# Patient Record
Sex: FEMALE | Race: WHITE | NOT HISPANIC OR LATINO | Employment: FULL TIME | URBAN - METROPOLITAN AREA
[De-identification: names, ages, dates, MRNs, and addresses within clinical notes are randomized per-mention and may not be internally consistent; named-entity substitution may affect disease eponyms.]

---

## 2019-03-06 ENCOUNTER — OFFICE VISIT (OUTPATIENT)
Dept: FAMILY MEDICINE CLINIC | Facility: CLINIC | Age: 34
End: 2019-03-06

## 2019-03-06 VITALS
TEMPERATURE: 99.8 F | OXYGEN SATURATION: 97 % | SYSTOLIC BLOOD PRESSURE: 120 MMHG | HEIGHT: 67 IN | BODY MASS INDEX: 43.32 KG/M2 | DIASTOLIC BLOOD PRESSURE: 90 MMHG | RESPIRATION RATE: 18 BRPM | WEIGHT: 276 LBS | HEART RATE: 96 BPM

## 2019-03-06 DIAGNOSIS — B27.90 MONONUCLEOSIS SYNDROME: Primary | ICD-10-CM

## 2019-03-06 PROBLEM — R55 VASODEPRESSOR SYNCOPE: Status: ACTIVE | Noted: 2019-03-06

## 2019-03-06 PROBLEM — Q24.8 PERICARDIAL CYST: Status: ACTIVE | Noted: 2019-03-06

## 2019-03-06 LAB
EXPIRATION DATE: NORMAL
FLUAV AG NPH QL: NEGATIVE
FLUBV AG NPH QL: NEGATIVE
INTERNAL CONTROL: NORMAL
Lab: NORMAL

## 2019-03-06 PROCEDURE — 87804 INFLUENZA ASSAY W/OPTIC: CPT | Performed by: FAMILY MEDICINE

## 2019-03-06 PROCEDURE — 99213 OFFICE O/P EST LOW 20 MIN: CPT | Performed by: FAMILY MEDICINE

## 2019-03-06 RX ORDER — MONTELUKAST SODIUM 10 MG/1
TABLET ORAL
COMMUNITY
Start: 2019-02-22 | End: 2020-07-18 | Stop reason: ALTCHOICE

## 2019-03-06 RX ORDER — ALBUTEROL SULFATE 90 UG/1
2 AEROSOL, METERED RESPIRATORY (INHALATION) EVERY 4 HOURS PRN
COMMUNITY
Start: 2019-02-22

## 2019-03-06 RX ORDER — PREDNISONE 20 MG/1
20 TABLET ORAL 2 TIMES DAILY
Qty: 14 TABLET | Refills: 0 | Status: SHIPPED | OUTPATIENT
Start: 2019-03-06 | End: 2019-03-13

## 2019-03-06 RX ORDER — SPIRONOLACTONE 100 MG/1
100 TABLET, FILM COATED ORAL DAILY
COMMUNITY

## 2019-03-06 RX ORDER — FLUTICASONE PROPIONATE 50 MCG
2 SPRAY, SUSPENSION (ML) NASAL DAILY
COMMUNITY

## 2019-03-06 RX ORDER — CETIRIZINE HYDROCHLORIDE 10 MG/1
10 TABLET ORAL DAILY
COMMUNITY

## 2019-03-06 NOTE — PROGRESS NOTES
"Subjective   Bri Pfeiffer is a 33 y.o. female.     CC: Cough/Respiratory Sx    History of Present Illness     Pt comes in today after being seen at [facility] on 2/22/19 for a roughly 1 month h/o coughing. Had initially been seen there and took Augmentin but went back for the above. Was given Prednisone taper over 7 days and then Flonase. Got better with her neck/throat swelling but, as tapered it, the swelling and sx came back.  PMH: prior CMV but not mono.  Has \"felt terrible\" for the entire month. Sx wax and wane. Temps to 102 this Monday.  Elevates to sleep due to GERD but this is unchanged.    The following portions of the patient's history were reviewed and updated as appropriate: allergies, current medications, past family history, past medical history, past social history, past surgical history and problem list.    Review of Systems   Constitutional: Negative for activity change, chills, fatigue and fever.   HENT: Positive for congestion, ear pain, sore throat and trouble swallowing. Negative for drooling and ear discharge.    Respiratory: Positive for cough, shortness of breath and stridor. Negative for chest tightness.    Cardiovascular: Negative for chest pain and palpitations.   Gastrointestinal: Negative for abdominal pain, diarrhea, nausea and vomiting.   Endocrine: Negative for cold intolerance.   Musculoskeletal: Positive for neck pain.   Neurological: Positive for headaches.   Psychiatric/Behavioral: Negative for behavioral problems and dysphoric mood.       /90   Pulse 96   Temp 99.8 °F (37.7 °C) (Oral)   Resp 18   Ht 170.2 cm (67\")   Wt 125 kg (276 lb)   SpO2 97%   BMI 43.23 kg/m²     Objective   Physical Exam   Constitutional: She appears well-developed and well-nourished.   HENT:   Mouth/Throat: Uvula is midline. Posterior oropharyngeal erythema present.   Neck: Neck supple. No thyromegaly present.   Cardiovascular: Normal rate and regular rhythm.   No murmur " heard.  Pulmonary/Chest: Effort normal and breath sounds normal. She has no wheezes. She has no rales.   Abdominal: Bowel sounds are normal. There is no tenderness.   Lymphadenopathy:        Head (right side): Submandibular (tender) adenopathy present.        Head (left side): Submandibular (tender) adenopathy present.   Neurological: She is alert.   Psychiatric: She has a normal mood and affect. Her behavior is normal.   Nursing note and vitals reviewed.  Flu Screen: NEGATIVE    Assessment/Plan   Bri was seen today for sore throat and fever.    Diagnoses and all orders for this visit:    Mononucleosis syndrome  -     POC Influenza A / B  -     Comprehensive Metabolic Panel  -     CBC & Differential  -     Mononucleosis Screen  -     predniSONE (DELTASONE) 20 MG tablet; Take 1 tablet by mouth 2 (Two) Times a Day for 7 days.    Strongly feel this is Mono, or a Mono-type syndrome. Avoid Tylenol, hydrate, rest.

## 2019-03-07 LAB
ALBUMIN SERPL-MCNC: 4.3 G/DL (ref 3.5–5.2)
ALBUMIN/GLOB SERPL: 1.3 G/DL
ALP SERPL-CCNC: 90 U/L (ref 39–117)
ALT SERPL-CCNC: 12 U/L (ref 1–33)
AST SERPL-CCNC: 10 U/L (ref 1–32)
BASOPHILS # BLD AUTO: 0.08 10*3/MM3 (ref 0–0.2)
BASOPHILS NFR BLD AUTO: 0.3 % (ref 0–1.5)
BILIRUB SERPL-MCNC: 0.4 MG/DL (ref 0.1–1.2)
BUN SERPL-MCNC: 8 MG/DL (ref 6–20)
BUN/CREAT SERPL: 9.6 (ref 7–25)
CALCIUM SERPL-MCNC: 9.7 MG/DL (ref 8.6–10.5)
CHLORIDE SERPL-SCNC: 102 MMOL/L (ref 98–107)
CO2 SERPL-SCNC: 27.6 MMOL/L (ref 22–29)
CREAT SERPL-MCNC: 0.83 MG/DL (ref 0.57–1)
EOSINOPHIL # BLD AUTO: 0.18 10*3/MM3 (ref 0–0.4)
EOSINOPHIL NFR BLD AUTO: 0.8 % (ref 0.3–6.2)
ERYTHROCYTE [DISTWIDTH] IN BLOOD BY AUTOMATED COUNT: 14.2 % (ref 12.3–15.4)
GLOBULIN SER CALC-MCNC: 3.3 GM/DL
GLUCOSE SERPL-MCNC: 89 MG/DL (ref 65–99)
HCT VFR BLD AUTO: 42.5 % (ref 34–46.6)
HETEROPH AB SER QL LA: NEGATIVE
HGB BLD-MCNC: 12.2 G/DL (ref 12–15.9)
IMM GRANULOCYTES # BLD AUTO: 0.17 10*3/MM3 (ref 0–0.05)
IMM GRANULOCYTES NFR BLD AUTO: 0.7 % (ref 0–0.5)
LYMPHOCYTES # BLD AUTO: 1.98 10*3/MM3 (ref 0.7–3.1)
LYMPHOCYTES NFR BLD AUTO: 8.6 % (ref 19.6–45.3)
MCH RBC QN AUTO: 26.5 PG (ref 26.6–33)
MCHC RBC AUTO-ENTMCNC: 28.7 G/DL (ref 31.5–35.7)
MCV RBC AUTO: 92.2 FL (ref 79–97)
MONOCYTES # BLD AUTO: 1.98 10*3/MM3 (ref 0.1–0.9)
MONOCYTES NFR BLD AUTO: 8.6 % (ref 5–12)
NEUTROPHILS # BLD AUTO: 18.52 10*3/MM3 (ref 1.4–7)
NEUTROPHILS NFR BLD AUTO: 81 % (ref 42.7–76)
NRBC BLD AUTO-RTO: 0 /100 WBC (ref 0–0)
PLATELET # BLD AUTO: 406 10*3/MM3 (ref 140–450)
POTASSIUM SERPL-SCNC: 4.5 MMOL/L (ref 3.5–5.2)
PROT SERPL-MCNC: 7.6 G/DL (ref 6–8.5)
RBC # BLD AUTO: 4.61 10*6/MM3 (ref 3.77–5.28)
SODIUM SERPL-SCNC: 141 MMOL/L (ref 136–145)
WBC # BLD AUTO: 22.91 10*3/MM3 (ref 3.4–10.8)

## 2019-03-11 DIAGNOSIS — D72.829 LEUKOCYTOSIS, UNSPECIFIED TYPE: Primary | ICD-10-CM

## 2019-03-25 LAB
BASOPHILS # BLD AUTO: 0.04 10*3/MM3 (ref 0–0.2)
BASOPHILS NFR BLD AUTO: 0.4 % (ref 0–1.5)
EOSINOPHIL # BLD AUTO: 0.11 10*3/MM3 (ref 0–0.4)
EOSINOPHIL NFR BLD AUTO: 1.2 % (ref 0.3–6.2)
ERYTHROCYTE [DISTWIDTH] IN BLOOD BY AUTOMATED COUNT: 14.3 % (ref 12.3–15.4)
HCT VFR BLD AUTO: 39.9 % (ref 34–46.6)
HGB BLD-MCNC: 11.7 G/DL (ref 12–15.9)
IMM GRANULOCYTES # BLD AUTO: 0.02 10*3/MM3 (ref 0–0.05)
IMM GRANULOCYTES NFR BLD AUTO: 0.2 % (ref 0–0.5)
LYMPHOCYTES # BLD AUTO: 2.31 10*3/MM3 (ref 0.7–3.1)
LYMPHOCYTES NFR BLD AUTO: 25.4 % (ref 19.6–45.3)
MCH RBC QN AUTO: 26.4 PG (ref 26.6–33)
MCHC RBC AUTO-ENTMCNC: 29.3 G/DL (ref 31.5–35.7)
MCV RBC AUTO: 89.9 FL (ref 79–97)
MONOCYTES # BLD AUTO: 0.83 10*3/MM3 (ref 0.1–0.9)
MONOCYTES NFR BLD AUTO: 9.1 % (ref 5–12)
NEUTROPHILS # BLD AUTO: 5.78 10*3/MM3 (ref 1.4–7)
NEUTROPHILS NFR BLD AUTO: 63.7 % (ref 42.7–76)
NRBC BLD AUTO-RTO: 0 /100 WBC (ref 0–0)
PLATELET # BLD AUTO: 391 10*3/MM3 (ref 140–450)
RBC # BLD AUTO: 4.44 10*6/MM3 (ref 3.77–5.28)
WBC # BLD AUTO: 9.09 10*3/MM3 (ref 3.4–10.8)

## 2019-03-26 ENCOUNTER — TELEPHONE (OUTPATIENT)
Dept: FAMILY MEDICINE CLINIC | Facility: CLINIC | Age: 34
End: 2019-03-26

## 2019-03-26 DIAGNOSIS — D64.9 ANEMIA, UNSPECIFIED TYPE: Primary | ICD-10-CM

## 2019-03-26 NOTE — TELEPHONE ENCOUNTER
Regarding: RE: RE: Test Results Question  Contact: [phone]  ----- Message from Mychart, Generic sent at 3/26/2019  3:33 PM EDT -----    Thank you for getting back with me! I have a lot of GI issues I have not had addressed but need to look in to. I'd appreciate the referral.  Thanks!    ----- Message -----  From: Alan Benavidez MD  Sent: 3/26/19 3:27 PM  To: Bri Pfeiffer  Subject: RE: Test Results Question    Thanks for the quick email. Since your original CBC was abnormal, thus the repeat that is markedly better, although showing some anemia, we have several options. IF you are having any gastrointestinal issues (heartburn, etc), I would recommend a referral to GI. If feeling great, I would simply repeat the CBC in 1 month. Let me know which way you would like to go.      ----- Message -----     From: Bri Pfeiffer     Sent: 3/26/2019 12:54 PM EDT       To: Alan Benavidez MD  Subject: Test Results Question    I have a question about CBC WITH DIFFERENTIAL resulted on 3/25/19 at 8:07 PM.    I have an IUD, and do not have any bleeding with my periods. What do you advise I look in to?  Thanks so much!  Bri Pfeiffer

## 2019-04-22 ENCOUNTER — OFFICE VISIT (OUTPATIENT)
Dept: GASTROENTEROLOGY | Facility: CLINIC | Age: 34
End: 2019-04-22

## 2019-04-22 VITALS
WEIGHT: 281 LBS | DIASTOLIC BLOOD PRESSURE: 81 MMHG | BODY MASS INDEX: 42.59 KG/M2 | HEIGHT: 68 IN | SYSTOLIC BLOOD PRESSURE: 123 MMHG | HEART RATE: 73 BPM

## 2019-04-22 DIAGNOSIS — K62.5 RECTAL BLEEDING: ICD-10-CM

## 2019-04-22 DIAGNOSIS — R13.19 ESOPHAGEAL DYSPHAGIA: Primary | ICD-10-CM

## 2019-04-22 PROCEDURE — 99244 OFF/OP CNSLTJ NEW/EST MOD 40: CPT | Performed by: INTERNAL MEDICINE

## 2019-04-22 RX ORDER — SODIUM CHLORIDE, SODIUM LACTATE, POTASSIUM CHLORIDE, CALCIUM CHLORIDE 600; 310; 30; 20 MG/100ML; MG/100ML; MG/100ML; MG/100ML
30 INJECTION, SOLUTION INTRAVENOUS CONTINUOUS
Status: CANCELLED | OUTPATIENT
Start: 2019-04-22

## 2019-04-22 NOTE — PROGRESS NOTES
Subjective   Bri Pfeiffer is a 33 y.o.. female is being seen for consultation today at the request of Alan Benavidez MD    Chief Complaint   Patient presents with   • Anemia   • Heartburn   • Difficulty Swallowing   • Abdominal Pain   • Constipation   • Diarrhea   • Rectal Bleeding     History of Present Illness  In the first week of March patient had a serious febrile illness with white count of in excess of 22,000.  Serologic workup was actually negative, follow-up blood work demonstrated a mild anemia, and the symptoms eventually completely resolved.  However, during this workup patient made note of her GI symptoms which have been waxing and waning over a long period of time and that includes intermittent heartburn, occasional odynophagia, occasional solid food dysphasia, alternating diarrhea and constipation, and occasional rectal bleeding.  She was a soon as the symptoms would become severe enough to seek medical attention they would tend to go away on their own for a few months.  She has had no melena.  She said no unusual weight loss.  She is not aware of anything that provokes the symptoms.  She does have allergies and asthma.    The following portions of the patient's history were reviewed and updated as appropriate: allergies, current medications, past family history, past medical history, past social history, past surgical history and problem list.      Current Outpatient Medications:   •  albuterol sulfate HFA (PROAIR HFA) 108 (90 Base) MCG/ACT inhaler, , Disp: , Rfl:   •  cetirizine (ZYRTEC ALLERGY) 10 MG tablet, , Disp: , Rfl:   •  Cholecalciferol (VITAMIN D-3) 5000 units tablet, Take  by mouth., Disp: , Rfl:   •  Cyanocobalamin (VITAMIN B 12 PO), Take  by mouth., Disp: , Rfl:   •  fluticasone (FLONASE ALLERGY RELIEF) 50 MCG/ACT nasal spray, , Disp: , Rfl:   •  spironolactone (ALDACTONE) 100 MG tablet, , Disp: , Rfl:   •  montelukast (SINGULAIR) 10 MG tablet, , Disp: , Rfl:     Family History    Problem Relation Age of Onset   • Colon cancer Maternal Grandmother    • Rectal cancer Maternal Grandmother    • Prostate cancer Maternal Grandfather    • Prostate cancer Paternal Grandfather        Review of Systems   Constitutional: Negative for appetite change, diaphoresis, fatigue, fever and unexpected weight change.   HENT: Positive for trouble swallowing. Negative for hearing loss, mouth sores and sore throat.    Eyes: Negative for pain and redness.   Respiratory: Negative for choking and shortness of breath.    Cardiovascular: Negative for chest pain and leg swelling.   Gastrointestinal: Positive for anal bleeding, constipation and diarrhea. Negative for abdominal distention, abdominal pain, blood in stool, nausea, rectal pain and vomiting.   Genitourinary: Negative for flank pain and hematuria.   Musculoskeletal: Negative for arthralgias and joint swelling.   Skin: Negative for color change and rash.   Allergic/Immunologic: Negative for food allergies and immunocompromised state.   Neurological: Negative for dizziness, seizures and headaches.   Hematological: Does not bruise/bleed easily.   Psychiatric/Behavioral: Negative for confusion, sleep disturbance and suicidal ideas. The patient is not nervous/anxious.        Objective   Physical Exam   Constitutional: She is oriented to person, place, and time. She appears well-developed and well-nourished.   HENT:   Head: Normocephalic and atraumatic.   Right Ear: External ear normal.   Left Ear: External ear normal.   Nose: Nose normal.   Eyes: Conjunctivae are normal. Pupils are equal, round, and reactive to light.   Neck: Neck supple. No thyromegaly present.   Cardiovascular: Normal heart sounds. Exam reveals no gallop and no friction rub.   No murmur heard.  Pulmonary/Chest: Effort normal and breath sounds normal.   Abdominal: Soft. Bowel sounds are normal. She exhibits no distension and no mass. There is no tenderness.   Musculoskeletal: She exhibits no  edema.   Neurological: She is alert and oriented to person, place, and time.   Skin: No rash noted. No erythema.   Psychiatric: She has a normal mood and affect. Her behavior is normal.   Nursing note and vitals reviewed.      Pertinent laboratory results were reviewed.  and Pertinent old records were reviewed.     Assessment/Plan   Problems Addressed this Visit        Digestive    Esophageal dysphagia - Primary    Relevant Orders    Case Request (Completed)    Rectal bleeding    Relevant Orders    Case Request (Completed)        Patient's GI symptoms need to be addressed.  We scheduled EGD and colonoscopy accordingly.

## 2019-05-01 ENCOUNTER — TELEPHONE (OUTPATIENT)
Dept: GASTROENTEROLOGY | Facility: CLINIC | Age: 34
End: 2019-05-01

## 2019-05-10 ENCOUNTER — ANESTHESIA EVENT (OUTPATIENT)
Dept: GASTROENTEROLOGY | Facility: HOSPITAL | Age: 34
End: 2019-05-10

## 2019-05-10 ENCOUNTER — ANESTHESIA (OUTPATIENT)
Dept: GASTROENTEROLOGY | Facility: HOSPITAL | Age: 34
End: 2019-05-10

## 2019-05-10 ENCOUNTER — HOSPITAL ENCOUNTER (OUTPATIENT)
Facility: HOSPITAL | Age: 34
Setting detail: HOSPITAL OUTPATIENT SURGERY
Discharge: HOME | End: 2019-05-10
Attending: INTERNAL MEDICINE | Admitting: INTERNAL MEDICINE

## 2019-05-10 VITALS
DIASTOLIC BLOOD PRESSURE: 94 MMHG | HEIGHT: 68 IN | TEMPERATURE: 97.9 F | RESPIRATION RATE: 16 BRPM | SYSTOLIC BLOOD PRESSURE: 125 MMHG | BODY MASS INDEX: 41.82 KG/M2 | OXYGEN SATURATION: 99 % | WEIGHT: 275.9 LBS | HEART RATE: 82 BPM

## 2019-05-10 DIAGNOSIS — K56.699 COLON STRICTURE (HCC): Primary | ICD-10-CM

## 2019-05-10 DIAGNOSIS — R13.19 ESOPHAGEAL DYSPHAGIA: ICD-10-CM

## 2019-05-10 DIAGNOSIS — K62.5 RECTAL BLEEDING: ICD-10-CM

## 2019-05-10 LAB
B-HCG UR QL: NEGATIVE
INTERNAL NEGATIVE CONTROL: NEGATIVE
INTERNAL POSITIVE CONTROL: POSITIVE
Lab: NORMAL

## 2019-05-10 PROCEDURE — 88342 IMHCHEM/IMCYTCHM 1ST ANTB: CPT | Performed by: INTERNAL MEDICINE

## 2019-05-10 PROCEDURE — 25010000002 PROPOFOL 10 MG/ML EMULSION: Performed by: ANESTHESIOLOGY

## 2019-05-10 PROCEDURE — 88305 TISSUE EXAM BY PATHOLOGIST: CPT | Performed by: INTERNAL MEDICINE

## 2019-05-10 PROCEDURE — 43239 EGD BIOPSY SINGLE/MULTIPLE: CPT | Performed by: INTERNAL MEDICINE

## 2019-05-10 PROCEDURE — 45380 COLONOSCOPY AND BIOPSY: CPT | Performed by: INTERNAL MEDICINE

## 2019-05-10 PROCEDURE — 81025 URINE PREGNANCY TEST: CPT | Performed by: INTERNAL MEDICINE

## 2019-05-10 PROCEDURE — 88312 SPECIAL STAINS GROUP 1: CPT | Performed by: INTERNAL MEDICINE

## 2019-05-10 RX ORDER — PROPOFOL 10 MG/ML
VIAL (ML) INTRAVENOUS AS NEEDED
Status: DISCONTINUED | OUTPATIENT
Start: 2019-05-10 | End: 2019-05-10 | Stop reason: SURG

## 2019-05-10 RX ORDER — SODIUM CHLORIDE, SODIUM LACTATE, POTASSIUM CHLORIDE, CALCIUM CHLORIDE 600; 310; 30; 20 MG/100ML; MG/100ML; MG/100ML; MG/100ML
30 INJECTION, SOLUTION INTRAVENOUS CONTINUOUS
Status: DISCONTINUED | OUTPATIENT
Start: 2019-05-10 | End: 2019-05-10 | Stop reason: HOSPADM

## 2019-05-10 RX ORDER — PROPOFOL 10 MG/ML
VIAL (ML) INTRAVENOUS CONTINUOUS PRN
Status: DISCONTINUED | OUTPATIENT
Start: 2019-05-10 | End: 2019-05-10 | Stop reason: SURG

## 2019-05-10 RX ORDER — LIDOCAINE HYDROCHLORIDE 20 MG/ML
INJECTION, SOLUTION INFILTRATION; PERINEURAL AS NEEDED
Status: DISCONTINUED | OUTPATIENT
Start: 2019-05-10 | End: 2019-05-10 | Stop reason: SURG

## 2019-05-10 RX ORDER — ESOMEPRAZOLE MAGNESIUM 40 MG/1
40 CAPSULE, DELAYED RELEASE ORAL DAILY
Qty: 30 CAPSULE | Refills: 5 | Status: SHIPPED | OUTPATIENT
Start: 2019-05-10 | End: 2019-12-28

## 2019-05-10 RX ADMIN — PROPOFOL 100 MCG/KG/MIN: 10 INJECTION, EMULSION INTRAVENOUS at 08:15

## 2019-05-10 RX ADMIN — LIDOCAINE HYDROCHLORIDE 60 MG: 20 INJECTION, SOLUTION INFILTRATION; PERINEURAL at 08:15

## 2019-05-10 RX ADMIN — SODIUM CHLORIDE, POTASSIUM CHLORIDE, SODIUM LACTATE AND CALCIUM CHLORIDE 30 ML/HR: 600; 310; 30; 20 INJECTION, SOLUTION INTRAVENOUS at 07:40

## 2019-05-10 RX ADMIN — PROPOFOL 100 MG: 10 INJECTION, EMULSION INTRAVENOUS at 08:18

## 2019-05-10 NOTE — ANESTHESIA POSTPROCEDURE EVALUATION
"Patient: Bri Pfeiffer    Procedure Summary     Date:  05/10/19 Room / Location:  [facility] ENDOSCOPY 1 /  [facility]    Anesthesia Start:  0817 Anesthesia Stop:  0900    Procedures:       ESOPHAGOGASTRODUODENOSCOPY WITH BIOPSY (N/A Esophagus)      COLONOSCOPY TO CECUM TO TERMINAL ILEUM WITH BIOPSY (N/A ) Diagnosis:       Esophageal dysphagia      Rectal bleeding      (Esophageal dysphagia [R13.10])      (Rectal bleeding [K62.5])    Surgeon:  Tae Turcios MD Provider:  Carl Ko MD    Anesthesia Type:  MAC ASA Status:  3          Anesthesia Type: MAC  Last vitals  BP   125/94 (05/10/19 0923)   Temp   36.6 °C (97.9 °F) (05/10/19 0910)   Pulse   82 (05/10/19 0923)   Resp   16 (05/10/19 0923)     SpO2   99 % (05/10/19 0923)     Post Anesthesia Care and Evaluation    Patient location during evaluation: PACU  Patient participation: complete - patient participated  Level of consciousness: awake  Pain score: 0  Pain management: adequate  Airway patency: patent  Anesthetic complications: No anesthetic complications  PONV Status: none  Cardiovascular status: acceptable  Respiratory status: acceptable  Hydration status: acceptable    Comments: /94 (BP Location: Left arm, Patient Position: Sitting)   Pulse 82   Temp 36.6 °C (97.9 °F) (Oral)   Resp 16   Ht 172.7 cm (68\")   Wt 125 kg (275 lb 14.4 oz)   SpO2 99%   BMI 41.95 kg/m²       "

## 2019-05-10 NOTE — ANESTHESIA PREPROCEDURE EVALUATION
Anesthesia Evaluation     Patient summary reviewed and Nursing notes reviewed                Airway   Mallampati: I  TM distance: >3 FB  Neck ROM: full  No difficulty expected  Dental - normal exam     Pulmonary - normal exam   (+) asthma,   Cardiovascular - negative cardio ROS and normal exam        Neuro/Psych  (+) syncope,     GI/Hepatic/Renal/Endo    (+) obesity,  GERD, GI bleeding,     Musculoskeletal (-) negative ROS    Abdominal  - normal exam    Bowel sounds: normal.   Substance History - negative use     OB/GYN negative ob/gyn ROS         Other                        Anesthesia Plan    ASA 3     MAC     Anesthetic plan, all risks, benefits, and alternatives have been provided, discussed and informed consent has been obtained with: patient.

## 2019-05-14 ENCOUNTER — HOSPITAL ENCOUNTER (OUTPATIENT)
Dept: CT IMAGING | Facility: HOSPITAL | Age: 34
Discharge: HOME | End: 2019-05-14
Admitting: INTERNAL MEDICINE

## 2019-05-14 DIAGNOSIS — K56.699 COLON STRICTURE (HCC): ICD-10-CM

## 2019-05-14 PROCEDURE — 74177 CT ABD & PELVIS W/CONTRAST: CPT

## 2019-05-14 PROCEDURE — 25010000002 IOPAMIDOL 61 % SOLUTION: Performed by: INTERNAL MEDICINE

## 2019-05-14 RX ADMIN — IOPAMIDOL 85 ML: 612 INJECTION, SOLUTION INTRAVENOUS at 13:51

## 2019-05-15 LAB
CYTO UR: NORMAL
LAB AP CASE REPORT: NORMAL
LAB AP DIAGNOSIS COMMENT: NORMAL
PATH REPORT.ADDENDUM SPEC: NORMAL
PATH REPORT.FINAL DX SPEC: NORMAL
PATH REPORT.GROSS SPEC: NORMAL

## 2019-05-20 ENCOUNTER — TELEPHONE (OUTPATIENT)
Dept: GASTROENTEROLOGY | Facility: CLINIC | Age: 34
End: 2019-05-20

## 2019-05-20 NOTE — TELEPHONE ENCOUNTER
----- Message from Tae Turcios MD sent at 5/17/2019  3:37 PM EDT -----  Special stains are negative for Helicobacter.  However, the biopsies of the colon strongly suggest Crohn's disease and I would like to see her back in the office for further evaluation.  ----- Message -----  From: Lab, Background User  Sent: 5/13/2019   3:24 PM  To: Tae Turcios MD

## 2019-05-20 NOTE — TELEPHONE ENCOUNTER
Pt notified that Dr Turcios reviewed colonoscopy biopsy report which suggests Crohn's Disease.  Sched f/u appt with Dr Turcios tomorrow 5/21/19 to discuss further.

## 2019-05-21 ENCOUNTER — OFFICE VISIT (OUTPATIENT)
Dept: GASTROENTEROLOGY | Facility: CLINIC | Age: 34
End: 2019-05-21

## 2019-05-21 VITALS
DIASTOLIC BLOOD PRESSURE: 83 MMHG | HEART RATE: 68 BPM | BODY MASS INDEX: 41.98 KG/M2 | SYSTOLIC BLOOD PRESSURE: 121 MMHG | HEIGHT: 68 IN | WEIGHT: 277 LBS

## 2019-05-21 DIAGNOSIS — K52.9 COLITIS: Primary | ICD-10-CM

## 2019-05-21 PROCEDURE — 99213 OFFICE O/P EST LOW 20 MIN: CPT | Performed by: INTERNAL MEDICINE

## 2019-05-21 RX ORDER — MESALAMINE 0.38 G/1
375 CAPSULE, EXTENDED RELEASE ORAL DAILY
Qty: 120 CAPSULE | Refills: 3 | Status: SHIPPED | OUTPATIENT
Start: 2019-05-21 | End: 2019-10-02 | Stop reason: SDUPTHER

## 2019-05-21 NOTE — PROGRESS NOTES
Subjective   Bri Pfeiffer is a 33 y.o.. female is here today for follow-up.    Chief Complaint   Patient presents with   • Abdominal Pain     Discuss Bx Results   • Nausea   • Rectal Bleeding       History of Present Illness  Patient underwent endoscopic evaluation to address abdominal pain, rectal bleeding, and dysphasia.  Her upper tract evaluation demonstrated evidence of simple reflux but no other major pathology.  However, her colonoscopy was very suggestive of Crohn's disease, as manifested by perianal scarring and mild anal stenosis, along with a similar process involving the ileocecal valves which prevented their cannulation.  Scattered ulceration was also seen throughout the colon.  Biopsies were obtained and actually demonstrated some poorly organized granulomas which are almost diagnostic of Crohn's disease.  A CT scan was obtained which demonstrated fatty involvement of the terminal ileum, again highly suggestive of a chronic process such as Crohn's disease.  From a symptomatic standpoint the patient is experiencing the same symptoms that were present on her initial visit.    The following portions of the patient's history were reviewed and updated as appropriate: allergies, current medications, past family history, past medical history, past social history, past surgical history and problem list.      Current Outpatient Medications:   •  albuterol sulfate HFA (PROAIR HFA) 108 (90 Base) MCG/ACT inhaler, , Disp: , Rfl:   •  cetirizine (ZYRTEC ALLERGY) 10 MG tablet, , Disp: , Rfl:   •  Cholecalciferol (VITAMIN D-3) 5000 units tablet, Take  by mouth., Disp: , Rfl:   •  Cyanocobalamin (VITAMIN B 12 PO), Take  by mouth., Disp: , Rfl:   •  esomeprazole (nexIUM) 40 MG capsule, Take 1 capsule by mouth Daily., Disp: 30 capsule, Rfl: 5  •  fluticasone (FLONASE ALLERGY RELIEF) 50 MCG/ACT nasal spray, , Disp: , Rfl:   •  montelukast (SINGULAIR) 10 MG tablet, , Disp: , Rfl:   •  spironolactone (ALDACTONE) 100  MG tablet, , Disp: , Rfl:   •  mesalamine (APRISO) 0.375 g 24 hr capsule, Take 1 capsule by mouth Daily., Disp: 120 capsule, Rfl: 3    Family History   Problem Relation Age of Onset   • Colon cancer Maternal Grandmother    • Rectal cancer Maternal Grandmother    • Prostate cancer Maternal Grandfather    • Prostate cancer Paternal Grandfather        Review of Systems   Respiratory: Negative for shortness of breath.    Cardiovascular: Negative for chest pain.   All other systems reviewed and are negative.      Objective   Physical Exam   Constitutional: She appears well-developed and well-nourished.   Nursing note and vitals reviewed.      Pertinent laboratory results were reviewed. , Pertinent old records were reviewed. , Pertinent radiology results were reviewed.  and Pertinent medical tests were reviewed.     Assessment/Plan   Problems Addressed this Visit        Digestive    Colitis - Primary    Relevant Orders    IBD Sgi Diagnostic        Think it is likely that she has Crohn's disease.  I would like to get the above serologies and see if this would provide supporting evidence before the diagnosis is completely established.  Although use of mesalamine is controversial and Crohn's disease, it seems to be appropriate in the patient's presentation, and I am not sure I could justify use of Biologics, given their side effect profile and expense.

## 2019-05-22 ENCOUNTER — TELEPHONE (OUTPATIENT)
Dept: GASTROENTEROLOGY | Facility: CLINIC | Age: 34
End: 2019-05-22

## 2019-05-22 NOTE — TELEPHONE ENCOUNTER
"Pt was in office yesterday and Dr Turcios prescribed Apriso (Mesalamine) 0.375 g, quantity #120, but directions stated to take \"One cap daily\".    All d/w Dr Turcios, directions should read : 4 caps daily.  Called [facility] and spoke to Ilana. Directions changed to 4 caps daily.  LVM with pt that she needs to take 4 caps daily instead of just one.    Pt returned call, she verbalized understanding of need to take 4 caps once daily.  "

## 2019-05-26 ENCOUNTER — RESULTS ENCOUNTER (OUTPATIENT)
Dept: GASTROENTEROLOGY | Facility: CLINIC | Age: 34
End: 2019-05-26

## 2019-05-26 DIAGNOSIS — K52.9 COLITIS: ICD-10-CM

## 2019-06-04 ENCOUNTER — TELEPHONE (OUTPATIENT)
Dept: GASTROENTEROLOGY | Facility: CLINIC | Age: 34
End: 2019-06-04

## 2019-06-04 NOTE — TELEPHONE ENCOUNTER
"Dr Turcios ordered IBD diagnostic test at last visit.  Pt had done at at [facility] location. Received call from MOISE at [facility] ([phone]).  They gerald test but they cannot bill insurance directly.  They can only bill to provider (Dr Turcios).  Informed [facility] that they cannot bill directly to Dr Turcios.    Called [facility]. They state if pt had drawn at hospital, [facility] directly.  [facility] would then bill insurance. She will talk with Jeni Morocho to see if this can be worked out with [facility].    Spoke with Jeni Morocho and Opal at [facility].  There is nothing they can do about specimen collected at [facility]. ([facility] actually then sends to [facility]). They state lab test is very expensive and that even if pt came to [facility] to have done, they would not draw it.  They advised pt call her insurance company to see if this test is even covered.    Will contact [facility] lab tomorrow for further recommendations and to see if they can provide assistance with this matter.    Zoraida spoke with Pt, she will go to [facility]\" to have IBD test done.  Mailed lab order/instructions to pt. Called [facility] they will cancel test that was done there.  "

## 2019-06-06 LAB — SPECIMEN STATUS: NORMAL

## 2019-06-06 NOTE — TELEPHONE ENCOUNTER
Called Jerrod Boles from [facility] [employer] [facility] T [phone]. He advised best thing for patient to do. Is to go to [facility] and have blood drawn for test. They work with [facility] on this test. Most patient will pay out of pocket. Would be $250. Most people do not pay any more than $ 50. [facility] bills insurance and works to get lowest price for patient to pay out of pocket. Called patient left message for her to call me at [phone]. Will review this information with her.

## 2019-06-21 ENCOUNTER — TELEPHONE (OUTPATIENT)
Dept: GASTROENTEROLOGY | Facility: CLINIC | Age: 34
End: 2019-06-21

## 2019-06-21 NOTE — TELEPHONE ENCOUNTER
----- Message from Zoraida Leyva MA sent at 6/19/2019  4:08 PM EDT -----  [birth year] patient called wanting lab results. Told her we will call [facility] tomorrow. To see if they have results.

## 2019-06-21 NOTE — TELEPHONE ENCOUNTER
Dr Turcios reviewed [facility] IBD sgi Diagnostic results from 6/17/19:  Pattern is consistent with Crohn's Disease.    Pt notified of above.  She has f/u appt 7/25/19.

## 2019-07-25 ENCOUNTER — OFFICE VISIT (OUTPATIENT)
Dept: GASTROENTEROLOGY | Facility: CLINIC | Age: 34
End: 2019-07-25

## 2019-07-25 VITALS
BODY MASS INDEX: 42.21 KG/M2 | SYSTOLIC BLOOD PRESSURE: 124 MMHG | DIASTOLIC BLOOD PRESSURE: 83 MMHG | HEART RATE: 72 BPM | WEIGHT: 278.5 LBS | HEIGHT: 68 IN

## 2019-07-25 DIAGNOSIS — K21.9 GASTROESOPHAGEAL REFLUX DISEASE, ESOPHAGITIS PRESENCE NOT SPECIFIED: ICD-10-CM

## 2019-07-25 DIAGNOSIS — K50.111 CROHN'S DISEASE OF LARGE INTESTINE WITH RECTAL BLEEDING (HCC): ICD-10-CM

## 2019-07-25 DIAGNOSIS — K52.9 COLITIS: Primary | ICD-10-CM

## 2019-07-25 DIAGNOSIS — K62.5 RECTAL BLEEDING: ICD-10-CM

## 2019-07-25 PROCEDURE — 99213 OFFICE O/P EST LOW 20 MIN: CPT | Performed by: INTERNAL MEDICINE

## 2019-07-25 NOTE — PROGRESS NOTES
Subjective   Bri Pfeiffer is a 33 y.o.. female is here today for follow-up.    Chief Complaint   Patient presents with   • Crohn's Disease   • Abdominal Pain   • Rectal Bleeding     History of Present Illness  Patient is doing just a bit better than her last visit.  She is having less epigastric pain and reflux type symptoms.  She has noticed a slight diminution of her rectal bleeding.  Interestingly, her dominant symptom is constipation rather than diarrhea with her Crohn's disease.  The IBD diagnostic serology returned and it was positive for Crohn's.    The following portions of the patient's history were reviewed and updated as appropriate: allergies, current medications, past family history, past medical history, past social history, past surgical history and problem list.      Current Outpatient Medications:   •  albuterol sulfate HFA (PROAIR HFA) 108 (90 Base) MCG/ACT inhaler, , Disp: , Rfl:   •  cetirizine (ZYRTEC ALLERGY) 10 MG tablet, , Disp: , Rfl:   •  Cholecalciferol (VITAMIN D-3) 5000 units tablet, Take  by mouth., Disp: , Rfl:   •  Cyanocobalamin (VITAMIN B 12 PO), Take  by mouth., Disp: , Rfl:   •  esomeprazole (nexIUM) 40 MG capsule, Take 1 capsule by mouth Daily., Disp: 30 capsule, Rfl: 5  •  fluticasone (FLONASE ALLERGY RELIEF) 50 MCG/ACT nasal spray, , Disp: , Rfl:   •  mesalamine (APRISO) 0.375 g 24 hr capsule, Take 1 capsule by mouth Daily. (Patient taking differently: Take 375 mg by mouth Daily. 4 tabs daily), Disp: 120 capsule, Rfl: 3  •  montelukast (SINGULAIR) 10 MG tablet, , Disp: , Rfl:   •  spironolactone (ALDACTONE) 100 MG tablet, , Disp: , Rfl:     Family History   Problem Relation Age of Onset   • Colon cancer Maternal Grandmother    • Rectal cancer Maternal Grandmother    • Prostate cancer Maternal Grandfather    • Prostate cancer Paternal Grandfather        Review of Systems   Respiratory: Negative for shortness of breath.    Cardiovascular: Negative for chest pain.   All  other systems reviewed and are negative.      Objective   Physical Exam   Constitutional: She appears well-developed and well-nourished.   Nursing note and vitals reviewed.      Pertinent laboratory results were reviewed.  and Pertinent old records were reviewed.     Assessment/Plan   Problems Addressed this Visit        Digestive    Rectal bleeding    Colitis - Primary    Gastroesophageal reflux disease      Other Visit Diagnoses     Crohn's disease of large intestine with rectal bleeding (CMS/HCC)        Relevant Orders    Crohns Prognostic        She is making very slow progress with her colitis symptoms which is typical for mesalamine.  Her symptoms are at times difficult to assess.  For this reason I would like to go ahead and get a Crohn's prognostic panel.  If this returns a high value, I think one could make a good case to start biologic therapy earlier rather than later.  I like to see her back in about a month.  Otherwise she is to continue current medications.

## 2019-07-30 ENCOUNTER — RESULTS ENCOUNTER (OUTPATIENT)
Dept: GASTROENTEROLOGY | Facility: CLINIC | Age: 34
End: 2019-07-30

## 2019-07-30 DIAGNOSIS — K50.111 CROHN'S DISEASE OF LARGE INTESTINE WITH RECTAL BLEEDING (HCC): ICD-10-CM

## 2019-08-05 ENCOUNTER — TELEPHONE (OUTPATIENT)
Dept: GASTROENTEROLOGY | Facility: CLINIC | Age: 34
End: 2019-08-05

## 2019-08-05 NOTE — TELEPHONE ENCOUNTER
----- Message from Tae Turcios MD sent at 8/2/2019  7:00 PM EDT -----  These are results that indicate that she is at high risk for complications from her Crohn's disease.  I would like to see her back in the office to discuss biologic therapy unless she is committed to it in which case we can initiate the precertification process.  ----- Message -----  From: Deedee Ga RN  Sent: 8/2/2019   8:20 AM  To: Tae Turcios MD

## 2019-08-05 NOTE — TELEPHONE ENCOUNTER
LVM with pt to call office for [facility] results (Crohn's Prognostic).      Pt needs appt in office to discuss biologic therapy.    Pt notified that Dr Turcios reviewed results which indicate that she could be at risk for complications.  He would like to see her in office to discuss biologic therapy.  Sched appt tomorrow 8/6/19.

## 2019-08-06 ENCOUNTER — OFFICE VISIT (OUTPATIENT)
Dept: GASTROENTEROLOGY | Facility: CLINIC | Age: 34
End: 2019-08-06

## 2019-08-06 ENCOUNTER — LAB (OUTPATIENT)
Dept: LAB | Facility: HOSPITAL | Age: 34
End: 2019-08-06

## 2019-08-06 VITALS
BODY MASS INDEX: 41.66 KG/M2 | SYSTOLIC BLOOD PRESSURE: 118 MMHG | WEIGHT: 274 LBS | HEART RATE: 63 BPM | DIASTOLIC BLOOD PRESSURE: 79 MMHG

## 2019-08-06 DIAGNOSIS — K52.9 COLITIS: ICD-10-CM

## 2019-08-06 DIAGNOSIS — K52.9 COLITIS: Primary | ICD-10-CM

## 2019-08-06 LAB
HBV SURFACE AG SERPL QL IA: NORMAL
HCV AB SER DONR QL: NORMAL

## 2019-08-06 PROCEDURE — 86803 HEPATITIS C AB TEST: CPT | Performed by: INTERNAL MEDICINE

## 2019-08-06 PROCEDURE — 86481 TB AG RESPONSE T-CELL SUSP: CPT

## 2019-08-06 PROCEDURE — 99214 OFFICE O/P EST MOD 30 MIN: CPT | Performed by: INTERNAL MEDICINE

## 2019-08-06 PROCEDURE — 36415 COLL VENOUS BLD VENIPUNCTURE: CPT | Performed by: INTERNAL MEDICINE

## 2019-08-06 PROCEDURE — 87340 HEPATITIS B SURFACE AG IA: CPT | Performed by: INTERNAL MEDICINE

## 2019-08-06 NOTE — PROGRESS NOTES
Subjective   Bri Pfeiffer is a 33 y.o.. female is here today for follow-up.    Chief Complaint   Patient presents with   • Crohn's Disease   • Abdominal Pain   • Constipation   • Rectal Bleeding       History of Present Illness  Patient continues with her symptoms of general abdominal discomfort, intermittent rectal bleeding, and dyschezia.  At this point she has well-documented Crohn's colitis.  Serologies were obtained which confirmed the presence of Crohn's colitis.  We also got a Crohn's prognostic index.  It was positive, 83% chance of complication in 5 years, 87% chance of complication in 10 years.  She is open to the possibility of Biologics.  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    The following portions of the patient's history were reviewed and updated as appropriate: allergies, current medications, past family history, past medical history, past social history, past surgical history and problem list.      Current Outpatient Medications:   •  albuterol sulfate HFA (PROAIR HFA) 108 (90 Base) MCG/ACT inhaler, , Disp: , Rfl:   •  cetirizine (ZYRTEC ALLERGY) 10 MG tablet, , Disp: , Rfl:   •  Cholecalciferol (VITAMIN D-3) 5000 units tablet, Take  by mouth., Disp: , Rfl:   •  Cyanocobalamin (VITAMIN B 12 PO), Take  by mouth., Disp: , Rfl:   •  esomeprazole (nexIUM) 40 MG capsule, Take 1 capsule by mouth Daily., Disp: 30 capsule, Rfl: 5  •  fluticasone (FLONASE ALLERGY RELIEF) 50 MCG/ACT nasal spray, , Disp: , Rfl:   •  mesalamine (APRISO) 0.375 g 24 hr capsule, Take 1 capsule by mouth Daily. (Patient taking differently: Take 375 mg by mouth Daily. 4 tabs daily), Disp: 120 capsule, Rfl: 3  •  montelukast (SINGULAIR) 10 MG tablet, , Disp: , Rfl:   •  spironolactone (ALDACTONE) 100 MG tablet, , Disp: , Rfl:   •  Adalimumab (HUMIRA PEN) 40 MG/0.8ML Pen-injector Kit, Inject 40 mg under the skin  into the appropriate area as directed See Admin Instructions. 4 injections day 1, 2 injections day 15, then 1, Disp: 8 each, Rfl: 2    Family History   Problem Relation Age of Onset   • Colon cancer Maternal Grandmother    • Rectal cancer Maternal Grandmother    • Prostate cancer Maternal Grandfather    • Prostate cancer Paternal Grandfather        Review of Systems   Respiratory: Negative for shortness of breath.    Cardiovascular: Negative for chest pain.   All other systems reviewed and are negative.      Objective   Physical Exam   Constitutional: She is oriented to person, place, and time. She appears well-developed and well-nourished.   HENT:   Head: Normocephalic and atraumatic.   Right Ear: External ear normal.   Left Ear: External ear normal.   Eyes: Conjunctivae and EOM are normal. Pupils are equal, round, and reactive to light.   Pulmonary/Chest: Effort normal.   Neurological: She is alert and oriented to person, place, and time.   Psychiatric: She has a normal mood and affect. Her behavior is normal. Judgment and thought content normal.   Nursing note and vitals reviewed.      Pertinent laboratory results were reviewed.  and Pertinent old records were reviewed.     Assessment/Plan   Problems Addressed this Visit        Digestive    Colitis - Primary    Relevant Orders    Hepatitis B Surface Antigen    Hepatitis C Antibody    TSPOT        At this point I think biologic therapy is indicated on the basis of the high prognostic index.  I would recommend Humira because of her lifestyle (working daily).  I will get the above lab work and if negative then recommend we get started with the Humira induction therapy and then maintenance therapy 40 mg every other week.  I like to see her back in a couple months.

## 2019-08-08 LAB
TSPOT INTERPRETATION: NEGATIVE
TSPOT NIL CONTROL INTERPRETATION: NORMAL
TSPOT PANEL A: 0
TSPOT PANEL B: 0
TSPOT POS CONTROL INTERPRETATION: NORMAL

## 2019-08-12 ENCOUNTER — PRIOR AUTHORIZATION (OUTPATIENT)
Dept: GASTROENTEROLOGY | Facility: CLINIC | Age: 34
End: 2019-08-12

## 2019-08-12 NOTE — TELEPHONE ENCOUNTER
Dr Turcios has prescribed Humira.  Mesalamine not effective and [facility] Crohn's Diagnostic test indicates that she is at high risk for complications.    Humira citrate free:  2 pens day one (80 mg/08.ml), then one pen (80mg/08.ml) day 15 then one pen (40mg/0.4ml) day 29 then every other week thereafter.    Submitted intake info to [facility] to start prior auth process.  Also faxed Humira Nursing Ambassador form for injection training.    8/14/19, Received fax from [facility] approving Humira 40 mg/0.4 ml (maintenance dosing). Auth valid thru 8/13/2021.  Faxed this to [facility] with note to see if they received auth for induction dosing as well.    8/15/19, Debbi from [facility] was able to run both induction and maintenance doses of Humira thru [employer].  She will contact pt to schedule delivery.    8/19/19, Miguel has scheduled delivery with pt for this Wed 8/21/19.    8/23/19, spoke with pt, she received Humira and RN is coming to her house for injection training today.

## 2019-10-01 ENCOUNTER — OFFICE VISIT (OUTPATIENT)
Dept: GASTROENTEROLOGY | Facility: CLINIC | Age: 34
End: 2019-10-01

## 2019-10-01 ENCOUNTER — APPOINTMENT (OUTPATIENT)
Dept: LAB | Facility: HOSPITAL | Age: 34
End: 2019-10-01

## 2019-10-01 VITALS
WEIGHT: 272.5 LBS | DIASTOLIC BLOOD PRESSURE: 76 MMHG | SYSTOLIC BLOOD PRESSURE: 111 MMHG | BODY MASS INDEX: 41.3 KG/M2 | HEIGHT: 68 IN | HEART RATE: 68 BPM

## 2019-10-01 DIAGNOSIS — R19.7 DIARRHEA, UNSPECIFIED TYPE: ICD-10-CM

## 2019-10-01 DIAGNOSIS — K50.111 CROHN'S DISEASE OF LARGE INTESTINE WITH RECTAL BLEEDING (HCC): Primary | ICD-10-CM

## 2019-10-01 LAB
ALBUMIN SERPL-MCNC: 4.2 G/DL (ref 3.5–5.2)
ALBUMIN/GLOB SERPL: 1.4 G/DL
ALP SERPL-CCNC: 59 U/L (ref 39–117)
ALT SERPL W P-5'-P-CCNC: 7 U/L (ref 1–33)
ANION GAP SERPL CALCULATED.3IONS-SCNC: 13 MMOL/L (ref 5–15)
AST SERPL-CCNC: 11 U/L (ref 1–32)
BASOPHILS # BLD AUTO: 0.02 10*3/MM3 (ref 0–0.2)
BASOPHILS NFR BLD AUTO: 0.3 % (ref 0–1.5)
BILIRUB SERPL-MCNC: 0.4 MG/DL (ref 0.2–1.2)
BUN BLD-MCNC: 11 MG/DL (ref 6–20)
BUN/CREAT SERPL: 15.1 (ref 7–25)
CALCIUM SPEC-SCNC: 9 MG/DL (ref 8.6–10.5)
CHLORIDE SERPL-SCNC: 100 MMOL/L (ref 98–107)
CO2 SERPL-SCNC: 25 MMOL/L (ref 22–29)
CREAT BLD-MCNC: 0.73 MG/DL (ref 0.57–1)
DEPRECATED RDW RBC AUTO: 40.6 FL (ref 37–54)
EOSINOPHIL # BLD AUTO: 0.08 10*3/MM3 (ref 0–0.4)
EOSINOPHIL NFR BLD AUTO: 1 % (ref 0.3–6.2)
ERYTHROCYTE [DISTWIDTH] IN BLOOD BY AUTOMATED COUNT: 13.8 % (ref 12.3–15.4)
GFR SERPL CREATININE-BSD FRML MDRD: 92 ML/MIN/1.73
GLOBULIN UR ELPH-MCNC: 3.1 GM/DL
GLUCOSE BLD-MCNC: 81 MG/DL (ref 65–99)
HCT VFR BLD AUTO: 35.2 % (ref 34–46.6)
HGB BLD-MCNC: 11.2 G/DL (ref 12–15.9)
IMM GRANULOCYTES # BLD AUTO: 0.02 10*3/MM3 (ref 0–0.05)
IMM GRANULOCYTES NFR BLD AUTO: 0.3 % (ref 0–0.5)
LYMPHOCYTES # BLD AUTO: 2.99 10*3/MM3 (ref 0.7–3.1)
LYMPHOCYTES NFR BLD AUTO: 37.5 % (ref 19.6–45.3)
MCH RBC QN AUTO: 26.2 PG (ref 26.6–33)
MCHC RBC AUTO-ENTMCNC: 31.8 G/DL (ref 31.5–35.7)
MCV RBC AUTO: 82.4 FL (ref 79–97)
MONOCYTES # BLD AUTO: 0.7 10*3/MM3 (ref 0.1–0.9)
MONOCYTES NFR BLD AUTO: 8.8 % (ref 5–12)
NEUTROPHILS # BLD AUTO: 4.16 10*3/MM3 (ref 1.7–7)
NEUTROPHILS NFR BLD AUTO: 52.1 % (ref 42.7–76)
NRBC BLD AUTO-RTO: 0 /100 WBC (ref 0–0.2)
PLATELET # BLD AUTO: 331 10*3/MM3 (ref 140–450)
PMV BLD AUTO: 9.4 FL (ref 6–12)
POTASSIUM BLD-SCNC: 4.2 MMOL/L (ref 3.5–5.2)
PROT SERPL-MCNC: 7.3 G/DL (ref 6–8.5)
RBC # BLD AUTO: 4.27 10*6/MM3 (ref 3.77–5.28)
SODIUM BLD-SCNC: 138 MMOL/L (ref 136–145)
WBC NRBC COR # BLD: 7.97 10*3/MM3 (ref 3.4–10.8)

## 2019-10-01 PROCEDURE — 83516 IMMUNOASSAY NONANTIBODY: CPT | Performed by: INTERNAL MEDICINE

## 2019-10-01 PROCEDURE — 99213 OFFICE O/P EST LOW 20 MIN: CPT | Performed by: INTERNAL MEDICINE

## 2019-10-01 PROCEDURE — 86255 FLUORESCENT ANTIBODY SCREEN: CPT | Performed by: INTERNAL MEDICINE

## 2019-10-01 PROCEDURE — 36415 COLL VENOUS BLD VENIPUNCTURE: CPT | Performed by: INTERNAL MEDICINE

## 2019-10-01 PROCEDURE — 80053 COMPREHEN METABOLIC PANEL: CPT | Performed by: INTERNAL MEDICINE

## 2019-10-01 PROCEDURE — 82784 ASSAY IGA/IGD/IGG/IGM EACH: CPT | Performed by: INTERNAL MEDICINE

## 2019-10-01 PROCEDURE — 85025 COMPLETE CBC W/AUTO DIFF WBC: CPT | Performed by: INTERNAL MEDICINE

## 2019-10-01 RX ORDER — DULOXETIN HYDROCHLORIDE 20 MG/1
CAPSULE, DELAYED RELEASE ORAL
COMMUNITY
Start: 2019-09-30 | End: 2019-11-13 | Stop reason: ALTCHOICE

## 2019-10-01 RX ORDER — ADALIMUMAB 40MG/0.4ML
KIT SUBCUTANEOUS
Refills: 0 | Status: ON HOLD | COMMUNITY
Start: 2019-09-11 | End: 2020-11-06 | Stop reason: SDUPTHER

## 2019-10-01 NOTE — PROGRESS NOTES
Subjective   Bri Pfeiffer is a 33 y.o.. female is here today for follow-up.    Chief Complaint   Patient presents with   • Crohn's Disease   • Abdominal Pain     Intermittent   • Diarrhea   • Rectal Bleeding     History of Present Illness  Patient has received her 2 loading doses of Humira and one maintenance dose and she can tell significant improvement in her symptoms.  Her energy level has improved.  Aches and pains involving her hips and legs have gotten better.  The abdominal pain has improved although she experiences it on an intermittent basis.  Rectal bleeding is improved.  She still has occasional bouts of diarrhea.  She inquires about food allergies.    The following portions of the patient's history were reviewed and updated as appropriate: allergies, current medications, past family history, past medical history, past social history, past surgical history and problem list.      Current Outpatient Medications:   •  albuterol sulfate HFA (PROAIR HFA) 108 (90 Base) MCG/ACT inhaler, , Disp: , Rfl:   •  cetirizine (ZYRTEC ALLERGY) 10 MG tablet, , Disp: , Rfl:   •  Cholecalciferol (VITAMIN D-3) 5000 units tablet, Take  by mouth., Disp: , Rfl:   •  Cyanocobalamin (VITAMIN B 12 PO), Take  by mouth., Disp: , Rfl:   •  DULoxetine (CYMBALTA) 20 MG capsule, , Disp: , Rfl:   •  esomeprazole (nexIUM) 40 MG capsule, Take 1 capsule by mouth Daily., Disp: 30 capsule, Rfl: 5  •  fluticasone (FLONASE ALLERGY RELIEF) 50 MCG/ACT nasal spray, , Disp: , Rfl:   •  HUMIRA PEN 40 MG/0.4ML Pen-injector Kit, INJECT 40 MG Q OTHER WEEK, Disp: , Rfl: 0  •  mesalamine (APRISO) 0.375 g 24 hr capsule, Take 1 capsule by mouth Daily. (Patient taking differently: Take 375 mg by mouth Daily. 4 tabs daily), Disp: 120 capsule, Rfl: 3  •  montelukast (SINGULAIR) 10 MG tablet, , Disp: , Rfl:   •  spironolactone (ALDACTONE) 100 MG tablet, , Disp: , Rfl:     Family History   Problem Relation Age of Onset   • Colon cancer Maternal  Grandmother    • Rectal cancer Maternal Grandmother    • Prostate cancer Maternal Grandfather    • Prostate cancer Paternal Grandfather        Review of Systems   Respiratory: Negative for shortness of breath.    Cardiovascular: Negative for chest pain.   All other systems reviewed and are negative.      Objective   Physical Exam   Constitutional: She is oriented to person, place, and time. She appears well-developed and well-nourished.   HENT:   Head: Normocephalic and atraumatic.   Right Ear: External ear normal.   Left Ear: External ear normal.   Eyes: Conjunctivae and EOM are normal. Pupils are equal, round, and reactive to light.   Pulmonary/Chest: Effort normal.   Neurological: She is alert and oriented to person, place, and time.   Psychiatric: She has a normal mood and affect. Her behavior is normal. Judgment and thought content normal.   Nursing note and vitals reviewed.      Pertinent laboratory results were reviewed.  and Pertinent old records were reviewed.     Assessment/Plan   Problems Addressed this Visit        Digestive    Crohn's disease of large intestine with rectal bleeding (CMS/HCC) - Primary    Relevant Orders    CBC & Differential    Comprehensive Metabolic Panel    Celiac Comprehensive Panel      Other Visit Diagnoses     Diarrhea, unspecified type        Relevant Orders    CBC & Differential    Comprehensive Metabolic Panel    Celiac Comprehensive Panel        Patient is off to good start as far as Humira therapy is concerned.  I would like to continue it for now.  We will go ahead and update her blood work including a celiac panel.  I will see her back in 6 weeks.

## 2019-10-02 ENCOUNTER — TELEPHONE (OUTPATIENT)
Dept: GASTROENTEROLOGY | Facility: CLINIC | Age: 34
End: 2019-10-02

## 2019-10-02 RX ORDER — MESALAMINE 375 MG/1
CAPSULE, EXTENDED RELEASE ORAL
Qty: 120 CAPSULE | Refills: 2 | Status: SHIPPED | OUTPATIENT
Start: 2019-10-02 | End: 2019-12-28

## 2019-10-02 NOTE — TELEPHONE ENCOUNTER
Received message from pt stating she tried to have Apriso refilled but had no more refills. She says her pharmacy sent refill request to our office but we never received anything.    [facility]ill electronically or by fax.    Received electronic refill request from [employer], and routed to Dr Turcios to authorize.

## 2019-10-03 ENCOUNTER — TELEPHONE (OUTPATIENT)
Dept: GASTROENTEROLOGY | Facility: CLINIC | Age: 34
End: 2019-10-03

## 2019-10-03 LAB
ENDOMYSIUM IGA SER QL: NEGATIVE
GLIADIN PEPTIDE IGA SER-ACNC: 5 UNITS (ref 0–19)
GLIADIN PEPTIDE IGG SER-ACNC: 3 UNITS (ref 0–19)
IGA SERPL-MCNC: 369 MG/DL (ref 87–352)
TTG IGA SER-ACNC: <2 U/ML (ref 0–3)
TTG IGG SER-ACNC: <2 U/ML (ref 0–5)

## 2019-10-03 NOTE — TELEPHONE ENCOUNTER
LVM with pt regarding lab results (cbc,cmp) as reviewed by Dr Turcios:  All normal except for mild anemia, recommend OTC iron tablets on daily basis.

## 2019-10-03 NOTE — TELEPHONE ENCOUNTER
----- Message from Tae Turcios MD sent at 10/2/2019  3:57 PM EDT -----  Please call the patient regarding her abnormal result. MIld anemia, recommend any one of the OTC iron tablets on a chronic basis.

## 2019-10-07 ENCOUNTER — TELEPHONE (OUTPATIENT)
Dept: GASTROENTEROLOGY | Facility: CLINIC | Age: 34
End: 2019-10-07

## 2019-10-07 NOTE — TELEPHONE ENCOUNTER
----- Message from Tae Turcios MD sent at 10/4/2019  4:26 PM EDT -----  Advise patient the results were normal.  The elevated IgA is a nonspecific mild elevation.

## 2019-10-07 NOTE — TELEPHONE ENCOUNTER
"Notified pt of Celiac results via \"[employer]\" as reviewed by Dr Turcios: Celiac normal and the elevated IgA is a nonspecific mild elevation.  "

## 2019-10-21 ENCOUNTER — TELEPHONE (OUTPATIENT)
Dept: GASTROENTEROLOGY | Facility: CLINIC | Age: 34
End: 2019-10-21

## 2019-10-21 RX ORDER — CLOTRIMAZOLE 10 MG/1
10 LOZENGE ORAL; TOPICAL
Qty: 70 TABLET | Refills: 0 | Status: SHIPPED | OUTPATIENT
Start: 2019-10-21 | End: 2019-11-13

## 2019-10-21 NOTE — TELEPHONE ENCOUNTER
"Dr Turcios reviewed pt's message sent via \"[employer]\"    \"I've been having bad heartburn, difficulty and painful swallowing, and feel like i have something stuck in my esophagus for a few days. (There is no reason for me to believe there actually is something stuck). I've been cautious of my diet and taking my prescribed meds, but wanted to see if you had any other suggestions to alleviate the discomfort.\"    Dr Turcios sent in rx for Mycelex Fabiola for possible yeast infection.  If she is not better within few days, she may need repeat EGD.      LVM with pt regarding above.     "

## 2019-11-13 ENCOUNTER — OFFICE VISIT (OUTPATIENT)
Dept: GASTROENTEROLOGY | Facility: CLINIC | Age: 34
End: 2019-11-13

## 2019-11-13 VITALS
WEIGHT: 272 LBS | BODY MASS INDEX: 41.22 KG/M2 | HEIGHT: 68 IN | DIASTOLIC BLOOD PRESSURE: 83 MMHG | SYSTOLIC BLOOD PRESSURE: 113 MMHG | HEART RATE: 69 BPM

## 2019-11-13 DIAGNOSIS — K50.111 CROHN'S DISEASE OF LARGE INTESTINE WITH RECTAL BLEEDING (HCC): Primary | ICD-10-CM

## 2019-11-13 DIAGNOSIS — K52.9 COLITIS: ICD-10-CM

## 2019-11-13 DIAGNOSIS — K21.9 GASTROESOPHAGEAL REFLUX DISEASE, ESOPHAGITIS PRESENCE NOT SPECIFIED: ICD-10-CM

## 2019-11-13 PROCEDURE — 99213 OFFICE O/P EST LOW 20 MIN: CPT | Performed by: INTERNAL MEDICINE

## 2019-11-13 RX ORDER — HYDROXYZINE PAMOATE 25 MG/1
CAPSULE ORAL
COMMUNITY
Start: 2019-11-03 | End: 2020-01-14 | Stop reason: ALTCHOICE

## 2019-11-13 RX ORDER — CITALOPRAM 20 MG/1
TABLET ORAL
COMMUNITY
Start: 2019-11-12 | End: 2020-01-29

## 2019-11-13 RX ORDER — FLUCONAZOLE 100 MG/1
100 TABLET ORAL DAILY
Qty: 15 TABLET | Refills: 0 | Status: SHIPPED | OUTPATIENT
Start: 2019-11-13 | End: 2020-01-14

## 2019-11-13 RX ORDER — FERROUS SULFATE TAB EC 324 MG (65 MG FE EQUIVALENT) 324 (65 FE) MG
324 TABLET DELAYED RESPONSE ORAL
COMMUNITY

## 2019-11-13 RX ORDER — SODIUM CHLORIDE, SODIUM LACTATE, POTASSIUM CHLORIDE, CALCIUM CHLORIDE 600; 310; 30; 20 MG/100ML; MG/100ML; MG/100ML; MG/100ML
30 INJECTION, SOLUTION INTRAVENOUS CONTINUOUS
Status: CANCELLED | OUTPATIENT
Start: 2019-12-10

## 2019-11-13 NOTE — PROGRESS NOTES
Subjective   Bri Pfeiffer is a 34 y.o.. female is here today for follow-up.    Chief Complaint   Patient presents with   • Crohn's Disease   • Heartburn   • Difficulty Swallowing   • Abdominal Pain   • Fatigue   • Rectal Bleeding       History of Present Illness  At the time of last visit patient was doing quite well after induction of Humira and on maintenance Apriso.  However, over the past month she has had recurrence of some generalized abdominal pain, intermittent rectal bleeding, occasional diarrhea, dysphasia, and heartburn.  We gave her an empiric trial of Mycelex about 3 weeks ago and she seemed to improve a bit only to relapse.  Symptoms now persist as much as ever.  She feels like she has returned to her baseline illness for Humira induction therapy.    The following portions of the patient's history were reviewed and updated as appropriate: allergies, current medications, past family history, past medical history, past social history, past surgical history and problem list.      Current Outpatient Medications:   •  albuterol sulfate HFA (PROAIR HFA) 108 (90 Base) MCG/ACT inhaler, , Disp: , Rfl:   •  APRISO 0.375 g 24 hr capsule, TAKE FOUR CAPSULES BY MOUTH DAILY, Disp: 120 capsule, Rfl: 2  •  cetirizine (ZYRTEC ALLERGY) 10 MG tablet, , Disp: , Rfl:   •  Cholecalciferol (VITAMIN D-3) 5000 units tablet, Take  by mouth., Disp: , Rfl:   •  citalopram (CeleXA) 20 MG tablet, , Disp: , Rfl:   •  Cyanocobalamin (VITAMIN B 12 PO), Take  by mouth., Disp: , Rfl:   •  esomeprazole (nexIUM) 40 MG capsule, Take 1 capsule by mouth Daily., Disp: 30 capsule, Rfl: 5  •  ferrous sulfate 324 (65 Fe) MG tablet delayed-release EC tablet, Take 324 mg by mouth Daily With Breakfast., Disp: , Rfl:   •  fluticasone (FLONASE ALLERGY RELIEF) 50 MCG/ACT nasal spray, , Disp: , Rfl:   •  HUMIRA PEN 40 MG/0.4ML Pen-injector Kit, INJECT 40 MG Q OTHER WEEK, Disp: , Rfl: 0  •  hydrOXYzine pamoate (VISTARIL) 25 MG capsule, , Disp: ,  Rfl:   •  montelukast (SINGULAIR) 10 MG tablet, , Disp: , Rfl:   •  spironolactone (ALDACTONE) 100 MG tablet, , Disp: , Rfl:   •  fluconazole (DIFLUCAN) 100 MG tablet, Take 1 tablet by mouth Daily., Disp: 15 tablet, Rfl: 0    Family History   Problem Relation Age of Onset   • Colon cancer Maternal Grandmother    • Rectal cancer Maternal Grandmother    • Prostate cancer Maternal Grandfather    • Prostate cancer Paternal Grandfather        Review of Systems   Respiratory: Negative for shortness of breath.    Cardiovascular: Negative for chest pain.   All other systems reviewed and are negative.      Objective   Physical Exam   Constitutional: She is oriented to person, place, and time. She appears well-developed and well-nourished.   HENT:   Head: Normocephalic and atraumatic.   Right Ear: External ear normal.   Left Ear: External ear normal.   Eyes: Conjunctivae and EOM are normal. Pupils are equal, round, and reactive to light.   Pulmonary/Chest: Effort normal.   Neurological: She is alert and oriented to person, place, and time.   Psychiatric: She has a normal mood and affect. Her behavior is normal. Judgment and thought content normal.   Nursing note and vitals reviewed.      Pertinent laboratory results were reviewed.  and Pertinent old records were reviewed.     Assessment/Plan   Problems Addressed this Visit        Digestive    Colitis    Relevant Orders    Case Request (Completed)    Gastroesophageal reflux disease    Relevant Orders    Case Request (Completed)    Crohn's disease of large intestine with rectal bleeding (CMS/HCC) - Primary    Relevant Orders    Case Request (Completed)        I am still concerned that she may have Candida esophagitis.  I think it would be worth a course of Diflucan therapy and we will schedule her for EGD to confirm the diagnosis order refuted.  After initial good response to Humira she may no longer be responding to that particular medication.  We will check drug levels and  antibiotics and see if the dosing needs any adjustment.  I will see her one way or another in 3 to 4 weeks.

## 2019-11-14 ENCOUNTER — HOSPITAL ENCOUNTER (OUTPATIENT)
Facility: HOSPITAL | Age: 34
Setting detail: HOSPITAL OUTPATIENT SURGERY
End: 2019-11-14
Attending: INTERNAL MEDICINE | Admitting: INTERNAL MEDICINE

## 2019-11-27 ENCOUNTER — TELEPHONE (OUTPATIENT)
Dept: GASTROENTEROLOGY | Facility: CLINIC | Age: 34
End: 2019-11-27

## 2019-11-27 NOTE — TELEPHONE ENCOUNTER
----- Message from Tae Turcios MD sent at 11/26/2019  6:33 PM EST -----  The blood levels are adequate and there are no abnormal antibodies.

## 2019-11-27 NOTE — TELEPHONE ENCOUNTER
Pt notified of [facility] Humira test results as reviewed by Dr Turcios:  Humira levels adequate and no abnormal antibodies. Pt has EGD 12/10.  Sched f/u appt on 12/17/19.

## 2019-12-11 ENCOUNTER — PREP FOR SURGERY (OUTPATIENT)
Dept: OTHER | Facility: HOSPITAL | Age: 34
End: 2019-12-11

## 2019-12-11 DIAGNOSIS — R13.19 ESOPHAGEAL DYSPHAGIA: Primary | ICD-10-CM

## 2019-12-11 DIAGNOSIS — K50.111 CROHN'S DISEASE OF LARGE INTESTINE WITH RECTAL BLEEDING (HCC): ICD-10-CM

## 2019-12-11 RX ORDER — SODIUM CHLORIDE, SODIUM LACTATE, POTASSIUM CHLORIDE, CALCIUM CHLORIDE 600; 310; 30; 20 MG/100ML; MG/100ML; MG/100ML; MG/100ML
30 INJECTION, SOLUTION INTRAVENOUS CONTINUOUS
Status: CANCELLED | OUTPATIENT
Start: 2019-12-13

## 2019-12-13 ENCOUNTER — ANESTHESIA (OUTPATIENT)
Dept: GASTROENTEROLOGY | Facility: HOSPITAL | Age: 34
End: 2019-12-13

## 2019-12-13 ENCOUNTER — ANESTHESIA EVENT (OUTPATIENT)
Dept: GASTROENTEROLOGY | Facility: HOSPITAL | Age: 34
End: 2019-12-13

## 2019-12-13 ENCOUNTER — HOSPITAL ENCOUNTER (OUTPATIENT)
Facility: HOSPITAL | Age: 34
Setting detail: HOSPITAL OUTPATIENT SURGERY
Discharge: HOME | End: 2019-12-13
Attending: INTERNAL MEDICINE | Admitting: INTERNAL MEDICINE

## 2019-12-13 VITALS
RESPIRATION RATE: 16 BRPM | HEIGHT: 67 IN | SYSTOLIC BLOOD PRESSURE: 123 MMHG | OXYGEN SATURATION: 96 % | DIASTOLIC BLOOD PRESSURE: 54 MMHG | HEART RATE: 76 BPM | WEIGHT: 260.25 LBS | TEMPERATURE: 98 F | BODY MASS INDEX: 40.85 KG/M2

## 2019-12-13 DIAGNOSIS — K50.111 CROHN'S DISEASE OF LARGE INTESTINE WITH RECTAL BLEEDING (HCC): ICD-10-CM

## 2019-12-13 DIAGNOSIS — R13.19 ESOPHAGEAL DYSPHAGIA: ICD-10-CM

## 2019-12-13 PROCEDURE — 88305 TISSUE EXAM BY PATHOLOGIST: CPT | Performed by: INTERNAL MEDICINE

## 2019-12-13 PROCEDURE — 43239 EGD BIOPSY SINGLE/MULTIPLE: CPT | Performed by: INTERNAL MEDICINE

## 2019-12-13 PROCEDURE — 81025 URINE PREGNANCY TEST: CPT | Performed by: INTERNAL MEDICINE

## 2019-12-13 PROCEDURE — 25010000002 PROPOFOL 10 MG/ML EMULSION: Performed by: NURSE ANESTHETIST, CERTIFIED REGISTERED

## 2019-12-13 PROCEDURE — 45330 DIAGNOSTIC SIGMOIDOSCOPY: CPT | Performed by: INTERNAL MEDICINE

## 2019-12-13 RX ORDER — LIDOCAINE HYDROCHLORIDE 10 MG/ML
0.5 INJECTION, SOLUTION INFILTRATION; PERINEURAL ONCE AS NEEDED
Status: DISCONTINUED | OUTPATIENT
Start: 2019-12-13 | End: 2019-12-13 | Stop reason: HOSPADM

## 2019-12-13 RX ORDER — PROPOFOL 10 MG/ML
VIAL (ML) INTRAVENOUS AS NEEDED
Status: DISCONTINUED | OUTPATIENT
Start: 2019-12-13 | End: 2019-12-13 | Stop reason: SURG

## 2019-12-13 RX ORDER — SODIUM CHLORIDE 0.9 % (FLUSH) 0.9 %
10 SYRINGE (ML) INJECTION AS NEEDED
Status: DISCONTINUED | OUTPATIENT
Start: 2019-12-13 | End: 2019-12-13 | Stop reason: HOSPADM

## 2019-12-13 RX ORDER — LIDOCAINE HYDROCHLORIDE 20 MG/ML
INJECTION, SOLUTION INFILTRATION; PERINEURAL AS NEEDED
Status: DISCONTINUED | OUTPATIENT
Start: 2019-12-13 | End: 2019-12-13 | Stop reason: SURG

## 2019-12-13 RX ORDER — SODIUM CHLORIDE, SODIUM LACTATE, POTASSIUM CHLORIDE, CALCIUM CHLORIDE 600; 310; 30; 20 MG/100ML; MG/100ML; MG/100ML; MG/100ML
30 INJECTION, SOLUTION INTRAVENOUS CONTINUOUS
Status: DISCONTINUED | OUTPATIENT
Start: 2019-12-13 | End: 2019-12-13 | Stop reason: HOSPADM

## 2019-12-13 RX ORDER — PROPOFOL 10 MG/ML
VIAL (ML) INTRAVENOUS CONTINUOUS PRN
Status: DISCONTINUED | OUTPATIENT
Start: 2019-12-13 | End: 2019-12-13 | Stop reason: SURG

## 2019-12-13 RX ORDER — SODIUM CHLORIDE, SODIUM LACTATE, POTASSIUM CHLORIDE, CALCIUM CHLORIDE 600; 310; 30; 20 MG/100ML; MG/100ML; MG/100ML; MG/100ML
1000 INJECTION, SOLUTION INTRAVENOUS CONTINUOUS
Status: DISCONTINUED | OUTPATIENT
Start: 2019-12-13 | End: 2019-12-13 | Stop reason: HOSPADM

## 2019-12-13 RX ADMIN — PROPOFOL 300 MCG/KG/MIN: 10 INJECTION, EMULSION INTRAVENOUS at 13:00

## 2019-12-13 RX ADMIN — PROPOFOL 50 MG: 10 INJECTION, EMULSION INTRAVENOUS at 13:00

## 2019-12-13 RX ADMIN — LIDOCAINE HYDROCHLORIDE 60 MG: 20 INJECTION, SOLUTION INFILTRATION; PERINEURAL at 13:00

## 2019-12-13 RX ADMIN — SODIUM CHLORIDE, POTASSIUM CHLORIDE, SODIUM LACTATE AND CALCIUM CHLORIDE 1000 ML: 600; 310; 30; 20 INJECTION, SOLUTION INTRAVENOUS at 12:49

## 2019-12-13 NOTE — DISCHARGE INSTRUCTIONS
Flexible Sigmoiddr oscopy, Care After  DR SCHWARTZ [phone]    This sheet gives you information about how to care for yourself after your procedure. Your health care provider may also give you more specific instructions. If you have problems or questions, contact your health care provider.  What can I expect after the procedure?  After the procedure, it is common to have:  · Abdominal cramping or pain.  · Bloating.  · A small amount of rectal bleeding if you had a biopsy.  Follow these instructions at home:  · Take over-the-counter and prescription medicines only as told by your health care provider.  · Do not drive for 24 hours if you received a medicine to help you relax (sedative).  · Keep all follow-up visits as told by your health care provider. This is important.  Contact a health care provider if:  · You have abdominal pain or cramping that gets worse or is not helped with medicine.  · You continue to have small amounts of rectal bleeding after 24 hours.  · You have nausea or vomiting.  · You feel weak or dizzy.  · You have a fever.  Get help right away if:  · You pass large blood clots or see a large amount of blood in the toilet after having a bowel movement.  · You have nausea or vomiting for more than 24 hours after the procedure.  This information is not intended to replace advice given to you by your health care provider. Make sure you discuss any questions you have with your health care provider.  Document Released: 12/23/2014 Document Revised: 07/07/2017 Document Reviewed: 03/18/2017  [employer] Interactive Patient Education © 2019 [employer]  Upper Endoscopy, Adult, Care After  This sheet gives you information about how to care for yourself after your procedure. Your health care provider may also give you more specific instructions. If you have problems or questions, contact your health care provider.  What can I expect after the procedure?  After the procedure, it is common to have:  · A sore  throat.  · Mild stomach pain or discomfort.  · Bloating.  · Nausea.  Follow these instructions at home:    · Follow instructions from your health care provider about what to eat or drink after your procedure.  · Return to your normal activities as told by your health care provider. Ask your health care provider what activities are safe for you.  · Take over-the-counter and prescription medicines only as told by your health care provider.  · Do not drive for 24 hours if you were given a sedative during your procedure.  · Keep all follow-up visits as told by your health care provider. This is important.  Contact a health care provider if you have:  · A sore throat that lasts longer than one day.  · Trouble swallowing.  Get help right away if:  · You vomit blood or your vomit looks like coffee grounds.  · You have:  ? A fever.  ? Bloody, black, or tarry stools.  ? A severe sore throat or you cannot swallow.  ? Difficulty breathing.  ? Severe pain in your chest or abdomen.  Summary  · After the procedure, it is common to have a sore throat, mild stomach discomfort, bloating, and nausea.  · Do not drive for 24 hours if you were given a sedative during the procedure.  · Follow instructions from your health care provider about what to eat or drink after your procedure.  · Return to your normal activities as told by your health care provider.  This information is not intended to replace advice given to you by your health care provider. Make sure you discuss any questions you have with your health care provider.  Document Released: 06/18/2013 Document Revised: 05/20/2019 Document Reviewed: 05/20/2019  [employer] Interactive Patient Education © 2019 [employer]  Esophagitis    Esophagitis is inflammation of the esophagus. The esophagus is the tube that carries food and liquids from your mouth to your stomach. Esophagitis can cause soreness or pain in the esophagus. This condition can make it difficult and painful to  swallow.  What are the causes?  Most causes of esophagitis are not serious. Common causes of this condition include:  · Gastroesophageal reflux disease (GERD). This is when stomach contents move back up into the esophagus (reflux).  · Repeated vomiting.  · An allergic-type reaction, especially caused by food allergies (eosinophilic esophagitis).  · Injury to the esophagus by swallowing large pills with or without water, or swallowing certain types of medicines.  · Swallowing (ingesting) harmful chemicals, such as household cleaning products.  · Heavy alcohol use.  · An infection of the esophagus. This most often occurs in people who have a weakened immune system.  · Radiation or chemotherapy treatment for cancer.  · Certain diseases such as sarcoidosis, Crohn disease, and scleroderma.  What are the signs or symptoms?  Symptoms of this condition include:  · Difficult or painful swallowing.  · Pain with swallowing acidic liquids, such as citrus juices.  · Pain with burping.  · Chest pain.  · Difficulty breathing.  · Nausea.  · Vomiting.  · Pain in the abdomen.  · Weight loss.  · Ulcers in the mouth.  · Patches of white material in the mouth (candidiasis).  · Fever.  · Coughing up blood or vomiting blood.  · Stool that is black, tarry, or bright red.  How is this diagnosed?  Your health care provider will take a medical history and perform a physical exam. You may also have other tests, including:  · An endoscopy to examine your stomach and esophagus with a small camera.  · A test that measures the acidity level in your esophagus.  · A test that measures how much pressure is on your esophagus.  · A barium swallow or modified barium swallow to show the shape, size, and functioning of your esophagus.  · Allergy tests.  How is this treated?  Treatment for this condition depends on the cause of your esophagitis. In some cases, steroids or other medicines may be given to help relieve your symptoms or to treat the underlying  cause of your condition. You may have to make some lifestyle changes, such as:  · Avoiding alcohol.  · Quitting smoking.  · Changing your diet.  · Exercising.  · Changing your sleep habits and your sleep environment.  Follow these instructions at home:  Take these actions to decrease your discomfort and to help avoid complications.  Diet  · Follow a diet as recommended by your health care provider. This may involve avoiding foods and drinks such as:  ? Coffee and tea (with or without caffeine).  ? Drinks that contain alcohol.  ? Energy drinks and sports drinks.  ? Carbonated drinks or sodas.  ? Chocolate and cocoa.  ? Peppermint and mint flavorings.  ? Garlic and onions.  ? Horseradish.  ? Spicy and acidic foods, including peppers, chili powder, maldonado powder, vinegar, hot sauces, and barbecue sauce.  ? Citrus fruit juices and citrus fruits, such as oranges, hugh, and limes.  ? Tomato-based foods, such as red sauce, chili, salsa, and pizza with red sauce.  ? Fried and fatty foods, such as donuts, french fries, potato chips, and high-fat dressings.  ? High-fat meats, such as hot dogs and fatty cuts of red and white meats, such as rib eye steak, sausage, ham, and lopez.  ? High-fat dairy items, such as whole milk, butter, and cream cheese.  · Eat small, frequent meals instead of large meals.  · Avoid drinking large amounts of liquid with your meals.  · Avoid eating meals during the 2-3 hours before bedtime.  · Avoid lying down right after you eat.  · Do not exercise right after you eat.  · Avoid foods and drinks that seem to make your symptoms worse.  General instructions  · Pay attention to any changes in your symptoms.  · Take over-the-counter and prescription medicines only as told by your health care provider. Do not take aspirin, ibuprofen, or other NSAIDs unless your health care provider told you to do so.  · If you have trouble taking pills, use a pill splitter to decrease the size of the pill. This will  decrease the chance of the pill getting stuck or injuring your esophagus on the way down. Also, drink water after you take a pill.  · Do not use any tobacco products, including cigarettes, chewing tobacco, and e-cigarettes. If you need help quitting, ask your health care provider.  · Wear loose-fitting clothing. Do not wear anything tight around your waist that causes pressure on your abdomen.  · Raise (elevate) the head of your bed about 6 inches (15 cm).  · Try to reduce your stress, such as with yoga or meditation. If you need help reducing stress, ask your health care provider.  · If you are overweight, reduce your weight to an amount that is healthy for you. Ask your health care provider for guidance about a safe weight loss goal.  · Keep all follow-up visits as told by your health care provider. This is important.  Contact a health care provider if:  · You have new symptoms.  · You have unexplained weight loss.  · You have difficulty swallowing, or it hurts to swallow.  · You have wheezing or a persistent cough.  · Your symptoms do not improve with treatment.  · You have frequent heartburn for more than two weeks.  Get help right away if:  · You have severe pain in your arms, neck, jaw, teeth, or back.  · You feel sweaty, dizzy, or light-headed.  · You have chest pain or shortness of breath.  · You vomit and your vomit looks like blood or coffee grounds.  · Your stool is bloody or black.  · You have a fever.  · You cannot swallow, drink, or eat.  This information is not intended to replace advice given to you by your health care provider. Make sure you discuss any questions you have with your health care provider.  Document Released: 01/25/2006 Document Revised: 05/25/2017 Document Reviewed: 04/13/2016  [employer] Interactive Patient Education © 2019 [employer]

## 2019-12-13 NOTE — ANESTHESIA PREPROCEDURE EVALUATION
Anesthesia Evaluation     Patient summary reviewed and Nursing notes reviewed   no history of anesthetic complications:  NPO Solid Status: > 8 hours  NPO Liquid Status: > 2 hours           Airway   Mallampati: II  TM distance: >3 FB  Neck ROM: full  No difficulty expected  Dental - normal exam     Pulmonary     breath sounds clear to auscultation  (+) asthma,  Cardiovascular   Exercise tolerance: good (4-7 METS)    Rhythm: regular  Rate: normal        Neuro/Psych  GI/Hepatic/Renal/Endo    (+) morbid obesity, GERD,      ROS Comment: crohns disease    Musculoskeletal     Abdominal     Abdomen: soft.   Substance History      OB/GYN          Other                        Anesthesia Plan    ASA 3     MAC     intravenous induction     Anesthetic plan, all risks, benefits, and alternatives have been provided, discussed and informed consent has been obtained with: patient.    Plan discussed with CRNA.

## 2019-12-13 NOTE — ANESTHESIA POSTPROCEDURE EVALUATION
"Patient: Bri Pfeiffer    Procedure Summary     Date:  12/13/19 Room / Location:  [facility] ENDOSCOPY 1 /  [facility]    Anesthesia Start:  1253 Anesthesia Stop:  1324    Procedures:       ESOPHAGOGASTRODUODENOSCOPY WITH COLD BIOPSIES (N/A Esophagus)      SIGMOIDOSCOPY FLEXIBLE TO T.I. (N/A ) Diagnosis:       Esophageal dysphagia      Crohn's disease of large intestine with rectal bleeding (CMS/HCC)      (Esophageal dysphagia [R13.10])      (Crohn's disease of large intestine with rectal bleeding (CMS/HCC) [K50.111])    Surgeon:  Tae Turcios MD Provider:  Kristi Hernandez MD    Anesthesia Type:  MAC ASA Status:  3          Anesthesia Type: MAC    Vitals  Vitals Value Taken Time   /68 12/13/2019  1:37 PM   Temp 37.2 °C (99 °F) 12/13/2019  1:24 PM   Pulse 57 12/13/2019  1:37 PM   Resp 16 12/13/2019  1:24 PM   SpO2 100 % 12/13/2019  1:37 PM           Post Anesthesia Care and Evaluation    Patient location during evaluation: PACU  Patient participation: complete - patient participated  Level of consciousness: awake  Pain score: 0  Pain management: adequate  Airway patency: patent  Anesthetic complications: No anesthetic complications    Cardiovascular status: acceptable  Respiratory status: acceptable  Hydration status: acceptable    Comments: Blood pressure 115/68, pulse 57, temperature 37.2 °C (99 °F), resp. rate 16, height 170.2 cm (67\"), weight 118 kg (260 lb 4 oz), SpO2 100 %.    No anesthesia care post op    "

## 2019-12-17 ENCOUNTER — OFFICE VISIT (OUTPATIENT)
Dept: GASTROENTEROLOGY | Facility: CLINIC | Age: 34
End: 2019-12-17

## 2019-12-17 VITALS — SYSTOLIC BLOOD PRESSURE: 118 MMHG | HEART RATE: 64 BPM | DIASTOLIC BLOOD PRESSURE: 78 MMHG

## 2019-12-17 DIAGNOSIS — K50.111 CROHN'S DISEASE OF LARGE INTESTINE WITH RECTAL BLEEDING (HCC): Primary | ICD-10-CM

## 2019-12-17 DIAGNOSIS — K58.8 OTHER IRRITABLE BOWEL SYNDROME: ICD-10-CM

## 2019-12-17 PROCEDURE — 99213 OFFICE O/P EST LOW 20 MIN: CPT | Performed by: INTERNAL MEDICINE

## 2019-12-17 NOTE — PROGRESS NOTES
Subjective   Bri Pfeiffer is a 34 y.o.. female is here today for follow-up.    Chief Complaint   Patient presents with   • Crohn's Disease   • Difficulty Swallowing   • Heartburn   • Diarrhea   • Abdominal Pain   • Rectal Bleeding     History of Present Illness  Patient returns after recent endoscopic assessment.  Her colonoscopy demonstrated good response of her Crohn's disease to Humira.  She did have a anal fissure.  She was prescribed diltiazem cream 2% but has not had the opportunity to get it filled yet.  Her esophagus demonstrated mild diffuse superficial esophagitis, etiology of which is unclear.  This was biopsied, apices are still pending.  Her dominant symptom is actually constipation and rectal pain.    The following portions of the patient's history were reviewed and updated as appropriate: allergies, current medications, past family history, past medical history, past social history, past surgical history and problem list.      Current Outpatient Medications:   •  albuterol sulfate HFA (PROAIR HFA) 108 (90 Base) MCG/ACT inhaler, , Disp: , Rfl:   •  APRISO 0.375 g 24 hr capsule, TAKE FOUR CAPSULES BY MOUTH DAILY, Disp: 120 capsule, Rfl: 2  •  cetirizine (ZYRTEC ALLERGY) 10 MG tablet, , Disp: , Rfl:   •  Cholecalciferol (VITAMIN D-3) 5000 units tablet, Take  by mouth., Disp: , Rfl:   •  citalopram (CeleXA) 20 MG tablet, , Disp: , Rfl:   •  Cyanocobalamin (VITAMIN B 12 PO), Take  by mouth., Disp: , Rfl:   •  esomeprazole (nexIUM) 40 MG capsule, Take 1 capsule by mouth Daily., Disp: 30 capsule, Rfl: 5  •  ferrous sulfate 324 (65 Fe) MG tablet delayed-release EC tablet, Take 324 mg by mouth Daily With Breakfast., Disp: , Rfl:   •  fluconazole (DIFLUCAN) 100 MG tablet, Take 1 tablet by mouth Daily., Disp: 15 tablet, Rfl: 0  •  fluticasone (FLONASE ALLERGY RELIEF) 50 MCG/ACT nasal spray, , Disp: , Rfl:   •  HUMIRA PEN 40 MG/0.4ML Pen-injector Kit, INJECT 40 MG Q OTHER WEEK, Disp: , Rfl: 0  •   hydrOXYzine pamoate (VISTARIL) 25 MG capsule, , Disp: , Rfl:   •  montelukast (SINGULAIR) 10 MG tablet, , Disp: , Rfl:   •  spironolactone (ALDACTONE) 100 MG tablet, , Disp: , Rfl:     Family History   Problem Relation Age of Onset   • Colon cancer Maternal Grandmother    • Rectal cancer Maternal Grandmother    • Prostate cancer Maternal Grandfather    • Prostate cancer Paternal Grandfather        Review of Systems   Respiratory: Negative for shortness of breath.    Cardiovascular: Negative for chest pain.   All other systems reviewed and are negative.      Objective   Physical Exam   Constitutional: She is oriented to person, place, and time. She appears well-developed and well-nourished.   HENT:   Head: Normocephalic and atraumatic.   Right Ear: External ear normal.   Left Ear: External ear normal.   Eyes: Pupils are equal, round, and reactive to light. Conjunctivae and EOM are normal.   Pulmonary/Chest: Effort normal.   Neurological: She is alert and oriented to person, place, and time.   Psychiatric: She has a normal mood and affect. Her behavior is normal. Judgment and thought content normal.   Nursing note and vitals reviewed.      Pertinent laboratory results were reviewed. , Pertinent old records were reviewed.  and Pertinent medical tests were reviewed.     Assessment/Plan   Problems Addressed this Visit        Digestive    Crohn's disease of large intestine with rectal bleeding (CMS/HCC) - Primary    Other irritable bowel syndrome        Patient's Crohn's disease has responded to Humira.  She does have a anal fissure that is contributing to her current symptoms.  I am recommending that she get started on her diltiazem cream as soon as it is available.  Her esophageal biopsies are pending.  We will let her know once we get the results.  I will see her back in a couple of months.

## 2019-12-18 LAB
CYTO UR: NORMAL
LAB AP CASE REPORT: NORMAL
PATH REPORT.FINAL DX SPEC: NORMAL
PATH REPORT.GROSS SPEC: NORMAL

## 2019-12-19 ENCOUNTER — OFFICE VISIT (OUTPATIENT)
Dept: RETAIL CLINIC | Facility: CLINIC | Age: 34
End: 2019-12-19

## 2019-12-19 VITALS
SYSTOLIC BLOOD PRESSURE: 118 MMHG | HEART RATE: 68 BPM | DIASTOLIC BLOOD PRESSURE: 76 MMHG | TEMPERATURE: 98.6 F | OXYGEN SATURATION: 98 %

## 2019-12-19 DIAGNOSIS — R21 RASH: Primary | ICD-10-CM

## 2019-12-19 PROCEDURE — 99213 OFFICE O/P EST LOW 20 MIN: CPT | Performed by: NURSE PRACTITIONER

## 2019-12-19 PROCEDURE — 96372 THER/PROPH/DIAG INJ SC/IM: CPT | Performed by: NURSE PRACTITIONER

## 2019-12-19 RX ORDER — METHYLPREDNISOLONE ACETATE 80 MG/ML
80 INJECTION, SUSPENSION INTRA-ARTICULAR; INTRALESIONAL; INTRAMUSCULAR; SOFT TISSUE ONCE
Status: COMPLETED | OUTPATIENT
Start: 2019-12-19 | End: 2019-12-19

## 2019-12-19 RX ORDER — PREDNISONE 10 MG/1
TABLET ORAL
Qty: 21 TABLET | Refills: 0 | Status: SHIPPED | OUTPATIENT
Start: 2019-12-19 | End: 2020-01-14

## 2019-12-19 RX ADMIN — METHYLPREDNISOLONE ACETATE 80 MG: 80 INJECTION, SUSPENSION INTRA-ARTICULAR; INTRALESIONAL; INTRAMUSCULAR; SOFT TISSUE at 16:30

## 2019-12-19 NOTE — PATIENT INSTRUCTIONS
Start oral prednisone tomorrow since you received injection in clinic today.    Can also try a benadryl nightly. Also talk to MD about pre-treating with benadryl before humira injections.      If no improvement by 12/23 call the clinic, I will be here.

## 2019-12-19 NOTE — PROGRESS NOTES
Subjective   Bri Pfeiffer is a 34 y.o. female.     History of Present Illness Diagnosis of Crohn's 05/2019 and started Humira 08/2019. Had second scope colonoscopy and Administered self-injection of Humira 40mg on Friday 12/13/19, noticed rash to abdomen the next morning. Rash itches at times and has spread to armida. Upper thighs and chest. Denies trying anything OTC to treat. Pt. Called internist and they recommended being seen asap since they are on Humira. Denies any changes to body wash, detergent, dryer sheets, etc.    The following portions of the patient's history were reviewed and updated as appropriate: allergies, current medications, past family history, past medical history, past social history, past surgical history and problem list.    Review of Systems    Objective   Physical Exam   Constitutional: She is oriented to person, place, and time. She appears well-developed and well-nourished. No distress.   HENT:   Head: Normocephalic and atraumatic.   Eyes: Pupils are equal, round, and reactive to light. Conjunctivae and EOM are normal.   Neck: Normal range of motion.   Cardiovascular: Normal rate, regular rhythm and normal heart sounds. Exam reveals no gallop and no friction rub.   No murmur heard.  Pulmonary/Chest: Effort normal and breath sounds normal. No respiratory distress.   Neurological: She is alert and oriented to person, place, and time.   Skin: Skin is warm and dry. Rash noted. Rash is macular and papular. She is not diaphoretic.        Psychiatric: She has a normal mood and affect.         Assessment/Plan   Diagnoses and all orders for this visit:    Rash  -     methylPREDNISolone acetate (DEPO-medrol) injection 80 mg    Other orders  -     predniSONE (DELTASONE) 10 MG (21) tablet pack; Use as directed on package

## 2019-12-28 RX ORDER — ESOMEPRAZOLE MAGNESIUM 40 MG/1
CAPSULE, DELAYED RELEASE ORAL
Qty: 30 CAPSULE | Refills: 4 | Status: SHIPPED | OUTPATIENT
Start: 2019-12-28 | End: 2020-06-08

## 2019-12-28 RX ORDER — MESALAMINE 375 MG/1
CAPSULE, EXTENDED RELEASE ORAL
Qty: 120 CAPSULE | Refills: 1 | Status: SHIPPED | OUTPATIENT
Start: 2019-12-28 | End: 2020-02-06

## 2020-01-14 ENCOUNTER — LAB (OUTPATIENT)
Dept: LAB | Facility: HOSPITAL | Age: 35
End: 2020-01-14

## 2020-01-14 ENCOUNTER — OFFICE VISIT (OUTPATIENT)
Dept: GASTROENTEROLOGY | Facility: CLINIC | Age: 35
End: 2020-01-14

## 2020-01-14 VITALS
BODY MASS INDEX: 41.52 KG/M2 | DIASTOLIC BLOOD PRESSURE: 86 MMHG | SYSTOLIC BLOOD PRESSURE: 116 MMHG | HEART RATE: 79 BPM | HEIGHT: 68 IN | WEIGHT: 274 LBS

## 2020-01-14 DIAGNOSIS — K50.111 CROHN'S DISEASE OF LARGE INTESTINE WITH RECTAL BLEEDING (HCC): ICD-10-CM

## 2020-01-14 DIAGNOSIS — K21.9 GASTROESOPHAGEAL REFLUX DISEASE, ESOPHAGITIS PRESENCE NOT SPECIFIED: ICD-10-CM

## 2020-01-14 DIAGNOSIS — R21 SKIN RASH: ICD-10-CM

## 2020-01-14 DIAGNOSIS — K50.111 CROHN'S DISEASE OF LARGE INTESTINE WITH RECTAL BLEEDING (HCC): Primary | ICD-10-CM

## 2020-01-14 DIAGNOSIS — M54.40 LOW BACK PAIN WITH SCIATICA, SCIATICA LATERALITY UNSPECIFIED, UNSPECIFIED BACK PAIN LATERALITY, UNSPECIFIED CHRONICITY: ICD-10-CM

## 2020-01-14 DIAGNOSIS — K62.5 RECTAL BLEEDING: ICD-10-CM

## 2020-01-14 LAB
ALBUMIN SERPL-MCNC: 3.9 G/DL (ref 3.5–5.2)
ALBUMIN/GLOB SERPL: 1.2 G/DL
ALP SERPL-CCNC: 59 U/L (ref 39–117)
ALT SERPL W P-5'-P-CCNC: 11 U/L (ref 1–33)
ANION GAP SERPL CALCULATED.3IONS-SCNC: 7.9 MMOL/L (ref 5–15)
AST SERPL-CCNC: 16 U/L (ref 1–32)
BASOPHILS # BLD AUTO: 0.03 10*3/MM3 (ref 0–0.2)
BASOPHILS NFR BLD AUTO: 0.3 % (ref 0–1.5)
BILIRUB SERPL-MCNC: 0.2 MG/DL (ref 0.2–1.2)
BUN BLD-MCNC: 10 MG/DL (ref 6–20)
BUN/CREAT SERPL: 11.9 (ref 7–25)
CALCIUM SPEC-SCNC: 8.9 MG/DL (ref 8.6–10.5)
CHLORIDE SERPL-SCNC: 102 MMOL/L (ref 98–107)
CHROMATIN AB SERPL-ACNC: <10 IU/ML (ref 0–14)
CO2 SERPL-SCNC: 27.1 MMOL/L (ref 22–29)
CREAT BLD-MCNC: 0.84 MG/DL (ref 0.57–1)
DEPRECATED RDW RBC AUTO: 45 FL (ref 37–54)
EOSINOPHIL # BLD AUTO: 0.19 10*3/MM3 (ref 0–0.4)
EOSINOPHIL NFR BLD AUTO: 2.1 % (ref 0.3–6.2)
ERYTHROCYTE [DISTWIDTH] IN BLOOD BY AUTOMATED COUNT: 13.9 % (ref 12.3–15.4)
GFR SERPL CREATININE-BSD FRML MDRD: 78 ML/MIN/1.73
GLOBULIN UR ELPH-MCNC: 3.3 GM/DL
GLUCOSE BLD-MCNC: 94 MG/DL (ref 65–99)
HCT VFR BLD AUTO: 36.3 % (ref 34–46.6)
HGB BLD-MCNC: 11.7 G/DL (ref 12–15.9)
IMM GRANULOCYTES # BLD AUTO: 0.02 10*3/MM3 (ref 0–0.05)
IMM GRANULOCYTES NFR BLD AUTO: 0.2 % (ref 0–0.5)
LYMPHOCYTES # BLD AUTO: 2.39 10*3/MM3 (ref 0.7–3.1)
LYMPHOCYTES NFR BLD AUTO: 26.4 % (ref 19.6–45.3)
MCH RBC QN AUTO: 28.7 PG (ref 26.6–33)
MCHC RBC AUTO-ENTMCNC: 32.2 G/DL (ref 31.5–35.7)
MCV RBC AUTO: 89 FL (ref 79–97)
MONOCYTES # BLD AUTO: 0.94 10*3/MM3 (ref 0.1–0.9)
MONOCYTES NFR BLD AUTO: 10.4 % (ref 5–12)
NEUTROPHILS # BLD AUTO: 5.48 10*3/MM3 (ref 1.7–7)
NEUTROPHILS NFR BLD AUTO: 60.6 % (ref 42.7–76)
NRBC BLD AUTO-RTO: 0 /100 WBC (ref 0–0.2)
PLATELET # BLD AUTO: 294 10*3/MM3 (ref 140–450)
PMV BLD AUTO: 9.4 FL (ref 6–12)
POTASSIUM BLD-SCNC: 4.3 MMOL/L (ref 3.5–5.2)
PROT SERPL-MCNC: 7.2 G/DL (ref 6–8.5)
RBC # BLD AUTO: 4.08 10*6/MM3 (ref 3.77–5.28)
SODIUM BLD-SCNC: 137 MMOL/L (ref 136–145)
WBC NRBC COR # BLD: 9.05 10*3/MM3 (ref 3.4–10.8)

## 2020-01-14 PROCEDURE — 80053 COMPREHEN METABOLIC PANEL: CPT | Performed by: INTERNAL MEDICINE

## 2020-01-14 PROCEDURE — 85025 COMPLETE CBC W/AUTO DIFF WBC: CPT | Performed by: INTERNAL MEDICINE

## 2020-01-14 PROCEDURE — 99214 OFFICE O/P EST MOD 30 MIN: CPT | Performed by: INTERNAL MEDICINE

## 2020-01-14 PROCEDURE — 86038 ANTINUCLEAR ANTIBODIES: CPT | Performed by: INTERNAL MEDICINE

## 2020-01-14 PROCEDURE — 86431 RHEUMATOID FACTOR QUANT: CPT

## 2020-01-14 PROCEDURE — 81374 HLA I TYPING 1 ANTIGEN LR: CPT

## 2020-01-14 PROCEDURE — 36415 COLL VENOUS BLD VENIPUNCTURE: CPT | Performed by: INTERNAL MEDICINE

## 2020-01-14 NOTE — PROGRESS NOTES
"Subjective   Bri Pfeiffer is a 34 y.o.. female is here today for follow-up.    Chief Complaint   Patient presents with   • Crohn's Disease   • Rash   • Rectal Bleeding     Improving   • Abdominal Pain     with BMs   • Heartburn   • Hair/Scalp Problem     \"Falling out/breaking off\"       History of Present Illness  At this point patient has no diarrhea and very little rectal bleeding associated with her Crohn's disease.  She still has some generalized abdominal pain with bowel movements.  Diltiazem cream seems to help with this.  She has noticed a rash come and go.  It tends to go around her waist and into her inguinal area.  She is also noticed that her fingernails are becoming brittle and that she appears to be losing hair more frequently.  She also complains of persistent low back pain.  These are new complaints for the patient.    The following portions of the patient's history were reviewed and updated as appropriate: allergies, current medications, past family history, past medical history, past social history, past surgical history and problem list.      Current Outpatient Medications:   •  albuterol sulfate HFA (PROAIR HFA) 108 (90 Base) MCG/ACT inhaler, , Disp: , Rfl:   •  APRISO 0.375 g 24 hr capsule, TAKE FOUR CAPSULES BY MOUTH DAILY, Disp: 120 capsule, Rfl: 1  •  cetirizine (ZYRTEC ALLERGY) 10 MG tablet, , Disp: , Rfl:   •  Cholecalciferol (VITAMIN D-3) 5000 units tablet, Take  by mouth., Disp: , Rfl:   •  citalopram (CeleXA) 20 MG tablet, , Disp: , Rfl:   •  Cyanocobalamin (VITAMIN B 12 PO), Take  by mouth., Disp: , Rfl:   •  esomeprazole (nexIUM) 40 MG capsule, TAKE ONE CAPSULE BY MOUTH DAILY, Disp: 30 capsule, Rfl: 4  •  ferrous sulfate 324 (65 Fe) MG tablet delayed-release EC tablet, Take 324 mg by mouth Daily With Breakfast., Disp: , Rfl:   •  fluticasone (FLONASE ALLERGY RELIEF) 50 MCG/ACT nasal spray, , Disp: , Rfl:   •  HUMIRA PEN 40 MG/0.4ML Pen-injector Kit, INJECT 40 MG Q OTHER WEEK, " Disp: , Rfl: 0  •  montelukast (SINGULAIR) 10 MG tablet, , Disp: , Rfl:   •  spironolactone (ALDACTONE) 100 MG tablet, , Disp: , Rfl:     Family History   Problem Relation Age of Onset   • Colon cancer Maternal Grandmother    • Rectal cancer Maternal Grandmother    • Prostate cancer Maternal Grandfather    • Prostate cancer Paternal Grandfather        Review of Systems   Respiratory: Negative for shortness of breath.    Cardiovascular: Negative for chest pain.   All other systems reviewed and are negative.      Objective   Physical Exam   Constitutional: She is oriented to person, place, and time. She appears well-developed and well-nourished.   HENT:   Head: Normocephalic and atraumatic.   Right Ear: External ear normal.   Left Ear: External ear normal.   Eyes: Pupils are equal, round, and reactive to light. Conjunctivae and EOM are normal.   Pulmonary/Chest: Effort normal.   Neurological: She is alert and oriented to person, place, and time.   Skin: Skin is warm and dry.   At the injection sites on her thighs is a very early scaling rash.  She also has a more petechial type rash in her inguinal area.  Her fingernails today to me look fairly normal.  There is no pitting.   Psychiatric: She has a normal mood and affect. Her behavior is normal. Judgment and thought content normal.   Nursing note and vitals reviewed.      Pertinent laboratory results were reviewed.  and Pertinent old records were reviewed.     Assessment/Plan   Problems Addressed this Visit        Digestive    Rectal bleeding    Relevant Orders    Ambulatory Referral to Dermatology    Ambulatory Referral to Rheumatology    CBC & Differential    Comprehensive Metabolic Panel    Rheumatoid Factor    LAUREN    HLA-B27 Antigen    Gastroesophageal reflux disease    Relevant Orders    Ambulatory Referral to Dermatology    Ambulatory Referral to Rheumatology    CBC & Differential    Comprehensive Metabolic Panel    Rheumatoid Factor    LAUREN    HLA-B27 Antigen     Crohn's disease of large intestine with rectal bleeding (CMS/HCC) - Primary    Relevant Orders    Ambulatory Referral to Dermatology    Ambulatory Referral to Rheumatology    CBC & Differential    Comprehensive Metabolic Panel    Rheumatoid Factor    LAUREN    HLA-B27 Antigen       Musculoskeletal and Integument    Skin rash    Relevant Orders    Ambulatory Referral to Dermatology    Ambulatory Referral to Rheumatology    CBC & Differential    Comprehensive Metabolic Panel    Rheumatoid Factor    LAUREN    HLA-B27 Antigen      Other Visit Diagnoses     Low back pain with sciatica, sciatica laterality unspecified, unspecified back pain laterality, unspecified chronicity        Relevant Orders    Ambulatory Referral to Dermatology    Ambulatory Referral to Rheumatology    CBC & Differential    Comprehensive Metabolic Panel    Rheumatoid Factor    LAUREN    HLA-B27 Antigen        The patient's Crohn's disease seems to be doing well.  The skin rash is problematic.  Although it is not unusual for skin rashes to come and go with Humira therapy, occasionally one gets a psoriatic form illness that appears to be directly related to the Humira therapy.  I would like for her to see a dermatologist and have this commented upon.  It is possible we may need to stop Humira and change her over to something such as Entyvio.  The low back pain could be routine mechanical low back pain, but again because of the association of axial disease with Crohn's disease I would like to get a preliminary rheumatologic work-up and then have 1 of the rheumatologic specialists give an opinion as well.  I will see her back in 6 weeks.

## 2020-01-15 ENCOUNTER — TELEPHONE (OUTPATIENT)
Dept: GASTROENTEROLOGY | Facility: CLINIC | Age: 35
End: 2020-01-15

## 2020-01-15 LAB — ANA SER QL: NEGATIVE

## 2020-01-16 ENCOUNTER — TELEPHONE (OUTPATIENT)
Dept: GASTROENTEROLOGY | Facility: CLINIC | Age: 35
End: 2020-01-16

## 2020-01-16 NOTE — TELEPHONE ENCOUNTER
LVM with pt normal lab results (cbc,cmp,angella,rheumatoid) as reviewed by Dr Turcios.  HLA-B27 antigen still pending.

## 2020-01-16 NOTE — TELEPHONE ENCOUNTER
----- Message from Tae Turcios MD sent at 1/14/2020  4:20 PM EST -----  Advise patient the results were normal.

## 2020-01-20 LAB — HLA-B27 QL NAA+PROBE: NEGATIVE

## 2020-01-23 ENCOUNTER — TELEPHONE (OUTPATIENT)
Dept: GASTROENTEROLOGY | Facility: CLINIC | Age: 35
End: 2020-01-23

## 2020-01-23 NOTE — TELEPHONE ENCOUNTER
Left message on patients voice mail. About appointment  With Dermatologist Chetan Childs 2-. At 1 pm. Mailed patient appointment information. With address and phone number. Also called Rheumatolgist spoke to Yvonne in [employer]. Said she had not received fax. Re-faxed information to [phone]. Received fax confirmation.

## 2020-01-24 ENCOUNTER — TELEPHONE (OUTPATIENT)
Dept: GASTROENTEROLOGY | Facility: CLINIC | Age: 35
End: 2020-01-24

## 2020-01-24 NOTE — TELEPHONE ENCOUNTER
"Dr Turcios originally placed Rheumatology Referral to Dr Jeni Steele. However, this provider is actually a \"\" type.  Pt would have to pay $45 monthly fee in addition to her insurance.    Pt is not interested in this type of provider.  She would [facility] to [facility].    Faxed referral intake form/demographics, labs and office note to [phone]. [facility] will contact pt within 7-10 business days with appt.    Called Dr Steele's office ([facility]) [phone] to cancel previous referral that was made.  "

## 2020-01-29 ENCOUNTER — OFFICE VISIT (OUTPATIENT)
Dept: FAMILY MEDICINE CLINIC | Facility: CLINIC | Age: 35
End: 2020-01-29

## 2020-01-29 VITALS
WEIGHT: 270 LBS | SYSTOLIC BLOOD PRESSURE: 110 MMHG | TEMPERATURE: 97.9 F | HEART RATE: 68 BPM | DIASTOLIC BLOOD PRESSURE: 73 MMHG | RESPIRATION RATE: 16 BRPM | HEIGHT: 68 IN | BODY MASS INDEX: 40.92 KG/M2

## 2020-01-29 DIAGNOSIS — F33.1 MODERATE EPISODE OF RECURRENT MAJOR DEPRESSIVE DISORDER (HCC): Primary | ICD-10-CM

## 2020-01-29 DIAGNOSIS — F41.9 ANXIETY: ICD-10-CM

## 2020-01-29 PROCEDURE — 99213 OFFICE O/P EST LOW 20 MIN: CPT | Performed by: FAMILY MEDICINE

## 2020-01-29 RX ORDER — SERTRALINE HYDROCHLORIDE 100 MG/1
100 TABLET, FILM COATED ORAL DAILY
Qty: 30 TABLET | Refills: 5 | Status: SHIPPED | OUTPATIENT
Start: 2020-01-29 | End: 2020-09-15 | Stop reason: SDUPTHER

## 2020-01-29 NOTE — PROGRESS NOTES
"Subjective   Bri Pfeiffer is a 34 y.o. female.     CC: Management of Anxiety/Depression    History of Present Illness     Pt comes in today to discuss the above. Has been getting care for several months through [facility] (internet) where she originally was prescribed Cymbalta and then changed to Celexa, where she initially had some improvement yet now with regression. Denies SI/HI. Lots of Negative Sx.      The following portions of the patient's history were reviewed and updated as appropriate: allergies, current medications, past family history, past medical history, past social history, past surgical history and problem list.    Review of Systems   Constitutional: Positive for fatigue.   Psychiatric/Behavioral: Positive for dysphoric mood. Negative for agitation, behavioral problems and confusion. The patient is nervous/anxious.        /73   Pulse 68   Temp 97.9 °F (36.6 °C) (Oral)   Resp 16   Ht 172.7 cm (68\")   Wt 122 kg (270 lb)   BMI 41.05 kg/m²     Objective   Physical Exam   Constitutional: She appears well-developed and well-nourished.   Psychiatric: She has a normal mood and affect. Her behavior is normal. Judgment and thought content normal.       Assessment/Plan   Bri was seen today for depression and anxiety.    Diagnoses and all orders for this visit:    Moderate episode of recurrent major depressive disorder (CMS/HCC)  -     sertraline (ZOLOFT) 100 MG tablet; Take 1 tablet by mouth Daily.    Anxiety  -     sertraline (ZOLOFT) 100 MG tablet; Take 1 tablet by mouth Daily.    Pt to email in 1 month for possible dosage adjustment.           "

## 2020-02-05 ENCOUNTER — TELEPHONE (OUTPATIENT)
Dept: GASTROENTEROLOGY | Facility: CLINIC | Age: 35
End: 2020-02-05

## 2020-02-05 NOTE — TELEPHONE ENCOUNTER
Apriso in now non formulary, Preferred med is Mesalamine 1.2 gram.  (She is currently taking 4 Apriso caps daily).    Dr Turcios, can you change to Mesalamine 1.2 g?

## 2020-02-05 NOTE — TELEPHONE ENCOUNTER
Pt called office stating the cost of Apriso has gone up to $300 per month.  Offered her samples and copay card.    Pt stopped by office to  Apriso samples and copay card.  Also received fax from [facility] pharmacy stating Apriso is now non formulary with [employer].    Pt will try copay card but if still too expensive, will need to change to preferred medicine on formulary:  Generic Lialda (Mesalamine 1.2 g).  Pt will contact us if medicine needs to be changed.

## 2020-02-06 RX ORDER — MESALAMINE 1.2 G/1
2400 TABLET, DELAYED RELEASE ORAL
Qty: 60 TABLET | Refills: 11 | Status: SHIPPED | OUTPATIENT
Start: 2020-02-06 | End: 2020-02-25

## 2020-02-06 NOTE — TELEPHONE ENCOUNTER
Pt notified that Dr Turcios changed Apriso to Mesalamine 1.2 gram (2 tabs daily), since Apriso now non formulary with [employer].

## 2020-02-12 ENCOUNTER — PATIENT MESSAGE (OUTPATIENT)
Dept: GASTROENTEROLOGY | Facility: CLINIC | Age: 35
End: 2020-02-12

## 2020-02-25 ENCOUNTER — OFFICE VISIT (OUTPATIENT)
Dept: GASTROENTEROLOGY | Facility: CLINIC | Age: 35
End: 2020-02-25

## 2020-02-25 VITALS
BODY MASS INDEX: 40.92 KG/M2 | HEIGHT: 68 IN | HEART RATE: 75 BPM | SYSTOLIC BLOOD PRESSURE: 118 MMHG | WEIGHT: 270 LBS | DIASTOLIC BLOOD PRESSURE: 75 MMHG

## 2020-02-25 DIAGNOSIS — K21.9 GASTROESOPHAGEAL REFLUX DISEASE, ESOPHAGITIS PRESENCE NOT SPECIFIED: ICD-10-CM

## 2020-02-25 DIAGNOSIS — K50.111 CROHN'S DISEASE OF LARGE INTESTINE WITH RECTAL BLEEDING (HCC): Primary | ICD-10-CM

## 2020-02-25 PROCEDURE — 99213 OFFICE O/P EST LOW 20 MIN: CPT | Performed by: INTERNAL MEDICINE

## 2020-02-25 RX ORDER — ACETAMINOPHEN 500 MG
500 TABLET ORAL EVERY 6 HOURS PRN
COMMUNITY
End: 2021-07-08

## 2020-02-25 RX ORDER — KETOCONAZOLE 20 MG/G
CREAM TOPICAL
COMMUNITY
Start: 2020-02-21 | End: 2020-07-18 | Stop reason: ALTCHOICE

## 2020-02-25 RX ORDER — DIPHENOXYLATE HYDROCHLORIDE AND ATROPINE SULFATE 2.5; .025 MG/1; MG/1
1 TABLET ORAL 4 TIMES DAILY PRN
Qty: 120 TABLET | Refills: 3 | Status: SHIPPED | OUTPATIENT
Start: 2020-02-25 | End: 2022-03-09

## 2020-02-25 NOTE — PROGRESS NOTES
Subjective   Bri Pfeiffer is a 34 y.o.. female is here today for follow-up.    Chief Complaint   Patient presents with   • Crohn's Disease   • Diarrhea   • Gas   • Nausea   • Muscle Pain   • Heartburn     Regurg   • Rectal Bleeding     History of Present Illness  Patient had both dermatology and rheumatology appointments in the past several weeks.  Dermatologist found no evidence of any sort of psoriatic form issues that would require modification of her biologic therapy.  Rheumatologist did not see an active inflammatory component to her arthralgias.  Patient occasionally has breakthrough diarrhea.  She uses Lomotil occasionally.  Heartburn is under fairly good control with the esomeprazole.  Her rectal bleeding is scant.    The following portions of the patient's history were reviewed and updated as appropriate: allergies, current medications, past family history, past medical history, past social history, past surgical history and problem list.      Current Outpatient Medications:   •  acetaminophen (TYLENOL) 500 MG tablet, Take 500 mg by mouth Every 6 (Six) Hours As Needed for Mild Pain ., Disp: , Rfl:   •  albuterol sulfate HFA (PROAIR HFA) 108 (90 Base) MCG/ACT inhaler, , Disp: , Rfl:   •  cetirizine (ZYRTEC ALLERGY) 10 MG tablet, , Disp: , Rfl:   •  Cholecalciferol (VITAMIN D-3) 5000 units tablet, Take  by mouth., Disp: , Rfl:   •  Cyanocobalamin (VITAMIN B 12 PO), Take  by mouth., Disp: , Rfl:   •  esomeprazole (nexIUM) 40 MG capsule, TAKE ONE CAPSULE BY MOUTH DAILY, Disp: 30 capsule, Rfl: 4  •  ferrous sulfate 324 (65 Fe) MG tablet delayed-release EC tablet, Take 324 mg by mouth Daily With Breakfast., Disp: , Rfl:   •  fluticasone (FLONASE ALLERGY RELIEF) 50 MCG/ACT nasal spray, , Disp: , Rfl:   •  HUMIRA PEN 40 MG/0.4ML Pen-injector Kit, INJECT 40 MG Q OTHER WEEK, Disp: , Rfl: 0  •  ketoconazole (NIZORAL) 2 % cream, , Disp: , Rfl:   •  montelukast (SINGULAIR) 10 MG tablet, , Disp: , Rfl:   •   sertraline (ZOLOFT) 100 MG tablet, Take 1 tablet by mouth Daily., Disp: 30 tablet, Rfl: 5  •  spironolactone (ALDACTONE) 100 MG tablet, , Disp: , Rfl:   •  diphenoxylate-atropine (LOMOTIL) 2.5-0.025 MG per tablet, Take 1 tablet by mouth 4 (Four) Times a Day As Needed for Diarrhea., Disp: 120 tablet, Rfl: 3    Family History   Problem Relation Age of Onset   • Colon cancer Maternal Grandmother    • Rectal cancer Maternal Grandmother    • Prostate cancer Maternal Grandfather    • Prostate cancer Paternal Grandfather        Review of Systems   Respiratory: Negative for shortness of breath.    Cardiovascular: Negative for chest pain.   All other systems reviewed and are negative.      Objective   Physical Exam   Constitutional: She is oriented to person, place, and time. She appears well-developed and well-nourished.   HENT:   Head: Normocephalic and atraumatic.   Right Ear: External ear normal.   Left Ear: External ear normal.   Eyes: Pupils are equal, round, and reactive to light. Conjunctivae and EOM are normal.   Pulmonary/Chest: Effort normal.   Neurological: She is alert and oriented to person, place, and time.   Psychiatric: She has a normal mood and affect. Her behavior is normal. Judgment and thought content normal.   Nursing note and vitals reviewed.      Pertinent laboratory results were reviewed.  and Pertinent old records were reviewed.     Assessment/Plan   Problems Addressed this Visit        Digestive    Gastroesophageal reflux disease    Crohn's disease of large intestine with rectal bleeding (CMS/HCC) - Primary    Relevant Medications    diphenoxylate-atropine (LOMOTIL) 2.5-0.025 MG per tablet        I would have her continue current therapy.  I would like to switch her over to Lomotil which she can take up to 4 times a day for symptom control.  She will be following up with her rheumatologist shortly and I look forward to the feedback.  I will see her in 3 months.  Transition issues discussed.

## 2020-03-05 RX ORDER — MESALAMINE 375 MG/1
CAPSULE, EXTENDED RELEASE ORAL
Qty: 120 CAPSULE | Refills: 1 | Status: SHIPPED | OUTPATIENT
Start: 2020-03-05 | End: 2020-06-10

## 2020-03-06 ENCOUNTER — PRIOR AUTHORIZATION (OUTPATIENT)
Dept: GASTROENTEROLOGY | Facility: CLINIC | Age: 35
End: 2020-03-06

## 2020-03-06 NOTE — TELEPHONE ENCOUNTER
"Pt has been on Humira and previously had [employer] insurance. She now has [employer] insurance.  [facility] (Humira currently filled here) is not contracted with this specific [employer] plan. She will need to have filled thru [facility] (customer service # [phone]).  Called [facility] and spoke with Garcia, pharmacist. Gave him rx info:  Humira 40 mg/0.4ml citrate free, inject 40 mg (one pen) SQ every other week, dispense #2 pens, with 11 refills.    Also obtained Humira prior auth from [employer] via \"covermymeds\", valid 3/6/2020-3/6/2021.    Pt notified via \"mychart\" of above.  Advised her to contact [facility] in few days if she does not hear from them. She will need to call [facility] at [phone], option #3.  She will also need to provide pharmacy with her Humira copay ($5.00) info.    3/18, Received fax from [facility] that Humira was shipped to pt on 3/10/20.  "

## 2020-03-23 ENCOUNTER — TELEPHONE (OUTPATIENT)
Dept: GASTROENTEROLOGY | Facility: CLINIC | Age: 35
End: 2020-03-23

## 2020-03-23 NOTE — TELEPHONE ENCOUNTER
----- Message from Bri Pfeiffer sent at 3/23/2020  3:33 PM EDT -----  Regarding: Non-Urgent Medical Question  Contact: [phone]  Good afternoon!  I have another request from you. Can you either email or fax a letter to my employer stating that I am on medications that suppress my immune system?   I am going to take a leave as my employer is not closing and I am unable to work without being in close quarters to my coworkers or clients, and I need proof my my immunosuppression.     You can email my manager. Bony Mueller at [email or web address] or fax her at [phone].  If needed it can also be mailed to Bony at  [address] [place] [address]    Thank you so much!  Bri

## 2020-03-23 NOTE — TELEPHONE ENCOUNTER
"Dr Turcios recommends pt self quarantine for a minimum of 2 weeks due to immunosuppression medication (Humira).  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     Faxed note to pt's work (Attn: Bony Mueller/fax [phone]) per her request but with RTW date as \"undetermined\" for now.    Fax would not go through at above number.  Called pt and she gave another fax to use instead [phone] and received confirmation.      "

## 2020-04-02 ENCOUNTER — PRIOR AUTHORIZATION (OUTPATIENT)
Dept: GASTROENTEROLOGY | Facility: CLINIC | Age: 35
End: 2020-04-02

## 2020-04-02 NOTE — TELEPHONE ENCOUNTER
"Pt has been taking Esomeprazole (Nexium) 40 mg once daily since EGD showed Esophagitis (K20.9).  Pt has new [place] insurance and this now requires prior auth.    Submitted PA request online via \"covermymeds\" to [employer].  Received approval, valid 4/2/20-4/2/2021.  Faxed approval note to [facility].  "

## 2020-05-05 ENCOUNTER — TELEMEDICINE (OUTPATIENT)
Dept: GASTROENTEROLOGY | Facility: CLINIC | Age: 35
End: 2020-05-05

## 2020-05-05 DIAGNOSIS — K50.111 CROHN'S DISEASE OF LARGE INTESTINE WITH RECTAL BLEEDING (HCC): Primary | ICD-10-CM

## 2020-05-05 DIAGNOSIS — K62.5 RECTAL BLEEDING: ICD-10-CM

## 2020-05-05 PROCEDURE — 99213 OFFICE O/P EST LOW 20 MIN: CPT | Performed by: INTERNAL MEDICINE

## 2020-05-05 RX ORDER — DICYCLOMINE HCL 20 MG
20 TABLET ORAL 3 TIMES DAILY PRN
Qty: 90 TABLET | Refills: 2 | Status: SHIPPED | OUTPATIENT
Start: 2020-05-05 | End: 2022-03-09

## 2020-05-05 NOTE — PROGRESS NOTES
Subjective   Bri Pfeiffer is a 34 y.o.. female is here today for follow-up.    No chief complaint on file.  CC: Crohn's disease    This was a Telehealth visit using an approved video system and done according to our institutional protocol as necessitated by the current COVID-19 crisis. Consent for this visit was obtained.      History of Present Illness  The patient's Crohn's symptoms which consist of abdominal cramping, diarrhea, and rectal bleeding waxes and wanes.  She had a rough time about 2 weeks ago.  The symptoms have settled down at this point.  She is not sure what might have made a difference.  Currently she is in modified self quarantine because of her immunosuppressed situation.  With the rest, many of her orthopedic symptoms have improved.  Her last blood work is at least 3 months ago.    The following portions of the patient's history were reviewed and updated as appropriate: allergies, current medications, past family history, past medical history, past social history, past surgical history and problem list.      Current Outpatient Medications:   •  acetaminophen (TYLENOL) 500 MG tablet, Take 500 mg by mouth Every 6 (Six) Hours As Needed for Mild Pain ., Disp: , Rfl:   •  albuterol sulfate HFA (PROAIR HFA) 108 (90 Base) MCG/ACT inhaler, , Disp: , Rfl:   •  APRISO 0.375 g 24 hr capsule, TAKE FOUR CAPSULES BY MOUTH DAILY, Disp: 120 capsule, Rfl: 1  •  cetirizine (ZYRTEC ALLERGY) 10 MG tablet, , Disp: , Rfl:   •  Cholecalciferol (VITAMIN D-3) 5000 units tablet, Take  by mouth., Disp: , Rfl:   •  Cyanocobalamin (VITAMIN B 12 PO), Take  by mouth., Disp: , Rfl:   •  dicyclomine (Bentyl) 20 MG tablet, Take 1 tablet by mouth 3 (Three) Times a Day As Needed (Cramping)., Disp: 90 tablet, Rfl: 2  •  diphenoxylate-atropine (LOMOTIL) 2.5-0.025 MG per tablet, Take 1 tablet by mouth 4 (Four) Times a Day As Needed for Diarrhea., Disp: 120 tablet, Rfl: 3  •  esomeprazole (nexIUM) 40 MG capsule, TAKE ONE  CAPSULE BY MOUTH DAILY, Disp: 30 capsule, Rfl: 4  •  ferrous sulfate 324 (65 Fe) MG tablet delayed-release EC tablet, Take 324 mg by mouth Daily With Breakfast., Disp: , Rfl:   •  fluticasone (FLONASE ALLERGY RELIEF) 50 MCG/ACT nasal spray, , Disp: , Rfl:   •  HUMIRA PEN 40 MG/0.4ML Pen-injector Kit, INJECT 40 MG Q OTHER WEEK, Disp: , Rfl: 0  •  ketoconazole (NIZORAL) 2 % cream, , Disp: , Rfl:   •  montelukast (SINGULAIR) 10 MG tablet, , Disp: , Rfl:   •  sertraline (ZOLOFT) 100 MG tablet, Take 1 tablet by mouth Daily., Disp: 30 tablet, Rfl: 5  •  spironolactone (ALDACTONE) 100 MG tablet, , Disp: , Rfl:     Family History   Problem Relation Age of Onset   • Colon cancer Maternal Grandmother    • Rectal cancer Maternal Grandmother    • Prostate cancer Maternal Grandfather    • Prostate cancer Paternal Grandfather        Review of Systems   Respiratory: Negative for shortness of breath.    Cardiovascular: Negative for chest pain.   All other systems reviewed and are negative.      Objective   Physical Exam   Constitutional: She is oriented to person, place, and time. She appears well-developed and well-nourished.   HENT:   Head: Normocephalic and atraumatic.   Right Ear: External ear normal.   Left Ear: External ear normal.   Eyes: Pupils are equal, round, and reactive to light. Conjunctivae and EOM are normal.   Pulmonary/Chest: Effort normal.   Neurological: She is alert and oriented to person, place, and time.   Psychiatric: She has a normal mood and affect. Her behavior is normal. Judgment and thought content normal.       Pertinent laboratory results were reviewed.  and Pertinent old records were reviewed.     Assessment/Plan   Problems Addressed this Visit        Digestive    Rectal bleeding    Relevant Orders    CBC & Differential    Comprehensive Metabolic Panel    Crohn's disease of large intestine with rectal bleeding (CMS/HCC) - Primary    Relevant Orders    CBC & Differential    Comprehensive Metabolic  Panel        The patient is doing reasonably well.  She is not symptomatic but the symptoms are manageable.  I will try her on dicyclomine 20 mg 3 times daily as needed and see if that might help with some of the abdominal cramping.  We will update her CBC and CMP.  I would like to see her back in about 2 months.    Video face-to-face time was approximately 9 minutes.

## 2020-05-15 ENCOUNTER — TELEPHONE (OUTPATIENT)
Dept: GASTROENTEROLOGY | Facility: CLINIC | Age: 35
End: 2020-05-15

## 2020-05-15 LAB
ALBUMIN SERPL-MCNC: 4.2 G/DL (ref 3.5–5.2)
ALBUMIN/GLOB SERPL: 1.2 G/DL
ALP SERPL-CCNC: 71 U/L (ref 39–117)
ALT SERPL-CCNC: 18 U/L (ref 1–33)
AST SERPL-CCNC: 18 U/L (ref 1–32)
BASOPHILS # BLD AUTO: 0.15 10*3/MM3 (ref 0–0.2)
BASOPHILS NFR BLD AUTO: 2.1 % (ref 0–1.5)
BILIRUB SERPL-MCNC: 0.4 MG/DL (ref 0.2–1.2)
BUN SERPL-MCNC: 16 MG/DL (ref 6–20)
BUN/CREAT SERPL: 21.3 (ref 7–25)
CALCIUM SERPL-MCNC: 9.5 MG/DL (ref 8.6–10.5)
CHLORIDE SERPL-SCNC: 98 MMOL/L (ref 98–107)
CO2 SERPL-SCNC: 26.6 MMOL/L (ref 22–29)
CREAT SERPL-MCNC: 0.75 MG/DL (ref 0.57–1)
EOSINOPHIL # BLD AUTO: 0.23 10*3/MM3 (ref 0–0.4)
EOSINOPHIL NFR BLD AUTO: 3.3 % (ref 0.3–6.2)
ERYTHROCYTE [DISTWIDTH] IN BLOOD BY AUTOMATED COUNT: 12.3 % (ref 12.3–15.4)
GLOBULIN SER CALC-MCNC: 3.4 GM/DL
GLUCOSE SERPL-MCNC: 84 MG/DL (ref 65–99)
HCT VFR BLD AUTO: 41.5 % (ref 34–46.6)
HGB BLD-MCNC: 13.3 G/DL (ref 12–15.9)
IMM GRANULOCYTES # BLD AUTO: 0.01 10*3/MM3 (ref 0–0.05)
IMM GRANULOCYTES NFR BLD AUTO: 0.1 % (ref 0–0.5)
LYMPHOCYTES # BLD AUTO: 2.72 10*3/MM3 (ref 0.7–3.1)
LYMPHOCYTES NFR BLD AUTO: 38.6 % (ref 19.6–45.3)
MCH RBC QN AUTO: 29.5 PG (ref 26.6–33)
MCHC RBC AUTO-ENTMCNC: 32 G/DL (ref 31.5–35.7)
MCV RBC AUTO: 92 FL (ref 79–97)
MONOCYTES # BLD AUTO: 0.59 10*3/MM3 (ref 0.1–0.9)
MONOCYTES NFR BLD AUTO: 8.4 % (ref 5–12)
NEUTROPHILS # BLD AUTO: 3.35 10*3/MM3 (ref 1.7–7)
NEUTROPHILS NFR BLD AUTO: 47.5 % (ref 42.7–76)
NRBC BLD AUTO-RTO: 0 /100 WBC (ref 0–0.2)
PLATELET # BLD AUTO: 330 10*3/MM3 (ref 140–450)
POTASSIUM SERPL-SCNC: 4.2 MMOL/L (ref 3.5–5.2)
PROT SERPL-MCNC: 7.6 G/DL (ref 6–8.5)
RBC # BLD AUTO: 4.51 10*6/MM3 (ref 3.77–5.28)
SODIUM SERPL-SCNC: 136 MMOL/L (ref 136–145)
WBC # BLD AUTO: 7.05 10*3/MM3 (ref 3.4–10.8)

## 2020-05-15 NOTE — TELEPHONE ENCOUNTER
----- Message from Tae Turcios MD sent at 5/15/2020  9:42 AM EDT -----  Advise patient the results were normal.

## 2020-06-08 RX ORDER — ESOMEPRAZOLE MAGNESIUM 40 MG/1
CAPSULE, DELAYED RELEASE ORAL
Qty: 30 CAPSULE | Refills: 3 | Status: SHIPPED | OUTPATIENT
Start: 2020-06-08 | End: 2020-09-02 | Stop reason: SDUPTHER

## 2020-06-10 RX ORDER — MESALAMINE 0.38 G/1
CAPSULE, EXTENDED RELEASE ORAL
Qty: 120 CAPSULE | Refills: 0 | Status: SHIPPED | OUTPATIENT
Start: 2020-06-10 | End: 2020-07-09

## 2020-06-11 ENCOUNTER — TELEPHONE (OUTPATIENT)
Dept: GASTROENTEROLOGY | Facility: CLINIC | Age: 35
End: 2020-06-11

## 2020-06-11 NOTE — TELEPHONE ENCOUNTER
Pt called and left a message that she will have a procedure with her gynecologist next week and during the procedure she will have an injection of Toradol and they also told her to take Ibuprofen 800mg x 1 dose the day before. She tries to avoid NSAIDS because they cause issues with her Crohn's. She is not sure if the injection will be OK or not and was wondering what she could take instead of the Ibuprofen the day before. She asked her GYN today and they told her to ask her GI doctor. She state she will have a hydrocodone script for after the procedure for pain control. She would like Dr Turcios's recommendations.

## 2020-07-06 ENCOUNTER — OFFICE VISIT (OUTPATIENT)
Dept: GASTROENTEROLOGY | Facility: CLINIC | Age: 35
End: 2020-07-06

## 2020-07-06 DIAGNOSIS — M54.40 LOW BACK PAIN WITH SCIATICA, SCIATICA LATERALITY UNSPECIFIED, UNSPECIFIED BACK PAIN LATERALITY, UNSPECIFIED CHRONICITY: ICD-10-CM

## 2020-07-06 DIAGNOSIS — K50.111 CROHN'S DISEASE OF LARGE INTESTINE WITH RECTAL BLEEDING (HCC): Primary | ICD-10-CM

## 2020-07-06 PROCEDURE — 99441 PR PHYS/QHP TELEPHONE EVALUATION 5-10 MIN: CPT | Performed by: INTERNAL MEDICINE

## 2020-07-06 NOTE — PROGRESS NOTES
Subjective   Bri Pfeiffer is a 34 y.o.. female is here today for follow-up.    Chief Complaint   Patient presents with   • Crohn's Disease   CC: Crohn's disease.    This visit has been rescheduled as a phone visit to comply with patient safety concerns in accordance with CDC recommendations. Total time of discussion was 6 minutes.    History of Present Illness  By way of review, patient presented in May 2019 with generalized abdominal discomfort, intermittent rectal bleeding, and diarrhea.  Evaluation included EGD which revealed esophagitis superficialis.  Colonoscopy demonstrated a nonobstructive stricture of the terminal ileum, along with ulceration on the ileocecal valves and scattered aphthae in the rectosigmoid.  CT scan also showed evidence of chronic inflammation of the distal ileum.  After some trial and error patient ended up on a combination of Humira and Apriso and she is currently doing very well on that combination.  Serologic studies have included Crohn's prognostic index which was at 93% over the next 5 years.  She had Humira drug levels checked and they were good.  Her most recent endoscopic study was in December of last year and it demonstrated some persistent anal stenosis but resolution of the inflammatory change in the rectosigmoid.  She has had a variety of arthralgias and was referred to rheumatologist for evaluation and the did not find anything of an inflammatory nature.  She says that she is doing as well now as she is done for the last couple years.  She has some occasional regurgitation but very little if any in the way of abdominal pain, rectal bleeding, or diarrhea.    The following portions of the patient's history were reviewed and updated as appropriate: allergies, current medications, past family history, past medical history, past social history, past surgical history and problem list.      Current Outpatient Medications:   •  acetaminophen (TYLENOL) 500 MG tablet, Take 500  mg by mouth Every 6 (Six) Hours As Needed for Mild Pain ., Disp: , Rfl:   •  albuterol sulfate HFA (PROAIR HFA) 108 (90 Base) MCG/ACT inhaler, , Disp: , Rfl:   •  cetirizine (ZYRTEC ALLERGY) 10 MG tablet, , Disp: , Rfl:   •  Cholecalciferol (VITAMIN D-3) 5000 units tablet, Take  by mouth., Disp: , Rfl:   •  Cyanocobalamin (VITAMIN B 12 PO), Take  by mouth., Disp: , Rfl:   •  dicyclomine (Bentyl) 20 MG tablet, Take 1 tablet by mouth 3 (Three) Times a Day As Needed (Cramping)., Disp: 90 tablet, Rfl: 2  •  diphenoxylate-atropine (LOMOTIL) 2.5-0.025 MG per tablet, Take 1 tablet by mouth 4 (Four) Times a Day As Needed for Diarrhea., Disp: 120 tablet, Rfl: 3  •  esomeprazole (nexIUM) 40 MG capsule, TAKE ONE CAPSULE BY MOUTH DAILY, Disp: 30 capsule, Rfl: 3  •  ferrous sulfate 324 (65 Fe) MG tablet delayed-release EC tablet, Take 324 mg by mouth Daily With Breakfast., Disp: , Rfl:   •  fluticasone (FLONASE ALLERGY RELIEF) 50 MCG/ACT nasal spray, , Disp: , Rfl:   •  HUMIRA PEN 40 MG/0.4ML Pen-injector Kit, INJECT 40 MG Q OTHER WEEK, Disp: , Rfl: 0  •  ketoconazole (NIZORAL) 2 % cream, , Disp: , Rfl:   •  mesalamine (APRISO) 0.375 g 24 hr capsule, TAKE FOUR CAPSULES BY MOUTH DAILY, Disp: 120 capsule, Rfl: 0  •  montelukast (SINGULAIR) 10 MG tablet, , Disp: , Rfl:   •  sertraline (ZOLOFT) 100 MG tablet, Take 1 tablet by mouth Daily., Disp: 30 tablet, Rfl: 5  •  spironolactone (ALDACTONE) 100 MG tablet, , Disp: , Rfl:     Family History   Problem Relation Age of Onset   • Colon cancer Maternal Grandmother    • Rectal cancer Maternal Grandmother    • Prostate cancer Maternal Grandfather    • Prostate cancer Paternal Grandfather        Review of Systems   Respiratory: Negative for shortness of breath.    Cardiovascular: Negative for chest pain.   All other systems reviewed and are negative.      Objective   Physical Exam   Constitutional:   No data.       Pertinent laboratory results were reviewed. , Pertinent old records were  reviewed. , Pertinent radiology results were reviewed.  and Pertinent medical tests were reviewed.     Assessment/Plan   Problems Addressed this Visit        Digestive    Crohn's disease of large intestine with rectal bleeding (CMS/HCC) - Primary       Nervous and Auditory    Low back pain with sciatica        She is currently doing well.  I do not recommend any changes in her current regimen.  Transitional issues discussed.  Recommended follow-up interval 3 to 4 months.

## 2020-07-09 RX ORDER — MESALAMINE 0.38 G/1
CAPSULE, EXTENDED RELEASE ORAL
Qty: 120 CAPSULE | Refills: 11 | Status: SHIPPED | OUTPATIENT
Start: 2020-07-09 | End: 2020-09-02 | Stop reason: SDUPTHER

## 2020-07-18 ENCOUNTER — TELEMEDICINE (OUTPATIENT)
Dept: FAMILY MEDICINE CLINIC | Facility: TELEHEALTH | Age: 35
End: 2020-07-18

## 2020-07-18 VITALS — TEMPERATURE: 98.5 F

## 2020-07-18 DIAGNOSIS — L25.9 CONTACT DERMATITIS, UNSPECIFIED CONTACT DERMATITIS TYPE, UNSPECIFIED TRIGGER: Primary | ICD-10-CM

## 2020-07-18 DIAGNOSIS — R21 SKIN RASH: ICD-10-CM

## 2020-07-18 PROCEDURE — 99213 OFFICE O/P EST LOW 20 MIN: CPT | Performed by: NURSE PRACTITIONER

## 2020-07-18 RX ORDER — FLUTICASONE PROPIONATE 50 MCG
1-2 SPRAY, SUSPENSION (ML) NASAL
COMMUNITY
End: 2020-07-18

## 2020-07-18 RX ORDER — MONTELUKAST SODIUM 10 MG/1
10 TABLET ORAL NIGHTLY
Qty: 30 TABLET | Refills: 0 | Status: SHIPPED | OUTPATIENT
Start: 2020-07-18 | End: 2020-08-17

## 2020-07-18 RX ORDER — PROMETHAZINE HYDROCHLORIDE 25 MG/1
TABLET ORAL
COMMUNITY
Start: 2020-06-11 | End: 2020-09-02 | Stop reason: ALTCHOICE

## 2020-07-18 RX ORDER — MISOPROSTOL 200 UG/1
TABLET ORAL
COMMUNITY
Start: 2020-06-11 | End: 2020-09-02 | Stop reason: ALTCHOICE

## 2020-07-18 RX ORDER — DIAZEPAM 5 MG/1
TABLET ORAL
COMMUNITY
Start: 2020-06-11 | End: 2020-09-02 | Stop reason: ALTCHOICE

## 2020-07-18 RX ORDER — METHYLPREDNISOLONE 4 MG/1
TABLET ORAL
Qty: 1 EACH | Refills: 0 | Status: SHIPPED | OUTPATIENT
Start: 2020-07-18 | End: 2020-09-02

## 2020-07-18 RX ORDER — HYDROCODONE BITARTRATE AND ACETAMINOPHEN 5; 325 MG/1; MG/1
TABLET ORAL
COMMUNITY
Start: 2020-06-11 | End: 2020-09-02 | Stop reason: ALTCHOICE

## 2020-07-18 NOTE — PROGRESS NOTES
Chief Complaint   Patient presents with   • Rash     Subjective   Bri Pfeiffer is a 34 y.o. female who presents for video visit for complaints of skin irritation    History of Present Illness   Rash on neck that started about 5 days ago and has spread from one lesion to several raised, red, itchy patches. Since she has developed another area of rash on waist band area on right side that has spread around to back. She has tried steroid cream and cream for yeast infection. She is on Humara and has had issues with dermatitis in the past. She does not have a fever, chills or pain at rash site.    The following portions of the patient's history were reviewed and updated as appropriate: allergies, current medications, past social history and problem list.    Review of Systems   Constitutional: Negative for chills and fever.   HENT: Negative for congestion, ear discharge, ear pain, facial swelling, postnasal drip, rhinorrhea and sinus pressure.    Eyes: Negative for discharge, redness and itching.   Respiratory: Negative for cough, shortness of breath and wheezing.    Skin: Positive for rash (neck, abdomen, back).   Allergic/Immunologic: Positive for environmental allergies.   Neurological: Negative for dizziness, light-headedness and headache.   Hematological: Negative for adenopathy.       Objective   Temp 98.5 °F (36.9 °C) (Oral)   Breastfeeding No   Physical Exam   Constitutional: No distress.   HENT:   Nose: Nose normal.   Pulmonary/Chest: Effort normal.   Lymphadenopathy:        Head (right side): No submental, no submandibular and no tonsillar adenopathy present.        Head (left side): No submental, no submandibular and no tonsillar adenopathy present.        Right cervical: No superficial cervical adenopathy present.       Left cervical: No superficial cervical adenopathy present.   Neurological: She is alert.   Skin: Rash noted. Rash is maculopapular (right side of neck with several small lesion,  right side of abdomen and back with hive like lesions. No signs of infection, streaking or drainage, no ulceration).     Exam via Video visit, physical exam with guided patient exam.    Assessment/Plan   Problem List Items Addressed This Visit        Musculoskeletal and Integument    Skin rash    Relevant Medications    mupirocin (Bactroban) 2 % ointment    montelukast (Singulair) 10 MG tablet      Other Visit Diagnoses     Contact dermatitis, unspecified contact dermatitis type, unspecified trigger    -  Primary    Relevant Medications    methylPREDNISolone (MEDROL, JEREMIAH,) 4 MG tablet    mupirocin (Bactroban) 2 % ointment           New Medications Ordered This Visit   Medications   • methylPREDNISolone (MEDROL, JEREMIAH,) 4 MG tablet     Sig: Take as directed on package instructions.     Dispense:  1 each     Refill:  0   • mupirocin (Bactroban) 2 % ointment     Sig: Apply  topically to the appropriate area as directed 3 (Three) Times a Day for 10 days.     Dispense:  15 g     Refill:  0   • montelukast (Singulair) 10 MG tablet     Sig: Take 1 tablet by mouth Every Night for 30 days.     Dispense:  30 tablet     Refill:  0       I have reviewed the provider's instructions with the patient, answering all questions to her satisfaction. Follow up with primary care provider on Monday if symptoms are not improved or worsen.    Time Spent: 20 min  Counseled patient on diagnosis, treatment and follow up plan.

## 2020-08-02 DIAGNOSIS — F33.1 MODERATE EPISODE OF RECURRENT MAJOR DEPRESSIVE DISORDER (HCC): ICD-10-CM

## 2020-08-02 DIAGNOSIS — F41.9 ANXIETY: ICD-10-CM

## 2020-08-03 RX ORDER — SERTRALINE HYDROCHLORIDE 100 MG/1
TABLET, FILM COATED ORAL
Qty: 30 TABLET | Refills: 4 | OUTPATIENT
Start: 2020-08-03

## 2020-09-02 ENCOUNTER — PREP FOR SURGERY (OUTPATIENT)
Dept: OTHER | Facility: HOSPITAL | Age: 35
End: 2020-09-02

## 2020-09-02 ENCOUNTER — LAB (OUTPATIENT)
Dept: LAB | Facility: HOSPITAL | Age: 35
End: 2020-09-02

## 2020-09-02 ENCOUNTER — OFFICE VISIT (OUTPATIENT)
Dept: GASTROENTEROLOGY | Facility: CLINIC | Age: 35
End: 2020-09-02

## 2020-09-02 VITALS
HEIGHT: 61 IN | TEMPERATURE: 97.9 F | HEART RATE: 75 BPM | OXYGEN SATURATION: 99 % | RESPIRATION RATE: 16 BRPM | SYSTOLIC BLOOD PRESSURE: 120 MMHG | BODY MASS INDEX: 53.64 KG/M2 | WEIGHT: 284.1 LBS | DIASTOLIC BLOOD PRESSURE: 70 MMHG

## 2020-09-02 DIAGNOSIS — K50.919 CROHN'S DISEASE WITH COMPLICATION, UNSPECIFIED GASTROINTESTINAL TRACT LOCATION (HCC): Primary | ICD-10-CM

## 2020-09-02 DIAGNOSIS — R68.81 EARLY SATIETY: ICD-10-CM

## 2020-09-02 DIAGNOSIS — K21.9 GASTROESOPHAGEAL REFLUX DISEASE WITHOUT ESOPHAGITIS: ICD-10-CM

## 2020-09-02 DIAGNOSIS — R11.10 REGURGITATION OF FOOD: ICD-10-CM

## 2020-09-02 DIAGNOSIS — Z79.899 HIGH RISK MEDICATION USE: ICD-10-CM

## 2020-09-02 DIAGNOSIS — K63.89 ILEOCECAL VALVE STENOSIS: ICD-10-CM

## 2020-09-02 DIAGNOSIS — K50.111 CROHN'S DISEASE OF LARGE INTESTINE WITH RECTAL BLEEDING (HCC): Primary | ICD-10-CM

## 2020-09-02 LAB
25(OH)D3 SERPL-MCNC: 40.8 NG/ML (ref 30–100)
ALBUMIN SERPL-MCNC: 4.4 G/DL (ref 3.5–5.2)
ALP SERPL-CCNC: 70 U/L (ref 39–117)
ALT SERPL W P-5'-P-CCNC: 9 U/L (ref 1–33)
AST SERPL-CCNC: 14 U/L (ref 1–32)
BASOPHILS # BLD AUTO: 0.05 10*3/MM3 (ref 0–0.2)
BASOPHILS NFR BLD AUTO: 0.5 % (ref 0–1.5)
BILIRUB CONJ SERPL-MCNC: <0.2 MG/DL (ref 0–0.3)
BILIRUB INDIRECT SERPL-MCNC: NORMAL MG/DL
BILIRUB SERPL-MCNC: 0.3 MG/DL (ref 0–1.2)
DEPRECATED RDW RBC AUTO: 41.9 FL (ref 37–54)
EOSINOPHIL # BLD AUTO: 0.16 10*3/MM3 (ref 0–0.4)
EOSINOPHIL NFR BLD AUTO: 1.5 % (ref 0.3–6.2)
ERYTHROCYTE [DISTWIDTH] IN BLOOD BY AUTOMATED COUNT: 12.6 % (ref 12.3–15.4)
HCT VFR BLD AUTO: 43.4 % (ref 34–46.6)
HGB BLD-MCNC: 14.1 G/DL (ref 12–15.9)
IMM GRANULOCYTES # BLD AUTO: 0.04 10*3/MM3 (ref 0–0.05)
IMM GRANULOCYTES NFR BLD AUTO: 0.4 % (ref 0–0.5)
LYMPHOCYTES # BLD AUTO: 3.25 10*3/MM3 (ref 0.7–3.1)
LYMPHOCYTES NFR BLD AUTO: 30.9 % (ref 19.6–45.3)
MCH RBC QN AUTO: 29.5 PG (ref 26.6–33)
MCHC RBC AUTO-ENTMCNC: 32.5 G/DL (ref 31.5–35.7)
MCV RBC AUTO: 90.8 FL (ref 79–97)
MONOCYTES # BLD AUTO: 0.76 10*3/MM3 (ref 0.1–0.9)
MONOCYTES NFR BLD AUTO: 7.2 % (ref 5–12)
NEUTROPHILS NFR BLD AUTO: 59.5 % (ref 42.7–76)
NEUTROPHILS NFR BLD AUTO: 6.25 10*3/MM3 (ref 1.7–7)
NRBC BLD AUTO-RTO: 0 /100 WBC (ref 0–0.2)
PLATELET # BLD AUTO: 325 10*3/MM3 (ref 140–450)
PMV BLD AUTO: 9.1 FL (ref 6–12)
PROT SERPL-MCNC: 7.8 G/DL (ref 6–8.5)
RBC # BLD AUTO: 4.78 10*6/MM3 (ref 3.77–5.28)
VIT B12 BLD-MCNC: 1432 PG/ML (ref 211–946)
WBC # BLD AUTO: 10.51 10*3/MM3 (ref 3.4–10.8)

## 2020-09-02 PROCEDURE — 82607 VITAMIN B-12: CPT | Performed by: INTERNAL MEDICINE

## 2020-09-02 PROCEDURE — 36415 COLL VENOUS BLD VENIPUNCTURE: CPT | Performed by: INTERNAL MEDICINE

## 2020-09-02 PROCEDURE — 82306 VITAMIN D 25 HYDROXY: CPT | Performed by: INTERNAL MEDICINE

## 2020-09-02 PROCEDURE — 80076 HEPATIC FUNCTION PANEL: CPT | Performed by: INTERNAL MEDICINE

## 2020-09-02 PROCEDURE — 85025 COMPLETE CBC W/AUTO DIFF WBC: CPT | Performed by: INTERNAL MEDICINE

## 2020-09-02 PROCEDURE — 99214 OFFICE O/P EST MOD 30 MIN: CPT | Performed by: INTERNAL MEDICINE

## 2020-09-02 RX ORDER — ESOMEPRAZOLE MAGNESIUM 40 MG/1
40 CAPSULE, DELAYED RELEASE ORAL DAILY
Qty: 30 CAPSULE | Refills: 5 | Status: SHIPPED | OUTPATIENT
Start: 2020-09-02 | End: 2021-09-13

## 2020-09-02 RX ORDER — MESALAMINE 0.38 G/1
1.5 CAPSULE, EXTENDED RELEASE ORAL DAILY
Qty: 120 CAPSULE | Refills: 11 | Status: SHIPPED | OUTPATIENT
Start: 2020-09-02 | End: 2021-03-24 | Stop reason: SDUPTHER

## 2020-09-02 NOTE — PATIENT INSTRUCTIONS
Ok for weekly Humira if recommended by Rheumatology.     Recommend annual flu vaccine (non-live) yearly    Also recommend annual dermatologic evaluation and yearly gynecology exam while on medication like Humira.     Plans for repeat EGD and colonoscopy December 2020.    Avoid words that worsen symptoms such as dairy      Schedule small bowel follow through for symptoms of regurgitation, to evaluate for small bowel stricture and bloating symptoms

## 2020-09-02 NOTE — PROGRESS NOTES
Crohn's Disease      HPI  34-year-old female presents today.  She has an established diagnosis of Crohn's disease.  She is followed by Dr. Turcios who has retired.  She states from a GI standpoint she is doing well.     She was diagnosed May 2019 with EGD and colonoscopy. May 2019 colonoscopy with benign appearing intrinsic moderate stenosis less than 1 cm found at the ileocecal valve.  A few 6 mm ulcers found at the ileocecal valve.  Multiple localized nonbleeding abstain found in the rectosigmoid colon.  Benign appearing intrinsic moderate stenosis less than 1 cm in length found at the anus.  Otherwise normal colonoscopy.  Flexible sigmoidoscopy December 2019 with anal fissure.  Otherwise normal colonoscopy.    She has generalized arthralgia with previous work-up with rheumatologist unremarkable. There was question for increased Humira to weekly.      She has complaints of pain with swallowing.  She also reports regurgitation and feels full with distention.    She has complaints of joint pain, arthritis, rashes, broken tooth, memory loss and tremors.    She is currently on B12 replacement.  Also uses Nexium for acid reflux.  On Humira and Lomotil.  Also on iron supplement.  Also Apriso.     Bloodwork May 2019 with hgb 13.3, wbc 7.05, platelet 330. AST 18, ALT 18, ALK 71, tb 0.4.    She started Humira August 2019. Therapeutic Drug monitoring November 2019 with 7.3 with no antibodies.     Review of Systems   Constitutional: Positive for diaphoresis and fatigue.   HENT: Positive for dental problem, mouth sores, rhinorrhea and sore throat.    Eyes: Negative.    Respiratory: Positive for cough.    Cardiovascular: Positive for chest pain and palpitations.   Endocrine: Positive for heat intolerance, polydipsia and polyphagia.   Genitourinary: Negative.    Skin: Positive for rash.   Allergic/Immunologic: Positive for environmental allergies, food allergies and immunocompromised state.   Hematological: Negative.     Psychiatric/Behavioral: The patient is nervous/anxious.         I have reviewed and confirmed the accuracy of the HPI and ROS as documented by the APRN BANDAR Hopkins     Problem List:    Patient Active Problem List   Diagnosis   • Pericardial cyst   • Vasodepressor syncope   • Esophageal dysphagia   • Rectal bleeding   • Colitis   • Gastroesophageal reflux disease   • Crohn's disease of large intestine with rectal bleeding (CMS/HCC)   • Other irritable bowel syndrome   • Skin rash   • Moderate episode of recurrent major depressive disorder (CMS/HCC)   • Anxiety   • Low back pain with sciatica   • Ileocecal valve stenosis       Medical History:    Past Medical History:   Diagnosis Date   • Anemia    • Asthma    • Crohn's disease (CMS/HCC) 05/2019   • GERD (gastroesophageal reflux disease)    • Lactose intolerance    • Pericardial cyst    • Spondylosis         Social History:    Social History     Socioeconomic History   • Marital status: Single     Spouse name: Not on file   • Number of children: Not on file   • Years of education: Not on file   • Highest education level: Not on file   Tobacco Use   • Smoking status: Never Smoker   • Smokeless tobacco: Never Used   Substance and Sexual Activity   • Alcohol use: Yes     Alcohol/week: 3.0 standard drinks     Types: 1 Glasses of wine, 2 Shots of liquor per week   • Drug use: No   • Sexual activity: Defer       Family History:   Family History   Problem Relation Age of Onset   • Colon cancer Maternal Grandmother    • Rectal cancer Maternal Grandmother    • Prostate cancer Maternal Grandfather    • Prostate cancer Paternal Grandfather        Surgical History:   Past Surgical History:   Procedure Laterality Date   • COLONOSCOPY N/A 5/10/2019    Procedure: COLONOSCOPY TO CECUM TO TERMINAL ILEUM WITH BIOPSY;  Surgeon: Tae Turcios MD;  Location: [facility] ENDOSCOPY;  Service: Gastroenterology   • ENDOSCOPY N/A 5/10/2019    Procedure: ESOPHAGOGASTRODUODENOSCOPY  WITH BIOPSY;  Surgeon: Tae Turcios MD;  Location: [facility] ENDOSCOPY;  Service: Gastroenterology   • ENDOSCOPY N/A 12/13/2019    Procedure: ESOPHAGOGASTRODUODENOSCOPY WITH COLD BIOPSIES;  Surgeon: Tae Turcios MD;  Location: [facility] ENDOSCOPY;  Service: Gastroenterology   • LIPOMA EXCISION     • SIGMOIDOSCOPY N/A 12/13/2019    Procedure: SIGMOIDOSCOPY FLEXIBLE TO T.I.;  Surgeon: Tae Turcios MD;  Location: [facility] ENDOSCOPY;  Service: Gastroenterology   • TONSILLECTOMY     • TONSILLECTOMY AND ADENOIDECTOMY           Current Outpatient Medications:   •  acetaminophen (TYLENOL) 500 MG tablet, Take 500 mg by mouth Every 6 (Six) Hours As Needed for Mild Pain ., Disp: , Rfl:   •  albuterol sulfate HFA (PROAIR HFA) 108 (90 Base) MCG/ACT inhaler, , Disp: , Rfl:   •  cetirizine (ZYRTEC ALLERGY) 10 MG tablet, , Disp: , Rfl:   •  Cholecalciferol (VITAMIN D-3) 5000 units tablet, Take  by mouth., Disp: , Rfl:   •  Cyanocobalamin (VITAMIN B 12 PO), Take  by mouth., Disp: , Rfl:   •  diphenoxylate-atropine (LOMOTIL) 2.5-0.025 MG per tablet, Take 1 tablet by mouth 4 (Four) Times a Day As Needed for Diarrhea., Disp: 120 tablet, Rfl: 3  •  esomeprazole (nexIUM) 40 MG capsule, Take 1 capsule by mouth Daily., Disp: 30 capsule, Rfl: 5  •  ferrous sulfate 324 (65 Fe) MG tablet delayed-release EC tablet, Take 324 mg by mouth Daily With Breakfast., Disp: , Rfl:   •  fluticasone (FLONASE ALLERGY RELIEF) 50 MCG/ACT nasal spray, , Disp: , Rfl:   •  HUMIRA PEN 40 MG/0.4ML Pen-injector Kit, INJECT 40 MG Q OTHER WEEK, Disp: , Rfl: 0  •  mesalamine (APRISO) 0.375 g 24 hr capsule, Take 4 capsules by mouth Daily., Disp: 120 capsule, Rfl: 11  •  sertraline (ZOLOFT) 100 MG tablet, Take 1 tablet by mouth Daily., Disp: 30 tablet, Rfl: 5  •  spironolactone (ALDACTONE) 100 MG tablet, , Disp: , Rfl:   •  dicyclomine (Bentyl) 20 MG tablet, Take 1 tablet by mouth 3 (Three) Times a Day As Needed (Cramping)., Disp: 90 tablet, Rfl: 2    Allergies:    Allergies   Allergen Reactions   • Levaquin [Levofloxacin] Hallucinations        The following portions of the patient's history were reviewed and updated as appropriate: allergies, current medications, past family history, past medical history, past social history, past surgical history and problem list.    Vitals:    09/02/20 1404   BP: 120/70   Pulse: 75   Resp: 16   Temp: 97.9 °F (36.6 °C)   SpO2: 99%         09/02/20  1404   Weight: 129 kg (284 lb 1.6 oz)     Body mass index is 53.68 kg/m².      Physical Exam   HENT:   Nose: No nasal deformity.   Eyes: No scleral icterus.   Neck:   Trachea midline.   Cardiovascular: Normal rate and regular rhythm.   Pulmonary/Chest: Breath sounds normal. No respiratory distress.   Abdominal: Soft. Normal appearance and bowel sounds are normal. She exhibits no distension and no mass. There is no tenderness.   Lymphadenopathy:   No periumbilical lymphadenopathy.   Neurological: She is alert.   Skin: Skin is warm. No cyanosis.   Psychiatric: She has a normal mood and affect.   Vitals reviewed.       Assessment/ Plan  Bri was seen today for crohn's disease.    Diagnoses and all orders for this visit:    Crohn's disease of large intestine with rectal bleeding (CMS/HCC)  -     CBC & Differential  -     Hepatic Function Panel  -     Vitamin B12  -     Vitamin D 25 Hydroxy  -     esomeprazole (nexIUM) 40 MG capsule; Take 1 capsule by mouth Daily.  -     mesalamine (APRISO) 0.375 g 24 hr capsule; Take 4 capsules by mouth Daily.  -     FL Upper GI With Air Contrast & SBFT; Future    Gastroesophageal reflux disease without esophagitis  -     esomeprazole (nexIUM) 40 MG capsule; Take 1 capsule by mouth Daily.    Early satiety  -     FL Upper GI With Air Contrast & SBFT; Future    Regurgitation of food  -     FL Upper GI With Air Contrast & SBFT; Future    High risk medication use  -     CBC & Differential  -     Hepatic Function Panel  -     Vitamin B12  -     Vitamin D 25 Hydroxy  -      esomeprazole (nexIUM) 40 MG capsule; Take 1 capsule by mouth Daily.  -     mesalamine (APRISO) 0.375 g 24 hr capsule; Take 4 capsules by mouth Daily.    Ileocecal valve stenosis  -     FL Upper GI With Air Contrast & SBFT; Future         No follow-ups on file.    Patient Instructions   Ok for weekly Humira if recommended by Rheumatology.     Recommend annual flu vaccine (non-live) yearly    Also recommend annual dermatologic evaluation and yearly gynecology exam while on medication like Humira.     Plans for repeat EGD and colonoscopy December 2020.    Avoid words that worsen symptoms such as dairy      Schedule small bowel follow through for symptoms of regurgitation, to evaluate for small bowel stricture and bloating symptoms            Discussion:  Will arrange SBFT to assess for worsening IC Valve stricture and regurgitation symptoms. To consider gallbladder evaluation if suspected small bowel stricture is not causing symptoms    Documentation completed by Xochilt PORTILLO acting as a scribe and the following sections (HPI, assessment, plan) for the undersigned provider. Chrissy

## 2020-09-04 ENCOUNTER — TRANSCRIBE ORDERS (OUTPATIENT)
Dept: SLEEP MEDICINE | Facility: HOSPITAL | Age: 35
End: 2020-09-04

## 2020-09-04 DIAGNOSIS — Z01.818 OTHER SPECIFIED PRE-OPERATIVE EXAMINATION: Primary | ICD-10-CM

## 2020-09-08 ENCOUNTER — OFFICE VISIT (OUTPATIENT)
Dept: CARDIOLOGY | Facility: CLINIC | Age: 35
End: 2020-09-08

## 2020-09-08 ENCOUNTER — LAB (OUTPATIENT)
Dept: LAB | Facility: HOSPITAL | Age: 35
End: 2020-09-08

## 2020-09-08 VITALS
HEART RATE: 76 BPM | BODY MASS INDEX: 54.49 KG/M2 | SYSTOLIC BLOOD PRESSURE: 115 MMHG | WEIGHT: 288.6 LBS | DIASTOLIC BLOOD PRESSURE: 84 MMHG | HEIGHT: 61 IN

## 2020-09-08 DIAGNOSIS — Q24.8 PERICARDIAL CYST: Primary | ICD-10-CM

## 2020-09-08 DIAGNOSIS — Z01.818 OTHER SPECIFIED PRE-OPERATIVE EXAMINATION: ICD-10-CM

## 2020-09-08 PROCEDURE — C9803 HOPD COVID-19 SPEC COLLECT: HCPCS

## 2020-09-08 PROCEDURE — U0004 COV-19 TEST NON-CDC HGH THRU: HCPCS

## 2020-09-08 PROCEDURE — 99204 OFFICE O/P NEW MOD 45 MIN: CPT | Performed by: INTERNAL MEDICINE

## 2020-09-09 LAB — SARS-COV-2 RNA RESP QL NAA+PROBE: NOT DETECTED

## 2020-09-10 ENCOUNTER — HOSPITAL ENCOUNTER (OUTPATIENT)
Dept: GENERAL RADIOLOGY | Facility: HOSPITAL | Age: 35
Discharge: HOME | End: 2020-09-10
Admitting: NURSE PRACTITIONER

## 2020-09-10 DIAGNOSIS — R68.81 EARLY SATIETY: ICD-10-CM

## 2020-09-10 DIAGNOSIS — K63.89 ILEOCECAL VALVE STENOSIS: ICD-10-CM

## 2020-09-10 DIAGNOSIS — R11.10 REGURGITATION OF FOOD: ICD-10-CM

## 2020-09-10 DIAGNOSIS — K50.111 CROHN'S DISEASE OF LARGE INTESTINE WITH RECTAL BLEEDING (HCC): ICD-10-CM

## 2020-09-10 PROCEDURE — 74246 X-RAY XM UPR GI TRC 2CNTRST: CPT

## 2020-09-10 PROCEDURE — 74248 X-RAY SM INT F-THRU STD: CPT

## 2020-09-10 RX ADMIN — BARIUM SULFATE 135 ML: 980 POWDER, FOR SUSPENSION ORAL at 08:45

## 2020-09-10 RX ADMIN — BARIUM SULFATE 183 ML: 960 POWDER, FOR SUSPENSION ORAL at 08:35

## 2020-09-10 RX ADMIN — ANTACID/ANTIFLATULENT 1 TABLET: 380; 550; 10; 10 GRANULE, EFFERVESCENT ORAL at 08:35

## 2020-09-10 RX ADMIN — BARIUM SULFATE 700 MG: 700 TABLET ORAL at 10:00

## 2020-09-11 ENCOUNTER — TELEPHONE (OUTPATIENT)
Dept: GASTROENTEROLOGY | Facility: CLINIC | Age: 35
End: 2020-09-11

## 2020-09-11 DIAGNOSIS — K63.89 ILEOCECAL VALVE STENOSIS: Primary | ICD-10-CM

## 2020-09-11 PROCEDURE — 93000 ELECTROCARDIOGRAM COMPLETE: CPT | Performed by: INTERNAL MEDICINE

## 2020-09-11 NOTE — TELEPHONE ENCOUNTER
I asked she is not running a fever, she does have some nausea, but she has been really trying to push fluids to flush this out of her system.

## 2020-09-11 NOTE — TELEPHONE ENCOUNTER
Patient called and states that she had a contrast study preformed yesterday. She has Crohns. She has not been able to pass gas or stool. It is painful. This started about noon yesterday. Please advise.

## 2020-09-11 NOTE — TELEPHONE ENCOUNTER
Spoke with patient, reviewed with Dr Escalona.     Contrast passed on SBFT. Encouraged patient to continue increased fluids, add miralax and soft, low residue diet and to ER is symptoms worsens.     Chrissy

## 2020-09-11 NOTE — PROGRESS NOTES
Subjective:     Encounter Date:09/08/2020      Patient ID: Bri Pfeiffer is a 34 y.o. female.    Chief Complaint: Perioperative risk assessment.  History of Present Illness    This is a 34-year-old female who presents today for evaluation.  I had seen the patient in the very distant past.  She had a pericardial cyst that was diagnosed by CT scan.  Last CT showed minimal changes of this but that was several years ago.  She gets a little bit of shortness of breath both at rest and with exertion.  She is a little bit of lightheadedness little bit of fatigue and little bit of palpitations.    Review of Systems   Constitution: Positive for malaise/fatigue.   HENT: Positive for hearing loss and sore throat.    Respiratory: Positive for shortness of breath.    Skin: Positive for rash.   Musculoskeletal: Positive for joint pain, joint swelling and muscle cramps.   Gastrointestinal: Positive for abdominal pain, diarrhea and nausea.   Psychiatric/Behavioral: Positive for depression. The patient is nervous/anxious.          ECG 12 Lead  Date/Time: 9/11/2020 2:23 PM  Performed by: Tapan Gomez MD  Authorized by: Tapan Gomez MD   Comparison: compared with previous ECG from 11/3/2011  Rhythm: sinus rhythm  Other findings: non-specific ST-T wave changes    Clinical impression: non-specific ECG               Objective:     Physical Exam   Constitutional: She is oriented to person, place, and time. She appears well-developed.   HENT:   Head: Normocephalic.   Eyes: Conjunctivae are normal.   Neck: Normal range of motion.   Cardiovascular: Normal rate, regular rhythm and normal heart sounds.   Pulmonary/Chest: Breath sounds normal.   Abdominal: Soft. Bowel sounds are normal.   Musculoskeletal: Normal range of motion. She exhibits no edema.   Neurological: She is alert and oriented to person, place, and time.   Skin: Skin is warm and dry.   Psychiatric: She has a normal mood and affect. Her behavior is  normal.   Vitals reviewed.      Lab Review:       Assessment:          Diagnosis Plan   1. Pericardial cyst  CT Chest Without Contrast          Plan:       1.  Pericardial cyst was only seen by CT scan last time we imaged her.  In light of that I am going to repeat her CT scan consider doing an echo.  We did an echo but it really did not show much.  We will see if there is any compression of the cardiac structures.  If this is unremarkable I would pursue a GI work-up she should remain low risk for these procedures.

## 2020-09-15 ENCOUNTER — OFFICE VISIT (OUTPATIENT)
Dept: FAMILY MEDICINE CLINIC | Facility: CLINIC | Age: 35
End: 2020-09-15

## 2020-09-15 VITALS
HEIGHT: 61 IN | RESPIRATION RATE: 16 BRPM | WEIGHT: 291 LBS | BODY MASS INDEX: 54.94 KG/M2 | TEMPERATURE: 98.3 F | DIASTOLIC BLOOD PRESSURE: 78 MMHG | HEART RATE: 78 BPM | SYSTOLIC BLOOD PRESSURE: 112 MMHG

## 2020-09-15 DIAGNOSIS — J30.1 NON-SEASONAL ALLERGIC RHINITIS DUE TO POLLEN: ICD-10-CM

## 2020-09-15 DIAGNOSIS — F33.1 MODERATE EPISODE OF RECURRENT MAJOR DEPRESSIVE DISORDER (HCC): Primary | ICD-10-CM

## 2020-09-15 DIAGNOSIS — Z23 IMMUNIZATION DUE: ICD-10-CM

## 2020-09-15 DIAGNOSIS — F41.9 ANXIETY: ICD-10-CM

## 2020-09-15 PROCEDURE — 90472 IMMUNIZATION ADMIN EACH ADD: CPT | Performed by: FAMILY MEDICINE

## 2020-09-15 PROCEDURE — 99213 OFFICE O/P EST LOW 20 MIN: CPT | Performed by: FAMILY MEDICINE

## 2020-09-15 PROCEDURE — 90732 PPSV23 VACC 2 YRS+ SUBQ/IM: CPT | Performed by: FAMILY MEDICINE

## 2020-09-15 PROCEDURE — 90471 IMMUNIZATION ADMIN: CPT | Performed by: FAMILY MEDICINE

## 2020-09-15 PROCEDURE — 90686 IIV4 VACC NO PRSV 0.5 ML IM: CPT | Performed by: FAMILY MEDICINE

## 2020-09-15 RX ORDER — SERTRALINE HYDROCHLORIDE 100 MG/1
150 TABLET, FILM COATED ORAL DAILY
Qty: 135 TABLET | Refills: 3 | Status: SHIPPED | OUTPATIENT
Start: 2020-09-15 | End: 2021-07-07 | Stop reason: SDUPTHER

## 2020-09-15 RX ORDER — MONTELUKAST SODIUM 10 MG/1
10 TABLET ORAL DAILY
Qty: 90 TABLET | Refills: 3 | Status: SHIPPED | OUTPATIENT
Start: 2020-09-15 | End: 2021-07-07 | Stop reason: SDUPTHER

## 2020-09-15 NOTE — PROGRESS NOTES
Immunization  FLU VACCINE  performed in left deltoid  by Estephanie Stapleton MA. Patient tolerated the procedure well without complications.  09/15/20   Estephanie Stapleton MA  Answers for HPI/ROS submitted by the patient on 9/14/2020   What is the primary reason for your visit?: Other  Please describe your symptoms.: Prescription refills, Zoloft, singulair  Have you had these symptoms before?: Yes  How long have you been having these symptoms?: Greater than 2 weeks

## 2020-09-15 NOTE — PROGRESS NOTES
Immunization  PNEUMONIA 23  performed in RIGHT DELTOID  by Estephanie Stapleton MA. Patient tolerated the procedure well without complications.  09/15/20   Estephanie Stapleton MA,    Answers for HPI/ROS submitted by the patient on 9/14/2020   What is the primary reason for your visit?: Other  Please describe your symptoms.: Prescription refills, Zoloft, singulair  Have you had these symptoms before?: Yes  How long have you been having these symptoms?: Greater than 2 weeks

## 2020-09-15 NOTE — PROGRESS NOTES
Subjective   Bri Pfeiffer is a 34 y.o. female.     History of Present Illness     Chief Complaint:   Chief Complaint   Patient presents with   • Depression     med refill    • Allergies     requesting singulair    • flu shot     left deltoid today   jms   • immunizations     to discuss PNEU 23 VACCINE per pt        Bri Pfeiffer 34 y.o. female who presents today for Medical Management of the below listed issues and medication refills.  she has a problem list of   Patient Active Problem List   Diagnosis   • Pericardial cyst   • Vasodepressor syncope   • Esophageal dysphagia   • Rectal bleeding   • Colitis   • Gastroesophageal reflux disease   • Crohn's disease of large intestine with rectal bleeding (CMS/HCC)   • Other irritable bowel syndrome   • Skin rash   • Moderate episode of recurrent major depressive disorder (CMS/HCC)   • Anxiety   • Low back pain with sciatica   • Ileocecal valve stenosis   .  Since the last visit, she has overall felt well.  she has been compliant with   Current Outpatient Medications:   •  acetaminophen (TYLENOL) 500 MG tablet, Take 500 mg by mouth Every 6 (Six) Hours As Needed for Mild Pain ., Disp: , Rfl:   •  albuterol sulfate HFA (PROAIR HFA) 108 (90 Base) MCG/ACT inhaler, , Disp: , Rfl:   •  cetirizine (ZYRTEC ALLERGY) 10 MG tablet, , Disp: , Rfl:   •  Cholecalciferol (VITAMIN D-3) 5000 units tablet, Take  by mouth., Disp: , Rfl:   •  Cyanocobalamin (VITAMIN B 12 PO), Take  by mouth., Disp: , Rfl:   •  dicyclomine (Bentyl) 20 MG tablet, Take 1 tablet by mouth 3 (Three) Times a Day As Needed (Cramping)., Disp: 90 tablet, Rfl: 2  •  diphenoxylate-atropine (LOMOTIL) 2.5-0.025 MG per tablet, Take 1 tablet by mouth 4 (Four) Times a Day As Needed for Diarrhea., Disp: 120 tablet, Rfl: 3  •  esomeprazole (nexIUM) 40 MG capsule, Take 1 capsule by mouth Daily., Disp: 30 capsule, Rfl: 5  •  ferrous sulfate 324 (65 Fe) MG tablet delayed-release EC tablet, Take 324 mg by mouth  "Daily With Breakfast., Disp: , Rfl:   •  fluticasone (FLONASE ALLERGY RELIEF) 50 MCG/ACT nasal spray, , Disp: , Rfl:   •  HUMIRA PEN 40 MG/0.4ML Pen-injector Kit, INJECT 40 MG Q OTHER WEEK, Disp: , Rfl: 0  •  mesalamine (APRISO) 0.375 g 24 hr capsule, Take 4 capsules by mouth Daily., Disp: 120 capsule, Rfl: 11  •  montelukast (Singulair) 10 MG tablet, Take 1 tablet by mouth Daily., Disp: 90 tablet, Rfl: 3  •  sertraline (Zoloft) 100 MG tablet, Take 1.5 tablets by mouth Daily., Disp: 135 tablet, Rfl: 3  •  spironolactone (ALDACTONE) 100 MG tablet, , Disp: , Rfl: .  she denies medication side effects.    All of the other chronic condition(s) listed above are stable w/o issues.    /78   Pulse 78   Temp 98.3 °F (36.8 °C) (Oral)   Resp 16   Ht 154.9 cm (61\")   Wt 132 kg (291 lb)   BMI 54.98 kg/m²     Results for orders placed or performed in visit on 09/08/20   COVID-19,BIOTAP, NP/OP SWAB IN TRANSPORT MEDIA OR SALINE 24-36 HR TAT - Swab, Nasopharynx    Specimen: Nasopharynx; Swab   Result Value Ref Range    SARS-CoV-2 PCR Not Detected            The following portions of the patient's history were reviewed and updated as appropriate: allergies, current medications, past family history, past medical history, past social history, past surgical history and problem list.    Review of Systems   Constitutional: Negative for activity change, chills and fever.   Respiratory: Negative for cough.    Cardiovascular: Negative for chest pain.   Psychiatric/Behavioral: Negative for dysphoric mood.       Objective   Physical Exam  Constitutional:       General: She is not in acute distress.     Appearance: She is well-developed.   Pulmonary:      Effort: Pulmonary effort is normal.   Neurological:      Mental Status: She is alert and oriented to person, place, and time.   Psychiatric:         Behavior: Behavior normal.         Thought Content: Thought content normal.         Assessment/Plan   Bri was seen today for " depression, allergies, flu shot and immunizations.    Diagnoses and all orders for this visit:    Moderate episode of recurrent major depressive disorder (CMS/HCC)  -     sertraline (Zoloft) 100 MG tablet; Take 1.5 tablets by mouth Daily.    Anxiety  -     sertraline (Zoloft) 100 MG tablet; Take 1.5 tablets by mouth Daily.    Non-seasonal allergic rhinitis due to pollen  -     montelukast (Singulair) 10 MG tablet; Take 1 tablet by mouth Daily.    Immunization due  -     Pneumococcal Polysaccharide Vaccine 23-Valent Greater Than or Equal To 3yo Subcutaneous / IM  -     Fluarix/Fluzone/Afluria Quad>6 Months

## 2020-09-18 NOTE — PROGRESS NOTES
"SURGERY  Bri Pfeiffer   1985 09/23/2020    Chief Complaint:  Narrowing at ileocecal valve    HPI    Ms. Pfeiffer is a nice 34 y.o. female referred by Dr. Kurt Escalona/Xochilt Mcgovern who presents with Crohn's with recent small bowel follow-through 9/10/2020 showing a 6 mm stricture just proximal to the ileocecal valve in the ileum with 5.4 cm segment of ileum mildly distended proximal to that.  She has had a diagnosis of Crohn's disease for only a year but says she has had chronic GI \"stuff\" including diarrhea, abdominal pain, bloody stools, recurring anemia and nausea for maybe a decade.      She previously was cared for by Dr. Turcios and actually had a c scope 5/2019 showing a non obstructive stricture at the terminal ileum (1 cm x 1 cm), along with ulceration on the ileocecal valves and scattered apthtae in the rectosigmoid.  She was started on Humira and Apriso and C scope in 12/2019 showed no anal stenosis, no terminal ileum stenosis.  She has had an anal fissure but no fistula.      She is now being cared for by Dr. Camacho, and based on her symptoms he sent her for the above referenced small bowel follow-through.  She describes that she cannot have an actual real meal because of pain and regurgitation.  The regurgitation I find particularly concerning, as I do not believe she would have that from a terminal ileal stricture.  She is had a work-up for that as well including an EGD that essentially was normal and an upper GI which did not show any swallowing issues.  She also describes that she has pain on a daily basis on a 3-4 out of 10 scale, and if she tries to eat a salad or anything other than pretty much liquids, then she has a great deal of pain and distention.  She feels like she is \"ripping in half\" and has to push on the right lower quadrant.  She will have been ribbony stools and cannot evacuate from the rectum as well.    While she was previously treated with Humira every other " week, her rheumatologist now wants her to be on that weekly, with there being a hint that she might have rheumatoid but no confirmatory diagnosis.  She has had problems with her joints for ages, and says that she is unable to lose weight because of that, she having a BMI of 45.6.  We have touched on the difficult and somewhat hurtful topic of her weight, and she confides that there is not a real chance that she could lose weight in preparation for surgery.    Past Medical History:   Diagnosis Date   • Anemia    • Anxiety    • Asthma    • Crohn's disease (CMS/[facility]) 05/2019   • Depression    • GERD (gastroesophageal reflux disease)    • Lactose intolerance    • Pericardial cyst    • Rheumatoid arthritis (CMS/[facility])    • Spondylosis      Past Surgical History:   Procedure Laterality Date   • COLONOSCOPY N/A 5/10/2019    Procedure: COLONOSCOPY TO CECUM TO TERMINAL ILEUM WITH BIOPSY;  Surgeon: Tae Turcios MD;  Location: [facility] ENDOSCOPY;  Service: Gastroenterology   • ENDOSCOPY N/A 5/10/2019    Procedure: ESOPHAGOGASTRODUODENOSCOPY WITH BIOPSY;  Surgeon: Tae Turcios MD;  Location: [facility] ENDOSCOPY;  Service: Gastroenterology   • ENDOSCOPY N/A 12/13/2019    Procedure: ESOPHAGOGASTRODUODENOSCOPY WITH COLD BIOPSIES;  Surgeon: Tae Turcios MD;  Location: [facility] ENDOSCOPY;  Service: Gastroenterology   • LIPOMA EXCISION  2007   • SIGMOIDOSCOPY N/A 12/13/2019    Procedure: SIGMOIDOSCOPY FLEXIBLE TO T.I.;  Surgeon: Tae Turcios MD;  Location: [facility] ENDOSCOPY;  Service: Gastroenterology   • TONSILLECTOMY AND ADENOIDECTOMY     • WISDOM TOOTH EXTRACTION       Family History   Problem Relation Age of Onset   • Hypertension Father    • Skin cancer Father    • Cancer Maternal Grandmother         Colorectal   • Prostate cancer Maternal Grandfather    • Prostate cancer Paternal Grandfather    • Hypertension Paternal Grandfather    • Hypertension Paternal Grandmother      Social History     Socioeconomic History   •  Marital status: Single     Spouse name: Not on file   • Number of children: 0   • Years of education: Not on file   • Highest education level: Some college, no degree   Occupational History   • Occupation: ER /admin assist     Employer: [employer]   Tobacco Use   • Smoking status: Never Smoker   • Smokeless tobacco: Never Used   • Tobacco comment: caffeine use a few days weekly   Substance and Sexual Activity   • Alcohol use: Yes   • Drug use: No   • Sexual activity: Defer         Current Outpatient Medications:   •  acetaminophen (TYLENOL) 500 MG tablet, Take 500 mg by mouth Every 6 (Six) Hours As Needed for Mild Pain ., Disp: , Rfl:   •  albuterol sulfate HFA (PROAIR HFA) 108 (90 Base) MCG/ACT inhaler, Inhale 2 puffs Every 4 (Four) Hours As Needed., Disp: , Rfl:   •  cetirizine (ZYRTEC ALLERGY) 10 MG tablet, Take 10 mg by mouth Daily., Disp: , Rfl:   •  Cholecalciferol (VITAMIN D-3) 5000 units tablet, Take 5,000 Units by mouth Daily., Disp: , Rfl:   •  Cyanocobalamin (VITAMIN B 12 PO), Take 1 tablet by mouth Daily., Disp: , Rfl:   •  dicyclomine (Bentyl) 20 MG tablet, Take 1 tablet by mouth 3 (Three) Times a Day As Needed (Cramping)., Disp: 90 tablet, Rfl: 2  •  diphenoxylate-atropine (LOMOTIL) 2.5-0.025 MG per tablet, Take 1 tablet by mouth 4 (Four) Times a Day As Needed for Diarrhea., Disp: 120 tablet, Rfl: 3  •  esomeprazole (nexIUM) 40 MG capsule, Take 1 capsule by mouth Daily., Disp: 30 capsule, Rfl: 5  •  ferrous sulfate 324 (65 Fe) MG tablet delayed-release EC tablet, Take 324 mg by mouth Daily With Breakfast., Disp: , Rfl:   •  fluticasone (FLONASE ALLERGY RELIEF) 50 MCG/ACT nasal spray, 2 sprays into the nostril(s) as directed by provider Daily., Disp: , Rfl:   •  HUMIRA PEN 40 MG/0.4ML Pen-injector Kit, INJECT 40 MG Q OTHER WEEK, Disp: , Rfl: 0  •  mesalamine (APRISO) 0.375 g 24 hr capsule, Take 4 capsules by mouth Daily., Disp: 120 capsule, Rfl: 11  •  montelukast (Singulair) 10 MG  "tablet, Take 1 tablet by mouth Daily., Disp: 90 tablet, Rfl: 3  •  Multiple Vitamin (MULTIVITAMIN PO), Take 1 tablet by mouth Daily., Disp: , Rfl:   •  sertraline (Zoloft) 100 MG tablet, Take 1.5 tablets by mouth Daily., Disp: 135 tablet, Rfl: 3  •  spironolactone (ALDACTONE) 100 MG tablet, Take 100 mg by mouth Daily., Disp: , Rfl:     Allergies   Allergen Reactions   • Levaquin [Levofloxacin] Hallucinations     Review of Systems  Positive for all systems including excess sweating, fatigue, congestion, ear pain, mouth sores, nosebleeds, postnasal drip, runny nose, sinus pressure, sneezing, trouble swallowing, light sensitivity, cough, chest pain, abdominal distention and pain, blood in stool, constipation, diarrhea, nausea, rectal pain, vomiting, reflux, heat intolerance, excess thirst, flank pain, vaginal pain, joint and back pain, joint swelling, muscle and neck pain, neck stiffness, rash, environmental allergies, food allergies, immunocompromised straight, dizziness, headaches, lightheadedness, numbness, tremors, weakness, decreased concentration, nervousness, anxiety, depression.  All other systems reviewed and negative.    Vitals:    09/23/20 1332   Weight: 132 kg (291 lb)   Height: 170.2 cm (67\")       PHYSICAL EXAM:    Ht 170.2 cm (67\")   Wt 132 kg (291 lb)   BMI 45.58 kg/m²   Body mass index is 45.58 kg/m².    Constitutional: well developed, well nourished, appears  healthy, stated age  Eyes: sclera nonicteric, conjunctiva not injected   ENMT: Hearing intact, trachea midline, dentitia not seen with mask in place  CVS: RRR, no murmur, peripheral edema not present  Respiratory: CTA, normal respiratory effort   Gastrointestinal: no hepatosplenomegaly, abdomen soft, described tenderness in the right lower quadrant with no guarding or rebound, abdominal hernia detected  Genitourinary: inguinal hernia not present  Musculoskeletal: gait normal, muscle mass normal  Skin: warm and dry, no rashes " visible  Neurological: awake and alert, seems to have reasonable capacity for understanding for medical decision making  Psychiatric: appears to have reasonable judgement, pleasant  Lymphatics: no cervical adenopathy    Radiographic/Lab Findings: I reviewed the small bowel follow-through with her showing her the area where the stricture is, just proximal to the ileocecal valve    Pamphlet reviewed: Bowel resection, including the potential for leak, and in particular with Crohn's disease the increased risk for perianastomotic abscess or fistula, but most notably the recurrence of disease directly at the anastomosis with essentially the same situation that we have now there being a stricture.  Discussed the fact that surgery does not cure Crohn's disease.    IMPRESSION:  · Crohn's disease with distal terminal ileal stricture, 6 mm, with dramatic symptoms  · Humira and Apriso use  · Arthritis with some suspected connective tissue disorder and spondylosis  · BMI 46  · Anxiety/depression on Zoloft  · Regurgitation of unexplained etiology    PLAN:  · Ultrasound the gallbladder.  I would like to make sure that I can come up with some reason that she is having this regurgitation as I suspect it will persist and will be a problematic issue postoperatively  · I put a call into Dr. Escalona to get his input on the need for surgery.  She certainly a greater risk than the routine Crohn's patient because of her BMI.  We talked in particular about the increased risk that occurs with a BMI over 30 in whole colon studies.  · Would be required to hold Humira for 1 month  · Would be required to hold narcotics for 1 week prior in order to allow the ERAS protocol  · Would require prep day prior with both neomycin and erythromycin oral antibiotics, IV antibiotics and holding and Entereg as well as Tylenol.  She will need to be marked although the potential for an ileostomy is quite low.    Lisa Kerr MD  09/23/2020  3:14 PM    US GB  normal.  Discussed with Dr Escalona and in the context of her operative risk, we agreed to additional studies prior to surgery.  She will get MRI enterography and we will go from there.  Ordered but not completed.  Clinical staff will notify of US result.  Lisa Kerr MD  10/12/20     ADDEND  Discussed with Dr Escalona.  I think she would be benefited by the surgery.  Will have the staff call to arrange office visit or call to proceed, her choice.  She will need a laparoscopic ileocecectomy, possible open.   ERAS.    Lisa Kerr MD  10/13/20

## 2020-09-21 ENCOUNTER — HOSPITAL ENCOUNTER (OUTPATIENT)
Dept: CT IMAGING | Facility: HOSPITAL | Age: 35
Discharge: HOME | End: 2020-09-21
Admitting: INTERNAL MEDICINE

## 2020-09-21 DIAGNOSIS — Q24.8 PERICARDIAL CYST: ICD-10-CM

## 2020-09-21 PROCEDURE — 71250 CT THORAX DX C-: CPT

## 2020-09-23 ENCOUNTER — OFFICE VISIT (OUTPATIENT)
Dept: SURGERY | Facility: CLINIC | Age: 35
End: 2020-09-23

## 2020-09-23 VITALS — BODY MASS INDEX: 45.67 KG/M2 | HEIGHT: 67 IN | WEIGHT: 291 LBS

## 2020-09-23 DIAGNOSIS — K50.00 CROHN'S DISEASE INVOLVING TERMINAL ILEUM (HCC): Primary | ICD-10-CM

## 2020-09-23 DIAGNOSIS — R11.2 NAUSEA AND VOMITING, INTRACTABILITY OF VOMITING NOT SPECIFIED, UNSPECIFIED VOMITING TYPE: ICD-10-CM

## 2020-09-23 PROCEDURE — 99244 OFF/OP CNSLTJ NEW/EST MOD 40: CPT | Performed by: SURGERY

## 2020-09-29 ENCOUNTER — HOSPITAL ENCOUNTER (OUTPATIENT)
Dept: ULTRASOUND IMAGING | Facility: HOSPITAL | Age: 35
Discharge: HOME | End: 2020-09-29
Admitting: SURGERY

## 2020-09-29 DIAGNOSIS — R11.2 NAUSEA AND VOMITING, INTRACTABILITY OF VOMITING NOT SPECIFIED, UNSPECIFIED VOMITING TYPE: ICD-10-CM

## 2020-09-29 PROCEDURE — 76705 ECHO EXAM OF ABDOMEN: CPT

## 2020-09-30 ENCOUNTER — TELEPHONE (OUTPATIENT)
Dept: GASTROENTEROLOGY | Facility: CLINIC | Age: 35
End: 2020-09-30

## 2020-09-30 DIAGNOSIS — K50.111 CROHN'S DISEASE OF LARGE INTESTINE WITH RECTAL BLEEDING (HCC): Primary | ICD-10-CM

## 2020-09-30 NOTE — TELEPHONE ENCOUNTER
----- Message from Leeanna Alejandra MA sent at 9/30/2020  7:34 AM EDT -----  Regarding: Non-Urgent Medical Question  Contact: [phone]      ----- Message -----  From: Bri Pfeiffer  Sent: 9/29/2020   4:19 PM EDT  To: [facility]  Subject: Non-Urgent Medical Question                      Good afternoon!  I am due for my Humira injection this coming Friday. My rheumatologist has advised increasing my injections to weekly, however the surgeon said I need to be off humira for several weeks prior to surgery. Do you think I should skip this injection in preparation for the resection?    Thanks so much  Bri pfeiffer

## 2020-09-30 NOTE — TELEPHONE ENCOUNTER
Clarified with Dr. Escalona.  He recommends patient continue Humira at this time.  Given review of small bowel follow-through with radiologist and Dr. Kerr, Dr. Damon recommends further evaluation with MR enterography.  Orders placed.    Spoke with patient informing her of recommendation for MR enterography and to continue Humira every other week at this time.    Patient has been following with rheumatology and it was recommended by rheumatology to increase frequency of Humira to weekly.  We will work on insurance approval for weekly Humira however patient has been instructed to not start weekly Humira while surgery consideration is being worked up and pending.    Patient verbalized agreement and understanding with the above plan, all questions answered.      Ly, please work on insurance approval for weekly Humira.  This was prescribed by her previous gastroenterologist Dr. Turcios and she is currently on Humira every 14 days.  Need approval for weekly dosing given joint pain and active inflammatory bowel disease symptoms.    Chrissy

## 2020-10-12 ENCOUNTER — TELEPHONE (OUTPATIENT)
Dept: SURGERY | Facility: CLINIC | Age: 35
End: 2020-10-12

## 2020-10-12 DIAGNOSIS — K50.111 CROHN'S DISEASE OF LARGE INTESTINE WITH RECTAL BLEEDING (HCC): ICD-10-CM

## 2020-10-12 NOTE — TELEPHONE ENCOUNTER
----- Message from Lisa Kerr MD sent at 10/12/2020  7:31 AM EDT -----  Please note my addendum to most recent office visit and call to notify patient.

## 2020-10-12 NOTE — TELEPHONE ENCOUNTER
Patient aware of US results and has already received MRI today 10/12/20. Pt voiced understanding.

## 2020-10-13 ENCOUNTER — TELEPHONE (OUTPATIENT)
Dept: SURGERY | Facility: CLINIC | Age: 35
End: 2020-10-13

## 2020-10-15 ENCOUNTER — TELEPHONE (OUTPATIENT)
Dept: GASTROENTEROLOGY | Facility: CLINIC | Age: 35
End: 2020-10-15

## 2020-10-15 NOTE — TELEPHONE ENCOUNTER
Dr Escalona discussed MRE results and recommendations with patient.  Patient is aware and in touch with surgery at this time.  pk/jt

## 2020-10-19 ENCOUNTER — PREP FOR SURGERY (OUTPATIENT)
Dept: OTHER | Facility: HOSPITAL | Age: 35
End: 2020-10-19

## 2020-10-19 ENCOUNTER — OFFICE VISIT (OUTPATIENT)
Dept: SURGERY | Facility: CLINIC | Age: 35
End: 2020-10-19

## 2020-10-19 VITALS — WEIGHT: 288.8 LBS | BODY MASS INDEX: 54.53 KG/M2 | HEIGHT: 61 IN

## 2020-10-19 DIAGNOSIS — K50.111 CROHN'S DISEASE OF LARGE INTESTINE WITH RECTAL BLEEDING (HCC): Primary | ICD-10-CM

## 2020-10-19 DIAGNOSIS — K50.00 CROHN'S DISEASE INVOLVING TERMINAL ILEUM (HCC): Primary | ICD-10-CM

## 2020-10-19 PROBLEM — K50.90 CROHN'S DISEASE: Status: ACTIVE | Noted: 2020-10-19

## 2020-10-19 PROCEDURE — 99214 OFFICE O/P EST MOD 30 MIN: CPT | Performed by: SURGERY

## 2020-10-19 RX ORDER — ALVIMOPAN 12 MG/1
12 CAPSULE ORAL 2 TIMES DAILY
Status: CANCELLED | OUTPATIENT
Start: 2020-11-04 | End: 2020-11-09

## 2020-10-19 RX ORDER — SCOLOPAMINE TRANSDERMAL SYSTEM 1 MG/1
1 PATCH, EXTENDED RELEASE TRANSDERMAL CONTINUOUS
Status: CANCELLED | OUTPATIENT
Start: 2020-11-04 | End: 2020-11-07

## 2020-10-19 RX ORDER — CEFAZOLIN SODIUM IN 0.9 % NACL 3 G/100 ML
3 INTRAVENOUS SOLUTION, PIGGYBACK (ML) INTRAVENOUS ONCE
Status: CANCELLED | OUTPATIENT
Start: 2020-11-04 | End: 2020-10-19

## 2020-10-19 RX ORDER — GABAPENTIN 300 MG/1
300 CAPSULE ORAL 3 TIMES DAILY
Status: CANCELLED | OUTPATIENT
Start: 2020-11-04

## 2020-10-19 RX ORDER — CHLORHEXIDINE GLUCONATE 4 G/100ML
SOLUTION TOPICAL 2 TIMES DAILY
Qty: 236 ML | Refills: 0 | Status: ON HOLD | OUTPATIENT
Start: 2020-10-19 | End: 2020-11-04

## 2020-10-19 RX ORDER — ERYTHROMYCIN 500 MG/1
1000 TABLET, DELAYED RELEASE ORAL 3 TIMES DAILY
Qty: 6 TABLET | Refills: 0 | Status: SHIPPED | OUTPATIENT
Start: 2020-10-19 | End: 2020-10-20

## 2020-10-19 RX ORDER — CELECOXIB 200 MG/1
200 CAPSULE ORAL EVERY 12 HOURS SCHEDULED
Status: CANCELLED | OUTPATIENT
Start: 2020-11-04

## 2020-10-19 RX ORDER — NEOMYCIN SULFATE 500 MG/1
1000 TABLET ORAL 3 TIMES DAILY
Qty: 6 TABLET | Refills: 0 | Status: SHIPPED | OUTPATIENT
Start: 2020-10-19 | End: 2020-10-20

## 2020-10-19 RX ORDER — ACETAMINOPHEN 500 MG
1000 TABLET ORAL EVERY 6 HOURS
Status: CANCELLED | OUTPATIENT
Start: 2020-11-04

## 2020-10-19 NOTE — PROGRESS NOTES
"SURGERY  Bri Pfeiffer   1985  10/19/2020    Chief Complaint:  Narrowing at ileocecal valve    HPI    Ms. Pfeiffer is a nice 34 y.o. female referred by Dr. Kurt Escalona/Xochilt Mcgovern who returns for consideration of surgery for Crohn's disease with terminal ileal stricture after initial visit last month and additional work-up.  When originally seen, her studies had included a recent small bowel follow-through (9/10/2020) showing a 6 mm stricture just proximal to the ileocecal valve in the ileum with 5.4 cm segment of ileum mildly distended proximal to that.  Since that time, I reviewed the study with Dr. Moon, the radiologist, and he has amended that to say that there is involvement immediately at the ileocecal valve with inability to comment on the colon.  She has had a diagnosis of Crohn's disease for only a year but says she has had chronic GI \"stuff\" including diarrhea, abdominal pain, bloody stools, recurring anemia and nausea for maybe a decade.      She previously was cared for by Dr. Turcios and actually had a c scope 5/2019 showing a non obstructive stricture at the terminal ileum (1 cm x 1 cm), along with ulceration on the ileocecal valves and scattered apthtae in the rectosigmoid.  She was started on Humira and Apriso and C scope in 12/2019 showed no anal stenosis, no terminal ileum stenosis.  She has had an anal fissure but no fistula.      She describes that she cannot have an actual real meal because of pain and regurgitation.  She understands that Dr. Escalona nor I can explain why the regurgitation is occurring and it may not improve after the surgery.  She has had a work-up for that as well including an EGD that essentially was normal and an upper GI which did not show any swallowing issues.  She also describes that she has pain on a daily basis on a 3-4 out of 10 scale, and if she tries to eat a salad or anything other than pretty much liquids, then she has a great deal of pain " "and distention.  She feels like she is \"ripping in half\" and has to push on the right lower quadrant.  She will have been ribbony stools and cannot evacuate from the rectum as well.    While she was previously treated with Humira every other week, her rheumatologist now wants her to be on that weekly, with there being a hint that she might have rheumatoid but no confirmatory diagnosis.  She has had problems with her joints for ages, and says that she is unable to lose weight because of that, she having a BMI of 45.6.  We have touched on the difficult and somewhat hurtful topic of her weight, and she confides that there is not a real chance that she could lose weight in preparation for surgery.    MRI enterography showed a 1 cm lumen stricture \"at the level of the ileocecal valve\" with dilated bowel proximal to that.  Again, the UGI showed a stricture located immediately proximal to and likely involving the ileocecal valve.      Past Medical History:   Diagnosis Date   • Anemia    • Anxiety    • Asthma    • Crohn's disease (CMS/[facility]) 05/2019   • Depression    • GERD (gastroesophageal reflux disease)    • Lactose intolerance    • Pericardial cyst    • Rheumatoid arthritis (CMS/[facility])    • Spondylosis      Past Surgical History:   Procedure Laterality Date   • COLONOSCOPY N/A 5/10/2019    Procedure: COLONOSCOPY TO CECUM TO TERMINAL ILEUM WITH BIOPSY;  Surgeon: Tae Turcios MD;  Location: [facility] ENDOSCOPY;  Service: Gastroenterology   • ENDOSCOPY N/A 5/10/2019    Procedure: ESOPHAGOGASTRODUODENOSCOPY WITH BIOPSY;  Surgeon: Tae Turcios MD;  Location: [facility] ENDOSCOPY;  Service: Gastroenterology   • ENDOSCOPY N/A 12/13/2019    Procedure: ESOPHAGOGASTRODUODENOSCOPY WITH COLD BIOPSIES;  Surgeon: Tae Turcios MD;  Location: [facility] ENDOSCOPY;  Service: Gastroenterology   • LIPOMA EXCISION  2007   • SIGMOIDOSCOPY N/A 12/13/2019    Procedure: SIGMOIDOSCOPY FLEXIBLE TO T.I.;  Surgeon: Tae Turcios MD;  " Location: [facility] ENDOSCOPY;  Service: Gastroenterology   • TONSILLECTOMY AND ADENOIDECTOMY     • WISDOM TOOTH EXTRACTION       Family History   Problem Relation Age of Onset   • Hypertension Father    • Skin cancer Father    • Cancer Maternal Grandmother         Colorectal   • Prostate cancer Maternal Grandfather    • Prostate cancer Paternal Grandfather    • Hypertension Paternal Grandfather    • Hypertension Paternal Grandmother      Social History     Socioeconomic History   • Marital status: Single     Spouse name: Not on file   • Number of children: 0   • Years of education: Not on file   • Highest education level: Some college, no degree   Occupational History   • Occupation: ER /admin assist     Employer: [employer]   Tobacco Use   • Smoking status: Never Smoker   • Smokeless tobacco: Never Used   • Tobacco comment: caffeine use a few days weekly   Substance and Sexual Activity   • Alcohol use: Yes   • Drug use: No   • Sexual activity: Defer         Current Outpatient Medications:   •  acetaminophen (TYLENOL) 500 MG tablet, Take 500 mg by mouth Every 6 (Six) Hours As Needed for Mild Pain ., Disp: , Rfl:   •  albuterol sulfate HFA (PROAIR HFA) 108 (90 Base) MCG/ACT inhaler, Inhale 2 puffs Every 4 (Four) Hours As Needed., Disp: , Rfl:   •  cetirizine (ZYRTEC ALLERGY) 10 MG tablet, Take 10 mg by mouth Daily., Disp: , Rfl:   •  Cholecalciferol (VITAMIN D-3) 5000 units tablet, Take 5,000 Units by mouth Daily., Disp: , Rfl:   •  Cyanocobalamin (VITAMIN B 12 PO), Take 1 tablet by mouth Daily., Disp: , Rfl:   •  dicyclomine (Bentyl) 20 MG tablet, Take 1 tablet by mouth 3 (Three) Times a Day As Needed (Cramping)., Disp: 90 tablet, Rfl: 2  •  diphenoxylate-atropine (LOMOTIL) 2.5-0.025 MG per tablet, Take 1 tablet by mouth 4 (Four) Times a Day As Needed for Diarrhea., Disp: 120 tablet, Rfl: 3  •  esomeprazole (nexIUM) 40 MG capsule, Take 1 capsule by mouth Daily., Disp: 30 capsule, Rfl: 5  •   "ferrous sulfate 324 (65 Fe) MG tablet delayed-release EC tablet, Take 324 mg by mouth Daily With Breakfast., Disp: , Rfl:   •  fluticasone (FLONASE ALLERGY RELIEF) 50 MCG/ACT nasal spray, 2 sprays into the nostril(s) as directed by provider Daily., Disp: , Rfl:   •  mesalamine (APRISO) 0.375 g 24 hr capsule, Take 4 capsules by mouth Daily., Disp: 120 capsule, Rfl: 11  •  montelukast (Singulair) 10 MG tablet, Take 1 tablet by mouth Daily., Disp: 90 tablet, Rfl: 3  •  Multiple Vitamin (MULTIVITAMIN PO), Take 1 tablet by mouth Daily., Disp: , Rfl:   •  sertraline (Zoloft) 100 MG tablet, Take 1.5 tablets by mouth Daily., Disp: 135 tablet, Rfl: 3  •  spironolactone (ALDACTONE) 100 MG tablet, Take 100 mg by mouth Daily., Disp: , Rfl:   •  HUMIRA PEN 40 MG/0.4ML Pen-injector Kit, INJECT 40 MG Q OTHER WEEK, Disp: , Rfl: 0    Allergies   Allergen Reactions   • Levaquin [Levofloxacin] Hallucinations     Review of Systems  Positive for all systems including excess sweating, fatigue, congestion, ear pain, mouth sores, nosebleeds, postnasal drip, runny nose, sinus pressure, sneezing, trouble swallowing, light sensitivity, cough, chest pain, abdominal distention and pain, blood in stool, constipation, diarrhea, nausea, rectal pain, vomiting, reflux, heat intolerance, excess thirst, flank pain, vaginal pain, joint and back pain, joint swelling, muscle and neck pain, neck stiffness, rash, environmental allergies, food allergies, immunocompromised straight, dizziness, headaches, lightheadedness, numbness, tremors, weakness, decreased concentration, nervousness, anxiety, depression.  All other systems reviewed and negative.    Vitals:    10/19/20 1523   Weight: 131 kg (288 lb 12.8 oz)   Height: 154.9 cm (60.98\")       PHYSICAL EXAM:    Ht 154.9 cm (60.98\")   Wt 131 kg (288 lb 12.8 oz)   BMI 54.60 kg/m²   Body mass index is 54.6 kg/m².    Constitutional: well developed, well nourished, appears  healthy, stated age  Eyes: sclera " nonicteric, conjunctiva not injected   ENMT: Hearing intact, trachea midline, dentitia not seen with mask in place  CVS: RRR, no murmur, peripheral edema not present  Respiratory: CTA, normal respiratory effort   Gastrointestinal: no hepatosplenomegaly, abdomen soft, described tenderness in the right lower quadrant with no guarding or rebound, abdominal hernia detected  Genitourinary: inguinal hernia not present  Musculoskeletal: gait normal, muscle mass normal  Skin: warm and dry, no rashes visible  Neurological: awake and alert, seems to have reasonable capacity for understanding for medical decision making  Psychiatric: appears to have reasonable judgement, pleasant  Lymphatics: no cervical adenopathy    Radiographic/Lab Findings: I reviewed the small bowel follow-through with her showing her the area where the stricture is, just proximal to the ileocecal valve.  US GB normal.      Pamphlet reviewed: Bowel resection, including the potential for leak, and in particular with Crohn's disease the increased risk for perianastomotic abscess or fistula, but most notably the recurrence of disease directly at the anastomosis with essentially the same situation that we have now there being a stricture.  Discussed the fact that surgery does not cure Crohn's disease.    IMPRESSION:  · Crohn's disease with distal terminal ileal stricture, 6 mm, with dramatic symptoms  · Likely anorectal disease, managed with meds, manifest as anal stenosis and rectal apthea.  · Humira (last dose 10/2) and Apriso use  · Arthritis with some suspected connective tissue disorder and spondylosis  · BMI 46  · Anxiety/depression on Zoloft  · Regurgitation of unexplained etiology    PLAN:  · Laparoscopic ileocecectomy, possible open.  Discussed with her why she is a better candidate for an excision then a stricturoplasty at this point, she never having had surgery previously.  · Would be required to hold Humira for 1 month.  Continue mesalamine  perioperatively  · Would be required to hold narcotics for 1 week prior in order to allow the ERAS protocol  · Would require prep day prior with both neomycin and erythromycin oral antibiotics, IV antibiotics in holding and Entereg as well as Tylenol.  She will need to be marked although the potential for an ileostomy is quite low.  · Eras protocol    Lisa Kerr MD  10/19/2020  3:14 PM

## 2020-10-28 ENCOUNTER — APPOINTMENT (OUTPATIENT)
Dept: PREADMISSION TESTING | Facility: HOSPITAL | Age: 35
End: 2020-10-28

## 2020-11-02 ENCOUNTER — PRE-ADMISSION TESTING (OUTPATIENT)
Dept: PREADMISSION TESTING | Facility: HOSPITAL | Age: 35
End: 2020-11-02

## 2020-11-02 VITALS
WEIGHT: 291 LBS | HEART RATE: 83 BPM | RESPIRATION RATE: 18 BRPM | BODY MASS INDEX: 45.67 KG/M2 | DIASTOLIC BLOOD PRESSURE: 83 MMHG | OXYGEN SATURATION: 100 % | SYSTOLIC BLOOD PRESSURE: 121 MMHG | HEIGHT: 67 IN | TEMPERATURE: 98.7 F

## 2020-11-02 DIAGNOSIS — K50.111 CROHN'S DISEASE OF LARGE INTESTINE WITH RECTAL BLEEDING (HCC): ICD-10-CM

## 2020-11-02 LAB
ABO GROUP BLD: NORMAL
ANION GAP SERPL CALCULATED.3IONS-SCNC: 10.3 MMOL/L (ref 5–15)
BLD GP AB SCN SERPL QL: NEGATIVE
BUN SERPL-MCNC: 13 MG/DL (ref 6–20)
BUN/CREAT SERPL: 17.6 (ref 7–25)
CALCIUM SPEC-SCNC: 9 MG/DL (ref 8.6–10.5)
CHLORIDE SERPL-SCNC: 103 MMOL/L (ref 98–107)
CO2 SERPL-SCNC: 26.7 MMOL/L (ref 22–29)
CREAT SERPL-MCNC: 0.74 MG/DL (ref 0.57–1)
DEPRECATED RDW RBC AUTO: 41.1 FL (ref 37–54)
ERYTHROCYTE [DISTWIDTH] IN BLOOD BY AUTOMATED COUNT: 12.3 % (ref 12.3–15.4)
GFR SERPL CREATININE-BSD FRML MDRD: 90 ML/MIN/1.73
GLUCOSE SERPL-MCNC: 100 MG/DL (ref 65–99)
HCG SERPL QL: NEGATIVE
HCT VFR BLD AUTO: 40 % (ref 34–46.6)
HGB BLD-MCNC: 13.1 G/DL (ref 12–15.9)
MCH RBC QN AUTO: 29.9 PG (ref 26.6–33)
MCHC RBC AUTO-ENTMCNC: 32.8 G/DL (ref 31.5–35.7)
MCV RBC AUTO: 91.3 FL (ref 79–97)
PLATELET # BLD AUTO: 313 10*3/MM3 (ref 140–450)
PMV BLD AUTO: 9.1 FL (ref 6–12)
POTASSIUM SERPL-SCNC: 4.1 MMOL/L (ref 3.5–5.2)
RBC # BLD AUTO: 4.38 10*6/MM3 (ref 3.77–5.28)
RH BLD: POSITIVE
SODIUM SERPL-SCNC: 140 MMOL/L (ref 136–145)
T&S EXPIRATION DATE: NORMAL
WBC # BLD AUTO: 9.01 10*3/MM3 (ref 3.4–10.8)

## 2020-11-02 PROCEDURE — 86900 BLOOD TYPING SEROLOGIC ABO: CPT | Performed by: SURGERY

## 2020-11-02 PROCEDURE — 80048 BASIC METABOLIC PNL TOTAL CA: CPT | Performed by: SURGERY

## 2020-11-02 PROCEDURE — 86850 RBC ANTIBODY SCREEN: CPT | Performed by: SURGERY

## 2020-11-02 PROCEDURE — 86901 BLOOD TYPING SEROLOGIC RH(D): CPT | Performed by: SURGERY

## 2020-11-02 PROCEDURE — C9803 HOPD COVID-19 SPEC COLLECT: HCPCS

## 2020-11-02 PROCEDURE — 84703 CHORIONIC GONADOTROPIN ASSAY: CPT | Performed by: SURGERY

## 2020-11-02 PROCEDURE — U0004 COV-19 TEST NON-CDC HGH THRU: HCPCS | Performed by: NURSE PRACTITIONER

## 2020-11-02 PROCEDURE — 85027 COMPLETE CBC AUTOMATED: CPT | Performed by: SURGERY

## 2020-11-02 PROCEDURE — 36415 COLL VENOUS BLD VENIPUNCTURE: CPT

## 2020-11-02 RX ORDER — CHLORHEXIDINE GLUCONATE 500 MG/1
CLOTH TOPICAL TAKE AS DIRECTED
Status: ON HOLD | COMMUNITY
End: 2020-11-04

## 2020-11-02 NOTE — DISCHARGE INSTRUCTIONS
Take the following medications the morning of surgery:ALBUTEROL, NEXIUM AND ZOLOFT    ARRIVE AT 8AM      If you are on prescription narcotic pain medication to control your pain you may also take that medication the morning of surgery.    General Instructions:  • Do not eat solid food after midnight the night before surgery.  • You may drink clear liquids day of surgery but must stop at least one hour before your hospital arrival time.  • It is beneficial for you to have a clear drink that contains carbohydrates the day of surgery.  We suggest a 12 to 20 ounce bottle of Gatorade or Powerade for non-diabetic patients or a 12 to 20 ounce bottle of G2 or Powerade Zero for diabetic patients. (Pediatric patients, are not advised to drink a 12 to 20 ounce carbohydrate drink)    Clear liquids are liquids you can see through.  Nothing red in color.     Plain water                               Sports drinks  Sodas                                   Gelatin (Jell-O)  Fruit juices without pulp such as white grape juice and apple juice  Popsicles that contain no fruit or yogurt  Tea or coffee (no cream or milk added)  Gatorade / Powerade  G2 / Powerade Zero    • Infants may have breast milk up to four hours before surgery.  • Infants drinking formula may drink formula up to six hours before surgery.   • Patients who avoid smoking, chewing tobacco and alcohol for 4 weeks prior to surgery have a reduced risk of post-operative complications.  Quit smoking as many days before surgery as you can.  • Do not smoke, use chewing tobacco or drink alcohol the day of surgery.   • If applicable bring your C-PAP/ BI-PAP machine.  • Bring any papers given to you in the doctor’s office.  • Wear clean comfortable clothes.  • Do not wear contact lenses, false eyelashes or make-up.  Bring a case for your glasses.   • Bring crutches or walker if applicable.  • Remove all piercings.  Leave jewelry and any other valuables at home.  • Hair extensions  with metal clips must be removed prior to surgery.  • The Pre-Admission Testing nurse will instruct you to bring medications if unable to obtain an accurate list in Pre-Admission Testing.        If you were given a blood bank ID arm band remember to bring it with you the day of surgery.    Preventing a Surgical Site Infection:  • For 2 to 3 days before surgery, avoid shaving with a razor because the razor can irritate skin and make it easier to develop an infection.    • Any areas of open skin can increase the risk of a post-operative wound infection by allowing bacteria to enter and travel throughout the body.  Notify your surgeon if you have any skin wounds / rashes even if it is not near the expected surgical site.  The area will need assessed to determine if surgery should be delayed until it is healed.  • The night prior to surgery shower using a fresh bar of anti-bacterial soap (such as Dial) and clean washcloth.  Sleep in a clean bed with clean clothing.  Do not allow pets to sleep with you.  • Shower on the morning of surgery using a fresh bar of anti-bacterial soap (such as Dial) and clean washcloth.  Dry with a clean towel and dress in clean clothing.  • Ask your surgeon if you will be receiving antibiotics prior to surgery.  • Make sure you, your family, and all healthcare providers clean their hands with soap and water or an alcohol based hand  before caring for you or your wound.    Day of surgery:  Your arrival time is approximately two hours before your scheduled surgery time.  Upon arrival, a Pre-op nurse and Anesthesiologist will review your health history, obtain vital signs, and answer questions you may have.  The only belongings needed at this time will be a list of your home medications and if applicable your C-PAP/BI-PAP machine.  If you are staying overnight your family can leave the rest of your belongings in the car and bring them to your room later.  A Pre-op nurse will start an IV  and you may receive medication in preparation for surgery, including something to help you relax.  While you are in surgery your family should notify the waiting room  if they leave the waiting room area and provide a contact phone number.    Please be aware that surgery does come with discomfort.  We want to make every effort to control your discomfort so please discuss any uncontrolled symptoms with your nurse.   Your doctor will most likely have prescribed pain medications.      If you are going home after surgery you will receive individualized written care instructions before being discharged.  A responsible adult must drive you to and from the hospital on the day of your surgery and stay with you for 24 hours.    If you are staying overnight following surgery, you will be transported to your hospital room following the recovery period.  [facility] has all private rooms.    If you have any questions please call Pre-Admission Testing at [phone].  Deductibles and co-payments are collected on the day of service. Please be prepared to pay the required co-pay, deductible or deposit on the day of service as defined by your plan.    Patient Education for Self-Quarantine Process    Following your COVID testing, we strongly recommend that you do not leave your home after you have been tested for COVID except to get medical care. This includes not going to work, school or to public areas.  If this is not possible for you to do please limit your activities to only required outings.  Be sure to wear a mask when you are with other people, practice social distancing and wash your hands frequently.      The following items provide additional details to keep you safe.  • Wash your hands with soap and water frequently for at least 20 seconds.   • Avoid touching your eyes, nose and mouth with unwashed hands.  • Do not share anything - utensils, towels, food from the same bowl.   • Have your own  utensils, drinking glass, dishes, towels and bedding.   • Do not have visitors.   • Do use FaceTime to stay in touch with family and friends.  • You should stay in a specific room away from others if possible.   • Stay at least 6 feet away from others in the home if you cannot have a dedicated room to yourself.   • Do not snuggle with your pet. While the CDC says there is no evidence that pets can spread COVID-19 or be infected from humans, it is probably best to avoid “petting, snuggling, being kissed or licked and sharing food (during self-quarantine)”, according to the CDC.   • Sanitize household surfaces daily. Include all high touch areas (door handles, light switches, phones, countertops, etc.)  • Do not share a bathroom with others, if possible.   • Wear a mask around others in your home if you are unable to stay in a separate room or 6 feet apart. If  you are unable to wear a mask, have your family member wear a mask if they must be within 6 feet of you.   Call your surgeon immediately if you experience any of the following symptoms:  • Sore Throat  • Shortness of Breath or difficulty breathing  • Cough  • Chills  • Body soreness or muscle pain  • Headache  • Fever  • New loss of taste or smell  • Do not arrive for your surgery ill.  Your procedure will need to be rescheduled to another time.  You will need to call your physician before the day of surgery to avoid any unnecessary exposure to hospital staff as well as other patients.    CHLORHEXIDINE CLOTH INSTRUCTIONS  The morning of surgery follow these instructions using the Chlorhexidine cloths you've been given.  These steps reduce bacteria on the body.  Do not use the cloths near your eyes, ears mouth, genitalia or on open wounds.  Throw the cloths away after use but do not try to flush them down a toilet.      • Open and remove one cloth at a time from the package.    • Leave the cloth unfolded and begin the bathing.  • Massage the skin with the cloths  using gentle pressure to remove bacteria.  Do not scrub harshly.   • Follow the steps below with one 2% CHG cloth per area (6 total cloths).  • One cloth for neck, shoulders and chest.  • One cloth for both arms, hands, fingers and underarms (do underarms last).  • One cloth for the abdomen followed by groin.  • One cloth for right leg and foot including between the toes.  • One cloth for left leg and foot including between the toes.  • The last cloth is to be used for the back of the neck, back and buttocks.    Allow the CHG to air dry 3 minutes on the skin which will give it time to work and decrease the chance of irritation.  The skin may feel sticky until it is dry.  Do not rinse with water or any other liquid or you will lose the beneficial effects of the CHG.  If mild skin irritation occurs, do rinse the skin to remove the CHG.  Report this to the nurse at time of admission.  Do not apply lotions, creams, ointments, deodorants or perfumes after using the clothes. Dress in clean clothes before coming to the hospital.

## 2020-11-02 NOTE — NURSING NOTE
WOCN: Possible ostomy . OR Wednesday. Stoma marking. Discussed how to empty pouch, daily activities. Patient has no questions currently   11/02/20 1701   Stoma Site Marking   Procedure Marking For ileostomy   Site Marked RUQ: right upper quadrant   Patient Assessment Screen rectus muscle identified;marked within patient's visual field;bony prominences avoided;waistband avoided;creases/scars avoided   How Was Patient Marked? surgical marker;transparent dressing   Patient Position During Marking sitting;standing

## 2020-11-03 LAB — SARS-COV-2 RNA RESP QL NAA+PROBE: NOT DETECTED

## 2020-11-04 ENCOUNTER — HOSPITAL ENCOUNTER (INPATIENT)
Facility: HOSPITAL | Age: 35
LOS: 4 days | Discharge: HOME | End: 2020-11-08
Attending: SURGERY | Admitting: SURGERY

## 2020-11-04 ENCOUNTER — ANESTHESIA (OUTPATIENT)
Dept: PERIOP | Facility: HOSPITAL | Age: 35
End: 2020-11-04

## 2020-11-04 ENCOUNTER — ANESTHESIA EVENT (OUTPATIENT)
Dept: PERIOP | Facility: HOSPITAL | Age: 35
End: 2020-11-04

## 2020-11-04 DIAGNOSIS — K50.111 CROHN'S DISEASE OF LARGE INTESTINE WITH RECTAL BLEEDING (HCC): Primary | ICD-10-CM

## 2020-11-04 LAB
ANION GAP SERPL CALCULATED.3IONS-SCNC: 9.3 MMOL/L (ref 5–15)
BASOPHILS # BLD AUTO: 0.02 10*3/MM3 (ref 0–0.2)
BASOPHILS NFR BLD AUTO: 0.1 % (ref 0–1.5)
BUN SERPL-MCNC: 9 MG/DL (ref 6–20)
BUN/CREAT SERPL: 11.5 (ref 7–25)
CALCIUM SPEC-SCNC: 8.8 MG/DL (ref 8.6–10.5)
CHLORIDE SERPL-SCNC: 105 MMOL/L (ref 98–107)
CO2 SERPL-SCNC: 22.7 MMOL/L (ref 22–29)
CREAT SERPL-MCNC: 0.78 MG/DL (ref 0.57–1)
DEPRECATED RDW RBC AUTO: 43.2 FL (ref 37–54)
EOSINOPHIL # BLD AUTO: 0.01 10*3/MM3 (ref 0–0.4)
EOSINOPHIL NFR BLD AUTO: 0.1 % (ref 0.3–6.2)
ERYTHROCYTE [DISTWIDTH] IN BLOOD BY AUTOMATED COUNT: 12.6 % (ref 12.3–15.4)
GFR SERPL CREATININE-BSD FRML MDRD: 85 ML/MIN/1.73
GLUCOSE SERPL-MCNC: 142 MG/DL (ref 65–99)
HCT VFR BLD AUTO: 42.9 % (ref 34–46.6)
HGB BLD-MCNC: 13.8 G/DL (ref 12–15.9)
IMM GRANULOCYTES # BLD AUTO: 0.06 10*3/MM3 (ref 0–0.05)
IMM GRANULOCYTES NFR BLD AUTO: 0.4 % (ref 0–0.5)
LYMPHOCYTES # BLD AUTO: 0.52 10*3/MM3 (ref 0.7–3.1)
LYMPHOCYTES NFR BLD AUTO: 3.7 % (ref 19.6–45.3)
MCH RBC QN AUTO: 29.8 PG (ref 26.6–33)
MCHC RBC AUTO-ENTMCNC: 32.2 G/DL (ref 31.5–35.7)
MCV RBC AUTO: 92.7 FL (ref 79–97)
MONOCYTES # BLD AUTO: 0.13 10*3/MM3 (ref 0.1–0.9)
MONOCYTES NFR BLD AUTO: 0.9 % (ref 5–12)
NEUTROPHILS NFR BLD AUTO: 13.2 10*3/MM3 (ref 1.7–7)
NEUTROPHILS NFR BLD AUTO: 94.8 % (ref 42.7–76)
NRBC BLD AUTO-RTO: 0 /100 WBC (ref 0–0.2)
PLATELET # BLD AUTO: 337 10*3/MM3 (ref 140–450)
PMV BLD AUTO: 9.1 FL (ref 6–12)
POTASSIUM SERPL-SCNC: 4.9 MMOL/L (ref 3.5–5.2)
RBC # BLD AUTO: 4.63 10*6/MM3 (ref 3.77–5.28)
SODIUM SERPL-SCNC: 137 MMOL/L (ref 136–145)
WBC # BLD AUTO: 13.94 10*3/MM3 (ref 3.4–10.8)

## 2020-11-04 PROCEDURE — 25010000002 CEFAZOLIN PER 500 MG: Performed by: SURGERY

## 2020-11-04 PROCEDURE — 88307 TISSUE EXAM BY PATHOLOGIST: CPT | Performed by: SURGERY

## 2020-11-04 PROCEDURE — 25010000003 BUPIVACAINE LIPOSOME 1.3 % SUSPENSION: Performed by: ANESTHESIOLOGY

## 2020-11-04 PROCEDURE — 25010000002 DEXAMETHASONE PER 1 MG: Performed by: NURSE ANESTHETIST, CERTIFIED REGISTERED

## 2020-11-04 PROCEDURE — 25010000002 PHENYLEPHRINE PER 1 ML: Performed by: NURSE ANESTHETIST, CERTIFIED REGISTERED

## 2020-11-04 PROCEDURE — 85025 COMPLETE CBC W/AUTO DIFF WBC: CPT | Performed by: SURGERY

## 2020-11-04 PROCEDURE — 25010000002 HYDROMORPHONE PER 4 MG: Performed by: NURSE ANESTHETIST, CERTIFIED REGISTERED

## 2020-11-04 PROCEDURE — 44205 LAP COLECTOMY PART W/ILEUM: CPT | Performed by: SURGERY

## 2020-11-04 PROCEDURE — 25010000002 LIDOCAINE PER 10 MG: Performed by: NURSE ANESTHETIST, CERTIFIED REGISTERED

## 2020-11-04 PROCEDURE — 25010000002 ONDANSETRON PER 1 MG: Performed by: NURSE ANESTHETIST, CERTIFIED REGISTERED

## 2020-11-04 PROCEDURE — 25010000002 MAGNESIUM SULFATE PER 500 MG OF MAGNESIUM: Performed by: NURSE ANESTHETIST, CERTIFIED REGISTERED

## 2020-11-04 PROCEDURE — 0DTH4ZZ RESECTION OF CECUM, PERCUTANEOUS ENDOSCOPIC APPROACH: ICD-10-PCS | Performed by: SURGERY

## 2020-11-04 PROCEDURE — C9290 INJ, BUPIVACAINE LIPOSOME: HCPCS | Performed by: ANESTHESIOLOGY

## 2020-11-04 PROCEDURE — 25010000002 HYDROMORPHONE PER 4 MG: Performed by: SURGERY

## 2020-11-04 PROCEDURE — 94799 UNLISTED PULMONARY SVC/PX: CPT

## 2020-11-04 PROCEDURE — 44205 LAP COLECTOMY PART W/ILEUM: CPT | Performed by: PHYSICIAN ASSISTANT

## 2020-11-04 PROCEDURE — 25010000002 MIDAZOLAM PER 1 MG: Performed by: ANESTHESIOLOGY

## 2020-11-04 PROCEDURE — 25010000002 FENTANYL CITRATE (PF) 100 MCG/2ML SOLUTION: Performed by: NURSE ANESTHETIST, CERTIFIED REGISTERED

## 2020-11-04 PROCEDURE — 25010000002 NEOSTIGMINE PER 0.5 MG: Performed by: NURSE ANESTHETIST, CERTIFIED REGISTERED

## 2020-11-04 PROCEDURE — 25010000002 PROPOFOL 10 MG/ML EMULSION: Performed by: NURSE ANESTHETIST, CERTIFIED REGISTERED

## 2020-11-04 PROCEDURE — 80048 BASIC METABOLIC PNL TOTAL CA: CPT | Performed by: SURGERY

## 2020-11-04 DEVICE — PROXIMATE RELOADABLE LINEAR CUTTER WITH SAFETY LOCK-OUT, 75MM
Type: IMPLANTABLE DEVICE | Site: ABDOMEN | Status: FUNCTIONAL
Brand: PROXIMATE

## 2020-11-04 DEVICE — PROXIMATE LINEAR CUTTER RELOAD, BLUE, 75MM
Type: IMPLANTABLE DEVICE | Site: ABDOMEN | Status: FUNCTIONAL
Brand: PROXIMATE

## 2020-11-04 DEVICE — CLIP LIGAT VASC HORIZON TI MD/LG GRN 6CT: Type: IMPLANTABLE DEVICE | Site: ABDOMEN | Status: FUNCTIONAL

## 2020-11-04 RX ORDER — CETIRIZINE HYDROCHLORIDE 10 MG/1
10 TABLET ORAL DAILY
Status: DISCONTINUED | OUTPATIENT
Start: 2020-11-04 | End: 2020-11-08 | Stop reason: HOSPADM

## 2020-11-04 RX ORDER — PROPOFOL 10 MG/ML
VIAL (ML) INTRAVENOUS AS NEEDED
Status: DISCONTINUED | OUTPATIENT
Start: 2020-11-04 | End: 2020-11-04 | Stop reason: SURG

## 2020-11-04 RX ORDER — MAGNESIUM SULFATE HEPTAHYDRATE 500 MG/ML
INJECTION, SOLUTION INTRAMUSCULAR; INTRAVENOUS AS NEEDED
Status: DISCONTINUED | OUTPATIENT
Start: 2020-11-04 | End: 2020-11-04 | Stop reason: SURG

## 2020-11-04 RX ORDER — FLUTICASONE PROPIONATE 50 MCG
2 SPRAY, SUSPENSION (ML) NASAL DAILY
Status: DISCONTINUED | OUTPATIENT
Start: 2020-11-04 | End: 2020-11-08 | Stop reason: HOSPADM

## 2020-11-04 RX ORDER — MONTELUKAST SODIUM 10 MG/1
10 TABLET ORAL DAILY
Status: DISCONTINUED | OUTPATIENT
Start: 2020-11-04 | End: 2020-11-08 | Stop reason: HOSPADM

## 2020-11-04 RX ORDER — FAMOTIDINE 20 MG/1
20 TABLET, FILM COATED ORAL 2 TIMES DAILY
Status: DISCONTINUED | OUTPATIENT
Start: 2020-11-04 | End: 2020-11-06

## 2020-11-04 RX ORDER — SPIRONOLACTONE 100 MG/1
100 TABLET, FILM COATED ORAL DAILY
Status: DISCONTINUED | OUTPATIENT
Start: 2020-11-04 | End: 2020-11-08 | Stop reason: HOSPADM

## 2020-11-04 RX ORDER — SERTRALINE HYDROCHLORIDE 100 MG/1
100 TABLET, FILM COATED ORAL DAILY
Status: DISCONTINUED | OUTPATIENT
Start: 2020-11-04 | End: 2020-11-08 | Stop reason: HOSPADM

## 2020-11-04 RX ORDER — SODIUM CHLORIDE 9 MG/ML
INJECTION, SOLUTION INTRAVENOUS AS NEEDED
Status: DISCONTINUED | OUTPATIENT
Start: 2020-11-04 | End: 2020-11-04 | Stop reason: HOSPADM

## 2020-11-04 RX ORDER — BUPIVACAINE HYDROCHLORIDE AND EPINEPHRINE 5; 5 MG/ML; UG/ML
INJECTION, SOLUTION PERINEURAL AS NEEDED
Status: DISCONTINUED | OUTPATIENT
Start: 2020-11-04 | End: 2020-11-04 | Stop reason: HOSPADM

## 2020-11-04 RX ORDER — ALVIMOPAN 12 MG/1
12 CAPSULE ORAL 2 TIMES DAILY
Status: DISCONTINUED | OUTPATIENT
Start: 2020-11-04 | End: 2020-11-04 | Stop reason: HOSPADM

## 2020-11-04 RX ORDER — ONDANSETRON 2 MG/ML
INJECTION INTRAMUSCULAR; INTRAVENOUS AS NEEDED
Status: DISCONTINUED | OUTPATIENT
Start: 2020-11-04 | End: 2020-11-04 | Stop reason: SURG

## 2020-11-04 RX ORDER — HYDROMORPHONE HYDROCHLORIDE 1 MG/ML
0.25 INJECTION, SOLUTION INTRAMUSCULAR; INTRAVENOUS; SUBCUTANEOUS
Status: DISCONTINUED | OUTPATIENT
Start: 2020-11-04 | End: 2020-11-08 | Stop reason: HOSPADM

## 2020-11-04 RX ORDER — SODIUM CHLORIDE, SODIUM LACTATE, POTASSIUM CHLORIDE, CALCIUM CHLORIDE 600; 310; 30; 20 MG/100ML; MG/100ML; MG/100ML; MG/100ML
9 INJECTION, SOLUTION INTRAVENOUS CONTINUOUS
Status: DISCONTINUED | OUTPATIENT
Start: 2020-11-04 | End: 2020-11-04

## 2020-11-04 RX ORDER — KETOROLAC TROMETHAMINE 15 MG/ML
15 INJECTION, SOLUTION INTRAMUSCULAR; INTRAVENOUS EVERY 6 HOURS
Status: DISCONTINUED | OUTPATIENT
Start: 2020-11-04 | End: 2020-11-06

## 2020-11-04 RX ORDER — SODIUM CHLORIDE, SODIUM LACTATE, POTASSIUM CHLORIDE, CALCIUM CHLORIDE 600; 310; 30; 20 MG/100ML; MG/100ML; MG/100ML; MG/100ML
INJECTION, SOLUTION INTRAVENOUS CONTINUOUS PRN
Status: DISCONTINUED | OUTPATIENT
Start: 2020-11-04 | End: 2020-11-04 | Stop reason: SURG

## 2020-11-04 RX ORDER — GABAPENTIN 300 MG/1
300 CAPSULE ORAL EVERY 8 HOURS SCHEDULED
Status: DISCONTINUED | OUTPATIENT
Start: 2020-11-04 | End: 2020-11-08 | Stop reason: HOSPADM

## 2020-11-04 RX ORDER — ONDANSETRON 2 MG/ML
4 INJECTION INTRAMUSCULAR; INTRAVENOUS ONCE AS NEEDED
Status: DISCONTINUED | OUTPATIENT
Start: 2020-11-04 | End: 2020-11-04 | Stop reason: HOSPADM

## 2020-11-04 RX ORDER — LIDOCAINE HYDROCHLORIDE 20 MG/ML
INJECTION, SOLUTION INFILTRATION; PERINEURAL AS NEEDED
Status: DISCONTINUED | OUTPATIENT
Start: 2020-11-04 | End: 2020-11-04 | Stop reason: SURG

## 2020-11-04 RX ORDER — FAMOTIDINE 10 MG/ML
20 INJECTION, SOLUTION INTRAVENOUS ONCE
Status: COMPLETED | OUTPATIENT
Start: 2020-11-04 | End: 2020-11-04

## 2020-11-04 RX ORDER — OXYCODONE HYDROCHLORIDE 5 MG/1
5 TABLET ORAL EVERY 4 HOURS PRN
Status: DISCONTINUED | OUTPATIENT
Start: 2020-11-04 | End: 2020-11-08 | Stop reason: HOSPADM

## 2020-11-04 RX ORDER — SODIUM CHLORIDE 0.9 % (FLUSH) 0.9 %
3 SYRINGE (ML) INJECTION EVERY 12 HOURS SCHEDULED
Status: DISCONTINUED | OUTPATIENT
Start: 2020-11-04 | End: 2020-11-04 | Stop reason: HOSPADM

## 2020-11-04 RX ORDER — CEFAZOLIN SODIUM IN 0.9 % NACL 3 G/100 ML
3 INTRAVENOUS SOLUTION, PIGGYBACK (ML) INTRAVENOUS EVERY 8 HOURS
Status: COMPLETED | OUTPATIENT
Start: 2020-11-04 | End: 2020-11-05

## 2020-11-04 RX ORDER — MIDAZOLAM HYDROCHLORIDE 1 MG/ML
1 INJECTION INTRAMUSCULAR; INTRAVENOUS
Status: DISCONTINUED | OUTPATIENT
Start: 2020-11-04 | End: 2020-11-04 | Stop reason: HOSPADM

## 2020-11-04 RX ORDER — KETAMINE HYDROCHLORIDE 50 MG/ML
INJECTION, SOLUTION, CONCENTRATE INTRAMUSCULAR; INTRAVENOUS AS NEEDED
Status: DISCONTINUED | OUTPATIENT
Start: 2020-11-04 | End: 2020-11-04 | Stop reason: SURG

## 2020-11-04 RX ORDER — DEXAMETHASONE SODIUM PHOSPHATE 10 MG/ML
INJECTION INTRAMUSCULAR; INTRAVENOUS AS NEEDED
Status: DISCONTINUED | OUTPATIENT
Start: 2020-11-04 | End: 2020-11-04 | Stop reason: SURG

## 2020-11-04 RX ORDER — FENTANYL CITRATE 50 UG/ML
INJECTION, SOLUTION INTRAMUSCULAR; INTRAVENOUS AS NEEDED
Status: DISCONTINUED | OUTPATIENT
Start: 2020-11-04 | End: 2020-11-04 | Stop reason: SURG

## 2020-11-04 RX ORDER — LIDOCAINE HYDROCHLORIDE ANHYDROUS AND DEXTROSE MONOHYDRATE 5; 400 G/100ML; MG/100ML
INJECTION, SOLUTION INTRAVENOUS CONTINUOUS PRN
Status: DISCONTINUED | OUTPATIENT
Start: 2020-11-04 | End: 2020-11-04 | Stop reason: SURG

## 2020-11-04 RX ORDER — MESALAMINE 0.38 G/1
1.5 CAPSULE, EXTENDED RELEASE ORAL DAILY
Status: DISCONTINUED | OUTPATIENT
Start: 2020-11-04 | End: 2020-11-08 | Stop reason: HOSPADM

## 2020-11-04 RX ORDER — LIDOCAINE HYDROCHLORIDE 10 MG/ML
0.5 INJECTION, SOLUTION EPIDURAL; INFILTRATION; INTRACAUDAL; PERINEURAL ONCE AS NEEDED
Status: DISCONTINUED | OUTPATIENT
Start: 2020-11-04 | End: 2020-11-04 | Stop reason: HOSPADM

## 2020-11-04 RX ORDER — HYDROMORPHONE HYDROCHLORIDE 1 MG/ML
0.25 INJECTION, SOLUTION INTRAMUSCULAR; INTRAVENOUS; SUBCUTANEOUS
Status: DISCONTINUED | OUTPATIENT
Start: 2020-11-04 | End: 2020-11-04 | Stop reason: HOSPADM

## 2020-11-04 RX ORDER — GLYCOPYRROLATE 0.2 MG/ML
INJECTION INTRAMUSCULAR; INTRAVENOUS AS NEEDED
Status: DISCONTINUED | OUTPATIENT
Start: 2020-11-04 | End: 2020-11-04 | Stop reason: SURG

## 2020-11-04 RX ORDER — ACETAMINOPHEN 500 MG
1000 TABLET ORAL EVERY 6 HOURS
Status: DISCONTINUED | OUTPATIENT
Start: 2020-11-04 | End: 2020-11-04 | Stop reason: HOSPADM

## 2020-11-04 RX ORDER — DEXTROSE MONOHYDRATE, SODIUM CHLORIDE, SODIUM LACTATE, POTASSIUM CHLORIDE, CALCIUM CHLORIDE 5; 600; 310; 179; 20 G/100ML; MG/100ML; MG/100ML; MG/100ML; MG/100ML
50 INJECTION, SOLUTION INTRAVENOUS CONTINUOUS
Status: DISCONTINUED | OUTPATIENT
Start: 2020-11-04 | End: 2020-11-06

## 2020-11-04 RX ORDER — SODIUM CHLORIDE 0.9 % (FLUSH) 0.9 %
3-10 SYRINGE (ML) INJECTION AS NEEDED
Status: DISCONTINUED | OUTPATIENT
Start: 2020-11-04 | End: 2020-11-04 | Stop reason: HOSPADM

## 2020-11-04 RX ORDER — NALOXONE HCL 0.4 MG/ML
0.4 VIAL (ML) INJECTION
Status: DISCONTINUED | OUTPATIENT
Start: 2020-11-04 | End: 2020-11-04 | Stop reason: HOSPADM

## 2020-11-04 RX ORDER — SCOLOPAMINE TRANSDERMAL SYSTEM 1 MG/1
1 PATCH, EXTENDED RELEASE TRANSDERMAL CONTINUOUS
Status: DISPENSED | OUTPATIENT
Start: 2020-11-04 | End: 2020-11-07

## 2020-11-04 RX ORDER — ALBUTEROL SULFATE 90 UG/1
2 AEROSOL, METERED RESPIRATORY (INHALATION) EVERY 4 HOURS PRN
Status: DISCONTINUED | OUTPATIENT
Start: 2020-11-04 | End: 2020-11-08 | Stop reason: HOSPADM

## 2020-11-04 RX ORDER — ACETAMINOPHEN 500 MG
1000 TABLET ORAL EVERY 6 HOURS
Status: DISPENSED | OUTPATIENT
Start: 2020-11-04 | End: 2020-11-06

## 2020-11-04 RX ORDER — ROCURONIUM BROMIDE 10 MG/ML
INJECTION, SOLUTION INTRAVENOUS AS NEEDED
Status: DISCONTINUED | OUTPATIENT
Start: 2020-11-04 | End: 2020-11-04 | Stop reason: SURG

## 2020-11-04 RX ORDER — ALVIMOPAN 12 MG/1
12 CAPSULE ORAL 2 TIMES DAILY
Status: DISCONTINUED | OUTPATIENT
Start: 2020-11-04 | End: 2020-11-06

## 2020-11-04 RX ORDER — MAGNESIUM HYDROXIDE 1200 MG/15ML
LIQUID ORAL AS NEEDED
Status: DISCONTINUED | OUTPATIENT
Start: 2020-11-04 | End: 2020-11-04 | Stop reason: HOSPADM

## 2020-11-04 RX ORDER — NALOXONE HCL 0.4 MG/ML
0.4 VIAL (ML) INJECTION
Status: DISCONTINUED | OUTPATIENT
Start: 2020-11-04 | End: 2020-11-08 | Stop reason: HOSPADM

## 2020-11-04 RX ORDER — PANTOPRAZOLE SODIUM 40 MG/1
40 TABLET, DELAYED RELEASE ORAL
Status: DISCONTINUED | OUTPATIENT
Start: 2020-11-05 | End: 2020-11-08 | Stop reason: HOSPADM

## 2020-11-04 RX ORDER — CEFAZOLIN SODIUM IN 0.9 % NACL 3 G/100 ML
3 INTRAVENOUS SOLUTION, PIGGYBACK (ML) INTRAVENOUS ONCE
Status: COMPLETED | OUTPATIENT
Start: 2020-11-04 | End: 2020-11-04

## 2020-11-04 RX ORDER — BUPIVACAINE HYDROCHLORIDE 5 MG/ML
INJECTION, SOLUTION EPIDURAL; INTRACAUDAL
Status: COMPLETED | OUTPATIENT
Start: 2020-11-04 | End: 2020-11-04

## 2020-11-04 RX ORDER — GABAPENTIN 300 MG/1
300 CAPSULE ORAL 3 TIMES DAILY
Status: DISCONTINUED | OUTPATIENT
Start: 2020-11-04 | End: 2020-11-04 | Stop reason: HOSPADM

## 2020-11-04 RX ORDER — CELECOXIB 200 MG/1
200 CAPSULE ORAL EVERY 12 HOURS SCHEDULED
Status: DISCONTINUED | OUTPATIENT
Start: 2020-11-04 | End: 2020-11-04 | Stop reason: HOSPADM

## 2020-11-04 RX ADMIN — MAGNESIUM SULFATE HEPTAHYDRATE 2 G: 500 INJECTION, SOLUTION INTRAMUSCULAR; INTRAVENOUS at 09:46

## 2020-11-04 RX ADMIN — SODIUM CHLORIDE, POTASSIUM CHLORIDE, SODIUM LACTATE AND CALCIUM CHLORIDE: 600; 310; 30; 20 INJECTION, SOLUTION INTRAVENOUS at 09:53

## 2020-11-04 RX ADMIN — CEFAZOLIN 3 G: 10 INJECTION, POWDER, FOR SOLUTION INTRAVENOUS at 09:24

## 2020-11-04 RX ADMIN — HYDROMORPHONE HYDROCHLORIDE 0.25 MG: 1 INJECTION, SOLUTION INTRAMUSCULAR; INTRAVENOUS; SUBCUTANEOUS at 15:19

## 2020-11-04 RX ADMIN — METRONIDAZOLE 500 MG: 500 INJECTION, SOLUTION INTRAVENOUS at 20:21

## 2020-11-04 RX ADMIN — DEXTROSE MONOHYDRATE, SODIUM CHLORIDE, SODIUM LACTATE, POTASSIUM CHLORIDE, CALCIUM CHLORIDE 50 ML/HR: 5; 600; 310; 179; 20 INJECTION, SOLUTION INTRAVENOUS at 20:03

## 2020-11-04 RX ADMIN — ROCURONIUM BROMIDE 20 MG: 10 INJECTION INTRAVENOUS at 10:47

## 2020-11-04 RX ADMIN — METRONIDAZOLE 500 MG: 500 INJECTION, SOLUTION INTRAVENOUS at 09:24

## 2020-11-04 RX ADMIN — KETAMINE HYDROCHLORIDE 15 MG: 50 INJECTION, SOLUTION INTRAMUSCULAR; INTRAVENOUS at 11:35

## 2020-11-04 RX ADMIN — GABAPENTIN 300 MG: 300 CAPSULE ORAL at 23:59

## 2020-11-04 RX ADMIN — SERTRALINE 100 MG: 100 TABLET, FILM COATED ORAL at 20:21

## 2020-11-04 RX ADMIN — MONTELUKAST SODIUM 10 MG: 10 TABLET, FILM COATED ORAL at 20:22

## 2020-11-04 RX ADMIN — PROPOFOL 200 MG: 10 INJECTION, EMULSION INTRAVENOUS at 09:38

## 2020-11-04 RX ADMIN — BUPIVACAINE 20 ML: 13.3 INJECTION, SUSPENSION, LIPOSOMAL INFILTRATION at 09:56

## 2020-11-04 RX ADMIN — MIDAZOLAM 1 MG: 1 INJECTION INTRAMUSCULAR; INTRAVENOUS at 08:49

## 2020-11-04 RX ADMIN — HYDROMORPHONE HYDROCHLORIDE 0.25 MG: 1 INJECTION, SOLUTION INTRAMUSCULAR; INTRAVENOUS; SUBCUTANEOUS at 13:35

## 2020-11-04 RX ADMIN — BUPIVACAINE HYDROCHLORIDE 20 ML: 5 INJECTION, SOLUTION EPIDURAL; INTRACAUDAL; PERINEURAL at 09:56

## 2020-11-04 RX ADMIN — SCOPALAMINE 1 PATCH: 1 PATCH, EXTENDED RELEASE TRANSDERMAL at 08:42

## 2020-11-04 RX ADMIN — HYDROMORPHONE HYDROCHLORIDE 0.25 MG: 1 INJECTION, SOLUTION INTRAMUSCULAR; INTRAVENOUS; SUBCUTANEOUS at 17:03

## 2020-11-04 RX ADMIN — NEOSTIGMINE METHYLSULFATE 3 MG: 1 INJECTION INTRAMUSCULAR; INTRAVENOUS; SUBCUTANEOUS at 12:10

## 2020-11-04 RX ADMIN — HYDROMORPHONE HYDROCHLORIDE 0.25 MG: 1 INJECTION, SOLUTION INTRAMUSCULAR; INTRAVENOUS; SUBCUTANEOUS at 14:43

## 2020-11-04 RX ADMIN — CEFAZOLIN 3 G: 10 INJECTION, POWDER, FOR SOLUTION INTRAVENOUS at 23:59

## 2020-11-04 RX ADMIN — LIDOCAINE HYDROCHLORIDE 80 MG: 20 INJECTION, SOLUTION INFILTRATION; PERINEURAL at 09:38

## 2020-11-04 RX ADMIN — SODIUM CHLORIDE, POTASSIUM CHLORIDE, SODIUM LACTATE AND CALCIUM CHLORIDE 9 ML/HR: 600; 310; 30; 20 INJECTION, SOLUTION INTRAVENOUS at 08:49

## 2020-11-04 RX ADMIN — ACETAMINOPHEN 1000 MG: 500 TABLET, FILM COATED ORAL at 08:43

## 2020-11-04 RX ADMIN — ROCURONIUM BROMIDE 50 MG: 10 INJECTION INTRAVENOUS at 09:38

## 2020-11-04 RX ADMIN — GABAPENTIN 300 MG: 300 CAPSULE ORAL at 08:43

## 2020-11-04 RX ADMIN — FAMOTIDINE 20 MG: 10 INJECTION INTRAVENOUS at 08:49

## 2020-11-04 RX ADMIN — FENTANYL CITRATE 50 MCG: 50 INJECTION INTRAMUSCULAR; INTRAVENOUS at 10:09

## 2020-11-04 RX ADMIN — OXYCODONE 5 MG: 5 TABLET ORAL at 19:08

## 2020-11-04 RX ADMIN — KETAMINE HYDROCHLORIDE 25 MG: 50 INJECTION, SOLUTION INTRAMUSCULAR; INTRAVENOUS at 09:44

## 2020-11-04 RX ADMIN — GLYCOPYRROLATE 0.4 MG: 0.2 INJECTION INTRAMUSCULAR; INTRAVENOUS at 12:10

## 2020-11-04 RX ADMIN — FAMOTIDINE 20 MG: 20 TABLET, FILM COATED ORAL at 20:22

## 2020-11-04 RX ADMIN — HYDROMORPHONE HYDROCHLORIDE 0.25 MG: 1 INJECTION, SOLUTION INTRAMUSCULAR; INTRAVENOUS; SUBCUTANEOUS at 14:05

## 2020-11-04 RX ADMIN — CETIRIZINE HYDROCHLORIDE 10 MG: 10 TABLET ORAL at 20:22

## 2020-11-04 RX ADMIN — PHENYLEPHRINE HYDROCHLORIDE 100 MCG: 10 INJECTION INTRAVENOUS at 10:33

## 2020-11-04 RX ADMIN — CELECOXIB 200 MG: 200 CAPSULE ORAL at 08:42

## 2020-11-04 RX ADMIN — ACETAMINOPHEN 1000 MG: 500 TABLET, FILM COATED ORAL at 20:23

## 2020-11-04 RX ADMIN — DEXAMETHASONE SODIUM PHOSPHATE 8 MG: 10 INJECTION INTRAMUSCULAR; INTRAVENOUS at 09:53

## 2020-11-04 RX ADMIN — MESALAMINE 1.5 G: 0.38 CAPSULE, EXTENDED RELEASE ORAL at 20:21

## 2020-11-04 RX ADMIN — SPIRONOLACTONE 100 MG: 100 TABLET, FILM COATED ORAL at 20:22

## 2020-11-04 RX ADMIN — HYDROMORPHONE HYDROCHLORIDE 0.25 MG: 1 INJECTION, SOLUTION INTRAMUSCULAR; INTRAVENOUS; SUBCUTANEOUS at 20:23

## 2020-11-04 RX ADMIN — LIDOCAINE HYDROCHLORIDE ANHYDROUS AND DEXTROSE MONOHYDRATE 2 MG/MIN: .4; 5 INJECTION, SOLUTION INTRAVENOUS at 09:49

## 2020-11-04 RX ADMIN — ALVIMOPAN 12 MG: 12 CAPSULE ORAL at 08:42

## 2020-11-04 RX ADMIN — ONDANSETRON HYDROCHLORIDE 4 MG: 2 SOLUTION INTRAMUSCULAR; INTRAVENOUS at 12:10

## 2020-11-04 RX ADMIN — ALVIMOPAN 12 MG: 12 CAPSULE ORAL at 20:23

## 2020-11-04 RX ADMIN — FENTANYL CITRATE 50 MCG: 50 INJECTION INTRAMUSCULAR; INTRAVENOUS at 09:38

## 2020-11-04 RX ADMIN — KETAMINE HYDROCHLORIDE 10 MG: 50 INJECTION, SOLUTION INTRAMUSCULAR; INTRAVENOUS at 10:51

## 2020-11-04 NOTE — ADDENDUM NOTE
Addendum  created 11/04/20 1451 by Chase Sweet MD    Attestation recorded in Intraprocedure, Intraprocedure Attestations filed

## 2020-11-04 NOTE — ANESTHESIA PREPROCEDURE EVALUATION
Anesthesia Evaluation     Patient summary reviewed and Nursing notes reviewed   no history of anesthetic complications:  NPO Solid Status: > 8 hours  NPO Liquid Status: > 2 hours           Airway   Mallampati: II  TM distance: >3 FB  Neck ROM: full  No difficulty expected  Dental - normal exam     Pulmonary     breath sounds clear to auscultation  (+) asthma,  Cardiovascular   Exercise tolerance: good (4-7 METS)    Rhythm: regular  Rate: normal        Neuro/Psych  (+) syncope, numbness, psychiatric history Anxiety and Depression,     GI/Hepatic/Renal/Endo    (+) morbid obesity, GERD,      ROS Comment: crohns disease    Musculoskeletal     Abdominal     Abdomen: soft.   Substance History      OB/GYN          Other   arthritis,                        Anesthesia Plan    ASA 3     general   (Tap blocks)  intravenous induction     Anesthetic plan, all risks, benefits, and alternatives have been provided, discussed and informed consent has been obtained with: patient.    Plan discussed with CRNA.

## 2020-11-04 NOTE — SIGNIFICANT NOTE
Called and spoke with mother to notify her pt now has a room P695.  Mother stated she would call later and I gave her the nurse's station number

## 2020-11-04 NOTE — ANESTHESIA PROCEDURE NOTES
Peripheral Block      Patient location during procedure: pre-op  Stop time: 11/4/2020 9:55 AM  Reason for block: at surgeon's request and post-op pain management  Performed by  Anesthesiologist: Chase Sweet MD  Preanesthetic Checklist  Completed: patient identified, site marked, surgical consent, pre-op evaluation, timeout performed, IV checked, risks and benefits discussed and monitors and equipment checked  Prep:  Pt Position: supine  Sterile barriers:cap, gloves and mask  Prep: ChloraPrep  Patient monitoring: blood pressure monitoring, continuous pulse oximetry and EKG  Procedure  Sedation:yes  Performed under: local infiltration  Guidance:ultrasound guided  ULTRASOUND INTERPRETATION. Using ultrasound guidance a 22 G gauge needle was placed in close proximity to the nerve, at which point, under ultrasound guidance anesthetic was injected in the area of the nerve and spread of the anesthesia was seen on ultrasound in close proximity thereto.  There were no abnormalities seen on ultrasound; a digital image was taken; and the patient tolerated the procedure with no complications. Images:still images obtained    Laterality:Bilateral  Block Type:TAP  Injection Technique:single-shot  Needle Type:echogenic  Needle Gauge:22 G      Medications Used: bupivacaine liposome (EXPAREL) 1.3 % injection, 20 mL  bupivacaine (PF) (MARCAINE) 0.5 % injection, 20 mL  Med admintered at 11/4/2020 9:56 AM      Post Assessment  Injection Assessment: negative aspiration for heme, no paresthesia on injection and incremental injection  Patient Tolerance:comfortable throughout block  Complications:no

## 2020-11-04 NOTE — ANESTHESIA PROCEDURE NOTES
Airway  Urgency: elective    Date/Time: 11/4/2020 9:43 AM  Airway not difficult    General Information and Staff    Patient location during procedure: OR  Anesthesiologist: Chase Sweet MD  CRNA: Oliva Yu CRNA    Indications and Patient Condition  Indications for airway management: airway protection    Preoxygenated: yes  MILS not maintained throughout  Mask difficulty assessment: 1 - vent by mask    Final Airway Details  Final airway type: endotracheal airway      Successful airway: ETT  Cuffed: yes   Successful intubation technique: direct laryngoscopy  Endotracheal tube insertion site: oral  Blade: Maureen  Blade size: 3  ETT size (mm): 7.0  Cormack-Lehane Classification: grade I - full view of glottis  Placement verified by: chest auscultation and capnometry   Cuff volume (mL): 8  Measured from: lips  ETT/EBT  to lips (cm): 22  Number of attempts at approach: 1  Assessment: lips, teeth, and gum same as pre-op and atraumatic intubation    Additional Comments  Intubated by LINDA Loyd. PreO2 100%, FEO2 >85, SIVI, easy BMV, sniff position, ETT placed/ confirmed, attraumatic, teeth and lips as preop.

## 2020-11-04 NOTE — ANESTHESIA POSTPROCEDURE EVALUATION
"Patient: Bri Pfeiffer    Procedure Summary     Date: 11/04/20 Room / Location: [facility] OR 06 / [facility] MAIN OR    Anesthesia Start: 0929 Anesthesia Stop: 1248    Procedure: laparoscopic ileocecectomy (N/A Abdomen) Diagnosis:       Crohn's disease of large intestine with rectal bleeding (CMS/HCC)      (Crohn's disease of large intestine with rectal bleeding (CMS/HCC) [K50.111])    Surgeon: Lisa Kerr MD Provider: Chase Sweet MD    Anesthesia Type: general ASA Status: 3          Anesthesia Type: general    Vitals  Vitals Value Taken Time   /79 11/04/20 1345   Temp 37 °C (98.6 °F) 11/04/20 1246   Pulse 99 11/04/20 1357   Resp 24 11/04/20 1345   SpO2 97 % 11/04/20 1357   Vitals shown include unvalidated device data.        Post Anesthesia Care and Evaluation    Patient location during evaluation: bedside  Pain management: adequate  Airway patency: patent  Anesthetic complications: No anesthetic complications    Cardiovascular status: acceptable  Respiratory status: acceptable  Hydration status: acceptable    Comments: /79   Pulse 85   Temp 37 °C (98.6 °F) (Oral)   Resp 24   Ht 170.2 cm (67\")   Wt 129 kg (283 lb 4.7 oz)   SpO2 97%   BMI 44.37 kg/m²         "

## 2020-11-04 NOTE — OP NOTE
SURGERY  Operative Note :  CIRILO Lugoranulfo  1985    Procedure Date: 11/04/20    Pre-op Diagnosis:  · Crohn's disease with distal terminal ileal stricture, 6 mm, with dramatic symptoms    Post-op Diagnosis:  · same    Procedure:   · Laparoscopic ileocecectomy    Surgeon: Cirilo    Assistant: mary amezcua    Indications:  · CT findings : Fairly extensive submucosal fat deposition or fatty infiltration within a 3.5 cm segment of cecum at the ileocecal valve resulting in luminal narrowing. The terminal ileum appears mildly thickened and there is slight submucosal fat deposition at the terminal ileum as well.  · MRI findings : enterography confirm a short segment stricture at the level of the ileocecal valve with luminal narrowing.  · SBFT findings : Terminal ileum stricture immediately proximal to and involving the ileocecal valve    Associated Issues:  · Likely anorectal disease, managed with meds, manifest as anal stenosis and rectal apthea.  · Humira (last dose 10/2) and Apriso use  · Arthritis with some suspected connective tissue disorder and spondylosis  · BMI 46  · Anxiety/depression on Zoloft  · Regurgitation of unexplained etiology    Findings:   · Minimal findings of Crohn's disease, with no dramatic suggestion of fat wrapping, and minimal thickening if any in the ileum as close as 5 cm to ileocecal valve    Recommendations:   · Routine recovery    Specimens:   · Ileum and cecum    EBL: 50 cc    Technique:     General anesthetic was induced, ERAS protocol with Entereg preop, tap blocks, IV antibiotics, abdomen prepped with ChloraPrep and draped sterilely.  Split leg table Jackson catheter.  Incision was made just above the umbilicus, CO2 insufflated via the Veress needle followed by a 12 mm trocar and a 10 mm scope straight initially then exchanged for an angled scope.  2 additional 5 mm trochars were placed one in the suprapubic, 1 in the right lower quadrant    I began by scoring the  peritoneum overlying the mesentery right in the midline of the small bowel having lifted that up and placing the patient in Trendelenburg.  I then took this division over to the white line of Toldt on the right, and gently began to lift up the small bowel mesentery and a Kattel Brasch maneuver, dividing the mesentery at the white line of Toldt all the way up around the hepatic flexure and rotating the mesentery all the way over to the midline.  Identified the duodenum at the upper aspect.  The transverse colon really was not tethered in the least based on the omentum and thus this degree of mobilization which essentially gave me everything to the midline was sufficient.  Oddly the appendix was tethered all the way up to the most superior aspect of the duodenum and that had to be released quite extensively.    We then made an incision that went below the umbilicus just a small amount and then about 5 cm upwards.  Her subcutaneous adipose tissue was about 7-1/2 cm deep, both by palpation as well as measuring on the CT scan.  A medium wound protector was placed, and the bowel lifted up into the wound and then outside the wound.  Blue towels were placed.    I palpated the ileum and finding no stricture within 5 cm of the ileocecal valve and no suggestion of Crohn's disease or dilatation, I selected a site about 5 cm from the terminal ileum, and came across that with a 75 JUANA.  I carefully took down the mesentery and stayed very close to the bowel.  I came across the ascending colon about 7 to 8 cm up from the most proximal aspect of the cecum, doing so with the 75 JUANA transversely.  The mesentery was then taken down with Kellys and divided.    I then oversewed the entire stump end of the a sending colon with interrupted 3-0 Vicryl's.  I cleaned up the small bowel at its stapled edge, and laid it overlying the a sending colon with the mesenteric aspect pointing posterior and anterior.  The small bowel was laid with the  antimesenteric aspect inferiorly, mesenteric aspect superiorly.    I then did a handsewn anastomosis and ileum to side a sending colon, with interrupted 3-0 Vicryl's to the serosa, opened the colon and the staple end of the ileum and then closed the mucosa with a running 3-0 chromic, finishing laterally with interrupted 3-0 Vicryl serosal sutures for completion of the anastomosis.  The mesentery required only 2 sutures for closure.    We placed the bowel back into the abdomen and irrigated out.  We then closed with running 0 PDS starting inferiorly and superiorly and tying together in the middle.  We then irrigated the subcu and closed the dermis with interrupted 3-0 Vicryl, skin with running 4-0 Vicryl.  Dermabond.    Lisa Kerr MD  11/04/20  12:49 EST

## 2020-11-04 NOTE — SIGNIFICANT NOTE
Called and spoke with mother Omayra and let her know pt was doing well.  They have not assigned her a room yet.  They expect the bed delay to be long.  Mother is at home and I will call her when we get a room assignment

## 2020-11-05 LAB
ANION GAP SERPL CALCULATED.3IONS-SCNC: 9.8 MMOL/L (ref 5–15)
BASOPHILS # BLD AUTO: 0.03 10*3/MM3 (ref 0–0.2)
BASOPHILS NFR BLD AUTO: 0.2 % (ref 0–1.5)
BUN SERPL-MCNC: 7 MG/DL (ref 6–20)
BUN/CREAT SERPL: 8.3 (ref 7–25)
CALCIUM SPEC-SCNC: 9 MG/DL (ref 8.6–10.5)
CHLORIDE SERPL-SCNC: 105 MMOL/L (ref 98–107)
CO2 SERPL-SCNC: 25.2 MMOL/L (ref 22–29)
CREAT SERPL-MCNC: 0.84 MG/DL (ref 0.57–1)
DEPRECATED RDW RBC AUTO: 41.4 FL (ref 37–54)
EOSINOPHIL # BLD AUTO: 0 10*3/MM3 (ref 0–0.4)
EOSINOPHIL NFR BLD AUTO: 0 % (ref 0.3–6.2)
ERYTHROCYTE [DISTWIDTH] IN BLOOD BY AUTOMATED COUNT: 12.2 % (ref 12.3–15.4)
GFR SERPL CREATININE-BSD FRML MDRD: 78 ML/MIN/1.73
GLUCOSE SERPL-MCNC: 107 MG/DL (ref 65–99)
HCT VFR BLD AUTO: 40.8 % (ref 34–46.6)
HGB BLD-MCNC: 13.3 G/DL (ref 12–15.9)
IMM GRANULOCYTES # BLD AUTO: 0.03 10*3/MM3 (ref 0–0.05)
IMM GRANULOCYTES NFR BLD AUTO: 0.2 % (ref 0–0.5)
LYMPHOCYTES # BLD AUTO: 1.65 10*3/MM3 (ref 0.7–3.1)
LYMPHOCYTES NFR BLD AUTO: 13 % (ref 19.6–45.3)
MCH RBC QN AUTO: 30 PG (ref 26.6–33)
MCHC RBC AUTO-ENTMCNC: 32.6 G/DL (ref 31.5–35.7)
MCV RBC AUTO: 91.9 FL (ref 79–97)
MONOCYTES # BLD AUTO: 1.27 10*3/MM3 (ref 0.1–0.9)
MONOCYTES NFR BLD AUTO: 10 % (ref 5–12)
NEUTROPHILS NFR BLD AUTO: 76.6 % (ref 42.7–76)
NEUTROPHILS NFR BLD AUTO: 9.71 10*3/MM3 (ref 1.7–7)
NRBC BLD AUTO-RTO: 0 /100 WBC (ref 0–0.2)
PLATELET # BLD AUTO: 334 10*3/MM3 (ref 140–450)
PMV BLD AUTO: 9.1 FL (ref 6–12)
POTASSIUM SERPL-SCNC: 4.7 MMOL/L (ref 3.5–5.2)
RBC # BLD AUTO: 4.44 10*6/MM3 (ref 3.77–5.28)
SODIUM SERPL-SCNC: 140 MMOL/L (ref 136–145)
WBC # BLD AUTO: 12.69 10*3/MM3 (ref 3.4–10.8)

## 2020-11-05 PROCEDURE — 25010000002 ENOXAPARIN PER 10 MG: Performed by: SURGERY

## 2020-11-05 PROCEDURE — 25010000002 KETOROLAC TROMETHAMINE PER 15 MG: Performed by: SURGERY

## 2020-11-05 PROCEDURE — 25010000002 CEFAZOLIN PER 500 MG: Performed by: SURGERY

## 2020-11-05 PROCEDURE — 80048 BASIC METABOLIC PNL TOTAL CA: CPT | Performed by: SURGERY

## 2020-11-05 PROCEDURE — 99024 POSTOP FOLLOW-UP VISIT: CPT | Performed by: SURGERY

## 2020-11-05 PROCEDURE — 85025 COMPLETE CBC W/AUTO DIFF WBC: CPT | Performed by: SURGERY

## 2020-11-05 RX ORDER — KETOROLAC TROMETHAMINE 30 MG/ML
15 INJECTION, SOLUTION INTRAMUSCULAR; INTRAVENOUS ONCE
Status: DISCONTINUED | OUTPATIENT
Start: 2020-11-05 | End: 2020-11-06

## 2020-11-05 RX ADMIN — ACETAMINOPHEN 1000 MG: 500 TABLET, FILM COATED ORAL at 17:38

## 2020-11-05 RX ADMIN — FAMOTIDINE 20 MG: 20 TABLET, FILM COATED ORAL at 08:58

## 2020-11-05 RX ADMIN — GABAPENTIN 300 MG: 300 CAPSULE ORAL at 05:59

## 2020-11-05 RX ADMIN — DEXTROSE MONOHYDRATE, SODIUM CHLORIDE, SODIUM LACTATE, POTASSIUM CHLORIDE, CALCIUM CHLORIDE 50 ML/HR: 5; 600; 310; 179; 20 INJECTION, SOLUTION INTRAVENOUS at 13:18

## 2020-11-05 RX ADMIN — KETOROLAC TROMETHAMINE 15 MG: 15 INJECTION, SOLUTION INTRAMUSCULAR; INTRAVENOUS at 10:49

## 2020-11-05 RX ADMIN — METRONIDAZOLE 500 MG: 500 INJECTION, SOLUTION INTRAVENOUS at 02:29

## 2020-11-05 RX ADMIN — SPIRONOLACTONE 100 MG: 100 TABLET, FILM COATED ORAL at 08:51

## 2020-11-05 RX ADMIN — ALVIMOPAN 12 MG: 12 CAPSULE ORAL at 21:43

## 2020-11-05 RX ADMIN — SERTRALINE 100 MG: 100 TABLET, FILM COATED ORAL at 08:51

## 2020-11-05 RX ADMIN — KETOROLAC TROMETHAMINE 15 MG: 15 INJECTION, SOLUTION INTRAMUSCULAR; INTRAVENOUS at 23:30

## 2020-11-05 RX ADMIN — MESALAMINE 1.5 G: 0.38 CAPSULE, EXTENDED RELEASE ORAL at 08:51

## 2020-11-05 RX ADMIN — ACETAMINOPHEN 1000 MG: 500 TABLET, FILM COATED ORAL at 00:00

## 2020-11-05 RX ADMIN — GABAPENTIN 300 MG: 300 CAPSULE ORAL at 17:38

## 2020-11-05 RX ADMIN — CETIRIZINE HYDROCHLORIDE 10 MG: 10 TABLET ORAL at 08:50

## 2020-11-05 RX ADMIN — PANTOPRAZOLE SODIUM 40 MG: 40 TABLET, DELAYED RELEASE ORAL at 05:59

## 2020-11-05 RX ADMIN — ENOXAPARIN SODIUM 40 MG: 40 INJECTION SUBCUTANEOUS at 08:58

## 2020-11-05 RX ADMIN — GABAPENTIN 300 MG: 300 CAPSULE ORAL at 21:48

## 2020-11-05 RX ADMIN — ALVIMOPAN 12 MG: 12 CAPSULE ORAL at 08:52

## 2020-11-05 RX ADMIN — ACETAMINOPHEN 1000 MG: 500 TABLET, FILM COATED ORAL at 10:49

## 2020-11-05 RX ADMIN — OXYCODONE 5 MG: 5 TABLET ORAL at 00:00

## 2020-11-05 RX ADMIN — FAMOTIDINE 20 MG: 20 TABLET, FILM COATED ORAL at 21:48

## 2020-11-05 RX ADMIN — FLUTICASONE PROPIONATE 2 SPRAY: 50 SPRAY, METERED NASAL at 08:52

## 2020-11-05 RX ADMIN — CEFAZOLIN 3 G: 10 INJECTION, POWDER, FOR SOLUTION INTRAVENOUS at 06:01

## 2020-11-05 RX ADMIN — ACETAMINOPHEN 1000 MG: 500 TABLET, FILM COATED ORAL at 05:59

## 2020-11-05 RX ADMIN — MONTELUKAST SODIUM 10 MG: 10 TABLET, FILM COATED ORAL at 08:51

## 2020-11-05 NOTE — PROGRESS NOTES
Discharge Planning Assessment  [facility]     Patient Name: Bri Pfeiffer  MRN: [id number]  Today's Date: 11/5/2020    Admit Date: 11/4/2020    Discharge Needs Assessment     Row Name 11/05/20 1237       Living Environment    Lives With  alone    Current Living Arrangements  home/apartment/condo    Primary Care Provided by  self    Provides Primary Care For  no one    Family Caregiver if Needed  parent(s)       Resource/Environmental Concerns    Resource/Environmental Concerns  none    Transportation Concerns  car, none       Transition Planning    Patient/Family Anticipates Transition to  home;home with family    Transportation Anticipated  family or friend will provide       Discharge Needs Assessment    Equipment Currently Used at Home  none    Concerns to be Addressed  no discharge needs identified;denies needs/concerns at this time    Equipment Needed After Discharge  none    Provided Post Acute Provider List?  N/A        Discharge Plan     Row Name 11/05/20 1237       Plan    Plan  Home alone- mother to assist if needed.    Plan Comments  Met in room with patient.  Introduced self and explained role.  Facesheet verified.  S/p lap ileocecectomy.  Pt denies any d/c needs.        Continued Care and Services - Admitted Since 11/4/2020    Coordination has not been started for this encounter.       Expected Discharge Date and Time     Expected Discharge Date Expected Discharge Time    Nov 7, 2020         Demographic Summary     Row Name 11/05/20 1236       General Information    Admission Type  inpatient    Arrived From  home    Referral Source  admission list    Reason for Consult  discharge planning    Preferred Language  English     Used During This Interaction  no       Contact Information    Permission Granted to Share Info With  ;family/designee        Functional Status     Row Name 11/05/20 1237       Functional Status    Usual Activity Tolerance  excellent    Current  Activity Tolerance  good       Functional Status, IADL    Medications  independent    Meal Preparation  independent    Housekeeping  independent    Laundry  independent    Shopping  independent       Mental Status    General Appearance WDL  WDL       Mental Status Summary    Recent Changes in Mental Status/Cognitive Functioning  no changes       Employment/    Employment Status  employed full-time                 Barb Osborn RN

## 2020-11-05 NOTE — PROGRESS NOTES
GENERAL SURGERY  Bri Pfeiffer  1985  1 Day Post-Op    HPI:  States bad night due to pain.  Declined the toradol.  Has walked.  No flatus.    Diet:  Advance as tolerated.     Vitals:    11/04/20 2220 11/05/20 0219 11/05/20 0640 11/05/20 0916   BP: 127/79 122/79 137/85 109/72   BP Location: Left arm Left arm Left arm Left arm   Patient Position: Lying Lying Lying Lying   Pulse: 103 103 89 84   Resp: 16 16 16 16   Temp: 100 °F (37.8 °C) 98.7 °F (37.1 °C) 100.2 °F (37.9 °C) 97.9 °F (36.6 °C)   TempSrc: Oral Oral Oral Oral   SpO2: 97% 99% 98% 96%   Weight:       Height:         Intake & Output (last day)       11/04 0701 - 11/05 0700 11/05 0701 - 11/06 0700    I.V. (mL/kg) 1586 (12.4)     Total Intake(mL/kg) 1586 (12.4)     Urine (mL/kg/hr) 4055     Total Output 4055     Net -2469                 Physical Exam  Abdominal:      Comments: Some small amount of blood eminated from the very deep umbilicus with staining in the mid abdominal fold.  Cleaned and asked nurse to place another dressing as i expect that it will happen again.         Pertinent labs/imaging:  Results from last 7 days   Lab Units 11/05/20  0735   WBC 10*3/mm3 12.69*   HEMOGLOBIN g/dL 13.3   HEMATOCRIT % 40.8   PLATELETS 10*3/mm3 334     Results from last 7 days   Lab Units 11/05/20  0735 11/04/20  1741 11/02/20  1635   SODIUM mmol/L 140 137 140   POTASSIUM mmol/L 4.7 4.9 4.1   CHLORIDE mmol/L 105 105 103   CO2 mmol/L 25.2 22.7 26.7   BUN mg/dL 7 9 13   CREATININE mg/dL 0.84 0.78 0.74   CALCIUM mg/dL 9.0 8.8 9.0   GLUCOSE mg/dL 107* 142* 100*       Assessment:   • 1 Day Post-Op after lap ileocecectomy  · Crohn's disease with distal terminal ileal stricture, 6 mm, with dramatic symptoms  · Likely anorectal disease, managed with meds, manifest as anal stenosis and rectal apthea.  · Humira (last dose 10/2) and Apriso use  · Arthritis with some suspected connective tissue disorder and spondylosis  · BMI 46  · Anxiety/depression on  Zoloft  · Regurgitation of unexplained etiology    Plan  · Await GI function  · One dose toradol.  · Ambulate.    Lisa Kerr MD  11/05/20  11:03 EST

## 2020-11-05 NOTE — PLAN OF CARE
Goal Outcome Evaluation:  HR tachy, medicated for pain with better relief with IV med, lap site x 2 dry & intact with dermabond, small midline incision intact with dermabond, scant amount of draianage, f/c to BSD draining cl yellow urine, tolerating clear liquids, IS up to 1200  Plan of Care Reviewed With: patient

## 2020-11-05 NOTE — PLAN OF CARE
Goal Outcome Evaluation:  Plan of Care Reviewed With: patient  Progress: improving  Outcome Summary: Incisions x3 with dermabond are clean, dry and intact, given Dilaudid x1 and Roxicodone x1 for pain, IVF infusing, received IV antibiotics, tachycardic, afebrile, walked in stoner, charles with good urine output to be removed this morning.

## 2020-11-06 LAB
ANION GAP SERPL CALCULATED.3IONS-SCNC: 4.8 MMOL/L (ref 5–15)
BUN SERPL-MCNC: 9 MG/DL (ref 6–20)
BUN/CREAT SERPL: 13.6 (ref 7–25)
CALCIUM SPEC-SCNC: 8.7 MG/DL (ref 8.6–10.5)
CHLORIDE SERPL-SCNC: 106 MMOL/L (ref 98–107)
CO2 SERPL-SCNC: 28.2 MMOL/L (ref 22–29)
CREAT SERPL-MCNC: 0.66 MG/DL (ref 0.57–1)
GFR SERPL CREATININE-BSD FRML MDRD: 103 ML/MIN/1.73
GLUCOSE SERPL-MCNC: 81 MG/DL (ref 65–99)
LAB AP CASE REPORT: NORMAL
PATH REPORT.FINAL DX SPEC: NORMAL
PATH REPORT.GROSS SPEC: NORMAL
POTASSIUM SERPL-SCNC: 4 MMOL/L (ref 3.5–5.2)
SODIUM SERPL-SCNC: 139 MMOL/L (ref 136–145)

## 2020-11-06 PROCEDURE — 25010000002 KETOROLAC TROMETHAMINE PER 15 MG: Performed by: SURGERY

## 2020-11-06 PROCEDURE — 99024 POSTOP FOLLOW-UP VISIT: CPT | Performed by: SURGERY

## 2020-11-06 PROCEDURE — 80048 BASIC METABOLIC PNL TOTAL CA: CPT | Performed by: SURGERY

## 2020-11-06 RX ORDER — OXYCODONE HYDROCHLORIDE 5 MG/1
5 TABLET ORAL EVERY 4 HOURS PRN
Qty: 16 TABLET | Refills: 0 | Status: SHIPPED | OUTPATIENT
Start: 2020-11-06 | End: 2020-11-14

## 2020-11-06 RX ORDER — ADALIMUMAB 40MG/0.4ML
40 KIT SUBCUTANEOUS WEEKLY
Qty: 2 EACH | Refills: 0
Start: 2020-11-06 | End: 2021-03-18 | Stop reason: DRUGHIGH

## 2020-11-06 RX ADMIN — OXYCODONE 5 MG: 5 TABLET ORAL at 11:56

## 2020-11-06 RX ADMIN — ACETAMINOPHEN 1000 MG: 500 TABLET, FILM COATED ORAL at 11:56

## 2020-11-06 RX ADMIN — GABAPENTIN 300 MG: 300 CAPSULE ORAL at 14:41

## 2020-11-06 RX ADMIN — KETOROLAC TROMETHAMINE 15 MG: 15 INJECTION, SOLUTION INTRAMUSCULAR; INTRAVENOUS at 05:34

## 2020-11-06 RX ADMIN — CETIRIZINE HYDROCHLORIDE 10 MG: 10 TABLET ORAL at 09:14

## 2020-11-06 RX ADMIN — FLUTICASONE PROPIONATE 2 SPRAY: 50 SPRAY, METERED NASAL at 09:14

## 2020-11-06 RX ADMIN — GABAPENTIN 300 MG: 300 CAPSULE ORAL at 05:34

## 2020-11-06 RX ADMIN — GABAPENTIN 300 MG: 300 CAPSULE ORAL at 21:33

## 2020-11-06 RX ADMIN — OXYCODONE 5 MG: 5 TABLET ORAL at 18:38

## 2020-11-06 RX ADMIN — PANTOPRAZOLE SODIUM 40 MG: 40 TABLET, DELAYED RELEASE ORAL at 05:34

## 2020-11-06 RX ADMIN — MONTELUKAST SODIUM 10 MG: 10 TABLET, FILM COATED ORAL at 09:14

## 2020-11-06 RX ADMIN — ACETAMINOPHEN 1000 MG: 500 TABLET, FILM COATED ORAL at 05:34

## 2020-11-06 RX ADMIN — SERTRALINE 100 MG: 100 TABLET, FILM COATED ORAL at 09:14

## 2020-11-06 RX ADMIN — MESALAMINE 1.5 G: 0.38 CAPSULE, EXTENDED RELEASE ORAL at 09:14

## 2020-11-06 RX ADMIN — SPIRONOLACTONE 100 MG: 100 TABLET, FILM COATED ORAL at 09:15

## 2020-11-06 NOTE — NURSING NOTE
Temp max 100.2, enc ambulation & IS, uses IS independently up to 2000, ambulated in stoner several times per ERAS orders, chewing gum , f/c d/c'd this am, voided small amount 1st void but now voiding w/o difficulty, gauze dressing applied to small midline incision for small amount drainage, lap sites x 2 dry & intact, no BM, no flatus, advancing diet slowly, tried regular vegan for dinner with no problems, does not want to take any narcotic pain meds, was reluctant to take toradol but finally took a dose after talking with Dr Kerr, took additional dose later claribel

## 2020-11-06 NOTE — PROGRESS NOTES
GENERAL SURGERY  Bri Pfeiffer  1985  2 Day Post-Op    HPI:  States good night with toradol.  Had a bowel movement with some blood in it.  On lovenox.  Walking a lot.  Tolerating diet.  Wants to stay another day, especially with blood in stool.    Diet:  Advance as tolerated.     Vitals:    11/05/20 1824 11/05/20 2250 11/06/20 0215 11/06/20 0549   BP: 124/75 103/70 108/71 99/61   BP Location: Left arm Left arm Left arm Left arm   Patient Position: Lying Lying Lying Lying   Pulse: 78 67 63 53   Resp: 16 16 16 16   Temp: 98.4 °F (36.9 °C) 97.4 °F (36.3 °C) 97.1 °F (36.2 °C) 97.3 °F (36.3 °C)   TempSrc: Oral Oral Oral Oral   SpO2: 100% 97% 98% 100%   Weight:       Height:         Intake & Output (last day)       11/05 0701 - 11/06 0700 11/06 0701 - 11/07 0700    P.O. 1040 240    I.V. (mL/kg) 1576.7 (12.3)     Total Intake(mL/kg) 2616.7 (20.4) 240 (1.9)    Urine (mL/kg/hr) 1350 (0.4) 200 (1)    Stool  0    Total Output 1350 200    Net +1266.7 +40          Stool Unmeasured Occurrence  1 x          Physical Exam  Abdominal:      Comments: Some small amount of blood eminated from the midline above the umbilicus at the skin edge, very minimal.           Pertinent labs/imaging:  Results from last 7 days   Lab Units 11/05/20  0735   WBC 10*3/mm3 12.69*   HEMOGLOBIN g/dL 13.3   HEMATOCRIT % 40.8   PLATELETS 10*3/mm3 334     Results from last 7 days   Lab Units 11/06/20  0648 11/05/20  0735 11/04/20  1741   SODIUM mmol/L 139 140 137   POTASSIUM mmol/L 4.0 4.7 4.9   CHLORIDE mmol/L 106 105 105   CO2 mmol/L 28.2 25.2 22.7   BUN mg/dL 9 7 9   CREATININE mg/dL 0.66 0.84 0.78   CALCIUM mg/dL 8.7 9.0 8.8   GLUCOSE mg/dL 81 107* 142*       Assessment:   • 2 Day Post-Op after lap ileocecectomy  · Crohn's disease with distal terminal ileal stricture, 6 mm, with dramatic symptoms. Pathology still pending.  · Likely anorectal disease, managed with meds, manifest as anal stenosis and rectal apthea.  · Humira (last dose  10/2) and Apriso use  · Arthritis with some suspected connective tissue disorder and spondylosis  · BMI 46  · Anxiety/depression on Zoloft  · Regurgitation of unexplained etiology    Plan  · Check CBC in AM with some blood in stool.  · DC toradol.  · DC lovenox and ambulate 5 times today.      · Ambulate.    Lisa Kerr MD  11/06/20  11:03 EST       Duration Of Freeze Thaw-Cycle (Seconds): 5 Detail Level: Detailed Number Of Freeze-Thaw Cycles: 1 freeze-thaw cycle Render Note In Bullet Format When Appropriate: No Post-Care Instructions: I reviewed with the patient in detail post-care instructions. Patient is to wear sunprotection, and avoid picking at any of the treated lesions. Pt may apply Vaseline to crusted or scabbing areas. If the treated lesions do not resolve pt is asked to return for further treatment and/or biopsy. Render Post-Care Instructions In Note?: yes Consent: The patient's verbal consent was obtained including but not limited to risks of crusting, scabbing, blistering, scarring, darker or lighter pigmentary change, recurrence, incomplete removal and infection.

## 2020-11-06 NOTE — PLAN OF CARE
Goal Outcome Evaluation:  Plan of Care Reviewed With: patient  Progress: improving  Outcome Summary: Midline inc and 2 lap sites. Up ad grant. IV SL. Voiding. Walking in stoner. Loose BM this a.m. Roxicodone for pain. Home tomorrow.

## 2020-11-06 NOTE — PLAN OF CARE
Goal Outcome Evaluation:  Plan of Care Reviewed With: patient  Progress: improving  Outcome Summary: lap sites x2 and 1 small midline incision with dermabond CDI.  scheduled toradol given.  voidng freely.  passing flatus.  labs ordered this morning. IVF infusing

## 2020-11-06 NOTE — PROGRESS NOTES
Continued Stay Note  [facility]     Patient Name: Bri Pfeiffer  MRN: [id number]  Today's Date: 11/6/2020    Admit Date: 11/4/2020    Discharge Plan     Row Name 11/06/20 1330       Plan    Plan  Home No needs    Plan Comments  Met with patient who confirmed DC plan is home. Stated mother and boyfriend will assist as needed and mother will provide transportation at DC. Denies any needs/equipment.        Discharge Codes    No documentation.       Expected Discharge Date and Time     Expected Discharge Date Expected Discharge Time    Nov 7, 2020             Luz Allred RN

## 2020-11-06 NOTE — DISCHARGE SUMMARY
Discharge Summary    Patient name: Bri Pfeiffer    Medical record number: [id number]    Admission date: 11/4/2020  Discharge date:  11/8/2020    Attending physician: Dr. Lisa Kerr    Primary care physician: Alan Benavidez MD    Consulting physician(s):  none    Primary Diagnoses:    · Crohn's stricture terminal ileum/ileocecal valve    Secondary Diagnoses:     · Body mass index is 44.37 kg/m².   · Likely anorectal disease, managed with meds, manifest as anal stenosis and rectal apthea.  · Immunocompromised with use of Humira (last dose 10/2) and Apriso   · Arthritis with some suspected connective tissue disorder and spondylosis  · Anxiety/depression on Zoloft  · Regurgitation of unexplained etiology    Procedure/Proc Date:      · Laparoscopic ileocectomy: 11/4/2020    Hospital Course:   The patient is a very pleasant 34 y.o. female that was admitted to the hospital on 11/4/2020 for the aforementioned surgery.  This was done for Crohn's disease.  She did well after surgery but did have some blood in her stool on POD 2 which prompted DC of toradol and lovenox.  She had 1 more day of bloody stools, that was monitored.  On the day of discharge she had had a normal bowel movement and had normal vital signs.    Pathology:   · Pending    Discharge medications:      Your medication list      START taking these medications      Instructions Last Dose Given Next Dose Due   oxyCODONE 5 MG immediate release tablet  Commonly known as: ROXICODONE      Take 1 tablet by mouth Every 4 (Four) Hours As Needed for Moderate Pain  for up to 8 days.          CHANGE how you take these medications      Instructions Last Dose Given Next Dose Due   Humira Pen 40 MG/0.4ML Pen-injector Kit  Generic drug: Adalimumab  What changed: See the new instructions.      Inject 40 mg under the skin into the appropriate area as directed 1 (One) Time Per Week. Hold for one month post op       sertraline 100 MG tablet  Commonly known as:  Zoloft  What changed: how much to take      Take 1.5 tablets by mouth Daily.          CONTINUE taking these medications      Instructions Last Dose Given Next Dose Due   acetaminophen 500 MG tablet  Commonly known as: TYLENOL      Take 500 mg by mouth Every 6 (Six) Hours As Needed for Mild Pain .       Aldactone 100 MG tablet  Generic drug: spironolactone      Take 100 mg by mouth Daily.       dicyclomine 20 MG tablet  Commonly known as: Bentyl      Take 1 tablet by mouth 3 (Three) Times a Day As Needed (Cramping).       diphenoxylate-atropine 2.5-0.025 MG per tablet  Commonly known as: Lomotil      Take 1 tablet by mouth 4 (Four) Times a Day As Needed for Diarrhea.       esomeprazole 40 MG capsule  Commonly known as: nexIUM      Take 1 capsule by mouth Daily.       ferrous sulfate 324 (65 Fe) MG tablet delayed-release EC tablet      Take 324 mg by mouth Daily With Breakfast.       Flonase Allergy Relief 50 MCG/ACT nasal spray  Generic drug: fluticasone      2 sprays into the nostril(s) as directed by provider Daily.       mesalamine 0.375 g 24 hr capsule  Commonly known as: APRISO      Take 4 capsules by mouth Daily.       montelukast 10 MG tablet  Commonly known as: Singulair      Take 1 tablet by mouth Daily.       multivitamin tablet tablet  Commonly known as: THERAGRAN      Take 1 tablet by mouth Daily.       ProAir  (90 Base) MCG/ACT inhaler  Generic drug: albuterol sulfate HFA      Inhale 2 puffs Every 4 (Four) Hours As Needed.       VITAMIN B 12 PO      Take 1 tablet by mouth Daily.       Vitamin D-3 125 MCG (5000 UT) tablet      Take 5,000 Units by mouth Daily.       ZyrTEC Allergy 10 MG tablet  Generic drug: cetirizine      Take 10 mg by mouth Daily.             Where to Get Your Medications      These medications were sent to [facility] - [address] [address]    Hours: 7:00AM-6PM Mon-Fri Phone: [phone]   · oxyCODONE 5 MG immediate release tablet      Information about where to get these medications is not yet available    Ask your nurse or doctor about these medications  · Humira Pen 40 MG/0.4ML Pen-injector Kit         Discharge diet:  · Vegetarian but with low residue.    Recommendations:    · Follow up in the office  · About 3 weeks.  · DC recommendations and follow up in AVS.  · RX for DC from Surgical standpoint completed in Med Rec          Lisa Kerr MD  Office Number: [phone].

## 2020-11-07 LAB
BASOPHILS # BLD AUTO: 0.04 10*3/MM3 (ref 0–0.2)
BASOPHILS NFR BLD AUTO: 0.4 % (ref 0–1.5)
DEPRECATED RDW RBC AUTO: 39.5 FL (ref 37–54)
EOSINOPHIL # BLD AUTO: 0.2 10*3/MM3 (ref 0–0.4)
EOSINOPHIL NFR BLD AUTO: 1.8 % (ref 0.3–6.2)
ERYTHROCYTE [DISTWIDTH] IN BLOOD BY AUTOMATED COUNT: 12 % (ref 12.3–15.4)
HCT VFR BLD AUTO: 37.5 % (ref 34–46.6)
HGB BLD-MCNC: 12.3 G/DL (ref 12–15.9)
IMM GRANULOCYTES # BLD AUTO: 0.04 10*3/MM3 (ref 0–0.05)
IMM GRANULOCYTES NFR BLD AUTO: 0.4 % (ref 0–0.5)
LYMPHOCYTES # BLD AUTO: 3.42 10*3/MM3 (ref 0.7–3.1)
LYMPHOCYTES NFR BLD AUTO: 30.2 % (ref 19.6–45.3)
MCH RBC QN AUTO: 29.9 PG (ref 26.6–33)
MCHC RBC AUTO-ENTMCNC: 32.8 G/DL (ref 31.5–35.7)
MCV RBC AUTO: 91.2 FL (ref 79–97)
MONOCYTES # BLD AUTO: 1.14 10*3/MM3 (ref 0.1–0.9)
MONOCYTES NFR BLD AUTO: 10.1 % (ref 5–12)
NEUTROPHILS NFR BLD AUTO: 57.1 % (ref 42.7–76)
NEUTROPHILS NFR BLD AUTO: 6.47 10*3/MM3 (ref 1.7–7)
NRBC BLD AUTO-RTO: 0 /100 WBC (ref 0–0.2)
PLATELET # BLD AUTO: 269 10*3/MM3 (ref 140–450)
PMV BLD AUTO: 9.6 FL (ref 6–12)
RBC # BLD AUTO: 4.11 10*6/MM3 (ref 3.77–5.28)
WBC # BLD AUTO: 11.31 10*3/MM3 (ref 3.4–10.8)

## 2020-11-07 PROCEDURE — 85025 COMPLETE CBC W/AUTO DIFF WBC: CPT | Performed by: SURGERY

## 2020-11-07 PROCEDURE — 99024 POSTOP FOLLOW-UP VISIT: CPT | Performed by: STUDENT IN AN ORGANIZED HEALTH CARE EDUCATION/TRAINING PROGRAM

## 2020-11-07 RX ORDER — ACETAMINOPHEN 500 MG
500 TABLET ORAL EVERY 6 HOURS
Status: DISCONTINUED | OUTPATIENT
Start: 2020-11-07 | End: 2020-11-08 | Stop reason: HOSPADM

## 2020-11-07 RX ADMIN — ACETAMINOPHEN 500 MG: 500 TABLET, FILM COATED ORAL at 10:59

## 2020-11-07 RX ADMIN — SERTRALINE 100 MG: 100 TABLET, FILM COATED ORAL at 09:56

## 2020-11-07 RX ADMIN — MESALAMINE 1.5 G: 0.38 CAPSULE, EXTENDED RELEASE ORAL at 09:56

## 2020-11-07 RX ADMIN — GABAPENTIN 300 MG: 300 CAPSULE ORAL at 21:42

## 2020-11-07 RX ADMIN — MONTELUKAST SODIUM 10 MG: 10 TABLET, FILM COATED ORAL at 09:56

## 2020-11-07 RX ADMIN — ACETAMINOPHEN 500 MG: 500 TABLET, FILM COATED ORAL at 18:15

## 2020-11-07 RX ADMIN — GABAPENTIN 300 MG: 300 CAPSULE ORAL at 04:42

## 2020-11-07 RX ADMIN — CETIRIZINE HYDROCHLORIDE 10 MG: 10 TABLET ORAL at 09:57

## 2020-11-07 RX ADMIN — GABAPENTIN 300 MG: 300 CAPSULE ORAL at 14:07

## 2020-11-07 RX ADMIN — FLUTICASONE PROPIONATE 2 SPRAY: 50 SPRAY, METERED NASAL at 09:58

## 2020-11-07 RX ADMIN — SPIRONOLACTONE 100 MG: 100 TABLET, FILM COATED ORAL at 09:56

## 2020-11-07 RX ADMIN — PANTOPRAZOLE SODIUM 40 MG: 40 TABLET, DELAYED RELEASE ORAL at 04:41

## 2020-11-07 NOTE — PLAN OF CARE
Goal Outcome Evaluation:  Plan of Care Reviewed With: patient  Progress: improving  Outcome Summary: Plan is for discharge today. Abdominal incision intact with dermabond . small amt of redness and bruising poresent. Lap sites x2 also dry and intact with dermabond. Taking roxicodone for pain which appears to be controlling pain but she did want to take some tylenol in between but the order had . She denied need for call to Dr to request more. She had some loose stool and is passing flatus. Ambulating in halls . Using ice bag 6o abdomen. Afebrile and VS stable

## 2020-11-07 NOTE — SIGNIFICANT NOTE
States she doesn't need MD called for additional tylenol order, will see if other meds help enough

## 2020-11-07 NOTE — PROGRESS NOTES
"GENERAL SURGERY PROGRESS NOTE    SUMMARY:  Mrs. Bri Pfeiffer is a 34 y.o. year old lady POD3 s/p ileocolic resection.  WBC down to 11 from 12. Having bowel function but with more blood this morning, Hgb 12.3 from 13.3 two days ago. Will observe for one more day with CBC in am.    Interval Events: She does say she had some dark black stools last night and some blood in them this morning.  Tolerating a diet.  Abdominal pain is stable.    O:/75 (BP Location: Right arm, Patient Position: Sitting)   Pulse 68   Temp 97.7 °F (36.5 °C) (Oral)   Resp 16   Ht 170.2 cm (67\")   Wt 129 kg (283 lb 4.7 oz)   SpO2 100%   BMI 44.37 kg/m²     Intake & Output:  UOP: 600cc/24 hrs    GENERAL: alert, well appearing, and in no distress  HEENT: normocephalic, atraumatic, moist mucous membranes, clear sclerae   CHEST: on room air, no increased work of breathing, symmetric air entry  CARDIAC: regular rate and rhythm    ABDOMEN: Incision clean and dry, no erythema or drainage.  EXTREMITIES: no cyanosis, clubbing, or edema   SKIN: Warm and moist, no rashes    LABS  Results from last 7 days   Lab Units 11/07/20  0758 11/05/20  0735 11/04/20  1741   WBC 10*3/mm3 11.31* 12.69* 13.94*   HEMOGLOBIN g/dL 12.3 13.3 13.8   HEMATOCRIT % 37.5 40.8 42.9   PLATELETS 10*3/mm3 269 334 337     Results from last 7 days   Lab Units 11/06/20  0648 11/05/20  0735 11/04/20  1741   SODIUM mmol/L 139 140 137   POTASSIUM mmol/L 4.0 4.7 4.9   CHLORIDE mmol/L 106 105 105   CO2 mmol/L 28.2 25.2 22.7   BUN mg/dL 9 7 9   CREATININE mg/dL 0.66 0.84 0.78   CALCIUM mg/dL 8.7 9.0 8.8   GLUCOSE mg/dL 81 107* 142*           ELISSA GUO MD  General and Endoscopic Surgery  [facility]    [address], [address]  [place], [place], [address]  P: [phone]  F: [phone]     "

## 2020-11-07 NOTE — PLAN OF CARE
Problem: Adult Inpatient Plan of Care  Goal: Plan of Care Review  2020 by Kat Grover, RN  Outcome: Ongoing, Progressing  Flowsheets (Taken 2020)  Outcome Summary: Pt had loose dark brown/blackish liquid stool this am  2020 by Kat Grover, RN  Outcome: Ongoing, Progressing  Flowsheets (Taken 2020)  Progress: improving  Plan of Care Reviewed With: patient  Outcome Summary: Plan is for discharge today. Abdominal incision intact with dermabond . small amt of redness and bruising poresent. Lap sites x2 also dry and intact with dermabond. Taking roxicodone for pain which appears to be controlling pain but she did want to take some tylenol in between but the order had . She denied need for call to Dr to request more. She had some loose stool and is passing flatus. Ambulating in halls . Using ice bag 6o abdomen. Afebrile and VS stable   Goal Outcome Evaluation:  Plan of Care Reviewed With: patient  Progress: improving  Outcome Summary: Pt had loose dark brown/blackish liquid stool this am

## 2020-11-08 ENCOUNTER — READMISSION MANAGEMENT (OUTPATIENT)
Dept: CALL CENTER | Facility: HOSPITAL | Age: 35
End: 2020-11-08

## 2020-11-08 VITALS
SYSTOLIC BLOOD PRESSURE: 116 MMHG | BODY MASS INDEX: 44.46 KG/M2 | TEMPERATURE: 97.7 F | RESPIRATION RATE: 16 BRPM | HEART RATE: 85 BPM | DIASTOLIC BLOOD PRESSURE: 76 MMHG | OXYGEN SATURATION: 100 % | WEIGHT: 283.29 LBS | HEIGHT: 67 IN

## 2020-11-08 LAB
DEPRECATED RDW RBC AUTO: 41.5 FL (ref 37–54)
ERYTHROCYTE [DISTWIDTH] IN BLOOD BY AUTOMATED COUNT: 12.3 % (ref 12.3–15.4)
HCT VFR BLD AUTO: 34.2 % (ref 34–46.6)
HGB BLD-MCNC: 11.5 G/DL (ref 12–15.9)
MCH RBC QN AUTO: 30.9 PG (ref 26.6–33)
MCHC RBC AUTO-ENTMCNC: 33.6 G/DL (ref 31.5–35.7)
MCV RBC AUTO: 91.9 FL (ref 79–97)
PLATELET # BLD AUTO: 295 10*3/MM3 (ref 140–450)
PMV BLD AUTO: 9.3 FL (ref 6–12)
RBC # BLD AUTO: 3.72 10*6/MM3 (ref 3.77–5.28)
WBC # BLD AUTO: 8.22 10*3/MM3 (ref 3.4–10.8)

## 2020-11-08 PROCEDURE — 85027 COMPLETE CBC AUTOMATED: CPT | Performed by: STUDENT IN AN ORGANIZED HEALTH CARE EDUCATION/TRAINING PROGRAM

## 2020-11-08 RX ORDER — GABAPENTIN 300 MG/1
300 CAPSULE ORAL EVERY 8 HOURS SCHEDULED
Qty: 20 CAPSULE | Refills: 0 | Status: SHIPPED | OUTPATIENT
Start: 2020-11-08 | End: 2021-07-08

## 2020-11-08 RX ADMIN — CETIRIZINE HYDROCHLORIDE 10 MG: 10 TABLET ORAL at 08:54

## 2020-11-08 RX ADMIN — MESALAMINE 1.5 G: 0.38 CAPSULE, EXTENDED RELEASE ORAL at 08:53

## 2020-11-08 RX ADMIN — ACETAMINOPHEN 500 MG: 500 TABLET, FILM COATED ORAL at 06:08

## 2020-11-08 RX ADMIN — GABAPENTIN 300 MG: 300 CAPSULE ORAL at 06:08

## 2020-11-08 RX ADMIN — PANTOPRAZOLE SODIUM 40 MG: 40 TABLET, DELAYED RELEASE ORAL at 06:08

## 2020-11-08 RX ADMIN — MONTELUKAST SODIUM 10 MG: 10 TABLET, FILM COATED ORAL at 08:54

## 2020-11-08 RX ADMIN — SERTRALINE 100 MG: 100 TABLET, FILM COATED ORAL at 08:54

## 2020-11-08 RX ADMIN — ACETAMINOPHEN 500 MG: 500 TABLET, FILM COATED ORAL at 12:27

## 2020-11-08 RX ADMIN — FLUTICASONE PROPIONATE 2 SPRAY: 50 SPRAY, METERED NASAL at 08:55

## 2020-11-08 RX ADMIN — SPIRONOLACTONE 100 MG: 100 TABLET, FILM COATED ORAL at 08:54

## 2020-11-08 NOTE — OUTREACH NOTE
Prep Survey      Responses   [facility] patient discharged from?  [place]   Is LACE score < 7 ?  No   Eligibility  [facility]   Date of Admission  11/04/20   Date of Discharge  11/08/20   Discharge Disposition  Home or Self Care   Discharge diagnosis  Laparoscopic ileocectomy for Crohn's stricture   Does the patient have one of the following disease processes/diagnoses(primary or secondary)?  General Surgery   Does the patient have Home health ordered?  No   Is there a DME ordered?  No   Prep survey completed?  Yes          Tia Thao RN

## 2020-11-08 NOTE — PLAN OF CARE
Goal Outcome Evaluation:  Plan of Care Reviewed With: patient  Progress: improving  Outcome Summary: Afebrile and VS stable. No visible signs of bleeding and no reported stools. Ambulating in halls. Small amts of reness and little bruising at incision line. Denies need for pain meds. Scheduled meds controlling pain. Rash present left side below breast and on left arm. Pt denies itching. She states she does have sensitive skin and breaks out easily. Will continue to monitor.

## 2020-11-09 ENCOUNTER — TRANSITIONAL CARE MANAGEMENT TELEPHONE ENCOUNTER (OUTPATIENT)
Dept: CALL CENTER | Facility: HOSPITAL | Age: 35
End: 2020-11-09

## 2020-11-09 NOTE — OUTREACH NOTE
Call Center TCM Note      Responses   [facility] patient discharged from?  [place]   Does the patient have one of the following disease processes/diagnoses(primary or secondary)?  General Surgery   TCM attempt successful?  Yes   Call start time  1430   Call end time  1431   Discharge diagnosis  Laparoscopic ileocectomy for Crohn's stricture   Meds reviewed with patient/caregiver?  Yes   Is the patient having any side effects they believe may be caused by any medication additions or changes?  No   Does the patient have all medications related to this admission filled (includes all antibiotics, pain medications, etc.)  Yes   Is the patient taking all medications as directed (includes completed medication regime)?  Yes   Does the patient have a follow up appointment scheduled with their surgeon?  Yes   Has the patient kept scheduled appointments due by today?  N/A   Psychosocial issues?  No   Did the patient receive a copy of their discharge instructions?  Yes   What is the patient's perception of their health status since discharge?  Improving   Nursing interventions  Nurse provided patient education   Is the patient /caregiver able to teach back basic post-op care?  Drive as instructed by MD in discharge instructions, Continue use of incentive spirometry at least 1 week post discharge, Practice 'cough and deep breath', Lifting as instructed by MD in discharge instructions, Do not remove steri-strips, Keep incision areas clean,dry and protected, No tub bath, swimming, or hot tub until instructed by MD, Take showers only when approved by MD-sponge bathe until then   Is the patient/caregiver able to teach back signs and symptoms of incisional infection?  Fever   Is the patient/caregiver able to teach back the hierarchy of who to call/visit for symptoms/problems? PCP, Specialist, Home health nurse, Urgent Care, ED, 911  Yes   TCM call completed?  Yes   Wrap up additional comments  Patient says she is doing  great, no questions or concerns at this time, she will be following up with her surgeon.          Aye Jones RN    11/9/2020, 14:31 EST

## 2020-11-09 NOTE — OUTREACH NOTE
Call Center TCM Note      Responses   [facility] patient discharged from?  [place]   Does the patient have one of the following disease processes/diagnoses(primary or secondary)?  General Surgery   TCM attempt successful?  No   Unsuccessful attempts  Attempt 1          Aye Jones RN    11/9/2020, 13:35 EST

## 2020-11-09 NOTE — PROGRESS NOTES
Case Management Discharge Note      Final Note: Discharged home. Luz Allred RN    Provided Post Acute Provider List?: N/A    Transportation Services  Private: Car    Final Discharge Disposition Code: 01 - home or self-care

## 2020-11-10 DIAGNOSIS — K50.111 CROHN'S DISEASE OF LARGE INTESTINE WITH RECTAL BLEEDING (HCC): ICD-10-CM

## 2020-11-10 RX ORDER — GABAPENTIN 300 MG/1
CAPSULE ORAL
Qty: 20 CAPSULE | Refills: 0 | OUTPATIENT
Start: 2020-11-10

## 2020-11-11 ENCOUNTER — OFFICE VISIT (OUTPATIENT)
Dept: SURGERY | Facility: CLINIC | Age: 35
End: 2020-11-11

## 2020-11-11 DIAGNOSIS — K50.812 CROHN'S DISEASE OF BOTH SMALL AND LARGE INTESTINE WITH INTESTINAL OBSTRUCTION (HCC): Primary | ICD-10-CM

## 2020-11-11 PROCEDURE — 99024 POSTOP FOLLOW-UP VISIT: CPT | Performed by: SURGERY

## 2020-11-11 NOTE — PROGRESS NOTES
SURGERY: [facility]  Post op Visit  Bri Pfeiffer  11/11/2020    Ms. Pfeiffer  presents today after having undergone Surgery 11/4/2020, of a laparoscopic ileocectomy.  This was for a suspected Crohn's stricture.  Her pathology showed vascular congestion and reactive hyperplasia.  There were no granulomas.    She did moderately well post op, tho pain was an issue.  She had some rectal bleeding that spontaneously resolved.  Happily, she didn't have any regurgitation after surgery.    Today with talked about her pathology.  I remarked that one of her imaging studies suggested a large fat pad at the ileocecal valve which may have been some of the issue.  Happily, she did not have gross evidence of crohn's elsewhere.     She can resume her crohn's tx at 4 weeks postop.    11/11/20  16:17 EST

## 2020-11-17 ENCOUNTER — READMISSION MANAGEMENT (OUTPATIENT)
Dept: CALL CENTER | Facility: HOSPITAL | Age: 35
End: 2020-11-17

## 2020-11-17 NOTE — OUTREACH NOTE
General Surgery Week 2 Survey      Responses   [facility] patient discharged from?  [place]   Does the patient have one of the following disease processes/diagnoses(primary or secondary)?  General Surgery   Week 2 attempt successful?  Yes   Call start time  1337   Discharge diagnosis  Laparoscopic ileocectomy for Crohn's stricture   Is the patient taking all medications as directed (includes completed medication regime)?  Yes   Has the patient kept scheduled appointments due by today?  Yes   Psychosocial issues?  No   What is the patient's perception of their health status since discharge?  Improving   Nursing interventions  Nurse provided patient education   Is the patient /caregiver able to teach back basic post-op care?  Continue use of incentive spirometry at least 1 week post discharge, Practice 'cough and deep breath', Drive as instructed by MD in discharge instructions, Take showers only when approved by MD-sponge bathe until then, No tub bath, swimming, or hot tub until instructed by MD, Keep incision areas clean,dry and protected, Do not remove steri-strips, Lifting as instructed by MD in discharge instructions   Is the patient/caregiver able to teach back signs and symptoms of incisional infection?  Fever   Is the patient/caregiver able to teach back steps to recovery at home?  Set small, achievable goals for return to baseline health, Rest and rebuild strength, gradually increase activity   Is the patient/caregiver able to teach back the hierarchy of who to call/visit for symptoms/problems? PCP, Specialist, Home health nurse, Urgent Care, ED, 911  Yes   Week 2 call completed?  Yes   Wrap up additional comments  Patient says she is doing great, no questions or concerns at this time.          Aye Jones RN

## 2020-11-24 ENCOUNTER — READMISSION MANAGEMENT (OUTPATIENT)
Dept: CALL CENTER | Facility: HOSPITAL | Age: 35
End: 2020-11-24

## 2020-11-24 NOTE — OUTREACH NOTE
General Surgery Week 3 Survey      Responses   [facility] patient discharged from?  [place]   Does the patient have one of the following disease processes/diagnoses(primary or secondary)?  General Surgery   Week 3 attempt successful?  Yes   Call start time  1355   Call end time  1357   Medication alerts for this patient  no medication changes   Meds reviewed with patient/caregiver?  Yes   Is the patient taking all medications as directed (includes completed medication regime)?  Yes   Has the patient kept scheduled appointments due by today?  Yes   Comments  has seen the surgeon one week after surgery and will  speaking with  the surgeon   What is the patient's perception of their health status since discharge?  Improving   Week 3 call completed?  Yes   Wrap up additional comments  having trouble with wound closing and some serous drainage, speakig with surgeon. wound has no odor          Chrissy Alaniz RN

## 2020-12-05 RX ORDER — AMOXICILLIN AND CLAVULANATE POTASSIUM 875; 125 MG/1; MG/1
1 TABLET, FILM COATED ORAL 2 TIMES DAILY
Qty: 10 TABLET | Refills: 0 | Status: SHIPPED | OUTPATIENT
Start: 2020-12-05 | End: 2020-12-14 | Stop reason: ALTCHOICE

## 2020-12-05 NOTE — PROGRESS NOTES
She called today with exquisite tenderness at the upper aspect of the incision from laparoscopic ileocecectomy for Crohn's disease.  The end of the incision is ulcerated and erythematous.  There is no drainage.  She has been applying Silvadene cream.  She is applied photos from her mobile phone to my chart which I was able to view.  Bowels have been moving.  There are no urinary complaints.  She is not running a fever.    I will start Augmentin for 5 days.  I instructed her to discontinue the Silvadene.  I think it is still okay for her to shower.

## 2020-12-14 ENCOUNTER — OFFICE VISIT (OUTPATIENT)
Dept: GASTROENTEROLOGY | Facility: CLINIC | Age: 35
End: 2020-12-14

## 2020-12-14 ENCOUNTER — OFFICE VISIT (OUTPATIENT)
Dept: SURGERY | Facility: CLINIC | Age: 35
End: 2020-12-14

## 2020-12-14 DIAGNOSIS — K58.8 OTHER IRRITABLE BOWEL SYNDROME: ICD-10-CM

## 2020-12-14 DIAGNOSIS — M25.50 ARTHRALGIA, UNSPECIFIED JOINT: ICD-10-CM

## 2020-12-14 DIAGNOSIS — K50.00 CROHN'S DISEASE INVOLVING TERMINAL ILEUM (HCC): Primary | ICD-10-CM

## 2020-12-14 DIAGNOSIS — K50.111 CROHN'S DISEASE OF LARGE INTESTINE WITH RECTAL BLEEDING (HCC): Primary | ICD-10-CM

## 2020-12-14 DIAGNOSIS — R11.0 NAUSEA: ICD-10-CM

## 2020-12-14 PROCEDURE — 99024 POSTOP FOLLOW-UP VISIT: CPT | Performed by: SURGERY

## 2020-12-14 PROCEDURE — 99443 PR PHYS/QHP TELEPHONE EVALUATION 21-30 MIN: CPT | Performed by: NURSE PRACTITIONER

## 2020-12-14 RX ORDER — ONDANSETRON 4 MG/1
4 TABLET, FILM COATED ORAL EVERY 8 HOURS PRN
Qty: 30 TABLET | Refills: 1 | Status: SHIPPED | OUTPATIENT
Start: 2020-12-14 | End: 2021-01-03

## 2020-12-14 NOTE — PROGRESS NOTES
Chief Complaint   Patient presents with   • Follow-up   • Crohn's Disease       HPI  Patient presents today via telemedicine for follow-up.  She has diagnosis of Crohn's disease status post laparoscopic ileocecectomy November 4, 2020 for suspected Crohn's stricture.  Pathology revealed vascular congestion and reactive hyperplasia, no granulomas.  She has been off of Humira since her resection.  She is scheduled for a follow-up today with her surgeon for some difficulty at her incision.    She was previous patient of Dr. Turcios's who has now retired.    Patient can experience vomiting that comes and goes.  She will use nausea medication as needed.  No specific triggers.    She still has a rectal stricture and can experience rectal bleeding at times.  She can have pain and irritation at her rectum at times.  Bowel movements are small and described as ribbonlike.  She is not currently taking MiraLAX or stool softener. She can have mucus in her stool at times.    She has abdominal bloating that occurs later in the day.  No specific food triggers.    She has sores in her mouth.  They are more pronounced.  She has had them when she was first diagnosed with Crohn's and feels that they have worsened over the past several months.  They are painful and described as small circular ulcers.    She contacted the office with concern over her menstrual cycle.  Prior to her surgery she had not had a menstrual cycle and since her surgery she has had 2 menstrual cycles.  She was evaluated by gynecology and her IUD is in the correct place and they are going to monitor.    Patient continues to experience joint pain and has been evaluated by rheumatology.  While she was on Humira, rheumatology had recommended weekly Humira to see if this improved arthralgias.  Rheumatology work-up was unremarkable for generalized arthralgia.    She is currently using Lomotil rarely for diarrhea and urgency.  She continues on Nexium for acid reflux.  Also  Apriso.    She continues on B12 replacement.    Adalimumab was initiated August 2019.  Therapeutic drug monitoring November 2019 was 7.3 with no antibodies.    You have chosen to receive care through a telephone visit today. Do you consent to use a telephone visit for your medical care today? Yes      Review of Systems   Constitutional: Positive for diaphoresis and fatigue.   HENT:        Mouth ulcers   Eyes: Negative.    Respiratory: Negative.    Cardiovascular: Negative.    Gastrointestinal: Positive for abdominal pain, anal bleeding and nausea.   Endocrine: Positive for heat intolerance and polyphagia.   Genitourinary: Positive for menstrual problem.   Musculoskeletal: Positive for arthralgias, back pain, joint swelling and myalgias.   Skin: Negative.    Allergic/Immunologic: Negative.    Neurological: Negative.    Hematological: Negative.    Psychiatric/Behavioral: Negative.        Problem List:    Patient Active Problem List   Diagnosis   • Pericardial cyst   • Vasodepressor syncope   • Esophageal dysphagia   • Rectal bleeding   • Colitis   • Gastroesophageal reflux disease   • Crohn's disease of large intestine with rectal bleeding (CMS/HCC)   • Other irritable bowel syndrome   • Skin rash   • Moderate episode of recurrent major depressive disorder (CMS/HCC)   • Anxiety   • Low back pain with sciatica   • Ileocecal valve stenosis   • Crohn's disease (CMS/HCC)   • BMI 40.0-44.9, adult (CMS/HCC)   • Status post small bowel resection       Medical History:    Past Medical History:   Diagnosis Date   • Allergies    • Anemia    • Anxiety    • Asthma    • Crohn's disease (CMS/HCC) 05/2019   • Depression    • Eczema    • GERD (gastroesophageal reflux disease)    • IBS (irritable bowel syndrome)    • Irregular heartbeat     OCCAS   • Lactose intolerance    • Pericardial cyst    • Rheumatoid arthritis (CMS/HCC)    • Sensitive skin    • Spondylosis    • Syncope         Social History:    Social History     Socioeconomic  History   • Marital status: Single     Spouse name: Not on file   • Number of children: 0   • Years of education: Not on file   • Highest education level: Some college, no degree   Occupational History   • Occupation: ER /admin assist     Employer: [employer]   Tobacco Use   • Smoking status: Never Smoker   • Smokeless tobacco: Never Used   • Tobacco comment: caffeine use a few days weekly   Substance and Sexual Activity   • Alcohol use: Not Currently   • Drug use: No   • Sexual activity: Defer       Family History:   Family History   Problem Relation Age of Onset   • Hypertension Father    • Skin cancer Father    • Cancer Maternal Grandmother         Colorectal   • Prostate cancer Maternal Grandfather    • Prostate cancer Paternal Grandfather    • Hypertension Paternal Grandfather    • Hypertension Paternal Grandmother    • Malig Hyperthermia Neg Hx        Surgical History:   Past Surgical History:   Procedure Laterality Date   • ADENOIDECTOMY     • COLON RESECTION N/A 11/4/2020    Procedure: laparoscopic ileocecectomy;  Surgeon: Lisa Kerr MD;  Location: [facility] MAIN OR;  Service: General;  Laterality: N/A;   • COLONOSCOPY N/A 5/10/2019    Procedure: COLONOSCOPY TO CECUM TO TERMINAL ILEUM WITH BIOPSY;  Surgeon: Tae Turcios MD;  Location: [facility] ENDOSCOPY;  Service: Gastroenterology   • ENDOSCOPY N/A 5/10/2019    Procedure: ESOPHAGOGASTRODUODENOSCOPY WITH BIOPSY;  Surgeon: Tae Turcios MD;  Location: [facility] ENDOSCOPY;  Service: Gastroenterology   • ENDOSCOPY N/A 12/13/2019    Procedure: ESOPHAGOGASTRODUODENOSCOPY WITH COLD BIOPSIES;  Surgeon: Tae Turcios MD;  Location: [facility] ENDOSCOPY;  Service: Gastroenterology   • LIPOMA EXCISION  2007   • SIGMOIDOSCOPY N/A 12/13/2019    Procedure: SIGMOIDOSCOPY FLEXIBLE TO T.I.;  Surgeon: Tae Turcios MD;  Location: [facility] ENDOSCOPY;  Service: Gastroenterology   • TONSILLECTOMY AND ADENOIDECTOMY     • WISDOM TOOTH EXTRACTION            Current Outpatient Medications:   •  acetaminophen (TYLENOL) 500 MG tablet, Take 500 mg by mouth Every 6 (Six) Hours As Needed for Mild Pain ., Disp: , Rfl:   •  albuterol sulfate HFA (PROAIR HFA) 108 (90 Base) MCG/ACT inhaler, Inhale 2 puffs Every 4 (Four) Hours As Needed., Disp: , Rfl:   •  cetirizine (ZYRTEC ALLERGY) 10 MG tablet, Take 10 mg by mouth Daily., Disp: , Rfl:   •  Cholecalciferol (VITAMIN D-3) 5000 units tablet, Take 5,000 Units by mouth Daily., Disp: , Rfl:   •  Cyanocobalamin (VITAMIN B 12 PO), Take 1 tablet by mouth Daily., Disp: , Rfl:   •  dicyclomine (Bentyl) 20 MG tablet, Take 1 tablet by mouth 3 (Three) Times a Day As Needed (Cramping)., Disp: 90 tablet, Rfl: 2  •  diphenoxylate-atropine (LOMOTIL) 2.5-0.025 MG per tablet, Take 1 tablet by mouth 4 (Four) Times a Day As Needed for Diarrhea., Disp: 120 tablet, Rfl: 3  •  esomeprazole (nexIUM) 40 MG capsule, Take 1 capsule by mouth Daily., Disp: 30 capsule, Rfl: 5  •  ferrous sulfate 324 (65 Fe) MG tablet delayed-release EC tablet, Take 324 mg by mouth Daily With Breakfast., Disp: , Rfl:   •  fluticasone (FLONASE ALLERGY RELIEF) 50 MCG/ACT nasal spray, 2 sprays into the nostril(s) as directed by provider Daily., Disp: , Rfl:   •  gabapentin (NEURONTIN) 300 MG capsule, Take 1 capsule by mouth Every 8 (Eight) Hours., Disp: 20 capsule, Rfl: 0  •  Humira Pen 40 MG/0.4ML Pen-injector Kit, Inject 40 mg under the skin into the appropriate area as directed 1 (One) Time Per Week. Hold for one month post op, Disp: 2 each, Rfl: 0  •  mesalamine (APRISO) 0.375 g 24 hr capsule, Take 4 capsules by mouth Daily. (Patient taking differently: Take 1,500 mg by mouth Daily.), Disp: 120 capsule, Rfl: 11  •  montelukast (Singulair) 10 MG tablet, Take 1 tablet by mouth Daily., Disp: 90 tablet, Rfl: 3  •  Multiple Vitamin (MULTIVITAMIN PO), Take 1 tablet by mouth Daily., Disp: , Rfl:   •  sertraline (Zoloft) 100 MG tablet, Take 1.5 tablets by mouth Daily.  (Patient taking differently: Take 100 mg by mouth Daily.), Disp: 135 tablet, Rfl: 3  •  silver sulfadiazine (SILVADENE, SSD) 1 % cream, Apply to affected area daily, Disp: 50 g, Rfl: 1  •  spironolactone (ALDACTONE) 100 MG tablet, Take 100 mg by mouth Daily., Disp: , Rfl:   •  ondansetron (ZOFRAN) 4 MG tablet, Take 1 tablet by mouth Every 8 (Eight) Hours As Needed for Nausea or Vomiting for up to 20 days., Disp: 30 tablet, Rfl: 1    Allergies:  Lactose intolerance (gi) and Levaquin [levofloxacin]    The following portions of the patient's history were reviewed and updated as appropriate: allergies, current medications, past family history, past medical history, past social history, past surgical history and problem list.    Assessment/ Plan  Diagnoses and all orders for this visit:    1. Crohn's disease of large intestine with rectal bleeding (CMS/HCC) (Primary)    2. Nausea  -     ondansetron (ZOFRAN) 4 MG tablet; Take 1 tablet by mouth Every 8 (Eight) Hours As Needed for Nausea or Vomiting for up to 20 days.  Dispense: 30 tablet; Refill: 1    3. Arthralgia, unspecified joint    4. Other irritable bowel syndrome         No follow-ups on file.    Patient Instructions   Use Zofran as needed for nausea, prescription sent electronically to pharmacy.  This medication can cause constipation so use lowest dose possible to control symptoms.    For rectal stricture, recommend MiraLAX or stool softener daily to help with the passage of stool.    Play food  to see if there are certain foods that are worsening dyspeptic symptoms.    Please contact the office after you have had follow-up with your surgeon regarding biologic therapy.  I have reviewed with Dr. Escalona and he is okay with restarting Humira to see if this helps with inflammatory bowel disease as well as joint pain as previously recommended by your rheumatologist.    For acid reflux, continue acid medication.  Avoid foods that worsen acid reflux  symptoms.    Use Ehlen's Magic mouthwash for oral ulcers, new prescription to be sent to pharmacy.    Referral placed for rheumatology evaluation given ongoing joint pain.            Discussion:  To consider Xifaxan as discussed.    Encourage patient to follow-up with surgeon and let us know after her appointment if she has been cleared to resume biologic therapy.  Discussed options including Stelara and Humira.  After discussing with Dr cornell, he recommends Humira given joint pain.  Would need to consider therapeutic drug monitoring or weekly dosing based on symptoms.    COVID-19 precautions and immunosuppressive medications reviewed with patient in detail.  We will also arrange for a rheumatology consult as patient has requested a second opinion.  Referral placed.    For oral ulcers, will start Helen's Magic mouthwash, refill sent electronically to pharmacy.         This visit was completed as a telephone visit due to COVID-19 pandemic. This visit has been rescheduled as a phone visit to comply with patient safety concerns in accordance with CDC recommendations. Total time of discussion was 28 minutes.

## 2020-12-14 NOTE — PROGRESS NOTES
SURGERY: [facility]  Post op Visit  Bri Pfeiffer  12/14/2020    Ms. Pfeiffer  presents today after having undergone Surgery 11/4/2020, of a laparoscopic ileocectomy.  This was for a suspected Crohn's stricture.  Her pathology showed vascular congestion and reactive hyperplasia.  There were no granulomas.    She did moderately well post op, tho pain was an issue.  She had some rectal bleeding that spontaneously resolved.  Happily, she didn't have any regurgitation after surgery.    Today she returns with multiple calls in between her last visit with complaints of difficulty with her incision.  At first it appeared she has had a small opening at the inferior aspect, but with repeat call she was placed on Augmentin.  Last week she had intense pain there so bad that she could not even put a sheet on it.  Now this was concerned about her neuroma and asked that she come in today.    She has a small area with thin skin overlying it which could be an infected stitch.  I anesthetized that open did, remove some necrotic material in place Nu Gauze.  She should replace this after 2 days on a daily basis and I will see her next week.  I suspect there was a stitch there.      12/14/20  16:17 EST

## 2020-12-15 PROBLEM — Z90.49 STATUS POST SMALL BOWEL RESECTION: Status: ACTIVE | Noted: 2020-12-15

## 2020-12-15 NOTE — PATIENT INSTRUCTIONS
Use Zofran as needed for nausea, prescription sent electronically to pharmacy.  This medication can cause constipation so use lowest dose possible to control symptoms.    For rectal stricture, recommend MiraLAX or stool softener daily to help with the passage of stool.    Play food  to see if there are certain foods that are worsening dyspeptic symptoms.    Please contact the office after you have had follow-up with your surgeon regarding biologic therapy.  I have reviewed with Dr. Escalona and he is okay with restarting Humira to see if this helps with inflammatory bowel disease as well as joint pain as previously recommended by your rheumatologist.    For acid reflux, continue acid medication.  Avoid foods that worsen acid reflux symptoms.    Use Helen's Magic mouthwash for oral ulcers, new prescription to be sent to pharmacy.    Referral placed for rheumatology evaluation given ongoing joint pain.

## 2020-12-16 ENCOUNTER — TELEPHONE (OUTPATIENT)
Dept: GASTROENTEROLOGY | Facility: CLINIC | Age: 35
End: 2020-12-16

## 2020-12-16 NOTE — ADDENDUM NOTE
Addended by: GEORGE MATHEW on: 12/16/2020 07:01 AM     Modules accepted: Orders, Level of Service

## 2020-12-16 NOTE — TELEPHONE ENCOUNTER
Phoned in new rx for Helen's Magic Mouth Wash  Benadryl Liquid 4 oz  Nystatin liquid 4 oz  Viscous Lidocaine 4 oz  Swish & Spit 5ml QID 2 refills  [facility] [phone]  pk/sw

## 2020-12-21 ENCOUNTER — OFFICE VISIT (OUTPATIENT)
Dept: SURGERY | Facility: CLINIC | Age: 35
End: 2020-12-21

## 2020-12-21 DIAGNOSIS — K50.00 CROHN'S DISEASE INVOLVING TERMINAL ILEUM (HCC): Primary | ICD-10-CM

## 2020-12-21 PROCEDURE — 99024 POSTOP FOLLOW-UP VISIT: CPT | Performed by: SURGERY

## 2020-12-22 NOTE — PROGRESS NOTES
SURGERY: [facility]  Post op Visit  Bri Pfeiffer  12/21/2020    Ms. Pfeiffer  presents today after having undergone Surgery 11/4/2020, of a laparoscopic ileocectomy.  This was for a suspected Crohn's stricture.  Her pathology showed vascular congestion and reactive hyperplasia.  There were no granulomas.    She did moderately well post op, tho pain was an issue.  She had some rectal bleeding that spontaneously resolved.  Happily, she didn't have any regurgitation after surgery.  Today she says she is having issues with recurrent emesis that is almost uncontrollable.  She is actually here this for a wound check    Wound looks great.  I pulled the few 1 mm tissue specks out and then encouraged her to use the new gauze for another 5 days, then switch to nothing for a couple of days to let any fluid come out and then switch to antibiotic ointment.  Given the choice of scheduling an appointment in 2 weeks or coming back if she has continued difficulties, she has chosen the latter.  Opening is about 3 mm    12/21/2020  16:17 EST

## 2021-03-02 ENCOUNTER — OFFICE VISIT (OUTPATIENT)
Dept: GASTROENTEROLOGY | Facility: CLINIC | Age: 36
End: 2021-03-02

## 2021-03-02 VITALS
OXYGEN SATURATION: 99 % | DIASTOLIC BLOOD PRESSURE: 74 MMHG | RESPIRATION RATE: 16 BRPM | SYSTOLIC BLOOD PRESSURE: 112 MMHG | WEIGHT: 293 LBS | TEMPERATURE: 98 F | BODY MASS INDEX: 45.99 KG/M2 | HEIGHT: 67 IN | HEART RATE: 70 BPM

## 2021-03-02 DIAGNOSIS — R68.81 EARLY SATIETY: ICD-10-CM

## 2021-03-02 DIAGNOSIS — Z90.49 STATUS POST SMALL BOWEL RESECTION: ICD-10-CM

## 2021-03-02 DIAGNOSIS — M25.50 ARTHRALGIA, UNSPECIFIED JOINT: ICD-10-CM

## 2021-03-02 DIAGNOSIS — R11.10 REGURGITATION OF FOOD: ICD-10-CM

## 2021-03-02 DIAGNOSIS — L73.2 HIDRADENITIS SUPPURATIVA: ICD-10-CM

## 2021-03-02 DIAGNOSIS — R11.0 NAUSEA: ICD-10-CM

## 2021-03-02 DIAGNOSIS — K50.111 CROHN'S DISEASE OF LARGE INTESTINE WITH RECTAL BLEEDING (HCC): Primary | ICD-10-CM

## 2021-03-02 DIAGNOSIS — Z79.899 HIGH RISK MEDICATION USE: ICD-10-CM

## 2021-03-02 PROCEDURE — 99215 OFFICE O/P EST HI 40 MIN: CPT | Performed by: NURSE PRACTITIONER

## 2021-03-02 RX ORDER — PREDNISONE 1 MG/1
TABLET ORAL
Qty: 65 TABLET | Refills: 0 | Status: SHIPPED | OUTPATIENT
Start: 2021-03-02 | End: 2021-07-08

## 2021-03-02 NOTE — PROGRESS NOTES
Chief Complaint   Patient presents with   • Follow-up     Crohn's disease, recently restarted Humira.           History of Present Illness  35-year-old female presents today for follow-up.  Last visit December 14, 2020.  She has a diagnosis of Crohn's disease and is status post laparoscopic ileocecectomy November 4, 2020 for suspected Crohn's stricture.  Pathology revealed vascular congestion and reactive hyperplasia, no granulomas.  She is a previous patient of Dr. Iqbal who has retired.  She restarted Humira January 2021 after she was cleared from colorectal surgery.    She still has rectal stricture and can experience rectal bleeding at times.  She can have pain and irritation at her rectum.  Bowel movements are small and ribbonlike.  She will use MiraLAX at times to help with the passage of stool but does not use MiraLAX on a regular basis.    She can experience abdominal bloating that occurs later in the day.  No specific food triggers.  Also early satiety.    She has acid reflux with regurgitation and vomiting of undigested food.  No previous evaluation for gastroparesis.    She will experience nausea.  This is a new symptom and has worsened over the past several months.  Nausea is present first thing in the morning and will last all day.  She also has new complaints of headache.  She does notice some nausea worse with movement.  No previous diagnosis of vertigo or evaluation for headaches.    She has ongoing joint pain that initially improved after she started Humira but worsened when she discontinued Humira recently.  She has been evaluated by rheumatology and work-up for rheumatoid arthritis was negative.  She was diagnosed with inflammatory arthritis secondary to Crohn's disease.  When she discontinued Humira her arthritis has been unbearable and did not improve when she recently restarted Humira.    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  She has not had recent B12 level checked.    She is scheduled for follow-up with rheumatology in 2 weeks.    She has boils in her axilla, below her breast and groin.  These were worse when she was off of Humira and since she has restarted Humira they have improved.  She does not have an official diagnosis of hidradenitis suppurativa but she is wondering if this is what she has.    She also reports syncope, autonomic dysfunction, new symptoms of headaches and orthostatic hypotension at times.  She is wondering if she needs a referral to a neurologist or another healthcare provider to see if all of the symptoms are connected in some way.    She has a prescription for Helen's Magic mouthwash for oral ulcers.    Review of Systems   Cardiovascular: Positive for palpitations.   Musculoskeletal: Positive for arthralgias, back pain, myalgias, neck pain and neck stiffness.   Skin:        boils   Neurological: Positive for dizziness, light-headedness and headaches.         Result Review :       Tissue Pathology Exam (11/04/2020 11:10)      Vital Signs:   /74   Pulse 70   Temp 98 °F (36.7 °C)   Resp 16   Ht 170.2 cm (67\")   Wt 135 kg (296 lb 11.2 oz)   SpO2 99%   BMI 46.47 kg/m²     Body mass index is 46.47 kg/m².     Physical Exam  Vitals signs reviewed.   Constitutional:       General: She is not in acute distress.     Appearance: Normal appearance. She is obese. She is not ill-appearing or diaphoretic.   Pulmonary:      Effort: Pulmonary effort is normal. No respiratory distress.   Skin:     Comments: Bilateral axilla and bilateral groin scars consistent with at bedtime.  No active nodules or tender areas.   Neurological:      Mental Status: She is alert.           Assessment and Plan    Diagnoses and all orders for this visit:    1. Crohn's disease of large intestine with rectal bleeding (CMS/HCC) (Primary)  -     " CBC & Differential  -     Comprehensive Metabolic Panel  -     Adalimumab+Ab (Serial Monitor)  -     QuantiFERON TB Gold  -     Vitamin B12  -     Vitamin D 25 Hydroxy  -     TSH Rfx On Abnormal To Free T4  -     Hepatitis B Surface Antigen  -     Hepatitis B Core Ab W/Reflex  -     Hepatitis B Surface Antibody  -     predniSONE (DELTASONE) 5 MG tablet; Taper: 30 mg x 3 days, 20 x 3 days, 15 x 3 days, 10 x 3 days, 5 x 14 days.  Dispense: 65 tablet; Refill: 0    2. Status post small bowel resection  -     CBC & Differential  -     Vitamin B12  -     Vitamin D 25 Hydroxy    3. High risk medication use  -     Adalimumab+Ab (Serial Monitor)  -     QuantiFERON TB Gold  -     Hepatitis B Surface Antigen  -     Hepatitis B Core Ab W/Reflex  -     Hepatitis B Surface Antibody    4. Regurgitation of food  -     NM Gastric Emptying; Future  -     NM HIDA Scan With Pharmacological Intervention; Future    5. Early satiety  -     NM Gastric Emptying; Future  -     NM HIDA Scan With Pharmacological Intervention; Future    6. Nausea  -     hCG, Serum, Qualitative; Future    7. Arthralgia, unspecified joint  -     predniSONE (DELTASONE) 5 MG tablet; Taper: 30 mg x 3 days, 20 x 3 days, 15 x 3 days, 10 x 3 days, 5 x 14 days.  Dispense: 65 tablet; Refill: 0    8. Hidradenitis suppurativa       Will start prednisone taper for worsening joint pain, side effects and precautions discussed in detail.    For nausea, regurgitation and reflux, will check gastric emptying study and HIDA scan.  Previous right upper quadrant ultrasound was unremarkable.    For nausea, will check pregnancy test.  She has an IUD and has unpredictable menstrual cycles.    We will check Humira drug level.  To consider weekly Humira however recent surgical findings as well as histologic findings did not support active inflammatory bowel disease however given diagnosis of hidradenitis suppurativa and worsening joint pain, we could consider weekly Humira as  discussed.    For numbness and tingling in her arms and feet, will check B12 level, orders placed.  We will also update thyroid and vitamin D for monitoring, orders placed.    We will discuss today's office visit with Dr. Escalona and make additional recommendations based on this discussion.      Follow Up   No follow-ups on file.    Patient Instructions   Obtain blood work, orders placed.    Schedule gastric emptying study and HIDA scan for further evaluation of symptoms.    Start MiraLAX, daily, adjust dose as needed to help promote more complete bowel movements.    For joint pain, start prednisone taper, take as directed.    Follow-up with rheumatology as scheduled.    We will discuss today's office visit with Dr. Escalona and contact you with additional recommendations and possible referral.

## 2021-03-12 LAB
25(OH)D3+25(OH)D2 SERPL-MCNC: 29.1 NG/ML (ref 30–100)
ADALIMUMAB AB SERPL-MCNC: <25 NG/ML
ADALIMUMAB SERPL-MCNC: 4.1 UG/ML
ALBUMIN SERPL-MCNC: 4.5 G/DL (ref 3.8–4.8)
ALBUMIN/GLOB SERPL: 1.4 {RATIO} (ref 1.2–2.2)
ALP SERPL-CCNC: 83 IU/L (ref 39–117)
ALT SERPL-CCNC: 54 IU/L (ref 0–32)
AST SERPL-CCNC: 47 IU/L (ref 0–40)
BASOPHILS # BLD AUTO: 0 X10E3/UL (ref 0–0.2)
BASOPHILS NFR BLD AUTO: 1 %
BILIRUB SERPL-MCNC: 0.4 MG/DL (ref 0–1.2)
BUN SERPL-MCNC: 14 MG/DL (ref 6–20)
BUN/CREAT SERPL: 19 (ref 9–23)
CALCIUM SERPL-MCNC: 9.8 MG/DL (ref 8.7–10.2)
CHLORIDE SERPL-SCNC: 101 MMOL/L (ref 96–106)
CO2 SERPL-SCNC: 25 MMOL/L (ref 20–29)
CREAT SERPL-MCNC: 0.75 MG/DL (ref 0.57–1)
EOSINOPHIL # BLD AUTO: 0.2 X10E3/UL (ref 0–0.4)
EOSINOPHIL NFR BLD AUTO: 2 %
ERYTHROCYTE [DISTWIDTH] IN BLOOD BY AUTOMATED COUNT: 12.2 % (ref 11.7–15.4)
GAMMA INTERFERON BACKGROUND BLD IA-ACNC: 0.07 IU/ML
GLOBULIN SER CALC-MCNC: 3.2 G/DL (ref 1.5–4.5)
GLUCOSE SERPL-MCNC: 80 MG/DL (ref 65–99)
HBV CORE AB SERPL QL IA: NEGATIVE
HBV SURFACE AB SER QL: REACTIVE
HBV SURFACE AG SERPL QL IA: NEGATIVE
HCT VFR BLD AUTO: 41.8 % (ref 34–46.6)
HGB BLD-MCNC: 13.8 G/DL (ref 11.1–15.9)
IMM GRANULOCYTES # BLD AUTO: 0 X10E3/UL (ref 0–0.1)
IMM GRANULOCYTES NFR BLD AUTO: 0 %
LYMPHOCYTES # BLD AUTO: 2.8 X10E3/UL (ref 0.7–3.1)
LYMPHOCYTES NFR BLD AUTO: 37 %
M TB IFN-G BLD-IMP: NEGATIVE
M TB IFN-G CD4+ BCKGRND COR BLD-ACNC: 0.17 IU/ML
M TB IFN-G CD4+CD8+ BCKGRND COR BLD-ACNC: 0.16 IU/ML
MCH RBC QN AUTO: 29.4 PG (ref 26.6–33)
MCHC RBC AUTO-ENTMCNC: 33 G/DL (ref 31.5–35.7)
MCV RBC AUTO: 89 FL (ref 79–97)
MITOGEN IGNF BLD-ACNC: >10 IU/ML
MONOCYTES # BLD AUTO: 0.7 X10E3/UL (ref 0.1–0.9)
MONOCYTES NFR BLD AUTO: 9 %
NEUTROPHILS # BLD AUTO: 4 X10E3/UL (ref 1.4–7)
NEUTROPHILS NFR BLD AUTO: 51 %
PLATELET # BLD AUTO: 393 X10E3/UL (ref 150–450)
POTASSIUM SERPL-SCNC: 4.8 MMOL/L (ref 3.5–5.2)
PROT SERPL-MCNC: 7.7 G/DL (ref 6–8.5)
QUANTIFERON INCUBATION: NORMAL
RBC # BLD AUTO: 4.7 X10E6/UL (ref 3.77–5.28)
SERVICE CMNT-IMP: NORMAL
SODIUM SERPL-SCNC: 141 MMOL/L (ref 134–144)
TSH SERPL DL<=0.005 MIU/L-ACNC: 1.91 UIU/ML (ref 0.45–4.5)
VIT B12 SERPL-MCNC: 1017 PG/ML (ref 232–1245)
WBC # BLD AUTO: 7.7 X10E3/UL (ref 3.4–10.8)

## 2021-03-15 ENCOUNTER — HOSPITAL ENCOUNTER (OUTPATIENT)
Dept: NUCLEAR MEDICINE | Facility: HOSPITAL | Age: 36
Discharge: HOME | End: 2021-03-15

## 2021-03-15 DIAGNOSIS — R11.10 REGURGITATION OF FOOD: ICD-10-CM

## 2021-03-15 DIAGNOSIS — R68.81 EARLY SATIETY: ICD-10-CM

## 2021-03-15 PROCEDURE — 0 TECHNETIUM TC 99M MEBROFENIN KIT: Performed by: NURSE PRACTITIONER

## 2021-03-15 PROCEDURE — 25010000002 SINCALIDE PER 5 MCG: Performed by: NURSE PRACTITIONER

## 2021-03-15 PROCEDURE — 78227 HEPATOBIL SYST IMAGE W/DRUG: CPT

## 2021-03-15 PROCEDURE — A9537 TC99M MEBROFENIN: HCPCS | Performed by: NURSE PRACTITIONER

## 2021-03-15 RX ORDER — KIT FOR THE PREPARATION OF TECHNETIUM TC 99M MEBROFENIN 45 MG/10ML
1 INJECTION, POWDER, LYOPHILIZED, FOR SOLUTION INTRAVENOUS
Status: COMPLETED | OUTPATIENT
Start: 2021-03-15 | End: 2021-03-15

## 2021-03-15 RX ADMIN — SINCALIDE 2.7 MCG: 5 INJECTION, POWDER, LYOPHILIZED, FOR SOLUTION INTRAVENOUS at 09:15

## 2021-03-15 RX ADMIN — MEBROFENIN 1 DOSE: 45 INJECTION, POWDER, LYOPHILIZED, FOR SOLUTION INTRAVENOUS at 08:10

## 2021-03-16 ENCOUNTER — TELEPHONE (OUTPATIENT)
Dept: GASTROENTEROLOGY | Facility: CLINIC | Age: 36
End: 2021-03-16

## 2021-03-16 DIAGNOSIS — K50.111 CROHN'S DISEASE OF LARGE INTESTINE WITH RECTAL BLEEDING (HCC): Primary | ICD-10-CM

## 2021-03-16 DIAGNOSIS — R79.89 ELEVATED LFTS: ICD-10-CM

## 2021-03-16 NOTE — TELEPHONE ENCOUNTER
AlSo I spoke with patient earlier today on the phone.    She needs repeat liver function test in 1 week.    I have placed the order in the computer and patient is aware.    Will you please mail the blood test order form from the chart to her home address.  Thank you.    Chrissy

## 2021-03-16 NOTE — TELEPHONE ENCOUNTER
[facility] is calling for a Humira refill.  Is she continuing on Humira Q two weeks?   Please advise.

## 2021-03-18 DIAGNOSIS — K50.111 CROHN'S DISEASE OF LARGE INTESTINE WITH RECTAL BLEEDING (HCC): Primary | ICD-10-CM

## 2021-03-18 DIAGNOSIS — L73.2 HIDRADENITIS SUPPURATIVA: ICD-10-CM

## 2021-03-18 RX ORDER — ADALIMUMAB 40MG/0.4ML
40 KIT SUBCUTANEOUS
Qty: 4 EACH | Refills: 6 | Status: SHIPPED | OUTPATIENT
Start: 2021-03-18 | End: 2021-09-16

## 2021-03-22 ENCOUNTER — TELEPHONE (OUTPATIENT)
Dept: GASTROENTEROLOGY | Facility: CLINIC | Age: 36
End: 2021-03-22

## 2021-03-22 NOTE — TELEPHONE ENCOUNTER
Patient was seen last week for a f/u for her Crohn's , patient was advised to see her rheumatologist and get his or her option regarding patient starting sulfasalazine. Rheumatologist approved pt starting the med but would want patient to d/c the mesalamine . Ok to d/c current regiment? Please advise     Ms.[email or web address]  [phone]

## 2021-03-24 DIAGNOSIS — K50.111 CROHN'S DISEASE OF LARGE INTESTINE WITH RECTAL BLEEDING (HCC): Primary | ICD-10-CM

## 2021-03-24 DIAGNOSIS — Z79.899 HIGH RISK MEDICATION USE: ICD-10-CM

## 2021-03-24 DIAGNOSIS — K50.111 CROHN'S DISEASE OF LARGE INTESTINE WITH RECTAL BLEEDING (HCC): ICD-10-CM

## 2021-03-24 RX ORDER — FOLIC ACID 1 MG/1
1 TABLET ORAL DAILY
Qty: 90 TABLET | Refills: 0 | Status: SHIPPED | OUTPATIENT
Start: 2021-03-24 | End: 2021-06-25

## 2021-03-24 RX ORDER — SULFASALAZINE 500 MG/1
TABLET ORAL
Qty: 84 TABLET | Refills: 0 | Status: SHIPPED | OUTPATIENT
Start: 2021-03-24 | End: 2021-04-01

## 2021-03-24 NOTE — TELEPHONE ENCOUNTER
This morning I Sent [employer] message to patient to discuss sulfasalazine and joint pain and I will complete this task. Chrissy

## 2021-03-25 DIAGNOSIS — K50.111 CROHN'S DISEASE OF LARGE INTESTINE WITH RECTAL BLEEDING (HCC): ICD-10-CM

## 2021-03-25 DIAGNOSIS — Z79.899 HIGH RISK MEDICATION USE: ICD-10-CM

## 2021-04-01 RX ORDER — MESALAMINE 0.38 G/1
725 CAPSULE, EXTENDED RELEASE ORAL DAILY
Qty: 60 CAPSULE | Refills: 5 | Status: SHIPPED | OUTPATIENT
Start: 2021-04-01 | End: 2021-05-03 | Stop reason: SDUPTHER

## 2021-04-01 NOTE — TELEPHONE ENCOUNTER
Please contact patient and let her know that she can restart the Apriso.    She does not need to restart prednisone.  Lets see what happens when she restarts Apriso.    She does not need blood work since we are not starting a new medication.  After she starts Apriso, would give the medication 14 days to see if symptoms improve.    If she is having constipation, she can use MiraLAX or stool softener to help with the passage.  Thank you.  Chrissy

## 2021-04-01 NOTE — TELEPHONE ENCOUNTER
Patient would like to restart Apriso. [facility] confirmed. Medication pended for approval.

## 2021-04-01 NOTE — TELEPHONE ENCOUNTER
----- Message from Bri Pfeiffer sent at 3/31/2021  6:11 PM EDT -----  Regarding: Prescription Question  Contact: [phone]  Annette Lemon!    So, apparently sulfasalazine has been on backorder for about a year and [facility] pharmacy has no clue when they will have it again. Looks like we will have to scratch that plan. I have been SUPER inflammed and having a lot of pain and inability to pass stool since stopping the Apriso. I am also off of prednisone now. Should I start one or both back up?     Do I still need to do bloodwork next week since I can't start the Sulfasalazine?     Thanks for your help!  Bri

## 2021-04-05 ENCOUNTER — TELEPHONE (OUTPATIENT)
Dept: GASTROENTEROLOGY | Facility: CLINIC | Age: 36
End: 2021-04-05

## 2021-04-05 NOTE — TELEPHONE ENCOUNTER
Patient called stating that the Sulfasalazine was on backorder so she filled the Mesalamine. Patient states that she has been taking the Mesalamine and now the Sulfasalazine is available and not on back order. She wants to know if she should continue the mesalamine or switch to the Sulfasalazine. Please advise.

## 2021-04-05 NOTE — TELEPHONE ENCOUNTER
We restarted the mesalamine because she was having symptoms since she discontinued it.  She should restart the mesalamine for 1 month and then let me know and we can switch to sulfasalazine at that time.  Thank you.  Chrissy

## 2021-04-15 DIAGNOSIS — K50.111 CROHN'S DISEASE OF LARGE INTESTINE WITH RECTAL BLEEDING (HCC): ICD-10-CM

## 2021-04-16 NOTE — TELEPHONE ENCOUNTER
Ly, please contact patient.  Last month, this medication was on backorder and she was having symptoms so we restarted her other mesalamine.  I do not think she needs a refill of this medication.  Please contact patient to clarify.  Thank you.

## 2021-04-21 RX ORDER — SULFASALAZINE 500 MG/1
TABLET ORAL
Qty: 84 TABLET | Refills: 0 | OUTPATIENT
Start: 2021-04-21

## 2021-04-30 ENCOUNTER — PATIENT MESSAGE (OUTPATIENT)
Dept: GASTROENTEROLOGY | Facility: CLINIC | Age: 36
End: 2021-04-30

## 2021-05-03 ENCOUNTER — TELEPHONE (OUTPATIENT)
Dept: GASTROENTEROLOGY | Facility: CLINIC | Age: 36
End: 2021-05-03

## 2021-05-03 DIAGNOSIS — R20.0 NUMBNESS AND TINGLING: Primary | ICD-10-CM

## 2021-05-03 DIAGNOSIS — R20.2 NUMBNESS AND TINGLING: Primary | ICD-10-CM

## 2021-05-03 DIAGNOSIS — K50.111 CROHN'S DISEASE OF LARGE INTESTINE WITH RECTAL BLEEDING (HCC): Primary | ICD-10-CM

## 2021-05-03 RX ORDER — MESALAMINE 0.38 G/1
1500 CAPSULE, EXTENDED RELEASE ORAL DAILY
Qty: 120 CAPSULE | Refills: 5 | Status: SHIPPED | OUTPATIENT
Start: 2021-05-03 | End: 2021-12-06

## 2021-05-03 NOTE — TELEPHONE ENCOUNTER
Patient sent a message in that when Apriso was last filled it was only filled for two pills daily but she thought she had been taking four pills daily. When I went in here to re-fill it for her it pre loads as two pills daily. Please advise correct dosage. Thanks

## 2021-05-07 ENCOUNTER — HOSPITAL ENCOUNTER (OUTPATIENT)
Dept: NUCLEAR MEDICINE | Facility: HOSPITAL | Age: 36
Discharge: HOME | End: 2021-05-07

## 2021-05-07 DIAGNOSIS — R68.81 EARLY SATIETY: ICD-10-CM

## 2021-05-07 DIAGNOSIS — R11.10 REGURGITATION OF FOOD: ICD-10-CM

## 2021-05-07 PROCEDURE — 0 TECHNETIUM SULFUR COLLOID: Performed by: NURSE PRACTITIONER

## 2021-05-07 PROCEDURE — 78264 GASTRIC EMPTYING IMG STUDY: CPT

## 2021-05-07 PROCEDURE — A9541 TC99M SULFUR COLLOID: HCPCS | Performed by: NURSE PRACTITIONER

## 2021-05-07 RX ADMIN — TECHNETIUM TC 99M SULFUR COLLOID 1 DOSE: KIT at 07:55

## 2021-06-25 DIAGNOSIS — K50.111 CROHN'S DISEASE OF LARGE INTESTINE WITH RECTAL BLEEDING (HCC): ICD-10-CM

## 2021-06-25 DIAGNOSIS — Z79.899 HIGH RISK MEDICATION USE: ICD-10-CM

## 2021-06-25 RX ORDER — FOLIC ACID 1 MG/1
TABLET ORAL
Qty: 90 TABLET | Refills: 0 | Status: SHIPPED | OUTPATIENT
Start: 2021-06-25 | End: 2022-11-17

## 2021-07-05 ENCOUNTER — APPOINTMENT (OUTPATIENT)
Dept: GENERAL RADIOLOGY | Facility: HOSPITAL | Age: 36
End: 2021-07-05

## 2021-07-05 PROCEDURE — 73610 X-RAY EXAM OF ANKLE: CPT | Performed by: EMERGENCY MEDICINE

## 2021-07-07 NOTE — PROGRESS NOTES
Subjective   Bri Pfeiffer is a 35 y.o. female.     History of Present Illness     Chief Complaint:   Chief Complaint   Patient presents with   • Depression   • Anxiety   • Allergic Rhinitis       Bri Pfeiffer 35 y.o. female who presents today for Medical Management of the below listed issues and medication refills.  she has a problem list of   Patient Active Problem List   Diagnosis   • Pericardial cyst   • Vasodepressor syncope   • Esophageal dysphagia   • Rectal bleeding   • Colitis   • Gastroesophageal reflux disease   • Crohn's disease of large intestine with rectal bleeding (CMS/HCC)   • Other irritable bowel syndrome   • Skin rash   • Moderate episode of recurrent major depressive disorder (CMS/HCC)   • Anxiety   • Low back pain with sciatica   • Ileocecal valve stenosis   • Crohn's disease (CMS/HCC)   • BMI 40.0-44.9, adult (CMS/HCC)   • Status post small bowel resection   • Fibromyalgia   • Non-seasonal allergic rhinitis due to pollen   .  Since the last visit, she has overall felt well, although her rheumatologist dx her with Fibromyalgia and pt needs help with sx control.   she has been compliant with   Current Outpatient Medications:   •  acetaminophen (Tylenol 8 Hour) 650 MG 8 hr tablet, Take 2 tablets by mouth Every 8 (Eight) Hours., Disp: , Rfl:   •  Adalimumab (Humira Pen) 40 MG/0.4ML Pen-injector Kit, Inject 40 mg under the skin into the appropriate area as directed Every 7 (Seven) Days. Dispense one kit = four pens for monthly dose, Disp: 4 each, Rfl: 6  •  albuterol sulfate HFA (PROAIR HFA) 108 (90 Base) MCG/ACT inhaler, Inhale 2 puffs Every 4 (Four) Hours As Needed., Disp: , Rfl:   •  cetirizine (ZYRTEC ALLERGY) 10 MG tablet, Take 10 mg by mouth Daily., Disp: , Rfl:   •  Cholecalciferol (VITAMIN D-3) 5000 units tablet, Take 5,000 Units by mouth Daily., Disp: , Rfl:   •  Cyanocobalamin (VITAMIN B 12 PO), Take 1 tablet by mouth Daily., Disp: , Rfl:   •  dicyclomine (Bentyl) 20  "MG tablet, Take 1 tablet by mouth 3 (Three) Times a Day As Needed (Cramping)., Disp: 90 tablet, Rfl: 2  •  diphenoxylate-atropine (LOMOTIL) 2.5-0.025 MG per tablet, Take 1 tablet by mouth 4 (Four) Times a Day As Needed for Diarrhea., Disp: 120 tablet, Rfl: 3  •  esomeprazole (nexIUM) 40 MG capsule, Take 1 capsule by mouth Daily., Disp: 30 capsule, Rfl: 5  •  ferrous sulfate 324 (65 Fe) MG tablet delayed-release EC tablet, Take 324 mg by mouth Daily With Breakfast., Disp: , Rfl:   •  fluticasone (FLONASE ALLERGY RELIEF) 50 MCG/ACT nasal spray, 2 sprays into the nostril(s) as directed by provider Daily., Disp: , Rfl:   •  folic acid (FOLVITE) 1 MG tablet, TAKE ONE TABLET BY MOUTH DAILY, Disp: 90 tablet, Rfl: 0  •  mesalamine (Apriso) 0.375 g 24 hr capsule, Take 4 capsules by mouth Daily., Disp: 120 capsule, Rfl: 5  •  Multiple Vitamin (MULTIVITAMIN PO), Take 1 tablet by mouth Daily., Disp: , Rfl:   •  polyethylene glycol (MiraLax) 17 GM/SCOOP powder, Take  by mouth., Disp: , Rfl:   •  spironolactone (ALDACTONE) 100 MG tablet, Take 100 mg by mouth Daily., Disp: , Rfl:   •  montelukast (Singulair) 10 MG tablet, Take 1 tablet by mouth Daily., Disp: 90 tablet, Rfl: 3  •  pregabalin (Lyrica) 50 MG capsule, Take 1 capsule by mouth 3 (Three) Times a Day., Disp: 90 capsule, Rfl: 1  •  sertraline (Zoloft) 100 MG tablet, Take 1.5 tablets by mouth Daily., Disp: 135 tablet, Rfl: 3.  she denies medication side effects.    All of the other chronic condition(s) listed above are stable w/o issues.    /80   Pulse 92   Temp 97.9 °F (36.6 °C)   Resp 16   Ht 170.2 cm (67\")   Wt (!) 137 kg (303 lb)   SpO2 98%   BMI 47.46 kg/m²     Results for orders placed or performed in visit on 03/02/21   Comprehensive Metabolic Panel    Specimen: Blood   Result Value Ref Range    Glucose 80 65 - 99 mg/dL    BUN 14 6 - 20 mg/dL    Creatinine 0.75 0.57 - 1.00 mg/dL    eGFR Non African Am 104 >59 mL/min/1.73    eGFR African Am 120 >59 " mL/min/1.73    BUN/Creatinine Ratio 19 9 - 23    Sodium 141 134 - 144 mmol/L    Potassium 4.8 3.5 - 5.2 mmol/L    Chloride 101 96 - 106 mmol/L    Total CO2 25 20 - 29 mmol/L    Calcium 9.8 8.7 - 10.2 mg/dL    Total Protein 7.7 6.0 - 8.5 g/dL    Albumin 4.5 3.8 - 4.8 g/dL    Globulin 3.2 1.5 - 4.5 g/dL    A/G Ratio 1.4 1.2 - 2.2    Total Bilirubin 0.4 0.0 - 1.2 mg/dL    Alkaline Phosphatase 83 39 - 117 IU/L    AST (SGOT) 47 (H) 0 - 40 IU/L    ALT (SGPT) 54 (H) 0 - 32 IU/L   Adalimumab+Ab (Serial Monitor)    Specimen: Blood   Result Value Ref Range    Adalimumab Drug Level 4.1 ug/mL    Adalimumab Antibody <25 ng/mL   QuantiFERON TB Gold    Specimen: Blood   Result Value Ref Range    QuantiFERON Incubation Incubation performed.     QuantiFERON Criteria Comment     QUANTIFERON TB1 AG VALUE 0.17 IU/mL    QUANTIFERON TB2 AG VALUE 0.16 IU/mL    QuantiFERON Nil Value 0.07 IU/mL    QuantiFERON Mitogen Value >10.00 IU/mL    QUANTIFERON-TB GOLD PLUS Negative Negative   Vitamin B12    Specimen: Blood   Result Value Ref Range    Vitamin B-12 1,017 232 - 1,245 pg/mL   Vitamin D 25 Hydroxy    Specimen: Blood   Result Value Ref Range    25 Hydroxy, Vitamin D 29.1 (L) 30.0 - 100.0 ng/mL   TSH Rfx On Abnormal To Free T4    Specimen: Blood   Result Value Ref Range    TSH 1.910 0.450 - 4.500 uIU/mL   Hepatitis B Surface Antigen    Specimen: Blood   Result Value Ref Range    Hepatitis B Surface Ag Negative Negative   Hepatitis B Core Ab W/Reflex    Specimen: Blood   Result Value Ref Range    Hep B Core Total Ab Negative Negative   Hepatitis B Surface Antibody    Specimen: Blood   Result Value Ref Range    Hep B S Ab Reactive    CBC & Differential    Specimen: Blood   Result Value Ref Range    WBC 7.7 3.4 - 10.8 x10E3/uL    RBC 4.70 3.77 - 5.28 x10E6/uL    Hemoglobin 13.8 11.1 - 15.9 g/dL    Hematocrit 41.8 34.0 - 46.6 %    MCV 89 79 - 97 fL    MCH 29.4 26.6 - 33.0 pg    MCHC 33.0 31.5 - 35.7 g/dL    RDW 12.2 11.7 - 15.4 %    Platelets  393 150 - 450 x10E3/uL    Neutrophil Rel % 51 Not Estab. %    Lymphocyte Rel % 37 Not Estab. %    Monocyte Rel % 9 Not Estab. %    Eosinophil Rel % 2 Not Estab. %    Basophil Rel % 1 Not Estab. %    Neutrophils Absolute 4.0 1.4 - 7.0 x10E3/uL    Lymphocytes Absolute 2.8 0.7 - 3.1 x10E3/uL    Monocytes Absolute 0.7 0.1 - 0.9 x10E3/uL    Eosinophils Absolute 0.2 0.0 - 0.4 x10E3/uL    Basophils Absolute 0.0 0.0 - 0.2 x10E3/uL    Immature Granulocyte Rel % 0 Not Estab. %    Immature Grans Absolute 0.0 0.0 - 0.1 x10E3/uL           The following portions of the patient's history were reviewed and updated as appropriate: allergies, current medications, past family history, past medical history, past social history, past surgical history and problem list.    Review of Systems   Constitutional: Negative for activity change, chills and fever.   Respiratory: Negative for cough.    Cardiovascular: Negative for chest pain.   Musculoskeletal: Positive for myalgias.   Psychiatric/Behavioral: Negative for dysphoric mood.       Objective   Physical Exam  Constitutional:       General: She is not in acute distress.     Appearance: She is well-developed.   Cardiovascular:      Rate and Rhythm: Normal rate and regular rhythm.   Pulmonary:      Effort: Pulmonary effort is normal.      Breath sounds: Normal breath sounds.   Neurological:      Mental Status: She is alert and oriented to person, place, and time.   Psychiatric:         Behavior: Behavior normal.         Thought Content: Thought content normal.         Assessment/Plan   Diagnoses and all orders for this visit:    1. Moderate episode of recurrent major depressive disorder (CMS/[facility]) (Primary)  -     sertraline (Zoloft) 100 MG tablet; Take 1.5 tablets by mouth Daily.  Dispense: 135 tablet; Refill: 3    2. Anxiety  -     sertraline (Zoloft) 100 MG tablet; Take 1.5 tablets by mouth Daily.  Dispense: 135 tablet; Refill: 3    3. Non-seasonal allergic rhinitis due to pollen  -      montelukast (Singulair) 10 MG tablet; Take 1 tablet by mouth Daily.  Dispense: 90 tablet; Refill: 3    4. Fibromyalgia  -     pregabalin (Lyrica) 50 MG capsule; Take 1 capsule by mouth 3 (Three) Times a Day.  Dispense: 90 capsule; Refill: 1

## 2021-07-08 ENCOUNTER — OFFICE VISIT (OUTPATIENT)
Dept: FAMILY MEDICINE CLINIC | Facility: CLINIC | Age: 36
End: 2021-07-08

## 2021-07-08 ENCOUNTER — OFFICE VISIT (OUTPATIENT)
Dept: NEUROLOGY | Facility: CLINIC | Age: 36
End: 2021-07-08

## 2021-07-08 VITALS
HEIGHT: 67 IN | OXYGEN SATURATION: 98 % | DIASTOLIC BLOOD PRESSURE: 90 MMHG | HEART RATE: 73 BPM | SYSTOLIC BLOOD PRESSURE: 116 MMHG | WEIGHT: 293 LBS | BODY MASS INDEX: 45.99 KG/M2

## 2021-07-08 VITALS
TEMPERATURE: 97.9 F | RESPIRATION RATE: 16 BRPM | DIASTOLIC BLOOD PRESSURE: 80 MMHG | HEART RATE: 92 BPM | HEIGHT: 67 IN | BODY MASS INDEX: 45.99 KG/M2 | WEIGHT: 293 LBS | SYSTOLIC BLOOD PRESSURE: 121 MMHG | OXYGEN SATURATION: 98 %

## 2021-07-08 DIAGNOSIS — M79.7 FIBROMYALGIA: Chronic | ICD-10-CM

## 2021-07-08 DIAGNOSIS — F41.9 ANXIETY: Chronic | ICD-10-CM

## 2021-07-08 DIAGNOSIS — F33.1 MODERATE EPISODE OF RECURRENT MAJOR DEPRESSIVE DISORDER (HCC): Primary | Chronic | ICD-10-CM

## 2021-07-08 DIAGNOSIS — R27.0 ATAXIA: Primary | ICD-10-CM

## 2021-07-08 DIAGNOSIS — J30.1 NON-SEASONAL ALLERGIC RHINITIS DUE TO POLLEN: Chronic | ICD-10-CM

## 2021-07-08 PROCEDURE — 99244 OFF/OP CNSLTJ NEW/EST MOD 40: CPT | Performed by: PSYCHIATRY & NEUROLOGY

## 2021-07-08 PROCEDURE — 99214 OFFICE O/P EST MOD 30 MIN: CPT | Performed by: FAMILY MEDICINE

## 2021-07-08 RX ORDER — POLYETHYLENE GLYCOL 3350 17 G/17G
POWDER, FOR SOLUTION ORAL
COMMUNITY

## 2021-07-08 RX ORDER — SENNOSIDES 8.6 MG
2 CAPSULE ORAL EVERY 8 HOURS
COMMUNITY
End: 2023-02-15

## 2021-07-08 RX ORDER — SERTRALINE HYDROCHLORIDE 100 MG/1
150 TABLET, FILM COATED ORAL DAILY
Qty: 135 TABLET | Refills: 3 | Status: SHIPPED | OUTPATIENT
Start: 2021-07-08 | End: 2021-10-21

## 2021-07-08 RX ORDER — MONTELUKAST SODIUM 10 MG/1
10 TABLET ORAL DAILY
Qty: 90 TABLET | Refills: 3 | Status: SHIPPED | OUTPATIENT
Start: 2021-07-08 | End: 2022-09-19

## 2021-07-08 RX ORDER — PREGABALIN 50 MG/1
50 CAPSULE ORAL 3 TIMES DAILY
Qty: 90 CAPSULE | Refills: 1 | Status: SHIPPED | OUTPATIENT
Start: 2021-07-08 | End: 2021-08-11 | Stop reason: SDUPTHER

## 2021-07-08 NOTE — PROGRESS NOTES
CC: Numbness    HPI:  Bri Pfeiffer is a  35 y.o.  right-handed white female who was sent for neurological consultation by Xochilt Mcgovern NP regarding numbness.  The patient describes numbness in both upper extremities more so in the left arm than the right and has been diagnosed with a bilateral carpal tunnel syndrome and left cubital tunnel syndrome based on an EMG done by Dr. Newell.  Both median motor distal latencies were mildly prolonged at 4.5 ms with normal conduction velocities in the forearms.  The amplitude was normal in the right but a little low on the left at 4 mV.  Both ulnar motor studies were obtained and there were normal conduction velocities above and below the elbow on the right but on the left the standard study showed some difference at 58.9 m/s below the elbow with 52.9 m/s across the elbow and so the study was repeated recording over the first dorsal interosseous showing a slow velocity across the elbow at 46.6 m/s and a normal velocity below the elbow at 55.9 m/s.    In addition she describes numbness in the left foot.  She describes numbness if she is sitting for prolonged period of time or sitting on the toilet.  The left leg gets numb.  She has daily notation of some left foot numbness mostly on the top and including all of the toes.  She actually struck the top of the left foot a few months ago causing shooting pains on the top of the foot into the toes but this pain has gotten better but she still has some numbness.  She says that on the stairs the left quad wants to give out on her at times but it is hard for her to tell when this is just from pain from her fibromyalgia or other causes.  She has had an MRI of the lumbar spine at [facility] as well as a pelvis MRI.  She states that there were not significant root compressions described.  The study was done end of 2019 or early 2020.  She has rheumatoid arthritis and involvement of the hip as well as SI joint.  She is  followed by Dr. Estephanie Thao for her rheumatoid arthritis.    The patient has complaints of balance issues which seem to be not really related to any dizziness although she does describe dizziness at times he does seem to be related to falls.  Within the past week or so she fell and sprained her right ankle and was seen at immediate care.  X-rays did not show a fracture.  She has a follow-up with her primary physician today.  She indicates that when she falls she typically falls of the left.    She also has history of syncopal episodes for many years.  She was seen by cardiology for this and it was felt that she had vasodepressor syncope.  She avoids loss of consciousness pretty well by quickly sitting down when she starts feeling dizzy.  She gets sweaty with the episodes in addition.    The patient has Crohn's disease rheumatoid arthritis chronic fatigue and fibromyalgia.  She required a recent small bowel resection for Crohn's disease.  Most recent vitamin B12 level was in March and was over 1000.  She is on oral B12 supplementation        Past Medical History:   Diagnosis Date   • Allergies    • Anemia    • Anxiety    • Asthma    • Crohn's disease (CMS/HCC) 05/2019   • Depression    • Eczema    • GERD (gastroesophageal reflux disease)    • IBS (irritable bowel syndrome)    • Irregular heartbeat     OCCAS   • Lactose intolerance    • Pericardial cyst    • Rheumatoid arthritis (CMS/HCC)    • Sensitive skin    • Spondylosis    • Syncope          Past Surgical History:   Procedure Laterality Date   • ADENOIDECTOMY     • COLON RESECTION N/A 11/4/2020    Procedure: laparoscopic ileocecectomy;  Surgeon: Lisa Kerr MD;  Location: [facility] OR;  Service: General;  Laterality: N/A;   • COLONOSCOPY N/A 5/10/2019    Procedure: COLONOSCOPY TO CECUM TO TERMINAL ILEUM WITH BIOPSY;  Surgeon: Tae Turcios MD;  Location: [facility] ENDOSCOPY;  Service: Gastroenterology   • ENDOSCOPY N/A 5/10/2019    Procedure:  ESOPHAGOGASTRODUODENOSCOPY WITH BIOPSY;  Surgeon: Tae Turcios MD;  Location: [facility] ENDOSCOPY;  Service: Gastroenterology   • ENDOSCOPY N/A 12/13/2019    Procedure: ESOPHAGOGASTRODUODENOSCOPY WITH COLD BIOPSIES;  Surgeon: Tae Turcios MD;  Location: [facility] ENDOSCOPY;  Service: Gastroenterology   • LIPOMA EXCISION  2007   • SIGMOIDOSCOPY N/A 12/13/2019    Procedure: SIGMOIDOSCOPY FLEXIBLE TO T.I.;  Surgeon: Tae Turcios MD;  Location: [facility] ENDOSCOPY;  Service: Gastroenterology   • TONSILLECTOMY AND ADENOIDECTOMY     • WISDOM TOOTH EXTRACTION             Current Outpatient Medications:   •  Adalimumab (Humira Pen) 40 MG/0.4ML Pen-injector Kit, Inject 40 mg under the skin into the appropriate area as directed Every 7 (Seven) Days. Dispense one kit = four pens for monthly dose, Disp: 4 each, Rfl: 6  •  albuterol sulfate HFA (PROAIR HFA) 108 (90 Base) MCG/ACT inhaler, Inhale 2 puffs Every 4 (Four) Hours As Needed., Disp: , Rfl:   •  cetirizine (ZYRTEC ALLERGY) 10 MG tablet, Take 10 mg by mouth Daily., Disp: , Rfl:   •  Cholecalciferol (VITAMIN D-3) 5000 units tablet, Take 5,000 Units by mouth Daily., Disp: , Rfl:   •  Cyanocobalamin (VITAMIN B 12 PO), Take 1 tablet by mouth Daily., Disp: , Rfl:   •  dicyclomine (Bentyl) 20 MG tablet, Take 1 tablet by mouth 3 (Three) Times a Day As Needed (Cramping)., Disp: 90 tablet, Rfl: 2  •  diphenoxylate-atropine (LOMOTIL) 2.5-0.025 MG per tablet, Take 1 tablet by mouth 4 (Four) Times a Day As Needed for Diarrhea., Disp: 120 tablet, Rfl: 3  •  esomeprazole (nexIUM) 40 MG capsule, Take 1 capsule by mouth Daily., Disp: 30 capsule, Rfl: 5  •  ferrous sulfate 324 (65 Fe) MG tablet delayed-release EC tablet, Take 324 mg by mouth Daily With Breakfast., Disp: , Rfl:   •  fluticasone (FLONASE ALLERGY RELIEF) 50 MCG/ACT nasal spray, 2 sprays into the nostril(s) as directed by provider Daily., Disp: , Rfl:   •  folic acid (FOLVITE) 1 MG tablet, TAKE ONE TABLET BY MOUTH DAILY,  Disp: 90 tablet, Rfl: 0  •  mesalamine (Apriso) 0.375 g 24 hr capsule, Take 4 capsules by mouth Daily., Disp: 120 capsule, Rfl: 5  •  montelukast (Singulair) 10 MG tablet, Take 1 tablet by mouth Daily., Disp: 90 tablet, Rfl: 3  •  Multiple Vitamin (MULTIVITAMIN PO), Take 1 tablet by mouth Daily., Disp: , Rfl:   •  sertraline (Zoloft) 100 MG tablet, Take 1.5 tablets by mouth Daily. (Patient taking differently: Take 100 mg by mouth Daily.), Disp: 135 tablet, Rfl: 3  •  spironolactone (ALDACTONE) 100 MG tablet, Take 100 mg by mouth Daily., Disp: , Rfl:   •  acetaminophen (Tylenol 8 Hour) 650 MG 8 hr tablet, Take 2 tablets by mouth Every 8 (Eight) Hours., Disp: , Rfl:   •  polyethylene glycol (MiraLax) 17 GM/SCOOP powder, Take  by mouth., Disp: , Rfl:       Family History   Problem Relation Age of Onset   • Hypertension Father    • Skin cancer Father    • Cancer Maternal Grandmother         Colorectal   • Colon cancer Maternal Grandmother    • Prostate cancer Maternal Grandfather    • Prostate cancer Paternal Grandfather    • Hypertension Paternal Grandfather    • Hypertension Paternal Grandmother    • Malig Hyperthermia Neg Hx    • Colon polyps Neg Hx          Social History     Socioeconomic History   • Marital status: Single     Spouse name: Not on file   • Number of children: 0   • Years of education: Not on file   • Highest education level: Some college, no degree   Tobacco Use   • Smoking status: Never Smoker   • Smokeless tobacco: Never Used   • Tobacco comment: caffeine use a few days weekly   Substance and Sexual Activity   • Alcohol use: Not Currently   • Drug use: No   • Sexual activity: Defer         Allergies   Allergen Reactions   • Lactose Intolerance (Gi) GI Intolerance   • Levaquin [Levofloxacin] Hallucinations         Pain Scale: Variable.  Currently the right ankle hurts because she sprained it        ROS:  Review of Systems   Constitutional: Positive for fatigue. Negative for activity change and  "appetite change.   HENT: Negative for ear pain, facial swelling and mouth sores.    Eyes: Negative for pain, redness and itching.   Respiratory: Negative for cough, choking and shortness of breath.    Cardiovascular: Negative for chest pain and leg swelling.   Gastrointestinal: Positive for abdominal pain, nausea and vomiting.   Endocrine: Negative for cold intolerance and heat intolerance.   Musculoskeletal: Negative for arthralgias, back pain and joint swelling.   Skin: Negative for color change, pallor and rash.   Allergic/Immunologic: Negative for environmental allergies and food allergies.   Neurological: Positive for dizziness, weakness and headaches. Negative for tremors, seizures, syncope, facial asymmetry, speech difficulty, light-headedness and numbness.   Psychiatric/Behavioral: Positive for confusion, decreased concentration and sleep disturbance. Negative for agitation, behavioral problems, dysphoric mood, hallucinations, self-injury and suicidal ideas. The patient is nervous/anxious. The patient is not hyperactive.          I have reviewed and agree with the above ROS completed by the medical assistant.      Physical Exam:  Vitals:    07/08/21 0858   BP: 116/90   Pulse: 73   SpO2: 98%   Weight: (!) 137 kg (301 lb)   Height: 170.2 cm (67\")     Orthostatic BP: Standing blood pressure was 120/80    Body mass index is 47.14 kg/m².    Physical Exam  General: M obese white female no acute distress  HEENT: Normocephalic no evidence of trauma.  Discs flat.  No AV nicking.  Throat negative  Neck: Supple.  Some pain in the back of the neck with flexion but no Lhermitte sign.  No cervical bruits.  No thyromegaly.    Heart: Regular rate and rhythm without murmur.  No pedal edema  Extremities: Radial pulses were strong and simultaneous.  Some swelling at the right ankle was noted mostly laterally      Neurological Exam:   Mental Status: Awake, alert, oriented to person, place and time.  Conversant without evidence " of an affective disorder, thought disorder, delusions or hallucinations.  Attention span and concentration are normal.  HCF: No aphasia, apraxia or dysarthria.  Recent and remote memory intact.  Knowledge of recent events intact.  CN: I:   II: Visual fields full without left inattention   III, IV, VI: Eye movements intact without nystagmus or ptosis.  Pupils equal  round and reactive to light.   V,VII: Light touch and pinprick intact all 3 divisions of V.  Facial muscles symmetrical.   VIII: Hearing intact to finger rub   IX,X: Soft palate elevates symmetrically   XI: Sternomastoid and trapezius are strong.   XII: Tongue midline without atrophy or fasciculations  Motor: Normal tone and bulk in the upper and lower extremities   Power testing: Mild weakness of both abductor pollicis brevis muscles and in the interossei of the left hand.  Otherwise all muscles tested in both arms were normal.  In the lower extremities all muscles tested in the left leg were 5/5.  In the right leg all muscles tested were also 5/5 but ankle flexion and extension were not tested because of her recent sprain.  Reflexes: Upper extremities: +2 diffusely        Lower extremities: +2 diffusely        Toe signs: Downgoing bilaterally  Sensory: Light touch: Reduced in the first 3 digits of the right hand and small digit of the left hand.  Reduced in the left great toe otherwise normal in both lower extremities        Pinprick: Reduced in the first 3 digits of the right hand as well as the left thumb and small digit.  Reduced in the left great toe otherwise normal in both lower extremities        Vibration: Intact at the ankles        Position: Intact at the great toes    Cerebellar: Finger-to-nose: Intact           Rapid movement: Intact           Heel-to-shin: Intact  Gait and Station: Antalgic.  Mildly broad-based.  No Romberg and no drift    Results:      Lab Results   Component Value Date    GLUCOSE 81 11/06/2020    BUN 14 03/02/2021     CREATININE 0.75 03/02/2021    EGFRIFNONA 104 03/02/2021    EGFRIFAFRI 120 03/02/2021    BCR 19 03/02/2021    CO2 25 03/02/2021    CALCIUM 9.8 03/02/2021    PROTENTOTREF 7.7 03/02/2021    ALBUMIN 4.5 03/02/2021    LABIL2 1.4 03/02/2021    AST 47 (H) 03/02/2021    ALT 54 (H) 03/02/2021       Lab Results   Component Value Date    WBC 7.7 03/02/2021    HGB 13.8 03/02/2021    HCT 41.8 03/02/2021    MCV 89 03/02/2021     03/02/2021         .No results found for: RPR      Lab Results   Component Value Date    TSH 1.910 03/02/2021         Lab Results   Component Value Date    DNCJAZKG84 1,017 03/02/2021         No results found for: FOLATE      No results found for: HGBA1C      Lab Results   Component Value Date    GLUCOSE 81 11/06/2020    BUN 14 03/02/2021    CREATININE 0.75 03/02/2021    EGFRIFNONA 104 03/02/2021    EGFRIFAFRI 120 03/02/2021    BCR 19 03/02/2021    K 4.8 03/02/2021    CO2 25 03/02/2021    CALCIUM 9.8 03/02/2021    PROTENTOTREF 7.7 03/02/2021    ALBUMIN 4.5 03/02/2021    LABIL2 1.4 03/02/2021    AST 47 (H) 03/02/2021    ALT 54 (H) 03/02/2021         Lab Results   Component Value Date    WBC 7.7 03/02/2021    HGB 13.8 03/02/2021    HCT 41.8 03/02/2021    MCV 89 03/02/2021     03/02/2021             Assessment:   1.  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  In the lower extremities she may have contused the left superficial peroneal sensory nerve.  I am not convinced she has a more diffuse polyneuropathy.  No additional findings in the left leg to suggest a lumbosacral radiculopathy.  2.  Balance disturbance-nothing obvious on exam to point to be the cause.  3.  History of syncope most likely vasodepressor syncope      Plan:  1.  MRI of the brain without and with contrast looking for hydrocephalus, pontine lesion or a midline cerebellar abnormality  2.  I don't think further investigation on the  left leg will help that much.  She may have an abnormal superficial peroneal sensory but this will not change what we do.  We will try to obtain the actual report for MRI of the lumbar spine done about a year and a half ago.  Currently she does not have evidence for a left lumbosacral radiculopathy  3.  She will follow up with MERLE Sam in about a month          Time: 60 minutes              Dictated utilizing Dragon dictation.

## 2021-08-07 ENCOUNTER — HOSPITAL ENCOUNTER (OUTPATIENT)
Dept: MRI IMAGING | Facility: HOSPITAL | Age: 36
Discharge: HOME | End: 2021-08-07
Admitting: PSYCHIATRY & NEUROLOGY

## 2021-08-07 DIAGNOSIS — R27.0 ATAXIA: ICD-10-CM

## 2021-08-07 PROCEDURE — 70553 MRI BRAIN STEM W/O & W/DYE: CPT

## 2021-08-07 PROCEDURE — 0 GADOBENATE DIMEGLUMINE 529 MG/ML SOLUTION: Performed by: PSYCHIATRY & NEUROLOGY

## 2021-08-07 PROCEDURE — A9577 INJ MULTIHANCE: HCPCS | Performed by: PSYCHIATRY & NEUROLOGY

## 2021-08-07 RX ADMIN — GADOBENATE DIMEGLUMINE 20 ML: 529 INJECTION, SOLUTION INTRAVENOUS at 13:58

## 2021-08-09 ENCOUNTER — TRANSCRIBE ORDERS (OUTPATIENT)
Dept: ADMINISTRATIVE | Facility: HOSPITAL | Age: 36
End: 2021-08-09

## 2021-08-09 DIAGNOSIS — R27.0 ATAXIA: Primary | ICD-10-CM

## 2021-08-10 ENCOUNTER — TELEPHONE (OUTPATIENT)
Dept: NEUROLOGY | Facility: CLINIC | Age: 36
End: 2021-08-10

## 2021-08-10 ENCOUNTER — HOSPITAL ENCOUNTER (OUTPATIENT)
Dept: MRI IMAGING | Facility: HOSPITAL | Age: 36
Discharge: HOME | End: 2021-08-10

## 2021-08-10 DIAGNOSIS — R27.0 ATAXIA: ICD-10-CM

## 2021-08-10 NOTE — TELEPHONE ENCOUNTER
PATIENT CALLED AND STATED ANDREA CALLED HER TO RESCHED AN APPT- I TRANSFERRED TO OFFICE, PATIENT HUNG UP DURING TRANSFER- LADY AT OFFICE STATES SHE HAD PATIENT ON OTHER LINE.

## 2021-08-11 ENCOUNTER — HOSPITAL ENCOUNTER (OUTPATIENT)
Dept: CT IMAGING | Facility: HOSPITAL | Age: 36
Discharge: HOME | End: 2021-08-11
Admitting: FAMILY MEDICINE

## 2021-08-11 ENCOUNTER — OFFICE VISIT (OUTPATIENT)
Dept: FAMILY MEDICINE CLINIC | Facility: CLINIC | Age: 36
End: 2021-08-11

## 2021-08-11 VITALS
WEIGHT: 293 LBS | TEMPERATURE: 97.6 F | RESPIRATION RATE: 16 BRPM | HEIGHT: 67 IN | DIASTOLIC BLOOD PRESSURE: 71 MMHG | HEART RATE: 66 BPM | OXYGEN SATURATION: 98 % | SYSTOLIC BLOOD PRESSURE: 108 MMHG | BODY MASS INDEX: 45.99 KG/M2

## 2021-08-11 DIAGNOSIS — M79.7 FIBROMYALGIA: Chronic | ICD-10-CM

## 2021-08-11 DIAGNOSIS — J45.40 MODERATE PERSISTENT ASTHMA, UNSPECIFIED WHETHER COMPLICATED: Primary | ICD-10-CM

## 2021-08-11 DIAGNOSIS — R06.02 SHORTNESS OF BREATH: ICD-10-CM

## 2021-08-11 PROCEDURE — 99214 OFFICE O/P EST MOD 30 MIN: CPT | Performed by: FAMILY MEDICINE

## 2021-08-11 PROCEDURE — 0 IOPAMIDOL PER 1 ML: Performed by: FAMILY MEDICINE

## 2021-08-11 PROCEDURE — 71275 CT ANGIOGRAPHY CHEST: CPT

## 2021-08-11 PROCEDURE — 82565 ASSAY OF CREATININE: CPT

## 2021-08-11 RX ORDER — PREGABALIN 50 MG/1
50 CAPSULE ORAL 3 TIMES DAILY
Qty: 90 CAPSULE | Refills: 5 | Status: SHIPPED | OUTPATIENT
Start: 2021-08-11 | End: 2021-09-30 | Stop reason: SDUPTHER

## 2021-08-11 RX ADMIN — IOPAMIDOL 95 ML: 755 INJECTION, SOLUTION INTRAVENOUS at 14:49

## 2021-08-11 NOTE — PROGRESS NOTES
"Subjective   Bri Pfeiffer is a 35 y.o. female.     CC: Dyspnea    History of Present Illness     Pt comes in today reporting some increased issues with COLEMAN and wheezing MORE RECENTLY. hAS A H/O ASTHMA AND does have a Proair when needed. Also using Singulair. Has had negative COVID testing x 3. No change with sleeping. NO wheezing noted. Also using Flonase and Zyrtec.   Ancillary, several family members with h/o blood clots.  Pt has taken the COVID vaccines.    Ancillary, doing well with the Lyrica and needs a refill.    The following portions of the patient's history were reviewed and updated as appropriate: allergies, current medications, past family history, past medical history, past social history, past surgical history and problem list.    Review of Systems   Constitutional: Negative for activity change, chills and fever.   HENT: Positive for ear pain and rhinorrhea. Negative for sore throat.    Respiratory: Positive for shortness of breath and wheezing. Negative for cough.    Cardiovascular: Positive for chest pain and leg swelling.   Gastrointestinal: Negative for abdominal pain and vomiting.   Musculoskeletal: Positive for neck pain.   Skin: Negative for rash.   Neurological: Positive for headaches.   Psychiatric/Behavioral: Negative for dysphoric mood.       /71   Pulse 66   Temp 97.6 °F (36.4 °C) (Oral)   Resp 16   Ht 170.2 cm (67\")   Wt (!) 138 kg (304 lb)   SpO2 98%   BMI 47.61 kg/m²     Objective   Physical Exam  Constitutional:       General: She is not in acute distress.     Appearance: She is well-developed.   Cardiovascular:      Rate and Rhythm: Normal rate and regular rhythm.   Pulmonary:      Effort: Pulmonary effort is normal.      Breath sounds: Normal breath sounds.   Neurological:      Mental Status: She is alert and oriented to person, place, and time.   Psychiatric:         Behavior: Behavior normal.         Thought Content: Thought content normal. "         Assessment/Plan   Diagnoses and all orders for this visit:    1. Moderate persistent asthma, unspecified whether complicated (Primary)    2. Shortness of breath  -     CT angiogram chest w contrast; Future    3. Fibromyalgia  -     pregabalin (Lyrica) 50 MG capsule; Take 1 capsule by mouth 3 (Three) Times a Day.  Dispense: 90 capsule; Refill: 5    Samples of Dulera 100 given to try.

## 2021-08-12 LAB — CREAT BLDA-MCNC: 0.8 MG/DL (ref 0.6–1.3)

## 2021-08-13 ENCOUNTER — OFFICE VISIT (OUTPATIENT)
Dept: NEUROLOGY | Facility: CLINIC | Age: 36
End: 2021-08-13

## 2021-08-13 VITALS
BODY MASS INDEX: 45.99 KG/M2 | SYSTOLIC BLOOD PRESSURE: 150 MMHG | HEART RATE: 70 BPM | WEIGHT: 293 LBS | OXYGEN SATURATION: 98 % | HEIGHT: 67 IN | DIASTOLIC BLOOD PRESSURE: 102 MMHG

## 2021-08-13 DIAGNOSIS — M54.50 CHRONIC BILATERAL LOW BACK PAIN, UNSPECIFIED WHETHER SCIATICA PRESENT: ICD-10-CM

## 2021-08-13 DIAGNOSIS — G56.13 MEDIAN NEUROPATHY OF BOTH UPPER EXTREMITIES: ICD-10-CM

## 2021-08-13 DIAGNOSIS — R26.89 IMBALANCE: ICD-10-CM

## 2021-08-13 DIAGNOSIS — R29.6 FREQUENT FALLS: ICD-10-CM

## 2021-08-13 DIAGNOSIS — G56.22 ULNAR NEUROPATHY AT WRIST, LEFT: ICD-10-CM

## 2021-08-13 DIAGNOSIS — G89.29 CHRONIC BILATERAL LOW BACK PAIN, UNSPECIFIED WHETHER SCIATICA PRESENT: ICD-10-CM

## 2021-08-13 DIAGNOSIS — R20.2 PARESTHESIA OF LEFT LOWER EXTREMITY: ICD-10-CM

## 2021-08-13 DIAGNOSIS — G89.29 CHRONIC NECK PAIN: ICD-10-CM

## 2021-08-13 DIAGNOSIS — R27.0 ATAXIA: Primary | ICD-10-CM

## 2021-08-13 DIAGNOSIS — M54.2 CHRONIC NECK PAIN: ICD-10-CM

## 2021-08-13 PROCEDURE — 99215 OFFICE O/P EST HI 40 MIN: CPT | Performed by: PHYSICIAN ASSISTANT

## 2021-08-13 NOTE — PROGRESS NOTES
CC: Follow-up: Multiple neurological issues    HPI:  Bri Pfeiffer is a  35 y.o.  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  Additionally patient reports some longstanding neck and back pain.  Her other past medical history is notable for enteropathic arthritis associated with Crohn's disease status post small bowel resection 2020 -she is on disease modifying therapy per GI with Humira, fibromyalgia, history of recurrent vasodepressor syncope, GERD, allergies, depression and anxiety.  Patient was last seen in our office per Dr. Thao on 7/13/2021, a summary of her condition is taken from previous note with amendments and additions as needed:    Bilateral upper extremity numbness/tingling and weakness  The patient describes numbness in both upper extremities more so in the left arm than the right and has been diagnosed with a bilateral carpal tunnel syndrome and left cubital tunnel syndrome based on an EMG done by Dr. Newell.  Both median motor distal latencies were mildly prolonged at 4.5 ms with normal conduction velocities in the forearms.  The amplitude was normal in the right but a little low on the left at 4 mV.  Both ulnar motor studies were obtained and there were normal conduction velocities above and below the elbow on the right but on the left the standard study showed some difference at 58.9 m/s below the elbow with 52.9 m/s across the elbow and so the study was repeated recording over the first dorsal interosseous showing a slow velocity across the elbow at 46.6 m/s and a normal velocity below the elbow at 55.9 m/s.  Patient reports very little pain the symptoms in her  "hands but states she is bothered by her hand clumsiness and repeatedly dropping objects.  Additionally she does report some neck pain, but states \" I have pain all over, so it does not particularly stand out.\"  She denies much pain radiating from her neck into her upper extremities also denies significant pain radiating from her neck up into her head.  She states sometimes her neck does feel stiff, especially when she wakes up in the morning but this usually goes away after a few hours.  Lastly patient notices some shakiness/tremulousness in her hands, she states it is very subtle, and occurs occasionally -seems to be present with both postural movements and at rest. Family history is negative for any peripheral neuropathy the patient is aware of, she does states she has an uncle with Parkinson's disease.    Left lower extremity numbness  In addition she describes numbness in the left lower extremity -she states it affects her entire lower extremity but seems to be worse in her foot.   She reports the symptoms are not constant though does indicate that she has daily notation of some left foot numbness mostly on the top and including all of the toes.  She reports she notices the numbness in her leg if she is sitting for prolonged period of time or sitting on the toilet.  She does have some painful symptoms at times in her left leg/foot as well, recently she has been put on Lyrica per her PCP for the fibromyalgia symptoms and this to have helped pain in her legs quite a bit. In terms of weakness she endorses feeling generally weak and fatigued basically all the time.  She says that on the stairs the left quad wants to give out on her at times but it is hard for her to tell when this is just from pain from her fibromyalgia or other causes.  Patient also endorses longstanding low back pain. She has had an MRI of the pelvis at [facility] on 5/29/2020, ordered per Dr. Estephanie Thao for her arthritis.  We do not " have the report or the films but per rheumatological note, MRI showed normal SI joints but inflammation and arthritis at L4/L5 facet with gluteus tendinosis.      Gait disturbance/incoordination/imbalance and frequent falls  The patient reports for at least the past 6 months she has had some noticeable gait disturbance which she describes as incoordination. She states it is particularly apparent when she tries to ambulate after she has been seated for a while, or after prolonged standing or if she is walking and quickly changes direction. She also complains of some imbalance issues though she does not associate this with her numbness she sometimes has in her left leg.  Additionally she denies associated vertigo type symptoms, there is no dizziness, no visual disturbance no decreased level of consciousness or mental status change.  She says this causes her to fall a lot, states she falls or almost falls almost every day.  About 6 weeks ago or so she fell and sprained her right ankle. She also indicates that when she falls she typically falls of the left. She reports before a fall, she does feel a sense of weakness in her legs but cannot say whether or not it is an acute weakness in her legs that causes her to fall.   As mentioned of recurrent syncopal episodes teenager.  She was seen by cardiology probably 10 years ago, underwent EKG and echo, it was concluded that she was having vasodepressor syncope. In recent years, she avoids loss of consciousness pretty well (it only happens maybe once every 3 months or so) -she states she is able to recognize prodromal symptoms, she gets lightheaded/dizzy, sweaty and slightly nauseous and then will quickly sitting down when she starts feeling dizzy.    At her last appointment Dr. Thao ordered an MRI of her brain looking for hydrocephalus, pontine lesion or a midline cerebellar abnormality - study was essentially normal.      Past Medical History:   Diagnosis Date   • Allergies     • Anemia    • Anxiety    • Asthma    • Coronary artery disease     Congenital pericardial cyst   • Crohn's disease (CMS/[facility]) 05/2019   • CTS (carpal tunnel syndrome)    • Depression    • Difficulty walking    • Eczema    • Fibromyalgia, primary    • GERD (gastroesophageal reflux disease)    • Headache, tension-type    • HL (hearing loss)    • IBS (irritable bowel syndrome)    • Irregular heartbeat     OCCAS   • Lactose intolerance    • Memory loss    • Migraine    • Pericardial cyst    • Peripheral neuropathy    • Rheumatoid arthritis (CMS/[facility])    • Sensitive skin    • Spondylosis    • Syncope          Past Surgical History:   Procedure Laterality Date   • ADENOIDECTOMY     • COLON RESECTION N/A 11/4/2020    Procedure: laparoscopic ileocecectomy;  Surgeon: Lisa Kerr MD;  Location: [facility] MAIN OR;  Service: General;  Laterality: N/A;   • COLONOSCOPY N/A 5/10/2019    Procedure: COLONOSCOPY TO CECUM TO TERMINAL ILEUM WITH BIOPSY;  Surgeon: Tae Turcios MD;  Location: [facility] ENDOSCOPY;  Service: Gastroenterology   • ENDOSCOPY N/A 5/10/2019    Procedure: ESOPHAGOGASTRODUODENOSCOPY WITH BIOPSY;  Surgeon: Tae Turcios MD;  Location: [facility] ENDOSCOPY;  Service: Gastroenterology   • ENDOSCOPY N/A 12/13/2019    Procedure: ESOPHAGOGASTRODUODENOSCOPY WITH COLD BIOPSIES;  Surgeon: Tae Turcios MD;  Location: [facility] ENDOSCOPY;  Service: Gastroenterology   • LIPOMA EXCISION  2007   • SIGMOIDOSCOPY N/A 12/13/2019    Procedure: SIGMOIDOSCOPY FLEXIBLE TO T.I.;  Surgeon: Tae Turcios MD;  Location: [facility] ENDOSCOPY;  Service: Gastroenterology   • TONSILLECTOMY AND ADENOIDECTOMY     • WISDOM TOOTH EXTRACTION             Current Outpatient Medications:   •  acetaminophen (Tylenol 8 Hour) 650 MG 8 hr tablet, Take 2 tablets by mouth Every 8 (Eight) Hours., Disp: , Rfl:   •  Adalimumab (Humira Pen) 40 MG/0.4ML Pen-injector Kit, Inject 40 mg under the skin into the appropriate area as directed Every 7 (Seven)  Days. Dispense one kit = four pens for monthly dose, Disp: 4 each, Rfl: 6  •  albuterol sulfate HFA (PROAIR HFA) 108 (90 Base) MCG/ACT inhaler, Inhale 2 puffs Every 4 (Four) Hours As Needed., Disp: , Rfl:   •  cetirizine (ZYRTEC ALLERGY) 10 MG tablet, Take 10 mg by mouth Daily., Disp: , Rfl:   •  Cholecalciferol (VITAMIN D-3) 5000 units tablet, Take 5,000 Units by mouth Daily., Disp: , Rfl:   •  Cyanocobalamin (VITAMIN B 12 PO), Take 1 tablet by mouth Daily., Disp: , Rfl:   •  dicyclomine (Bentyl) 20 MG tablet, Take 1 tablet by mouth 3 (Three) Times a Day As Needed (Cramping)., Disp: 90 tablet, Rfl: 2  •  diphenoxylate-atropine (LOMOTIL) 2.5-0.025 MG per tablet, Take 1 tablet by mouth 4 (Four) Times a Day As Needed for Diarrhea., Disp: 120 tablet, Rfl: 3  •  esomeprazole (nexIUM) 40 MG capsule, Take 1 capsule by mouth Daily., Disp: 30 capsule, Rfl: 5  •  ferrous sulfate 324 (65 Fe) MG tablet delayed-release EC tablet, Take 324 mg by mouth Daily With Breakfast., Disp: , Rfl:   •  fluticasone (FLONASE ALLERGY RELIEF) 50 MCG/ACT nasal spray, 2 sprays into the nostril(s) as directed by provider Daily., Disp: , Rfl:   •  folic acid (FOLVITE) 1 MG tablet, TAKE ONE TABLET BY MOUTH DAILY, Disp: 90 tablet, Rfl: 0  •  mesalamine (Apriso) 0.375 g 24 hr capsule, Take 4 capsules by mouth Daily., Disp: 120 capsule, Rfl: 5  •  montelukast (Singulair) 10 MG tablet, Take 1 tablet by mouth Daily., Disp: 90 tablet, Rfl: 3  •  Multiple Vitamin (MULTIVITAMIN PO), Take 1 tablet by mouth Daily., Disp: , Rfl:   •  polyethylene glycol (MiraLax) 17 GM/SCOOP powder, Take  by mouth., Disp: , Rfl:   •  pregabalin (Lyrica) 50 MG capsule, Take 1 capsule by mouth 3 (Three) Times a Day., Disp: 90 capsule, Rfl: 5  •  sertraline (Zoloft) 100 MG tablet, Take 1.5 tablets by mouth Daily., Disp: 135 tablet, Rfl: 3  •  spironolactone (ALDACTONE) 100 MG tablet, Take 100 mg by mouth Daily., Disp: , Rfl:       Family History   Problem Relation Age of Onset    • Hypertension Father    • Skin cancer Father    • Cancer Maternal Grandmother         Colorectal   • Colon cancer Maternal Grandmother    • Prostate cancer Maternal Grandfather    • Prostate cancer Paternal Grandfather    • Hypertension Paternal Grandfather    • Hypertension Paternal Grandmother    • Parkinsonism Maternal Uncle    • Malig Hyperthermia Neg Hx    • Colon polyps Neg Hx          Social History     Socioeconomic History   • Marital status: Single     Spouse name: Not on file   • Number of children: 0   • Years of education: Not on file   • Highest education level: Some college, no degree   Tobacco Use   • Smoking status: Never Smoker   • Smokeless tobacco: Never Used   • Tobacco comment: caffeine use a few days weekly   Substance and Sexual Activity   • Alcohol use: Not Currently     Alcohol/week: 0.0 standard drinks     Comment: No alcohol since october 2020   • Drug use: No   • Sexual activity: Yes     Partners: Male     Birth control/protection: Condom, I.U.D.         Allergies   Allergen Reactions   • Lactose Intolerance (Gi) GI Intolerance   • Levaquin [Levofloxacin] Hallucinations       ROS:  Review of Systems   Constitutional: Negative for activity change, appetite change and unexpected weight change.   HENT: Negative for facial swelling, trouble swallowing and voice change.    Eyes: Negative for photophobia, pain and visual disturbance.   Respiratory: Positive for chest tightness and shortness of breath. Negative for wheezing.    Cardiovascular: Negative for chest pain, palpitations and leg swelling.   Gastrointestinal: Negative for abdominal pain, nausea and vomiting.   Endocrine: Negative for cold intolerance and heat intolerance.   Musculoskeletal: Negative for arthralgias, back pain, gait problem, joint swelling, myalgias, neck pain and neck stiffness.   Neurological: Positive for tremors (right hand worsening). Negative for dizziness, seizures, syncope, facial asymmetry, speech  "difficulty, weakness, light-headedness, numbness and headaches.   Hematological: Does not bruise/bleed easily.   Psychiatric/Behavioral: Negative for agitation, behavioral problems, confusion, decreased concentration, dysphoric mood, hallucinations, self-injury, sleep disturbance and suicidal ideas. The patient is not nervous/anxious and is not hyperactive.      I have reviewed the above ROS put in by the medical assistant and am in agreement.     Physical Exam:  Vitals:    08/13/21 0822   BP: (!) 150/102   BP Location: Left arm   Patient Position: Sitting   Cuff Size: Adult   Pulse: 70   SpO2: 98%   Weight: (!) 137 kg (303 lb)   Height: 170.2 cm (67\")       Body mass index is 47.46 kg/m².    Physical Exam  General: obese white female no acute distress  HEENT: Normocephalic, atraumatic  Neck: Supple.  Reports some midline neck with flexion but no Lhermitte sign.    Heart: Regular rate and rhythm without murmur.   Extremities:  No pedal edema        Neurological Exam:   Mental Status: Awake, alert, oriented to person, place and time.  Conversant without evidence of an affective disorder, thought disorder, delusions or hallucinations.  Attention span and concentration are normal.  HCF: No aphasia, apraxia or dysarthria.  Recent and remote memory intact.  Knowledge of recent events intact.  CN:      I:              II: Visual fields full without left inattention              III, IV, VI: Eye movements intact without nystagmus or ptosis.  Pupils equal  round and reactive to light.              V,VII: Light touch and pinprick intact all 3 divisions of V.  Facial muscles symmetrical.              VIII: Hearing intact to finger rub              IX,X: Soft palate elevates symmetrically              XI: Sternomastoid and trapezius are strong.               XII: Tongue midline without atrophy or fasciculations  Motor: Normal tone and bulk in the upper and lower extremities              Power testing: Mild weakness of APBs " bilaterally, mild weakness of her interossei on left, otherwise all muscles tested in both arms were normal.  Some weakness of toe extension left greater than right, otherwise normal power in all muscles tested in her lower extremities.   Reflexes: Upper extremities: +2 diffusely                   Lower extremities: +2 diffusely    Sensory: Light touch: Reduced in the first 3 digits of the right hand and small digit of the left hand.  Reduced in the left great toe otherwise normal in both lower extremities                   Pinprick: Reduced in the first 3 digits of the right hand as well as the left thumb and small digit.  Reduced in the left great toe otherwise normal in both lower extremities     Cerebellar: Finger-to-nose: Intact                      Rapid movement: Intact           Gait and Station:  Mildly broad-based.    Casual walk appears normal, patient is able to walk on her toes and her heels.  Tandem walk is minimally impaired.  No Romberg and no drift    Results:      Lab Results   Component Value Date    GLUCOSE 81 11/06/2020    BUN 14 03/02/2021    CREATININE 0.80 08/11/2021    EGFRIFNONA 104 03/02/2021    EGFRIFAFRI 120 03/02/2021    BCR 19 03/02/2021    CO2 25 03/02/2021    CALCIUM 9.8 03/02/2021    PROTENTOTREF 7.7 03/02/2021    ALBUMIN 4.5 03/02/2021    LABIL2 1.4 03/02/2021    AST 47 (H) 03/02/2021    ALT 54 (H) 03/02/2021       Lab Results   Component Value Date    WBC 7.7 03/02/2021    HGB 13.8 03/02/2021    HCT 41.8 03/02/2021    MCV 89 03/02/2021     03/02/2021         .No results found for: RPR      Lab Results   Component Value Date    TSH 1.910 03/02/2021         Lab Results   Component Value Date    FTQLAHDN36 1,017 03/02/2021         No results found for: FOLATE      No results found for: HGBA1C      Lab Results   Component Value Date    GLUCOSE 81 11/06/2020    BUN 14 03/02/2021    CREATININE 0.80 08/11/2021    EGFRIFNONA 104 03/02/2021    EGFRIFAFRI 120 03/02/2021    BCR 19  03/02/2021    K 4.8 03/02/2021    CO2 25 03/02/2021    CALCIUM 9.8 03/02/2021    PROTENTOTREF 7.7 03/02/2021    ALBUMIN 4.5 03/02/2021    LABIL2 1.4 03/02/2021    AST 47 (H) 03/02/2021    ALT 54 (H) 03/02/2021         Lab Results   Component Value Date    WBC 7.7 03/02/2021    HGB 13.8 03/02/2021    HCT 41.8 03/02/2021    MCV 89 03/02/2021     03/02/2021       MRI OF THE BRAIN WITH AND WITHOUT CONTRAST     CLINICAL HISTORY: Ataxia and intermittent left-sided numbness.     MRI of the brain is obtained with sagittal pre and postgadolinium T1,  axial pre and postgadolinium T1, coronal postgadolinium T1, axial flair,  coronal FLAIR, axial T2, and axial diffusion-weighted images.     There are no previous similar studies available for comparison.     FINDINGS:     The ventricles, sulci, and cisterns are age appropriate. No abnormal  areas of restricted diffusion are identified. No significant white  matter abnormalities are identified. No abnormal areas of restricted  diffusion are noted. The major intracranial flow related signal voids  are unremarkable. There are no abnormal areas of contrast enhancement.     IMPRESSION:     Unremarkable MRI of the brain.     This report was finalized on 8/10/2021 3:36 PM by Dr. Sher Rao M.D.          Assessment:   1.  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  Left lower extremity numbness with some painful symptoms also reported (though distrubution is vague) in the presence of chronic low back pain  3.  Reported gait disturbance/incoordination/imbalance and frequent falls  4.  Longstanding history of vasodepressor syncope       Plan:  1.  I reviewed the results of her brain MRI with her today, study was essentially normal.  Agree that her paresthesias in her upper extremities are due to peripheral nerve  compression and there is not evidence of a cervical radiculopathy on her exam though I think there are enough findings and symptoms (plus comorbid inflammatory arthritis) that evaluation of her cervical spine is warranted.  As such I will order an MRI of her neck  2.  In terms of her left lower extremity numbness/pain I reviewed previous MRI of her pelvis today, report sites bilateral L4-L5 facet arthropathy with trace amount of fluid and inflammation about the left facet joint.  She does have some weakness of toe extension bilaterally greater on left than right but tib anterior seems strong bilaterally.  I will consult with Dr. Thao: contemplate EMG/peripheral neuropathy labs versus further imaging of her lumbar spine.  3.  As to the reported gait disturbance/incoordination and imbalance issues - her gait appears normal today really does not look ataxic but per patient's account the symptoms seem to come and go.  Additionally she continues to describe history of 2 types of falls - type 1 are falls she has related to what sounds like weakness with incoordination of her legs without any trigger or LOC (maybe a drop attack?) and type 2 seems very consistent with reflex syncope probably due to vasodepressor response per previous cardiology work-up.  All of this I think is more support for evaluating for cord compression in her cervical spine.  -Patient will follow up with me once work-up has been completed in about 6 weeks      Time 40 minutes                    Dictated utilizing Dragon dictation.

## 2021-08-14 NOTE — PROGRESS NOTES
As you know, your CT Angiogram was, thankfully, normal! Let's go ahead and try that Dulera and let me know how you are doing.

## 2021-08-23 DIAGNOSIS — R27.0 ATAXIA: Primary | ICD-10-CM

## 2021-08-23 DIAGNOSIS — R20.2 PARESTHESIA OF LEFT LOWER EXTREMITY: ICD-10-CM

## 2021-08-23 DIAGNOSIS — R29.6 FREQUENT FALLS: ICD-10-CM

## 2021-09-08 ENCOUNTER — HOSPITAL ENCOUNTER (OUTPATIENT)
Dept: MRI IMAGING | Facility: HOSPITAL | Age: 36
Discharge: HOME | End: 2021-09-08
Admitting: PHYSICIAN ASSISTANT

## 2021-09-08 DIAGNOSIS — R27.0 ATAXIA: ICD-10-CM

## 2021-09-08 PROCEDURE — 72141 MRI NECK SPINE W/O DYE: CPT

## 2021-09-11 DIAGNOSIS — Z79.899 HIGH RISK MEDICATION USE: ICD-10-CM

## 2021-09-11 DIAGNOSIS — K50.111 CROHN'S DISEASE OF LARGE INTESTINE WITH RECTAL BLEEDING (HCC): ICD-10-CM

## 2021-09-11 DIAGNOSIS — K21.9 GASTROESOPHAGEAL REFLUX DISEASE WITHOUT ESOPHAGITIS: ICD-10-CM

## 2021-09-13 RX ORDER — ESOMEPRAZOLE MAGNESIUM 40 MG/1
CAPSULE, DELAYED RELEASE ORAL
Qty: 30 CAPSULE | Refills: 5 | Status: SHIPPED | OUTPATIENT
Start: 2021-09-13 | End: 2022-05-31 | Stop reason: SDUPTHER

## 2021-09-16 DIAGNOSIS — K50.111 CROHN'S DISEASE OF LARGE INTESTINE WITH RECTAL BLEEDING (HCC): ICD-10-CM

## 2021-09-16 DIAGNOSIS — L73.2 HIDRADENITIS SUPPURATIVA: ICD-10-CM

## 2021-09-16 RX ORDER — ADALIMUMAB 40MG/0.4ML
KIT SUBCUTANEOUS
Qty: 4 EACH | Refills: 5 | Status: SHIPPED | OUTPATIENT
Start: 2021-09-16 | End: 2022-01-03 | Stop reason: SDUPTHER

## 2021-09-17 ENCOUNTER — TELEPHONE (OUTPATIENT)
Dept: GASTROENTEROLOGY | Facility: CLINIC | Age: 36
End: 2021-09-17

## 2021-09-17 NOTE — TELEPHONE ENCOUNTER
Informed patient she should get tested for Covid.  Possible exposure with co-worker.  She should quarantine if positive and call her pcp for further recommendations. pk

## 2021-09-27 ENCOUNTER — LAB (OUTPATIENT)
Dept: LAB | Facility: HOSPITAL | Age: 36
End: 2021-09-27

## 2021-09-27 ENCOUNTER — TELEPHONE (OUTPATIENT)
Dept: NEUROLOGY | Facility: CLINIC | Age: 36
End: 2021-09-27

## 2021-09-27 NOTE — TELEPHONE ENCOUNTER
Spoke with patient to see if she would like to push appt out a little further because suppose to come in 6 months after f/u she had on 8/13. Pt stated she would still like to come in. I then asked if she can have labs completed before appt she stated she will  have them done after the appt with Isis on Wednesday.

## 2021-09-28 ENCOUNTER — LAB (OUTPATIENT)
Dept: LAB | Facility: HOSPITAL | Age: 36
End: 2021-09-28

## 2021-09-28 DIAGNOSIS — R20.2 PARESTHESIA OF LEFT LOWER EXTREMITY: ICD-10-CM

## 2021-09-28 DIAGNOSIS — R27.0 ATAXIA: ICD-10-CM

## 2021-09-28 DIAGNOSIS — R29.6 FREQUENT FALLS: ICD-10-CM

## 2021-09-28 LAB — ERYTHROCYTE [SEDIMENTATION RATE] IN BLOOD: 13 MM/HR (ref 0–20)

## 2021-09-28 PROCEDURE — 83825 ASSAY OF MERCURY: CPT | Performed by: PHYSICIAN ASSISTANT

## 2021-09-28 PROCEDURE — 82175 ASSAY OF ARSENIC: CPT | Performed by: PHYSICIAN ASSISTANT

## 2021-09-28 PROCEDURE — 84446 ASSAY OF VITAMIN E: CPT

## 2021-09-28 PROCEDURE — 86592 SYPHILIS TEST NON-TREP QUAL: CPT | Performed by: PHYSICIAN ASSISTANT

## 2021-09-28 PROCEDURE — 36415 COLL VENOUS BLD VENIPUNCTURE: CPT | Performed by: PHYSICIAN ASSISTANT

## 2021-09-28 PROCEDURE — 82525 ASSAY OF COPPER: CPT

## 2021-09-28 PROCEDURE — 86334 IMMUNOFIX E-PHORESIS SERUM: CPT

## 2021-09-28 PROCEDURE — 84155 ASSAY OF PROTEIN SERUM: CPT | Performed by: PHYSICIAN ASSISTANT

## 2021-09-28 PROCEDURE — 82595 ASSAY OF CRYOGLOBULIN: CPT

## 2021-09-28 PROCEDURE — 82784 ASSAY IGA/IGD/IGG/IGM EACH: CPT

## 2021-09-28 PROCEDURE — 85652 RBC SED RATE AUTOMATED: CPT | Performed by: PHYSICIAN ASSISTANT

## 2021-09-28 PROCEDURE — 84165 PROTEIN E-PHORESIS SERUM: CPT | Performed by: PHYSICIAN ASSISTANT

## 2021-09-28 PROCEDURE — 83655 ASSAY OF LEAD: CPT | Performed by: PHYSICIAN ASSISTANT

## 2021-09-29 LAB — RPR SER QL: NORMAL

## 2021-09-30 ENCOUNTER — PATIENT MESSAGE (OUTPATIENT)
Dept: FAMILY MEDICINE CLINIC | Facility: CLINIC | Age: 36
End: 2021-09-30

## 2021-09-30 DIAGNOSIS — M79.7 FIBROMYALGIA: Chronic | ICD-10-CM

## 2021-09-30 LAB
ALBUMIN SERPL ELPH-MCNC: 3.4 G/DL (ref 2.9–4.4)
ALBUMIN/GLOB SERPL: 1 {RATIO} (ref 0.7–1.7)
ALPHA1 GLOB SERPL ELPH-MCNC: 0.2 G/DL (ref 0–0.4)
ALPHA2 GLOB SERPL ELPH-MCNC: 0.9 G/DL (ref 0.4–1)
B-GLOBULIN SERPL ELPH-MCNC: 1.1 G/DL (ref 0.7–1.3)
GAMMA GLOB SERPL ELPH-MCNC: 1.3 G/DL (ref 0.4–1.8)
GLOBULIN SER CALC-MCNC: 3.4 G/DL (ref 2.2–3.9)
IGA SERPL-MCNC: 402 MG/DL (ref 87–352)
IGG SERPL-MCNC: 1062 MG/DL (ref 586–1602)
IGM SERPL-MCNC: 99 MG/DL (ref 26–217)
LABORATORY COMMENT REPORT: NORMAL
M PROTEIN SERPL ELPH-MCNC: NORMAL G/DL
PROT PATTERN SERPL ELPH-IMP: NORMAL
PROT PATTERN SERPL IFE-IMP: ABNORMAL
PROT SERPL-MCNC: 6.8 G/DL (ref 6–8.5)

## 2021-09-30 RX ORDER — PREGABALIN 100 MG/1
100 CAPSULE ORAL 3 TIMES DAILY
Qty: 90 CAPSULE | Refills: 5 | Status: SHIPPED | OUTPATIENT
Start: 2021-09-30 | End: 2021-12-29

## 2021-09-30 NOTE — TELEPHONE ENCOUNTER
From: Bri Pfeiffer  To: Alan Benavidez MD  Sent: 9/30/2021 11:50 AM EDT  Subject: Prescription Question    Good morning!  I am nearly through my sample of the Dulera inhaler. Can you please send a refill prescription to [facility]?    Also, I feel like I need an increase in my pregabalin dose. I have been taking 50mg every 8 hours but it isn't covering pain management. I also have tried to be more active on the cooler days so that may be exacerbating my pain. Do I need to come in for this?    Thank you!

## 2021-10-01 ENCOUNTER — APPOINTMENT (OUTPATIENT)
Dept: GENERAL RADIOLOGY | Facility: HOSPITAL | Age: 36
End: 2021-10-01

## 2021-10-01 ENCOUNTER — HOSPITAL ENCOUNTER (EMERGENCY)
Facility: HOSPITAL | Age: 36
Discharge: HOME | End: 2021-10-02
Attending: EMERGENCY MEDICINE | Admitting: EMERGENCY MEDICINE

## 2021-10-01 DIAGNOSIS — R07.89 ATYPICAL CHEST PAIN: Primary | ICD-10-CM

## 2021-10-01 LAB
ALBUMIN SERPL-MCNC: 4.3 G/DL (ref 3.5–5.2)
ALBUMIN/GLOB SERPL: 1.4 G/DL
ALP SERPL-CCNC: 80 U/L (ref 39–117)
ALT SERPL W P-5'-P-CCNC: 11 U/L (ref 1–33)
ANION GAP SERPL CALCULATED.3IONS-SCNC: 10.6 MMOL/L (ref 5–15)
AST SERPL-CCNC: 15 U/L (ref 1–32)
BASOPHILS # BLD AUTO: 0.04 10*3/MM3 (ref 0–0.2)
BASOPHILS NFR BLD AUTO: 0.4 % (ref 0–1.5)
BILIRUB SERPL-MCNC: 0.2 MG/DL (ref 0–1.2)
BUN SERPL-MCNC: 8 MG/DL (ref 6–20)
BUN/CREAT SERPL: 9.8 (ref 7–25)
CALCIUM SPEC-SCNC: 9.3 MG/DL (ref 8.6–10.5)
CHLORIDE SERPL-SCNC: 100 MMOL/L (ref 98–107)
CO2 SERPL-SCNC: 26.4 MMOL/L (ref 22–29)
CREAT SERPL-MCNC: 0.82 MG/DL (ref 0.57–1)
DEPRECATED RDW RBC AUTO: 41.1 FL (ref 37–54)
EOSINOPHIL # BLD AUTO: 0.18 10*3/MM3 (ref 0–0.4)
EOSINOPHIL NFR BLD AUTO: 1.9 % (ref 0.3–6.2)
ERYTHROCYTE [DISTWIDTH] IN BLOOD BY AUTOMATED COUNT: 12.4 % (ref 12.3–15.4)
GFR SERPL CREATININE-BSD FRML MDRD: 80 ML/MIN/1.73
GLOBULIN UR ELPH-MCNC: 3 GM/DL
GLUCOSE SERPL-MCNC: 91 MG/DL (ref 65–99)
HCG SERPL QL: NEGATIVE
HCT VFR BLD AUTO: 41.9 % (ref 34–46.6)
HGB BLD-MCNC: 14.7 G/DL (ref 12–15.9)
IMM GRANULOCYTES # BLD AUTO: 0.01 10*3/MM3 (ref 0–0.05)
IMM GRANULOCYTES NFR BLD AUTO: 0.1 % (ref 0–0.5)
LYMPHOCYTES # BLD AUTO: 3.7 10*3/MM3 (ref 0.7–3.1)
LYMPHOCYTES NFR BLD AUTO: 38.5 % (ref 19.6–45.3)
MCH RBC QN AUTO: 32 PG (ref 26.6–33)
MCHC RBC AUTO-ENTMCNC: 35.1 G/DL (ref 31.5–35.7)
MCV RBC AUTO: 91.1 FL (ref 79–97)
MONOCYTES # BLD AUTO: 0.87 10*3/MM3 (ref 0.1–0.9)
MONOCYTES NFR BLD AUTO: 9 % (ref 5–12)
NEUTROPHILS NFR BLD AUTO: 4.82 10*3/MM3 (ref 1.7–7)
NEUTROPHILS NFR BLD AUTO: 50.1 % (ref 42.7–76)
NRBC BLD AUTO-RTO: 0 /100 WBC (ref 0–0.2)
PLATELET # BLD AUTO: 364 10*3/MM3 (ref 140–450)
PMV BLD AUTO: 9 FL (ref 6–12)
POTASSIUM SERPL-SCNC: 3.8 MMOL/L (ref 3.5–5.2)
PROT SERPL-MCNC: 7.3 G/DL (ref 6–8.5)
RBC # BLD AUTO: 4.6 10*6/MM3 (ref 3.77–5.28)
SODIUM SERPL-SCNC: 137 MMOL/L (ref 136–145)
TROPONIN T SERPL-MCNC: <0.01 NG/ML (ref 0–0.03)
WBC # BLD AUTO: 9.62 10*3/MM3 (ref 3.4–10.8)

## 2021-10-01 PROCEDURE — 84703 CHORIONIC GONADOTROPIN ASSAY: CPT

## 2021-10-01 PROCEDURE — 80053 COMPREHEN METABOLIC PANEL: CPT

## 2021-10-01 PROCEDURE — 93010 ELECTROCARDIOGRAM REPORT: CPT | Performed by: INTERNAL MEDICINE

## 2021-10-01 PROCEDURE — 93005 ELECTROCARDIOGRAM TRACING: CPT | Performed by: EMERGENCY MEDICINE

## 2021-10-01 PROCEDURE — 93005 ELECTROCARDIOGRAM TRACING: CPT

## 2021-10-01 PROCEDURE — 71046 X-RAY EXAM CHEST 2 VIEWS: CPT

## 2021-10-01 PROCEDURE — 85379 FIBRIN DEGRADATION QUANT: CPT | Performed by: PHYSICIAN ASSISTANT

## 2021-10-01 PROCEDURE — 84484 ASSAY OF TROPONIN QUANT: CPT

## 2021-10-01 PROCEDURE — 85025 COMPLETE CBC W/AUTO DIFF WBC: CPT

## 2021-10-01 PROCEDURE — 99284 EMERGENCY DEPT VISIT MOD MDM: CPT

## 2021-10-01 RX ORDER — SODIUM CHLORIDE 0.9 % (FLUSH) 0.9 %
10 SYRINGE (ML) INJECTION AS NEEDED
Status: DISCONTINUED | OUTPATIENT
Start: 2021-10-01 | End: 2021-10-02 | Stop reason: HOSPADM

## 2021-10-01 RX ORDER — ACETAMINOPHEN 325 MG/1
650 TABLET ORAL ONCE
Status: COMPLETED | OUTPATIENT
Start: 2021-10-01 | End: 2021-10-01

## 2021-10-01 RX ADMIN — ACETAMINOPHEN 650 MG: 325 TABLET, FILM COATED ORAL at 23:56

## 2021-10-02 VITALS
WEIGHT: 293 LBS | SYSTOLIC BLOOD PRESSURE: 113 MMHG | RESPIRATION RATE: 18 BRPM | BODY MASS INDEX: 44.41 KG/M2 | HEIGHT: 68 IN | DIASTOLIC BLOOD PRESSURE: 99 MMHG | HEART RATE: 71 BPM | TEMPERATURE: 97.3 F | OXYGEN SATURATION: 98 %

## 2021-10-02 LAB
D DIMER PPP FEU-MCNC: <0.27 MCGFEU/ML (ref 0–0.49)
HOLD SPECIMEN: NORMAL
QT INTERVAL: 392 MS
TROPONIN T SERPL-MCNC: <0.01 NG/ML (ref 0–0.03)
WHOLE BLOOD HOLD SPECIMEN: NORMAL
WHOLE BLOOD HOLD SPECIMEN: NORMAL

## 2021-10-02 PROCEDURE — 84484 ASSAY OF TROPONIN QUANT: CPT | Performed by: EMERGENCY MEDICINE

## 2021-10-02 NOTE — ED PROVIDER NOTES
MD ATTESTATION NOTE    The LULY and I have discussed this patient's history, physical exam, and treatment plan.  I have reviewed the documentation and personally had a face to face interaction with the patient. I affirm the documentation and agree with the treatment and plan.  The attached note describes my personal findings.      Bri Pfeiffer is a 35 y.o. female who presents to the ED c/o chest pain.  States she woke 10 hours ago with left-sided chest pain which she describes as a dull low intensity pressure with intermittent sharp episodes.  States associated with some shortness of breath no nausea vomiting no diaphoresis fever chills.      On exam:  No acute distress, normocephalic atraumatic, supple nontender, regular rate and rhythm, clear to auscultation bilaterally, soft nontender nondistended, moving all extremities well    Labs  Recent Results (from the past 24 hour(s))   ECG 12 Lead    Collection Time: 10/01/21  8:30 PM   Result Value Ref Range    QT Interval 392 ms   Comprehensive Metabolic Panel    Collection Time: 10/01/21 10:54 PM    Specimen: Blood   Result Value Ref Range    Glucose 91 65 - 99 mg/dL    BUN 8 6 - 20 mg/dL    Creatinine 0.82 0.57 - 1.00 mg/dL    Sodium 137 136 - 145 mmol/L    Potassium 3.8 3.5 - 5.2 mmol/L    Chloride 100 98 - 107 mmol/L    CO2 26.4 22.0 - 29.0 mmol/L    Calcium 9.3 8.6 - 10.5 mg/dL    Total Protein 7.3 6.0 - 8.5 g/dL    Albumin 4.30 3.50 - 5.20 g/dL    ALT (SGPT) 11 1 - 33 U/L    AST (SGOT) 15 1 - 32 U/L    Alkaline Phosphatase 80 39 - 117 U/L    Total Bilirubin 0.2 0.0 - 1.2 mg/dL    eGFR Non African Amer 80 >60 mL/min/1.73    Globulin 3.0 gm/dL    A/G Ratio 1.4 g/dL    BUN/Creatinine Ratio 9.8 7.0 - 25.0    Anion Gap 10.6 5.0 - 15.0 mmol/L   Troponin    Collection Time: 10/01/21 10:54 PM    Specimen: Blood   Result Value Ref Range    Troponin T <0.010 0.000 - 0.030 ng/mL   hCG, Serum, Qualitative    Collection Time: 10/01/21 10:54 PM    Specimen: Blood    Result Value Ref Range    HCG Qualitative Negative Negative   Green Top (Gel)    Collection Time: 10/01/21 10:54 PM   Result Value Ref Range    Extra Tube Hold for add-ons.    Lavender Top    Collection Time: 10/01/21 10:54 PM   Result Value Ref Range    Extra Tube hold for add-on    Light Blue Top    Collection Time: 10/01/21 10:54 PM   Result Value Ref Range    Extra Tube hold for add-on    CBC Auto Differential    Collection Time: 10/01/21 10:54 PM    Specimen: Blood   Result Value Ref Range    WBC 9.62 3.40 - 10.80 10*3/mm3    RBC 4.60 3.77 - 5.28 10*6/mm3    Hemoglobin 14.7 12.0 - 15.9 g/dL    Hematocrit 41.9 34.0 - 46.6 %    MCV 91.1 79.0 - 97.0 fL    MCH 32.0 26.6 - 33.0 pg    MCHC 35.1 31.5 - 35.7 g/dL    RDW 12.4 12.3 - 15.4 %    RDW-SD 41.1 37.0 - 54.0 fl    MPV 9.0 6.0 - 12.0 fL    Platelets 364 140 - 450 10*3/mm3    Neutrophil % 50.1 42.7 - 76.0 %    Lymphocyte % 38.5 19.6 - 45.3 %    Monocyte % 9.0 5.0 - 12.0 %    Eosinophil % 1.9 0.3 - 6.2 %    Basophil % 0.4 0.0 - 1.5 %    Immature Grans % 0.1 0.0 - 0.5 %    Neutrophils, Absolute 4.82 1.70 - 7.00 10*3/mm3    Lymphocytes, Absolute 3.70 (H) 0.70 - 3.10 10*3/mm3    Monocytes, Absolute 0.87 0.10 - 0.90 10*3/mm3    Eosinophils, Absolute 0.18 0.00 - 0.40 10*3/mm3    Basophils, Absolute 0.04 0.00 - 0.20 10*3/mm3    Immature Grans, Absolute 0.01 0.00 - 0.05 10*3/mm3    nRBC 0.0 0.0 - 0.2 /100 WBC   D-dimer, Quantitative    Collection Time: 10/01/21 10:54 PM    Specimen: Blood   Result Value Ref Range    D-Dimer, Quantitative <0.27 0.00 - 0.49 MCGFEU/mL   Troponin    Collection Time: 10/02/21  1:22 AM    Specimen: Blood   Result Value Ref Range    Troponin T <0.010 0.000 - 0.030 ng/mL       Radiology  XR Chest 2 View    Result Date: 10/1/2021  TWO-VIEW CHEST  HISTORY: Chest pain.  The lungs are well-expanded and clear and the heart and hilar structures are normal. There is no acute disease or change from the CT scan dated 08/11/2021.  This report was  finalized on 10/1/2021 8:56 PM by Dr. Geoffrey Hugo M.D.         Medical Decision Making:  ED Course as of Oct 02 0631   Sat Oct 02, 2021   0215 Patient rechecked, resting and comfortably in bed, no acute distress.  Discussed results, diagnosis, and need for follow-up with PCP.  Patient expressed understanding and agrees with plan.    [MARTI]   0232 EKG          EKG time: 20:30  Rhythm/Rate: Sinus rhythm at 71 bpm  P waves and NM: Normal  QRS, axis: Normal  ST and T waves: No acute ST/T wave changes    Interpreted Contemporaneously by me, independently viewed  No prior EKG for comparison.      [MARTI]   0233 Patient's HEART score is a 1, low risk.    [MARTI]      ED Course User Index  [MARTI] Javier Grijalva, PA           PPE: Both the patient and I wore a surgical mask throughout the entire patient encounter. I wore protective goggles.     Diagnosis  Final diagnoses:   Atypical chest pain        Corbin Gamble MD  10/02/21 0698

## 2021-10-02 NOTE — ED PROVIDER NOTES
EMERGENCY DEPARTMENT ENCOUNTER    Room Number:  06/06  Date of encounter:  10/2/2021  PCP: Alan Benavidez MD  Historian: Patient      HPI:  Chief Complaint: Chest pain      Context: Bri Pfeiffer is a 35 y.o. female with past medical history of Crohn's colitis, GERD, vasovagal syncope, and fibromyalgia who presents to the ED c/o chest pain.  Patient states that she awoke at around 2:00 this afternoon with left upper chest pain that she describes as a constant pressure with occasional intense, sharp pains.  Patient states that the pain radiates to the left neck.  It is associated with nausea, mild shortness of breath, and an occasional cough.  Nothing seems to make the pattern better or worse, denies any worsening of the pain with exertion or deep breath.  Denies any sweats, fever, diarrhea, headache, constipation, or UTI symptoms.  Patient is a non-smoker denies any significant family history for coronary artery disease, no history of hypertension, hyperlipidemia, or diabetes.  Patient does have a strong family history for blood clots.      PAST MEDICAL HISTORY  Active Ambulatory Problems     Diagnosis Date Noted   • Pericardial cyst 03/06/2019   • Vasodepressor syncope 03/06/2019   • Esophageal dysphagia 04/22/2019   • Rectal bleeding 04/22/2019   • Colitis 05/21/2019   • Gastroesophageal reflux disease 07/25/2019   • Crohn's disease of large intestine with rectal bleeding ([facility]) 10/01/2019   • Other irritable bowel syndrome 12/17/2019   • Skin rash 01/14/2020   • Moderate episode of recurrent major depressive disorder ([facility]) 01/29/2020   • Anxiety 01/29/2020   • Low back pain with sciatica 07/06/2020   • Ileocecal valve stenosis 09/02/2020   • Crohn's disease ([facility]) 10/19/2020   • BMI 40.0-44.9, adult ([facility]) 11/06/2020   • Status post small bowel resection 12/15/2020   • Fibromyalgia 07/08/2021   • Non-seasonal allergic rhinitis due to pollen 07/08/2021     Resolved Ambulatory Problems     Diagnosis Date  Noted   • No Resolved Ambulatory Problems     Past Medical History:   Diagnosis Date   • Allergies    • Anemia    • Asthma    • Coronary artery disease    • CTS (carpal tunnel syndrome)    • Depression    • Difficulty walking    • Eczema    • Fibromyalgia, primary    • GERD (gastroesophageal reflux disease)    • Headache, tension-type    • HL (hearing loss)    • IBS (irritable bowel syndrome)    • Irregular heartbeat    • Lactose intolerance    • Memory loss    • Migraine    • Peripheral neuropathy    • Rheumatoid arthritis (HCC)    • Sensitive skin    • Spondylosis    • Syncope          PAST SURGICAL HISTORY  Past Surgical History:   Procedure Laterality Date   • ADENOIDECTOMY     • COLON RESECTION N/A 11/4/2020    Procedure: laparoscopic ileocecectomy;  Surgeon: Lisa Kerr MD;  Location: [facility] MAIN OR;  Service: General;  Laterality: N/A;   • COLONOSCOPY N/A 5/10/2019    Procedure: COLONOSCOPY TO CECUM TO TERMINAL ILEUM WITH BIOPSY;  Surgeon: Tae Turcios MD;  Location: [facility] ENDOSCOPY;  Service: Gastroenterology   • ENDOSCOPY N/A 5/10/2019    Procedure: ESOPHAGOGASTRODUODENOSCOPY WITH BIOPSY;  Surgeon: Tae Turcios MD;  Location: [facility] ENDOSCOPY;  Service: Gastroenterology   • ENDOSCOPY N/A 12/13/2019    Procedure: ESOPHAGOGASTRODUODENOSCOPY WITH COLD BIOPSIES;  Surgeon: Tae Turcios MD;  Location: [facility] ENDOSCOPY;  Service: Gastroenterology   • LIPOMA EXCISION  2007   • SIGMOIDOSCOPY N/A 12/13/2019    Procedure: SIGMOIDOSCOPY FLEXIBLE TO T.I.;  Surgeon: Tae Turcios MD;  Location: [facility] ENDOSCOPY;  Service: Gastroenterology   • TONSILLECTOMY AND ADENOIDECTOMY     • WISDOM TOOTH EXTRACTION           FAMILY HISTORY  Family History   Problem Relation Age of Onset   • Hypertension Father    • Skin cancer Father    • Cancer Maternal Grandmother         Colorectal   • Colon cancer Maternal Grandmother    • Prostate cancer Maternal Grandfather    • Prostate cancer Paternal Grandfather    •  Hypertension Paternal Grandfather    • Hypertension Paternal Grandmother    • Parkinsonism Maternal Uncle    • Malig Hyperthermia Neg Hx    • Colon polyps Neg Hx          SOCIAL HISTORY  Social History     Socioeconomic History   • Marital status: Single     Spouse name: Not on file   • Number of children: 0   • Years of education: Not on file   • Highest education level: Some college, no degree   Tobacco Use   • Smoking status: Never Smoker   • Smokeless tobacco: Never Used   • Tobacco comment: caffeine use a few days weekly   Substance and Sexual Activity   • Alcohol use: Not Currently     Alcohol/week: 0.0 standard drinks     Comment: No alcohol since october 2020   • Drug use: No   • Sexual activity: Yes     Partners: Male     Birth control/protection: Condom, I.U.D.         ALLERGIES  Lactose intolerance (gi) and Levaquin [levofloxacin]        REVIEW OF SYSTEMS  Review of Systems     All systems reviewed and negative except for those discussed in HPI.       PHYSICAL EXAM    I have reviewed the triage vital signs and nursing notes.    ED Triage Vitals   Temp Heart Rate Resp BP SpO2   10/01/21 2020 10/01/21 2020 10/01/21 2020 10/01/21 2256 10/01/21 2020   97.3 °F (36.3 °C) 110 18 141/99 95 %      Temp src Heart Rate Source Patient Position BP Location FiO2 (%)   -- -- -- -- --              Physical Exam  GENERAL: Alert and oriented x3, obese, not distressed  HENT: nares patent  EYES: no scleral icterus  CV: regular rhythm, regular rate, no murmurs, rubs, or gallops  RESPIRATORY: normal effort, clear to auscultate bilaterally, no reproducible chest wall tenderness  ABDOMEN: soft/nontender  MUSCULOSKELETAL: no deformity, no calf tenderness, 1+ bilateral pitting edema in the BLE  NEURO: alert, moves all extremities, follows commands  SKIN: warm, dry, no rashes      LAB RESULTS  Recent Results (from the past 24 hour(s))   ECG 12 Lead    Collection Time: 10/01/21  8:30 PM   Result Value Ref Range    QT Interval 392 ms    Comprehensive Metabolic Panel    Collection Time: 10/01/21 10:54 PM    Specimen: Blood   Result Value Ref Range    Glucose 91 65 - 99 mg/dL    BUN 8 6 - 20 mg/dL    Creatinine 0.82 0.57 - 1.00 mg/dL    Sodium 137 136 - 145 mmol/L    Potassium 3.8 3.5 - 5.2 mmol/L    Chloride 100 98 - 107 mmol/L    CO2 26.4 22.0 - 29.0 mmol/L    Calcium 9.3 8.6 - 10.5 mg/dL    Total Protein 7.3 6.0 - 8.5 g/dL    Albumin 4.30 3.50 - 5.20 g/dL    ALT (SGPT) 11 1 - 33 U/L    AST (SGOT) 15 1 - 32 U/L    Alkaline Phosphatase 80 39 - 117 U/L    Total Bilirubin 0.2 0.0 - 1.2 mg/dL    eGFR Non African Amer 80 >60 mL/min/1.73    Globulin 3.0 gm/dL    A/G Ratio 1.4 g/dL    BUN/Creatinine Ratio 9.8 7.0 - 25.0    Anion Gap 10.6 5.0 - 15.0 mmol/L   Troponin    Collection Time: 10/01/21 10:54 PM    Specimen: Blood   Result Value Ref Range    Troponin T <0.010 0.000 - 0.030 ng/mL   hCG, Serum, Qualitative    Collection Time: 10/01/21 10:54 PM    Specimen: Blood   Result Value Ref Range    HCG Qualitative Negative Negative   Green Top (Gel)    Collection Time: 10/01/21 10:54 PM   Result Value Ref Range    Extra Tube Hold for add-ons.    Lavender Top    Collection Time: 10/01/21 10:54 PM   Result Value Ref Range    Extra Tube hold for add-on    Light Blue Top    Collection Time: 10/01/21 10:54 PM   Result Value Ref Range    Extra Tube hold for add-on    CBC Auto Differential    Collection Time: 10/01/21 10:54 PM    Specimen: Blood   Result Value Ref Range    WBC 9.62 3.40 - 10.80 10*3/mm3    RBC 4.60 3.77 - 5.28 10*6/mm3    Hemoglobin 14.7 12.0 - 15.9 g/dL    Hematocrit 41.9 34.0 - 46.6 %    MCV 91.1 79.0 - 97.0 fL    MCH 32.0 26.6 - 33.0 pg    MCHC 35.1 31.5 - 35.7 g/dL    RDW 12.4 12.3 - 15.4 %    RDW-SD 41.1 37.0 - 54.0 fl    MPV 9.0 6.0 - 12.0 fL    Platelets 364 140 - 450 10*3/mm3    Neutrophil % 50.1 42.7 - 76.0 %    Lymphocyte % 38.5 19.6 - 45.3 %    Monocyte % 9.0 5.0 - 12.0 %    Eosinophil % 1.9 0.3 - 6.2 %    Basophil % 0.4 0.0 - 1.5 %     Immature Grans % 0.1 0.0 - 0.5 %    Neutrophils, Absolute 4.82 1.70 - 7.00 10*3/mm3    Lymphocytes, Absolute 3.70 (H) 0.70 - 3.10 10*3/mm3    Monocytes, Absolute 0.87 0.10 - 0.90 10*3/mm3    Eosinophils, Absolute 0.18 0.00 - 0.40 10*3/mm3    Basophils, Absolute 0.04 0.00 - 0.20 10*3/mm3    Immature Grans, Absolute 0.01 0.00 - 0.05 10*3/mm3    nRBC 0.0 0.0 - 0.2 /100 WBC   D-dimer, Quantitative    Collection Time: 10/01/21 10:54 PM    Specimen: Blood   Result Value Ref Range    D-Dimer, Quantitative <0.27 0.00 - 0.49 MCGFEU/mL   Troponin    Collection Time: 10/02/21  1:22 AM    Specimen: Blood   Result Value Ref Range    Troponin T <0.010 0.000 - 0.030 ng/mL       Ordered the above labs and independently reviewed the results.        RADIOLOGY  XR Chest 2 View    Result Date: 10/1/2021  TWO-VIEW CHEST  HISTORY: Chest pain.  The lungs are well-expanded and clear and the heart and hilar structures are normal. There is no acute disease or change from the CT scan dated 08/11/2021.  This report was finalized on 10/1/2021 8:56 PM by Dr. Geoffrey Hugo M.D.        I ordered the above noted radiological studies. Reviewed by me and discussed with radiologist.  See dictation for official radiology interpretation.      PROCEDURES    Procedures      MEDICATIONS GIVEN IN ER    Medications   sodium chloride 0.9 % flush 10 mL (has no administration in time range)   acetaminophen (TYLENOL) tablet 650 mg (650 mg Oral Given 10/1/21 1392)         PROGRESS, DATA ANALYSIS, CONSULTS, AND MEDICAL DECISION MAKING    All labs have been independently reviewed by me.  All radiology studies have been reviewed by me and discussed with radiologist dictating the report.   EKG's independently viewed and interpreted by me.  Discussion below represents my analysis of pertinent findings related to patient's condition, differential diagnosis, treatment plan and final disposition.    DDx: Includes but not limited to ACS, PE, pneumonia, pleurisy,  pneumothorax    ED Course as of Oct 02 0246   Sat Oct 02, 2021   0215 Patient rechecked, resting and comfortably in bed, no acute distress.  Discussed results, diagnosis, and need for follow-up with PCP.  Patient expressed understanding and agrees with plan.    [MARTI]   0232 EKG          EKG time: 20:30  Rhythm/Rate: Sinus rhythm at 71 bpm  P waves and [place]: Normal  QRS, axis: Normal  ST and T waves: No acute ST/T wave changes    Interpreted Contemporaneously by me, independently viewed  No prior EKG for comparison.      [MARTI]   0233 Patient's HEART score is a 1, low risk.    [MARTI]      ED Course User Index  [MARTI] Javier Grijalva, PA       MDM: Patient's evaluation shows no acute abnormality, no evidence for a PE, pneumothorax, acute coronary syndrome, pneumonia or other emergent causes of chest pain.  EKG is normal, chest x-ray shows nothing acute, troponin negative x 2, D-dimer negative, and the patient's heart score is a 1.  Patient's vital signs are stable, patient is safe for discharge home.    PPE: The patient wore a surgical mask throughout the entire patient encounter. I wore an N95.    AS OF 02:46 EDT VITALS:    BP - 113/99  HR - 71  TEMP - 97.3 °F (36.3 °C)  O2 SATS - 98%        DIAGNOSIS  Final diagnoses:   Atypical chest pain         DISPOSITION  DISCHARGE    Patient discharged in stable condition.    Reviewed implications of results, diagnosis, meds, responsibility to follow up, warning signs and symptoms of possible worsening, potential complications and reasons to return to ER.    Patient/Family voiced understanding of above instructions.    Discussed plan for discharge, as there is no emergent indication for admission. Patient referred to primary care provider for BP management due to today's BP. Pt/family is agreeable and understands need for follow up and repeat testing.  Pt is aware that discharge does not mean that nothing is wrong but it indicates no emergency is present that requires admission and  they must continue care with follow-up as given below or physician of their choice.     FOLLOW-UP  Alan Benavidez MD  [address]  [facility] [address]  [phone]    Schedule an appointment as soon as possible for a visit in 1 week      [facility] CARDIOLOGY  [address]  [place] [address] [phone]  Schedule an appointment as soon as possible for a visit            Medication List      No changes were made to your prescriptions during this visit.                    Javier Grijalva PA  10/02/21 0246

## 2021-10-02 NOTE — DISCHARGE INSTRUCTIONS
Home, rest, home medicine as prescribed, follow up with PCP for recheck. Return to care if symptoms worsen or with further concerns.

## 2021-10-02 NOTE — ED NOTES
Pt noted to have mask on when this RN entered the room.  This RN wore appropriate PPE throughout our encounter. Hand hygiene performed upon entering and exiting room.       Zonia Leiva RN  10/01/21 7258

## 2021-10-02 NOTE — ED NOTES
"Pt reports left anterior chest pain that radiates to the left side throat/jaw, started 2 hours ago. Pt reports history of a \"pericardial cyst and vasopressive syncope\". Denies dizziness or nausea.      Joan Radford RN  10/01/21 2019    "

## 2021-10-03 LAB
ARSENIC BLD-MCNC: <1 UG/L (ref 2–23)
COPPER SERPL-MCNC: 122 UG/DL (ref 80–158)
LEAD BLDV-MCNC: <1 UG/DL (ref 0–4)
MERCURY BLD-MCNC: <1 UG/L (ref 0–14.9)

## 2021-10-04 LAB
A-TOCOPHEROL VIT E SERPL-MCNC: 13.1 MG/L (ref 5.9–19.4)
CRYOGLOB SER QL 1D COLD INC: NORMAL
GAMMA TOCOPHEROL SERPL-MCNC: 1.2 MG/L (ref 0.7–4.9)

## 2021-10-06 ENCOUNTER — OFFICE VISIT (OUTPATIENT)
Dept: FAMILY MEDICINE CLINIC | Facility: CLINIC | Age: 36
End: 2021-10-06

## 2021-10-06 VITALS
HEART RATE: 78 BPM | SYSTOLIC BLOOD PRESSURE: 115 MMHG | TEMPERATURE: 97.3 F | DIASTOLIC BLOOD PRESSURE: 81 MMHG | HEIGHT: 68 IN | WEIGHT: 293 LBS | OXYGEN SATURATION: 96 % | RESPIRATION RATE: 16 BRPM | BODY MASS INDEX: 44.41 KG/M2

## 2021-10-06 DIAGNOSIS — R07.89 ATYPICAL CHEST PAIN: Primary | ICD-10-CM

## 2021-10-06 DIAGNOSIS — Z09 HOSPITAL DISCHARGE FOLLOW-UP: ICD-10-CM

## 2021-10-06 PROCEDURE — 99213 OFFICE O/P EST LOW 20 MIN: CPT | Performed by: FAMILY MEDICINE

## 2021-10-06 RX ORDER — FAMOTIDINE 20 MG/1
20 TABLET, FILM COATED ORAL EVERY MORNING
Qty: 90 TABLET | Refills: 3 | Status: SHIPPED | OUTPATIENT
Start: 2021-10-06 | End: 2021-12-29

## 2021-10-06 NOTE — PROGRESS NOTES
Subjective   Bri Pfeiffer is a 35 y.o. female.     CC: ED F/U for Chest Pain    History of Present Illness     Pt returns today after recent ED visit. That visit was as follows:    HPI:  Chief Complaint: Chest pain        Context: Bri Pfeiffre is a 35 y.o. female with past medical history of Crohn's colitis, GERD, vasovagal syncope, and fibromyalgia who presents to the ED c/o chest pain.  Patient states that she awoke at around 2:00 this afternoon with left upper chest pain that she describes as a constant pressure with occasional intense, sharp pains.  Patient states that the pain radiates to the left neck.  It is associated with nausea, mild shortness of breath, and an occasional cough.  Nothing seems to make the pattern better or worse, denies any worsening of the pain with exertion or deep breath.  Denies any sweats, fever, diarrhea, headache, constipation, or UTI symptoms.  Patient is a non-smoker denies any significant family history for coronary artery disease, no history of hypertension, hyperlipidemia, or diabetes.  Patient does have a strong family history for blood clots.    MDM: Patient's evaluation shows no acute abnormality, no evidence for a PE, pneumothorax, acute coronary syndrome, pneumonia or other emergent causes of chest pain. EKG is normal, chest x-ray shows nothing acute, troponin negative x 2, D-dimer negative, and the patient's heart score is a 1. Patient's vital signs are stable, patient is safe for discharge home.    DIAGNOSIS   Final diagnoses:   Atypical chest pain   Current outpatient and discharge medications have been reconciled for the patient.  Reviewed by: Alan Benavidez MD      The following portions of the patient's history were reviewed and updated as appropriate: allergies, current medications, past family history, past medical history, past social history, past surgical history and problem list.    Review of Systems   Constitutional: Negative for activity  "change, chills and fever.   Respiratory: Negative for cough.    Cardiovascular: Negative for chest pain.   Psychiatric/Behavioral: Negative for dysphoric mood.       /81   Pulse 78   Temp 97.3 °F (36.3 °C) (Oral)   Resp 16   Ht 172.7 cm (68\")   Wt 136 kg (300 lb)   SpO2 96%   BMI 45.61 kg/m²     Objective   Physical Exam  Constitutional:       General: She is not in acute distress.     Appearance: She is well-developed.   Cardiovascular:      Rate and Rhythm: Normal rate and regular rhythm.   Pulmonary:      Effort: Pulmonary effort is normal.      Breath sounds: Normal breath sounds.   Neurological:      Mental Status: She is alert and oriented to person, place, and time.   Psychiatric:         Behavior: Behavior normal.         Thought Content: Thought content normal.     Hospital records reviewed with pt confirming HPI.      Assessment/Plan   Diagnoses and all orders for this visit:    1. Atypical chest pain (Primary)  -     Ambulatory Referral to Cardiology  -     famotidine (Pepcid) 20 MG tablet; Take 1 tablet by mouth Every Morning.  Dispense: 90 tablet; Refill: 3    2. Hospital discharge follow-up               "

## 2021-10-07 ENCOUNTER — OFFICE VISIT (OUTPATIENT)
Dept: CARDIOLOGY | Facility: CLINIC | Age: 36
End: 2021-10-07

## 2021-10-07 VITALS
OXYGEN SATURATION: 98 % | BODY MASS INDEX: 45.61 KG/M2 | DIASTOLIC BLOOD PRESSURE: 90 MMHG | SYSTOLIC BLOOD PRESSURE: 122 MMHG | HEIGHT: 68 IN | HEART RATE: 81 BPM

## 2021-10-07 DIAGNOSIS — G89.29 CHRONIC CHEST PAIN WITH LOW TO MODERATE RISK FOR CAD: ICD-10-CM

## 2021-10-07 DIAGNOSIS — R07.9 CHRONIC CHEST PAIN WITH LOW TO MODERATE RISK FOR CAD: ICD-10-CM

## 2021-10-07 DIAGNOSIS — Z91.89 CHRONIC CHEST PAIN WITH LOW TO MODERATE RISK FOR CAD: ICD-10-CM

## 2021-10-07 DIAGNOSIS — Q24.8 PERICARDIAL CYST: Primary | ICD-10-CM

## 2021-10-07 PROCEDURE — 99214 OFFICE O/P EST MOD 30 MIN: CPT | Performed by: INTERNAL MEDICINE

## 2021-10-07 NOTE — PROGRESS NOTES
"      CARDIOLOGY    Tapan Gomez MD    ENCOUNTER DATE:  10/07/2021    Bri Pfeiffer / 35 y.o. / female        CHIEF COMPLAINT / REASON FOR OFFICE VISIT     Chest Pain (ER Follow up  10/01/2021 )      HISTORY OF PRESENT ILLNESS       HPI  Bri Pfeiffer is a 35 y.o. female who presents today for reevaluation.  Patient was in the emergency room on October 1 complaining of chest discomforts.  He actually describes 2 types of discomfort.  One is a sharp discomfort that occurs throughout her chest.  The other one is a dull discomfort.  This 1 lasted several days on and off.  Unfortunately she has a lot of medical issues that could be contributing to her discomforts but she said it was bad enough that she went to the emergency room.  Work-up in emergency room was unremarkable she presents today for further evaluation.  She does say if she walks to the mailbox her heart rate will get very fast and she can have the discomfort.      The following portions of the patient's history were reviewed and updated as appropriate: allergies, current medications, past family history, past medical history, past social history, past surgical history and problem list.      VITAL SIGNS     Visit Vitals  /90 (BP Location: Left arm)   Pulse 81   Ht 172.7 cm (68\")   SpO2 98%   BMI 45.61 kg/m²         Wt Readings from Last 3 Encounters:   10/06/21 136 kg (300 lb)   10/01/21 136 kg (300 lb)   08/13/21 (!) 137 kg (303 lb)     Body mass index is 45.61 kg/m².      REVIEW OF SYSTEMS   ROS        PHYSICAL EXAMINATION     Vitals reviewed.   Constitutional:       Appearance: Healthy appearance.   Pulmonary:      Effort: Pulmonary effort is normal.   Cardiovascular:      Normal rate. Regular rhythm. Normal S1. Normal S2.      Murmurs: There is no murmur.      No gallop. No click. No rub.   Pulses:     Intact distal pulses.   Edema:     Peripheral edema absent.   Neurological:      Mental Status: Alert.           REVIEWED " DATA     Procedures    Cardiac Procedures:  1.           ASSESSMENT & PLAN      Diagnosis Plan   1. Pericardial cyst  Adult Stress Echo W/ Cont or Stress Agent if Necessary Per Protocol   2. Chronic chest pain with low to moderate risk for CAD  Adult Stress Echo W/ Cont or Stress Agent if Necessary Per Protocol         SUMMARY/DISCUSSION  1. chest discomfort patient has multiple different types of chest discomfort.  Patient also complains of shortness of breath associated with the discomfort sometimes is exertional sometimes not.  I set her up for stress echo for several reasons.  101 assess her shortness of breath but more than likely she is going to have chest discomfort when she exerts herself.  Question will be whether to cardiac in origin and that is why I think a stress echo is a more appropriate test to start with.  If unremarkable notify her clinically and see what evolves.        MEDICATIONS         Discharge Medications          Accurate as of October 7, 2021 12:36 PM. If you have any questions, ask your nurse or doctor.            Continue These Medications      Instructions Start Date   Aldactone 100 MG tablet  Generic drug: spironolactone   100 mg, Oral, Daily      dicyclomine 20 MG tablet  Commonly known as: Bentyl   20 mg, Oral, 3 Times Daily PRN      diphenoxylate-atropine 2.5-0.025 MG per tablet  Commonly known as: Lomotil   1 tablet, Oral, 4 Times Daily PRN      Dulera 100-5 MCG/ACT inhaler  Generic drug: mometasone-formoterol   2 puffs, Inhalation, 2 Times Daily - RT      esomeprazole 40 MG capsule  Commonly known as: nexIUM   TAKE ONE CAPSULE BY MOUTH DAILY      famotidine 20 MG tablet  Commonly known as: Pepcid   20 mg, Oral, Every Morning      ferrous sulfate 324 (65 Fe) MG tablet delayed-release EC tablet   324 mg, Oral, Daily With Breakfast      Flonase Allergy Relief 50 MCG/ACT nasal spray  Generic drug: fluticasone   2 sprays, Nasal, Daily      folic acid 1 MG tablet  Commonly known as:  FOLVITE   TAKE ONE TABLET BY MOUTH DAILY      Humira Pen 40 MG/0.4ML Pen-injector Kit  Generic drug: Adalimumab   INJECT 1 PEN UNDER THE SKIN EVERY 7 DAYS.      mesalamine 0.375 g 24 hr capsule  Commonly known as: Apriso   1.5 g, Oral, Daily      MiraLax 17 GM/SCOOP powder  Generic drug: polyethylene glycol   Oral      montelukast 10 MG tablet  Commonly known as: Singulair   10 mg, Oral, Daily      multivitamin tablet tablet  Commonly known as: THERAGRAN   1 tablet, Oral, Daily      pregabalin 100 MG capsule  Commonly known as: Lyrica   100 mg, Oral, 3 Times Daily      ProAir  (90 Base) MCG/ACT inhaler  Generic drug: albuterol sulfate HFA   2 puffs, Inhalation, Every 4 Hours PRN      sertraline 100 MG tablet  Commonly known as: Zoloft   150 mg, Oral, Daily      Tylenol 8 Hour 650 MG 8 hr tablet  Generic drug: acetaminophen   2 tablets, Oral, Every 8 Hours      VITAMIN B 12 PO   1 tablet, Oral, Daily      Vitamin D-3 125 MCG (5000 UT) tablet   5,000 Units, Oral, Daily      ZyrTEC Allergy 10 MG tablet  Generic drug: cetirizine   10 mg, Oral, Daily                 **Dragon Disclaimer:   Much of this encounter note is an electronic transcription/translation of spoken language to printed text. The electronic translation of spoken language may permit erroneous, or at times, nonsensical words or phrases to be inadvertently transcribed. Although I have reviewed the note for such errors, some may still exist.

## 2021-10-20 ENCOUNTER — OFFICE VISIT (OUTPATIENT)
Dept: NEUROLOGY | Facility: CLINIC | Age: 36
End: 2021-10-20

## 2021-10-20 ENCOUNTER — HOSPITAL ENCOUNTER (OUTPATIENT)
Dept: CARDIOLOGY | Facility: HOSPITAL | Age: 36
Discharge: HOME | End: 2021-10-20
Admitting: INTERNAL MEDICINE

## 2021-10-20 VITALS
DIASTOLIC BLOOD PRESSURE: 68 MMHG | BODY MASS INDEX: 44.41 KG/M2 | OXYGEN SATURATION: 97 % | WEIGHT: 293 LBS | HEART RATE: 99 BPM | HEIGHT: 68 IN | SYSTOLIC BLOOD PRESSURE: 120 MMHG

## 2021-10-20 DIAGNOSIS — Q24.8 PERICARDIAL CYST: ICD-10-CM

## 2021-10-20 DIAGNOSIS — G89.29 CHRONIC CHEST PAIN WITH LOW TO MODERATE RISK FOR CAD: ICD-10-CM

## 2021-10-20 DIAGNOSIS — R26.89 IMBALANCE: ICD-10-CM

## 2021-10-20 DIAGNOSIS — G56.13 MEDIAN NEUROPATHY OF BOTH UPPER EXTREMITIES: ICD-10-CM

## 2021-10-20 DIAGNOSIS — Z91.89 CHRONIC CHEST PAIN WITH LOW TO MODERATE RISK FOR CAD: ICD-10-CM

## 2021-10-20 DIAGNOSIS — G89.29 CHRONIC BILATERAL LOW BACK PAIN, UNSPECIFIED WHETHER SCIATICA PRESENT: Primary | ICD-10-CM

## 2021-10-20 DIAGNOSIS — M54.50 CHRONIC BILATERAL LOW BACK PAIN, UNSPECIFIED WHETHER SCIATICA PRESENT: Primary | ICD-10-CM

## 2021-10-20 DIAGNOSIS — R07.9 CHRONIC CHEST PAIN WITH LOW TO MODERATE RISK FOR CAD: ICD-10-CM

## 2021-10-20 DIAGNOSIS — R29.6 FREQUENT FALLS: ICD-10-CM

## 2021-10-20 DIAGNOSIS — R20.2 PARESTHESIA OF LEFT LOWER EXTREMITY: ICD-10-CM

## 2021-10-20 PROCEDURE — 93350 STRESS TTE ONLY: CPT | Performed by: INTERNAL MEDICINE

## 2021-10-20 PROCEDURE — 93325 DOPPLER ECHO COLOR FLOW MAPG: CPT | Performed by: INTERNAL MEDICINE

## 2021-10-20 PROCEDURE — 93325 DOPPLER ECHO COLOR FLOW MAPG: CPT

## 2021-10-20 PROCEDURE — 93016 CV STRESS TEST SUPVJ ONLY: CPT | Performed by: INTERNAL MEDICINE

## 2021-10-20 PROCEDURE — 93350 STRESS TTE ONLY: CPT

## 2021-10-20 PROCEDURE — 99214 OFFICE O/P EST MOD 30 MIN: CPT | Performed by: PHYSICIAN ASSISTANT

## 2021-10-20 PROCEDURE — 93018 CV STRESS TEST I&R ONLY: CPT | Performed by: INTERNAL MEDICINE

## 2021-10-20 PROCEDURE — 25010000002 PERFLUTREN (DEFINITY) 8.476 MG IN SODIUM CHLORIDE (PF) 0.9 % 10 ML INJECTION: Performed by: INTERNAL MEDICINE

## 2021-10-20 PROCEDURE — 93017 CV STRESS TEST TRACING ONLY: CPT

## 2021-10-20 PROCEDURE — 93320 DOPPLER ECHO COMPLETE: CPT

## 2021-10-20 PROCEDURE — 93352 ADMIN ECG CONTRAST AGENT: CPT | Performed by: INTERNAL MEDICINE

## 2021-10-20 PROCEDURE — 93320 DOPPLER ECHO COMPLETE: CPT | Performed by: INTERNAL MEDICINE

## 2021-10-20 RX ADMIN — PERFLUTREN 3 ML: 6.52 INJECTION, SUSPENSION INTRAVENOUS at 14:39

## 2021-10-20 NOTE — PROGRESS NOTES
CC: Follow-up multiple neurological issues    HPI:  Bri Pfeiffer is a  35 y.o.  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  Additionally patient reports some longstanding neck and back pain.  Her other past medical history is notable for enteropathic arthritis associated with Crohn's disease status post small bowel resection 2020 -she is on disease modifying therapy per GI with Humira, fibromyalgia, history of recurrent vasodepressor syncope, GERD, allergies, depression and anxiety.  Patient was last seen in our office per Dr. Thao on 7/13/2021, a summary of her condition is taken from previous note with amendments and additions as needed:     Bilateral upper extremity numbness/tingling and weakness  The patient describes numbness in both upper extremities more so in the left arm than the right and has been diagnosed with a bilateral carpal tunnel syndrome and left cubital tunnel syndrome based on an EMG done by Dr. Newell.  Both median motor distal latencies were mildly prolonged at 4.5 ms with normal conduction velocities in the forearms.  The amplitude was normal in the right but a little low on the left at 4 mV.  Both ulnar motor studies were obtained and there were normal conduction velocities above and below the elbow on the right but on the left the standard study showed some difference at 58.9 m/s below the elbow with 52.9 m/s across the elbow and so the study was repeated recording over the first dorsal interosseous showing a slow velocity across the elbow at 46.6 m/s and a normal velocity below the elbow at 55.9 m/s.  Patient reports very little pain the symptoms in her  "hands but states she is bothered by her hand clumsiness and repeatedly dropping objects.  Additionally she does report some neck pain, but states \" I have pain all over, so it does not particularly stand out.\"  She denies much pain radiating from her neck into her upper extremities also denies significant pain radiating from her neck up into her head.  She states sometimes her neck does feel stiff, especially when she wakes up in the morning but this usually goes away after a few hours.  Lastly patient notices some shakiness/tremulousness in her hands, she states it is very subtle, and occurs occasionally -seems to be present with both postural movements and at rest. Family history is negative for any peripheral neuropathy the patient is aware of, she does states she has an uncle with Parkinson's disease.     Left lower extremity numbness  In addition she describes numbness in the left lower extremity -she states it affects her entire lower extremity but seems to be worse in her foot.   She reports the symptoms are not constant though does indicate that she has daily notation of some left foot numbness mostly on the top and including all of the toes.  She reports she notices the numbness in her leg if she is sitting for prolonged period of time or sitting on the toilet. She does have some painful symptoms at times in her left leg/foot as well, recently she has been put on Lyrica per her PCP for the fibromyalgia symptoms and this to have helped pain in her legs quite a bit. In terms of weakness she endorses feeling generally weak and fatigued basically all the time.  She says that on the stairs the left quad wants to give out on her at times but it is hard for her to tell when this is just from pain from her fibromyalgia or other causes.  Patient also endorses longstanding low back pain. She has had an MRI of the pelvis at [facility] on 5/29/2020, ordered per Dr. Estephanie Thao for her arthritis.  We do not " have the report or the films but per rheumatological note, MRI showed normal SI joints but inflammation and arthritis at L4/L5 facet with gluteus tendinosis.      Gait disturbance/incoordination/imbalance and frequent falls  The patient reports for at least the past 6 months she has had some noticeable gait disturbance which she describes as incoordination. She states it is particularly apparent when she tries to ambulate after she has been seated for a while, or after prolonged standing or if she is walking and quickly changes direction. She also complains of some imbalance issues though she does not associate this with her numbness she sometimes has in her left leg.  Additionally she denies associated vertigo type symptoms, there is no dizziness, no visual disturbance no decreased level of consciousness or mental status change.  She says this causes her to fall a lot, states she falls or almost falls almost every day.  About 6 weeks ago or so she fell and sprained her right ankle. She also indicates that when she falls she typically falls of the left. She reports before a fall, she does feel a sense of weakness in her legs but cannot say whether or not it is an acute weakness in her legs that causes her to fall.   As mentioned of recurrent syncopal episodes teenager.  She was seen by cardiology probably 10 years ago, underwent EKG and echo, it was concluded that she was having vasodepressor syncope. In recent years, she avoids loss of consciousness pretty well (it only happens maybe once every 3 months or so) -she states she is able to recognize prodromal symptoms, she gets lightheaded/dizzy, sweaty and slightly nauseous and then will quickly sitting down when she starts feeling dizzy.   Work-up to date has included an MRI of her brain looking for hydrocephalus, pontine lesion or a midline cerebellar abnormality - study was performed on 8/10/2021 and findings were essentially normal.    Interim history  Since  patient was last seen she has undergone an MRI of her C-spine which also looked quite normal, there is no evidence for central canal stenosis or cord compression, and additionally no root level impingement.  She also had some additional laboratory studies looking for treatable causes of a peripheral neuropathy, results are as follows: RPR was nonreactive, cryoglobulins were negative, copper was within normal limits at 122, heavy metals including lead arsenic and mercury were all within normal, vitamin D levels were within normal, sed rate normal at 13 and IEP/SPEP showed no evidence for monoclonal gammopathy.     Patient reports that in terms of her symptoms, things are basically the same, she still endorses widespread pain -states this is especially bad in her bilateral hips and knees.  She also continues to complain of numbness/tingling involving both of her arms and her left lower extremity.  She still endorses some gait disturbance and states she is constantly tripping/stumbling over any uneven surfaces.  She complains of worsening general fatigue and decreased energy.  Recently her Lyrica was increased per her PCP, she is now taking 100 mg 3 times a day but states she has not noticed any major improvement in painful symptoms.       Past Medical History:   Diagnosis Date   • Allergies    • Anemia    • Anxiety    • Asthma    • Coronary artery disease     Congenital pericardial cyst   • Crohn's disease (HCC) 05/2019   • CTS (carpal tunnel syndrome)    • Depression    • Difficulty walking    • Eczema    • Fibromyalgia, primary    • GERD (gastroesophageal reflux disease)    • Headache, tension-type    • HL (hearing loss)    • IBS (irritable bowel syndrome)    • Irregular heartbeat     OCCAS   • Lactose intolerance    • Memory loss    • Migraine    • Pericardial cyst    • Peripheral neuropathy    • Rheumatoid arthritis (HCC)    • Sensitive skin    • Spondylosis    • Syncope          Past Surgical History:   Procedure  Laterality Date   • ADENOIDECTOMY     • COLON RESECTION N/A 11/4/2020    Procedure: laparoscopic ileocecectomy;  Surgeon: Lisa Kerr MD;  Location: [facility] MAIN OR;  Service: General;  Laterality: N/A;   • COLONOSCOPY N/A 5/10/2019    Procedure: COLONOSCOPY TO CECUM TO TERMINAL ILEUM WITH BIOPSY;  Surgeon: Tae Turcios MD;  Location:  [facility];  Service: Gastroenterology   • ENDOSCOPY N/A 5/10/2019    Procedure: ESOPHAGOGASTRODUODENOSCOPY WITH BIOPSY;  Surgeon: Tae Turcios MD;  Location:  [facility];  Service: Gastroenterology   • ENDOSCOPY N/A 12/13/2019    Procedure: ESOPHAGOGASTRODUODENOSCOPY WITH COLD BIOPSIES;  Surgeon: Tae Turcios MD;  Location: [facility] ENDOSCOPY;  Service: Gastroenterology   • LIPOMA EXCISION  2007   • SIGMOIDOSCOPY N/A 12/13/2019    Procedure: SIGMOIDOSCOPY FLEXIBLE TO T.I.;  Surgeon: Tae Turcios MD;  Location: [facility] ENDOSCOPY;  Service: Gastroenterology   • TONSILLECTOMY AND ADENOIDECTOMY     • WISDOM TOOTH EXTRACTION             Current Outpatient Medications:   •  acetaminophen (Tylenol 8 Hour) 650 MG 8 hr tablet, Take 2 tablets by mouth Every 8 (Eight) Hours., Disp: , Rfl:   •  albuterol sulfate HFA (PROAIR HFA) 108 (90 Base) MCG/ACT inhaler, Inhale 2 puffs Every 4 (Four) Hours As Needed., Disp: , Rfl:   •  cetirizine (ZYRTEC ALLERGY) 10 MG tablet, Take 10 mg by mouth Daily., Disp: , Rfl:   •  Cholecalciferol (VITAMIN D-3) 5000 units tablet, Take 5,000 Units by mouth Daily., Disp: , Rfl:   •  Cyanocobalamin (VITAMIN B 12 PO), Take 1 tablet by mouth Daily., Disp: , Rfl:   •  dicyclomine (Bentyl) 20 MG tablet, Take 1 tablet by mouth 3 (Three) Times a Day As Needed (Cramping)., Disp: 90 tablet, Rfl: 2  •  diphenoxylate-atropine (LOMOTIL) 2.5-0.025 MG per tablet, Take 1 tablet by mouth 4 (Four) Times a Day As Needed for Diarrhea., Disp: 120 tablet, Rfl: 3  •  esomeprazole (nexIUM) 40 MG capsule, TAKE ONE CAPSULE BY MOUTH DAILY, Disp: 30 capsule, Rfl:  5  •  famotidine (Pepcid) 20 MG tablet, Take 1 tablet by mouth Every Morning., Disp: 90 tablet, Rfl: 3  •  ferrous sulfate 324 (65 Fe) MG tablet delayed-release EC tablet, Take 324 mg by mouth Daily With Breakfast., Disp: , Rfl:   •  fluticasone (FLONASE ALLERGY RELIEF) 50 MCG/ACT nasal spray, 2 sprays into the nostril(s) as directed by provider Daily., Disp: , Rfl:   •  folic acid (FOLVITE) 1 MG tablet, TAKE ONE TABLET BY MOUTH DAILY, Disp: 90 tablet, Rfl: 0  •  Humira Pen 40 MG/0.4ML Pen-injector Kit, INJECT 1 PEN UNDER THE SKIN EVERY 7 DAYS., Disp: 4 each, Rfl: 5  •  mesalamine (Apriso) 0.375 g 24 hr capsule, Take 4 capsules by mouth Daily., Disp: 120 capsule, Rfl: 5  •  mometasone-formoterol (Dulera) 100-5 MCG/ACT inhaler, Inhale 2 puffs 2 (Two) Times a Day., Disp: 8.8 g, Rfl: 11  •  montelukast (Singulair) 10 MG tablet, Take 1 tablet by mouth Daily., Disp: 90 tablet, Rfl: 3  •  Multiple Vitamin (MULTIVITAMIN PO), Take 1 tablet by mouth Daily., Disp: , Rfl:   •  polyethylene glycol (MiraLax) 17 GM/SCOOP powder, Take  by mouth., Disp: , Rfl:   •  pregabalin (Lyrica) 100 MG capsule, Take 1 capsule by mouth 3 (Three) Times a Day., Disp: 90 capsule, Rfl: 5  •  sertraline (Zoloft) 100 MG tablet, Take 1.5 tablets by mouth Daily., Disp: 135 tablet, Rfl: 3  •  spironolactone (ALDACTONE) 100 MG tablet, Take 100 mg by mouth Daily., Disp: , Rfl:   No current facility-administered medications for this visit.      Family History   Problem Relation Age of Onset   • Hypertension Father    • Skin cancer Father    • Cancer Maternal Grandmother         Colorectal   • Colon cancer Maternal Grandmother    • Prostate cancer Maternal Grandfather    • Prostate cancer Paternal Grandfather    • Hypertension Paternal Grandfather    • Hypertension Paternal Grandmother    • Parkinsonism Maternal Uncle    • Malig Hyperthermia Neg Hx    • Colon polyps Neg Hx          Social History     Socioeconomic History   • Marital status: Single   •  "Number of children: 0   • Highest education level: Some college, no degree   Tobacco Use   • Smoking status: Never Smoker   • Smokeless tobacco: Never Used   • Tobacco comment: caffeine use a few days weekly   Vaping Use   • Vaping Use: Never used   Substance and Sexual Activity   • Alcohol use: Not Currently     Alcohol/week: 0.0 standard drinks     Comment: No alcohol since october 2020   • Drug use: No   • Sexual activity: Yes     Partners: Male     Birth control/protection: Condom, I.U.D.         Allergies   Allergen Reactions   • Lactose Intolerance (Gi) GI Intolerance   • Levaquin [Levofloxacin] Hallucinations         ROS:  Review of Systems   Eyes: Negative for photophobia, pain and redness.   Respiratory: Negative for cough, shortness of breath and wheezing.    Cardiovascular: Negative for chest pain, palpitations and leg swelling.   Gastrointestinal: Negative for diarrhea, nausea and vomiting.   Endocrine: Negative for cold intolerance, heat intolerance and polydipsia.   Genitourinary: Negative for decreased urine volume, difficulty urinating and urgency.   Musculoskeletal: Negative for back pain, neck pain and neck stiffness.   Skin: Negative for color change, rash and wound.   Allergic/Immunologic: Negative for environmental allergies, food allergies and immunocompromised state.   Neurological: Positive for tremors, weakness, numbness (left side) and headaches. Negative for dizziness, seizures, syncope, facial asymmetry, speech difficulty and light-headedness.   Hematological: Negative for adenopathy. Does not bruise/bleed easily.   Psychiatric/Behavioral: Negative for sleep disturbance. The patient is not nervous/anxious.      I have reviewed the above ROS put in by the medical assistant and am in agreement.     Physical Exam:  Vitals:    10/20/21 1511   BP: 120/68   Pulse: 99   SpO2: 97%   Weight: (!) 137 kg (303 lb)   Height: 172.7 cm (68\")     Body mass index is 46.07 kg/m².    Physical Exam  General: " obese white female no acute distress  HEENT: Normocephalic, atraumatic  Neck: Supple.  Reports some midline neck with flexion but no Lhermitte sign.    Extremities:  No pedal edema     Neurological Exam:   Mental Status: Awake, alert, oriented to person, place and time.  Conversant without evidence of an affective disorder, thought disorder, delusions or hallucinations.  Attention span and concentration are normal.  HCF: No aphasia, apraxia or dysarthria.  Recent and remote memory intact.  Knowledge of recent events intact.  CN:      I:              II: Visual fields full without left inattention              III, IV, VI: Eye movements intact without nystagmus or ptosis.  Pupils equal  round and reactive to light.              V,VII: Light touch and pinprick intact all 3 divisions of V.  Facial muscles symmetrical.              VIII: Hearing intact to finger rub              IX,X: Soft palate elevates symmetrically              XI: Sternomastoid and trapezius are strong.               XII: Tongue midline without atrophy or fasciculations  Motor: Normal tone and bulk in the upper and lower extremities              Power testing: Mild weakness of APBs bilaterally, mild weakness of her interossei on left, otherwise all muscles tested in both arms were normal.  Some weakness of toe extension left greater than right, otherwise normal power in all muscles tested in her lower extremities.   Reflexes: Upper extremities: +2 diffusely                   Lower extremities: +2 diffusely   Sensory: Light touch:                    Pinprick:      Cerebellar: Finger-to-nose: Intact                      Rapid movement: Intact           Gait and Station:  Mildly broad-based.    Casual walk appears normal, patient is able to walk on her toes and her heels.  Tandem walk is minimally impaired.  No Romberg and no drift    Results:      Lab Results   Component Value Date    GLUCOSE 91 10/01/2021    BUN 8 10/01/2021    CREATININE 0.82  10/01/2021    EGFRIFNONA 80 10/01/2021    EGFRIFAFRI 120 03/02/2021    BCR 9.8 10/01/2021    CO2 26.4 10/01/2021    CALCIUM 9.3 10/01/2021    PROTENTOTREF 6.8 09/28/2021    ALBUMIN 4.30 10/01/2021    LABIL2 1.0 09/28/2021    AST 15 10/01/2021    ALT 11 10/01/2021       Lab Results   Component Value Date    WBC 9.62 10/01/2021    HGB 14.7 10/01/2021    HCT 41.9 10/01/2021    MCV 91.1 10/01/2021     10/01/2021         .  Lab Results   Component Value Date    RPR Non-Reactive 09/28/2021         Lab Results   Component Value Date    TSH 1.910 03/02/2021         Lab Results   Component Value Date    OVKAGMSE57 1,017 03/02/2021         No results found for: FOLATE      No results found for: HGBA1C      Lab Results   Component Value Date    GLUCOSE 91 10/01/2021    BUN 8 10/01/2021    CREATININE 0.82 10/01/2021    EGFRIFNONA 80 10/01/2021    EGFRIFAFRI 120 03/02/2021    BCR 9.8 10/01/2021    K 3.8 10/01/2021    CO2 26.4 10/01/2021    CALCIUM 9.3 10/01/2021    PROTENTOTREF 6.8 09/28/2021    ALBUMIN 4.30 10/01/2021    LABIL2 1.0 09/28/2021    AST 15 10/01/2021    ALT 11 10/01/2021         Lab Results   Component Value Date    WBC 9.62 10/01/2021    HGB 14.7 10/01/2021    HCT 41.9 10/01/2021    MCV 91.1 10/01/2021     10/01/2021             Assessment:   1.  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  Left lower extremity numbness with some painful symptoms mostly within hip girdle and also at knees today, plus persistent chronic low back pain  3.  Reported gait disturbance/incoordination/imbalance and frequent falls  4.  History of vasodepressor syncope      Plan:      1.   Reviewed recent MRI of C-spine, study was grossly normal.  Still suspect that that her paresthesias in her upper extremities are due to a peripheral nerve compression.   Additionally there were no laboratory findings to indicate a treatable cause of a more diffuse polyneuropathy.  Again we discussed referral to hand specialty to see if they could offer her anything in terms of intervention, patient would like to hold off on this for now.  Continue conservative measures: Night splints, NSAIDs as needed.    2.  In terms of her left lower extremity numbness/pain, patient is scheduled for an EMG of her lower extremities in December.  In terms of her joint pains suspect this is arthritic in nature with some contribution possibly coming from her fibromyalgia as well.  Her Lyrica has been increased to 100 mg 3 times daily per her PCP which has not improved things greatly.  Discussed other forms of analgesia symptoms include the antidepressant category, often we use Cymbalta or Effexor in addition to the Lyrica.  Patient is already on Zoloft has been for about 2 years now, advised that she would have to come off of this in order to start an SNRI.  Patient states she will consider this, get back with me later.    3.  As to the reported gait disturbance/incoordination and imbalance issues -again MRI of C-spine was normal.  We will send patient to PT to see if this improves things.    -Patient will follow up with me again after EMG in about 2 months          Time 30 minutes        Dictated utilizing Dragon dictation.

## 2021-10-21 LAB
BH CV ECHO MEAS - ACS: 2 CM
BH CV ECHO MEAS - AO MAX PG (FULL): 3 MMHG
BH CV ECHO MEAS - AO MAX PG: 7.1 MMHG
BH CV ECHO MEAS - AO MEAN PG (FULL): 2 MMHG
BH CV ECHO MEAS - AO MEAN PG: 4.3 MMHG
BH CV ECHO MEAS - AO ROOT AREA (BSA CORRECTED): 1.1
BH CV ECHO MEAS - AO ROOT AREA: 5.2 CM^2
BH CV ECHO MEAS - AO ROOT DIAM: 2.6 CM
BH CV ECHO MEAS - AO V2 MAX: 132.8 CM/SEC
BH CV ECHO MEAS - AO V2 MEAN: 97.2 CM/SEC
BH CV ECHO MEAS - AO V2 VTI: 29.8 CM
BH CV ECHO MEAS - AVA(I,A): 2.1 CM^2
BH CV ECHO MEAS - AVA(I,D): 2.1 CM^2
BH CV ECHO MEAS - AVA(V,A): 2.3 CM^2
BH CV ECHO MEAS - AVA(V,D): 2.3 CM^2
BH CV ECHO MEAS - BSA(HAYCOCK): 2.6 M^2
BH CV ECHO MEAS - BSA: 2.4 M^2
BH CV ECHO MEAS - BZI_BMI: 45.6 KILOGRAMS/M^2
BH CV ECHO MEAS - BZI_METRIC_HEIGHT: 172.7 CM
BH CV ECHO MEAS - BZI_METRIC_WEIGHT: 136.1 KG
BH CV ECHO MEAS - EDV(MOD-SP2): 165 ML
BH CV ECHO MEAS - EDV(MOD-SP4): 61 ML
BH CV ECHO MEAS - EDV(TEICH): 134.1 ML
BH CV ECHO MEAS - EF(CUBED): 68.7 %
BH CV ECHO MEAS - EF(MOD-BP): 68 %
BH CV ECHO MEAS - EF(MOD-SP2): 77.6 %
BH CV ECHO MEAS - EF(MOD-SP4): 73.8 %
BH CV ECHO MEAS - EF(TEICH): 59.9 %
BH CV ECHO MEAS - ESV(MOD-SP2): 37 ML
BH CV ECHO MEAS - ESV(MOD-SP4): 16 ML
BH CV ECHO MEAS - ESV(TEICH): 53.8 ML
BH CV ECHO MEAS - FS: 32.1 %
BH CV ECHO MEAS - IVS/LVPW: 0.89
BH CV ECHO MEAS - IVSD: 1.2 CM
BH CV ECHO MEAS - LA DIMENSION: 4.1 CM
BH CV ECHO MEAS - LA/AO: 1.6
BH CV ECHO MEAS - LV DIASTOLIC VOL/BSA (35-75): 25.1 ML/M^2
BH CV ECHO MEAS - LV MASS(C)D: 263.8 GRAMS
BH CV ECHO MEAS - LV MASS(C)DI: 108.6 GRAMS/M^2
BH CV ECHO MEAS - LV MAX PG: 4.1 MMHG
BH CV ECHO MEAS - LV MEAN PG: 2.2 MMHG
BH CV ECHO MEAS - LV SYSTOLIC VOL/BSA (12-30): 6.6 ML/M^2
BH CV ECHO MEAS - LV V1 MAX: 100.7 CM/SEC
BH CV ECHO MEAS - LV V1 MEAN: 69.1 CM/SEC
BH CV ECHO MEAS - LV V1 VTI: 20.6 CM
BH CV ECHO MEAS - LVIDD: 5.3 CM
BH CV ECHO MEAS - LVIDS: 3.6 CM
BH CV ECHO MEAS - LVLD AP2: 8.9 CM
BH CV ECHO MEAS - LVLD AP4: 7.9 CM
BH CV ECHO MEAS - LVLS AP2: 7.4 CM
BH CV ECHO MEAS - LVLS AP4: 5.9 CM
BH CV ECHO MEAS - LVOT AREA (M): 3.1 CM^2
BH CV ECHO MEAS - LVOT AREA: 3 CM^2
BH CV ECHO MEAS - LVOT DIAM: 2 CM
BH CV ECHO MEAS - LVPWD: 1.3 CM
BH CV ECHO MEAS - MV A MAX VEL: 56.6 CM/SEC
BH CV ECHO MEAS - MV DEC SLOPE: 385.6 CM/SEC^2
BH CV ECHO MEAS - MV DEC TIME: 0.16 SEC
BH CV ECHO MEAS - MV E MAX VEL: 71.1 CM/SEC
BH CV ECHO MEAS - MV E/A: 1.3
BH CV ECHO MEAS - MV MAX PG: 3.8 MMHG
BH CV ECHO MEAS - MV MEAN PG: 1.4 MMHG
BH CV ECHO MEAS - MV P1/2T MAX VEL: 100.8 CM/SEC
BH CV ECHO MEAS - MV P1/2T: 76.6 MSEC
BH CV ECHO MEAS - MV V2 MAX: 97.9 CM/SEC
BH CV ECHO MEAS - MV V2 MEAN: 54.7 CM/SEC
BH CV ECHO MEAS - MV V2 VTI: 27.5 CM
BH CV ECHO MEAS - MVA P1/2T LCG: 2.2 CM^2
BH CV ECHO MEAS - MVA(P1/2T): 2.9 CM^2
BH CV ECHO MEAS - MVA(VTI): 2.3 CM^2
BH CV ECHO MEAS - PA ACC TIME: 0.15 SEC
BH CV ECHO MEAS - PA MAX PG (FULL): 2.3 MMHG
BH CV ECHO MEAS - PA MAX PG: 5.1 MMHG
BH CV ECHO MEAS - PA PR(ACCEL): 13.6 MMHG
BH CV ECHO MEAS - PA V2 MAX: 112.7 CM/SEC
BH CV ECHO MEAS - PULM A REVS DUR: 0.15 SEC
BH CV ECHO MEAS - PULM A REVS VEL: 33.9 CM/SEC
BH CV ECHO MEAS - PULM DIAS VEL: 39.4 CM/SEC
BH CV ECHO MEAS - PULM S/D: 1.3
BH CV ECHO MEAS - PULM SYS VEL: 50 CM/SEC
BH CV ECHO MEAS - PVA(V,A): 5.5 CM^2
BH CV ECHO MEAS - PVA(V,D): 5.5 CM^2
BH CV ECHO MEAS - QP/QS: 2.5
BH CV ECHO MEAS - RAP SYSTOLE: 3 MMHG
BH CV ECHO MEAS - RV MAX PG: 2.7 MMHG
BH CV ECHO MEAS - RV MEAN PG: 1.6 MMHG
BH CV ECHO MEAS - RV V1 MAX: 82.7 CM/SEC
BH CV ECHO MEAS - RV V1 MEAN: 59.4 CM/SEC
BH CV ECHO MEAS - RV V1 VTI: 20.6 CM
BH CV ECHO MEAS - RVOT AREA: 7.5 CM^2
BH CV ECHO MEAS - RVOT DIAM: 3.1 CM
BH CV ECHO MEAS - RVSP: 7.4 MMHG
BH CV ECHO MEAS - SI(AO): 63.2 ML/M^2
BH CV ECHO MEAS - SI(CUBED): 41.6 ML/M^2
BH CV ECHO MEAS - SI(LVOT): 25.9 ML/M^2
BH CV ECHO MEAS - SI(MOD-SP2): 52.7 ML/M^2
BH CV ECHO MEAS - SI(MOD-SP4): 18.5 ML/M^2
BH CV ECHO MEAS - SI(TEICH): 33.1 ML/M^2
BH CV ECHO MEAS - SV(AO): 153.5 ML
BH CV ECHO MEAS - SV(CUBED): 101.1 ML
BH CV ECHO MEAS - SV(LVOT): 62.9 ML
BH CV ECHO MEAS - SV(MOD-SP2): 128 ML
BH CV ECHO MEAS - SV(MOD-SP4): 45 ML
BH CV ECHO MEAS - SV(RVOT): 154.8 ML
BH CV ECHO MEAS - SV(TEICH): 80.3 ML
BH CV ECHO MEAS - TAPSE (>1.6): 2.5 CM
BH CV ECHO MEAS - TR MAX VEL: 104.7 CM/SEC
BH CV STRESS BP STAGE 1: NORMAL
BH CV STRESS BP STAGE 2: NORMAL
BH CV STRESS DURATION MIN STAGE 1: 3
BH CV STRESS DURATION MIN STAGE 2: 3
BH CV STRESS DURATION SEC STAGE 1: 0
BH CV STRESS DURATION SEC STAGE 2: 0
BH CV STRESS ECHO POST STRESS EJECTION FRACTION EF: 74 %
BH CV STRESS GRADE STAGE 1: 10
BH CV STRESS GRADE STAGE 2: 12
BH CV STRESS HR STAGE 1: 164
BH CV STRESS HR STAGE 2: 179
BH CV STRESS METS STAGE 1: 5
BH CV STRESS METS STAGE 2: 7.5
BH CV STRESS PROTOCOL 1: NORMAL
BH CV STRESS SPEED STAGE 1: 1.7
BH CV STRESS SPEED STAGE 2: 2.5
BH CV STRESS STAGE 1: 1
BH CV STRESS STAGE 2: 2
BH CV XLRA - RV BASE: 2.2 CM
BH CV XLRA - RV LENGTH: 6.9 CM
BH CV XLRA - RV MID: 2 CM
LEFT ATRIUM VOLUME INDEX: 22 ML/M2
MAXIMAL PREDICTED HEART RATE: 186 BPM
PERCENT MAX PREDICTED HR: 96.24 %
SINUS: 2.6 CM
STJ: 2.8 CM
STRESS BASELINE BP: NORMAL MMHG
STRESS BASELINE HR: 81 BPM
STRESS PERCENT HR: 113 %
STRESS POST ESTIMATED WORKLOAD: 7.5 METS
STRESS POST EXERCISE DUR MIN: 6 MIN
STRESS POST EXERCISE DUR SEC: 0 SEC
STRESS POST PEAK BP: NORMAL MMHG
STRESS POST PEAK HR: 179 BPM
STRESS TARGET HR: 158 BPM

## 2021-10-21 RX ORDER — DULOXETIN HYDROCHLORIDE 60 MG/1
60 CAPSULE, DELAYED RELEASE ORAL DAILY
Qty: 30 CAPSULE | Refills: 2 | Status: SHIPPED | OUTPATIENT
Start: 2021-10-21 | End: 2022-01-31

## 2021-10-27 ENCOUNTER — APPOINTMENT (OUTPATIENT)
Dept: WOMENS IMAGING | Facility: HOSPITAL | Age: 36
End: 2021-10-27

## 2021-10-27 PROCEDURE — 77067 SCR MAMMO BI INCL CAD: CPT | Performed by: RADIOLOGY

## 2021-10-27 PROCEDURE — 77063 BREAST TOMOSYNTHESIS BI: CPT | Performed by: RADIOLOGY

## 2021-11-17 ENCOUNTER — HOSPITAL ENCOUNTER (OUTPATIENT)
Dept: PHYSICAL THERAPY | Facility: HOSPITAL | Age: 36
Setting detail: THERAPIES SERIES
Discharge: HOME | End: 2021-11-17

## 2021-11-17 DIAGNOSIS — R26.9 GAIT ABNORMALITY: ICD-10-CM

## 2021-11-17 DIAGNOSIS — G57.93 NEUROPATHY INVOLVING BOTH LOWER EXTREMITIES: ICD-10-CM

## 2021-11-17 DIAGNOSIS — Z74.09 IMPAIRED FUNCTIONAL MOBILITY, BALANCE, GAIT, AND ENDURANCE: Primary | ICD-10-CM

## 2021-11-17 PROCEDURE — 97163 PT EVAL HIGH COMPLEX 45 MIN: CPT | Performed by: PHYSICAL THERAPIST

## 2021-11-17 NOTE — THERAPY EVALUATION
.Outpatient Physical Therapy Neuro Initial Evaluation  [facility]     Patient Name: Bri Pfeiffer  : 1985  MRN: [id number]  Today's Date: 2021      Visit Date: 2021    Patient Active Problem List   Diagnosis   • Pericardial cyst   • Vasodepressor syncope   • Esophageal dysphagia   • Rectal bleeding   • Colitis   • Gastroesophageal reflux disease   • Crohn's disease of large intestine with rectal bleeding ([facility])   • Other irritable bowel syndrome   • Skin rash   • Moderate episode of recurrent major depressive disorder ([facility])   • Anxiety   • Low back pain with sciatica   • Ileocecal valve stenosis   • Crohn's disease ([facility])   • BMI 40.0-44.9, adult ([facility])   • Status post small bowel resection   • Fibromyalgia   • Non-seasonal allergic rhinitis due to pollen        Past Medical History:   Diagnosis Date   • Allergies    • Anemia    • Anxiety    • Asthma    • Coronary artery disease     Congenital pericardial cyst   • Crohn's disease ([facility]) 2019   • CTS (carpal tunnel syndrome)    • Depression    • Difficulty walking    • Eczema    • Fibromyalgia, primary    • GERD (gastroesophageal reflux disease)    • Headache, tension-type    • HL (hearing loss)    • IBS (irritable bowel syndrome)    • Irregular heartbeat     OCCAS   • Lactose intolerance    • Memory loss    • Migraine    • Pericardial cyst    • Peripheral neuropathy    • Rheumatoid arthritis ([facility])    • Sensitive skin    • Spondylosis    • Syncope         Past Surgical History:   Procedure Laterality Date   • ADENOIDECTOMY     • COLON RESECTION N/A 2020    Procedure: laparoscopic ileocecectomy;  Surgeon: Lisa Kerr MD;  Location: [facility] OR;  Service: General;  Laterality: N/A;   • COLONOSCOPY N/A 5/10/2019    Procedure: COLONOSCOPY TO CECUM TO TERMINAL ILEUM WITH BIOPSY;  Surgeon: Tae Turcios MD;  Location: [facility] ENDOSCOPY;  Service: Gastroenterology   • ENDOSCOPY N/A 5/10/2019    Procedure:  ESOPHAGOGASTRODUODENOSCOPY WITH BIOPSY;  Surgeon: Tae Turcios MD;  Location: [facility] ENDOSCOPY;  Service: Gastroenterology   • ENDOSCOPY N/A 12/13/2019    Procedure: ESOPHAGOGASTRODUODENOSCOPY WITH COLD BIOPSIES;  Surgeon: Tae Turcios MD;  Location: [facility] ENDOSCOPY;  Service: Gastroenterology   • LIPOMA EXCISION  2007   • SIGMOIDOSCOPY N/A 12/13/2019    Procedure: SIGMOIDOSCOPY FLEXIBLE TO T.I.;  Surgeon: Tae Turcios MD;  Location: [facility] ENDOSCOPY;  Service: Gastroenterology   • TONSILLECTOMY AND ADENOIDECTOMY     • WISDOM TOOTH EXTRACTION           Visit Dx:     ICD-10-CM ICD-9-CM   1. Impaired functional mobility, balance, gait, and endurance  Z74.09 V49.89   2. Gait abnormality  R26.9 781.2   3. Neuropathy involving both lower extremities  G57.93 356.9        Patient History     Row Name 11/17/21 1106             History    Chief Complaint Balance Problems; Difficulty Walking; Difficulty with daily activities; Dizziness; Falls/history of falls; Fatigue/poor endurance; Impaired sensation; Joint stiffness; Joint swelling; Muscle tenderness; Muscle weakness; Numbness; Pain; Swelling; Tightness; Tinglings (P)    -patient      Type of Pain Ankle pain; Back pain; Elbow pain; Foot pain; Hand pain; Hip pain; Jaw pain; Knee pain; Lower Extremity / Leg; Neck pain; Shoulder pain; Upper Extremity / Arm; Wrist pain (P)    -patient      Patient/Caregiver Goals Relieve pain; Improve mobility; Improve strength; Relief from dizziness; Decrease swelling (P)    -patient      Hand Dominance right-handed (P)    -patient      Occupation/sports/leisure activities Dog shows (P)    -patient      What clinical tests have you had for this problem? CT scan; MRI; Blood Work  EMG of UEs  -JK      Results of Clinical Tests no central cord stenois or cord compression found on cervical MRI; B median neuropathies at wrist and L ulnar neuropathy at elbow  -JK      Are you or can you be pregnant No (P)    -patient               Pain     Pain Location Hand; Knee; Hip; Neck  -JK      Pain at Present 7  -JK      Pain Frequency Constant/continuous  location intermittant; widespread pain  -JK      Pain Description --  widespread  -JK              Fall Risk Assessment    Any falls in the past year: Yes (P)    -patient      Factors that contributed to the fall: Lost balance; Fatigue; Uneven surface (P)    -patient              Services    Prior Rehab/Home Health Experiences No (P)    -patient      Are you currently receiving Home Health services No (P)    -patient      Do you plan to receive Home Health services in the near future No (P)    -patient              Daily Activities    Primary Language English (P)    -patient      Are you able to read Yes (P)    -patient      Are you able to write Yes (P)    -patient      How does patient learn best? Listening; Reading; Demonstration (P)    -patient              Safety    Are you being hurt, hit, or frightened by anyone at home or in your life? No (P)    -patient      Are you being neglected by a caregiver No (P)    -patient      Have you had any of the following issues with Depression; Anxiety (P)    -patient            User Key  (r) = Recorded By, (t) = Taken By, (c) = Cosigned By    Initials Name Provider Type    Erin Gray, PT Physical Therapist    patient Bri Pfeiffer --                     PT Neuro     Row Name 11/17/21 1300             Subjective Comments    Subjective Comments Pt would like to bring her service dog to PT with her because she takes her dog when out in communtiy and has other errands to run on Wednesdays. Pt reports she has EMG of B LEs scheduled for Dec 17 with Dr Farhad Thao. Pt reports she leans her elbows out against the wall or leans on furniture for support while completing tasks at home instead of using an AD. Pt reports she is very lax at her ankles which has caused her to roll her ankles numerous times (R most injured).  -JK              Precautions  "and Contraindications    Precautions/Limitations fall precautions; other (see comments)  syncope  -JK      Precautions Hx of recurrent vasodepressor syncope  -JK              Subjective Pain    Able to rate subjective pain? yes  -JK      Pre-Treatment Pain Level 7  -JK      Post-Treatment Pain Level 7  -JK      Subjective Pain Comment widespread at hands, neck, L hip and knee  -JK              Home Living    Current Living Arrangements home/apartment/condo  -JK      Home Accessibility stairs to enter home  -JK      Number of Stairs, Main Entrance five  -JK      Surface of Stairs, Main Entrance concrete  -JK      Stair Railings, Main Entrance railing on right side (ascending)  -JK      Stairs Comment, Main Entrance Per pt: \" ascending is most taxing; descending is most dangerous bc that's when I fall\"  -JK      Home Equipment --  shower seat  -JK              Cognition    Overall Cognitive Status WFL  -JK      Memory Appears intact  -JK      Orientation Level Oriented X4  -JK      Safety Judgment Good awareness of safety precautions  -JK      Deficits Fully aware of deficits  -JK              Posture/Observations    Alignment Options Forward head; Rounded shoulders  -JK      Forward Head Mild  -JK      Rounded Shoulders Mild  -JK      Posture/Observations Comments Pt arrived to PT walking independently and without an AD  -JK              MMT (Manual Muscle Testing)    General MMT Comments B LEs grossly 4+/5 EXCEPT R DF 3+/5; L quad and Hamstring 4/5  -JK              Bed Mobility    Bed Mobility bed mobility (all) activities  -JK      All Activities, [place] (Bed Mobility) independent  -JK              Transfers    Sit-Stand [place] (Transfers) independent  -JK      Stand-Sit [place] (Transfers) independent  -JK      Comment (Transfers) uses hands to push to stand and to slow descend when sitting  -JK              Gait/Stairs (Locomotion)    [place] Level (Gait) independent  no AD  -JK      " Distance in Feet (Gait) 100' x 5  -JK      Pattern (Gait) step-through  -JK      Bilateral Gait Deviations --  inversion at B ankles at times  -JK      [place] Level (Stairs) supervision  -JK      Handrail Location (Stairs) right side (ascending)  -JK      Number of Steps (Stairs) 4  -JK      Ascending Technique (Stairs) step-over-step  -JK      Descending Technique (Stairs) step-to-step  pt needed B rails for descending  -JK      Stairs, Safety Issues other (see comments)  B knee hyperextension during descend  -JK      Stairs, Impairments strength decreased; impaired balance  -JK            User Key  (r) = Recorded By, (t) = Taken By, (c) = Cosigned By    Initials Name Provider Type    Erin Gray, PT Physical Therapist                        Therapy Education  Education Details: Asked pt to bring her ankle braces that she has and the shoes she wears them in to the next PT appt  Given: Symptoms/condition management, Mobility training  Program: New  How Provided: Verbal  Provided to: Patient  Level of Understanding: Teach back education performed, Demonstrated, Verbalized     PT OP Goals     Row Name 11/17/21 1600          PT Short Term Goals    STG Date to Achieve 12/15/21  -JK     STG 1 Pt to be independent with HEP of B ankle strengthening exercise using theraband.  -JK     STG 2 Pt to ambulate with small head turns R/L and up/down without gross LOB.  -JK     STG 3 Pt to ambulate down steps with one rail and no LOB with decreased B knee hyperextension.  -JK     STG 4 Pt to improve balance during gait by improving DGI to 14/24.  -JK            Long Term Goals    LTG Date to Achieve 02/16/22  -JK     LTG 1 Pt to  be independent with LE strengthening HEP that include both supine and standing exercise to improve stability of LEs during functional tasks.  -JK     LTG 2 Pt to ambulate safely up/down 4 steps with one rail carrying objects and no LOB.  -JK     LTG 3 Pt able to stop and make a change of  "direction of gait without gross LOB.  -J     LTG 4 Pt to ambulate with normal head turns R and L and up/down without LOB.  -J     LTG 5 Pt to improve DGI to 17/24.  -J     LTG 6 Pt to demonstrate improved balance by having no new falls at home.  -J     LTG 7 Pt to improve  balance with improved LANDON score of 44/56 or better.  -           User Key  (r) = Recorded By, (t) = Taken By, (c) = Cosigned By    Initials Name Provider Type    Erin Gray, PT Physical Therapist                 PT Assessment/Plan     Row Name 11/17/21 4540          PT Assessment    Functional Limitations Decreased safety during functional activities; Impaired gait; Limitation in home management; Limitations in community activities; Limitations in functional capacity and performance; Performance in leisure activities; Performance in work activities; Performance in self-care ADL  -     Impairments Balance; Coordination; Endurance; Gait; Impaired aerobic capacity; Muscle strength; Pain  -     Assessment Comments Pt is a 35 yo WF referred to PT due to weakness and recurrent falls. Pt has dx of enteropathic arthritis, Crohn's, s/p small bowel resection 2020, fibromyalgia, hx of recurrent vasodepressor syncope, GERD, allergies, depression, and anxiety. Pt has B numbness and tingling due to B median nerve neuropathies and L ulnar neuropathy at elbow. Pt also has B LE numbness with frequent falls; pt reports falling atleast once a month and most falls are to the L. Pt reports she is weak and fatigued all the time. Pt lives alone in a one floor home with 5 step entry with one rail; reports ascending steps as \"taxing\" and descending steps as \"dangerous bc that's when I fall\". 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 Pt has a service dog that goes with her to all community outings except for work. Pt has good strength with MMT with " only mild weakness L quad and hamstring and R dorsiflexors, but reports she feels very weak in B LEs. Pt has more issues with gait and balance, especially with tasks on the DGI. Pt had a complete crossover of LEs that rquired PT to assist pt back to upright balance with horizontal head turns. Pt had an even bigger LOB with gait stop and pivot that required almost total assist from PT to regain balance. Pt would benefit from skilled PT to address gait and balance issues and instruct pt in a HEP to improve LE stabiltiy to reduce number of falls pt is having. Pt's prognosis is medically complicated and is evolving.  -JK     Please refer to paper survey for additional self-reported information Yes  -JK     Rehab Potential Good  -JK     Patient/caregiver participated in establishment of treatment plan and goals Yes  -JK     Patient would benefit from skilled therapy intervention Yes  -JK            PT Plan    PT Frequency 1x/week  -JK     Predicted Duration of Therapy Intervention (PT) 12 weeks  -JK     Planned CPT's? PT EVAL HIGH COMPLEXITY: 66163; PT THER PROC EA 15 MIN: 39644; PT NEUROMUSC RE-EDUCATION EA 15 MIN: 73996; PT GAIT TRAINING EA 15 MIN: [id number]  -JK     Physical Therapy Interventions (Optional Details) balance training; gait training; home exercise program; neuromuscular re-education; patient/family education; postural re-education; stair training; strengthening; transfer training  -JK           User Key  (r) = Recorded By, (t) = Taken By, (c) = Cosigned By    Initials Name Provider Type    Erin Gray, PT Physical Therapist                    OP Exercises     Row Name 11/17/21 1300             Subjective Comments    Subjective Comments Pt would like to bring her service dog to PT with her because she takes her dog when out in communtiy and has other errands to run on Wednesdays. Pt reports she has EMG of B LEs scheduled for Dec 17 with Dr Farhad Thao. Pt reports she leans her elbows out against the  wall or leans on furniture for support while completing tasks at home instead of using an AD. Pt reports she is very lax at her ankles which has caused her to roll her ankles numerous times (R most injured).  -JENN              Subjective Pain    Able to rate subjective pain? yes  -JENN      Pre-Treatment Pain Level 7  -JK      Post-Treatment Pain Level 7  -JK      Subjective Pain Comment widespread at hands, neck, L hip and knee  -JENN            User Key  (r) = Recorded By, (t) = Taken By, (c) = Cosigned By    Initials Name Provider Type    Erin Gray, PT Physical Therapist                            Outcome Measure Options: Galdamez Balance, Dynamic Gait Index  Galdamez Balance Scale  Sitting to Standing: able to stand independently using hands  Standing Unsupported: able to stand safely for 2 minutes  Sitting with Back Unsupported but Feet Supported on Floor or on Stool: able to sit safely and securely for 2 minutes  Standing to Sitting: controls descent by using hands  Transfers: able to transfer safely definite need of hands  Standing Unsupported with Eyes Closed: able to stand 10 seconds safely  Standing Unsupported with Feet Together: able to place feet together independently and stand 1 minute with supervision  Reaching Forward with Outstretched Arm While Standing: can reach forward confidently 25 cm (10 inches)   Object From the Floor From a Standing Position: able to  object safely and easily  Turning to Look Behind Over Left and Right Shoulders While Standing: looks behind from both sides and weight shifts well  Turn 360 Degrees: able to turn 360 degrees safely one side only 4 seconds or less  Place Alternate Foot on Step or Stool While Standing Unsupported: able to complete > 2 steps needs minimal assist  Standing Unsupported with One Foot in Front: needs help to step but can hold 15 seconds  Standing on One Leg: unable to try of needs assist to prevent fall  Galdamez Total Score: 41  Dynamic Gait  Index (DGI)  Gait Level Surface: Normal: walks 20’, no assistive devices, good speed, no evidence for imbalance, normal gait pattern  Change in Gait Speed: Normal: Able to smoothly change walking speed without loss of balance or gait deviation. Shows significant difference in walking speeds between normal, fast and slow paces.  Gait with Horizontal Head Turns: Severe impairment: Performs task with severe disruption of gait, i.e. staggers outside 15” path, loses balance, stops, reaches for wall.  Gait with Vertical Head Turns: Moderate impairment: Performs head turns with moderate change in gait velocity, slows down,staggers, but recovers, can continue to walk.  Gait and Pivot Turn: Severe impairment: Cannot turn safely, requires assistance to turn and stop.  Step Over Obstacle: Mild impairment: Is able to step over shoe box, but must slow down and adjust steps to clear box safely.  Step Around Obstacles: Mild impairment: Is able to step around both cones, but must slow down and adjust steps to clear cones.  Steps: Moderate impairment: Two feet to a stair, must use rail.  Dynamic Gait Index Score: 12  Dynamic Gait Index Comments: severe LOB with gait pivot and turn    Time Calculation:   Start Time: 1250  Stop Time: 1335  Time Calculation (min): 45 min   Therapy Charges for Today     Code Description Service Date Service Provider Modifiers Qty    [id number]  PT EVAL HIGH COMPLEXITY 3 11/17/2021 Erin Masters, PT GP 1          PT G-Codes  Outcome Measure Options: Galdamez Balance, Dynamic Gait Index  Galdamez Total Score: 41     Patient was intermittently wearing a face mask during this therapy encounter. Therapist used appropriate personal protective equipment including eye protection, mask, and gloves.  Mask used was standard procedure mask. Appropriate PPE was worn during the entire therapy session. Hand hygiene was completed before and after therapy session. Patient is not in enhanced droplet precautions.       Erin  CINDY Masters, PT  11/17/2021

## 2021-11-24 ENCOUNTER — APPOINTMENT (OUTPATIENT)
Dept: PHYSICAL THERAPY | Facility: HOSPITAL | Age: 36
End: 2021-11-24

## 2021-12-01 ENCOUNTER — HOSPITAL ENCOUNTER (OUTPATIENT)
Dept: PHYSICAL THERAPY | Facility: HOSPITAL | Age: 36
Setting detail: THERAPIES SERIES
Discharge: HOME | End: 2021-12-01

## 2021-12-01 DIAGNOSIS — Z74.09 IMPAIRED FUNCTIONAL MOBILITY, BALANCE, GAIT, AND ENDURANCE: Primary | ICD-10-CM

## 2021-12-01 DIAGNOSIS — R26.9 GAIT ABNORMALITY: ICD-10-CM

## 2021-12-01 DIAGNOSIS — G57.93 NEUROPATHY INVOLVING BOTH LOWER EXTREMITIES: ICD-10-CM

## 2021-12-01 PROCEDURE — 97530 THERAPEUTIC ACTIVITIES: CPT

## 2021-12-01 PROCEDURE — 97112 NEUROMUSCULAR REEDUCATION: CPT
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
## 2021-12-04 DIAGNOSIS — K50.111 CROHN'S DISEASE OF LARGE INTESTINE WITH RECTAL BLEEDING (HCC): ICD-10-CM

## 2021-12-06 RX ORDER — MESALAMINE 0.38 G/1
CAPSULE, EXTENDED RELEASE ORAL
Qty: 120 CAPSULE | Refills: 5 | Status: SHIPPED | OUTPATIENT
Start: 2021-12-06 | End: 2022-04-12

## 2021-12-07 ENCOUNTER — APPOINTMENT (OUTPATIENT)
Dept: WOMENS IMAGING | Facility: HOSPITAL | Age: 36
End: 2021-12-07

## 2021-12-07 PROCEDURE — 76641 ULTRASOUND BREAST COMPLETE: CPT | Performed by: RADIOLOGY

## 2021-12-07 PROCEDURE — 77062 BREAST TOMOSYNTHESIS BI: CPT | Performed by: RADIOLOGY

## 2021-12-07 PROCEDURE — G0279 TOMOSYNTHESIS, MAMMO: HCPCS | Performed by: RADIOLOGY

## 2021-12-07 PROCEDURE — 77066 DX MAMMO INCL CAD BI: CPT | Performed by: RADIOLOGY

## 2021-12-08 ENCOUNTER — APPOINTMENT (OUTPATIENT)
Dept: PHYSICAL THERAPY | Facility: HOSPITAL | Age: 36
End: 2021-12-08

## 2021-12-15 ENCOUNTER — APPOINTMENT (OUTPATIENT)
Dept: PHYSICAL THERAPY | Facility: HOSPITAL | Age: 36
End: 2021-12-15

## 2021-12-17 ENCOUNTER — HOSPITAL ENCOUNTER (OUTPATIENT)
Dept: INFUSION THERAPY | Facility: HOSPITAL | Age: 36
Discharge: HOME | End: 2021-12-17
Admitting: PSYCHIATRY & NEUROLOGY

## 2021-12-17 DIAGNOSIS — R20.2 PARESTHESIA OF LEFT LOWER EXTREMITY: ICD-10-CM

## 2021-12-17 DIAGNOSIS — R29.6 FREQUENT FALLS: ICD-10-CM

## 2021-12-17 DIAGNOSIS — R27.0 ATAXIA: ICD-10-CM

## 2021-12-17 PROCEDURE — 95909 NRV CNDJ TST 5-6 STUDIES: CPT | Performed by: PSYCHIATRY & NEUROLOGY

## 2021-12-17 PROCEDURE — 95886 MUSC TEST DONE W/N TEST COMP: CPT

## 2021-12-17 PROCEDURE — 95886 MUSC TEST DONE W/N TEST COMP: CPT | Performed by: PSYCHIATRY & NEUROLOGY

## 2021-12-17 PROCEDURE — 95909 NRV CNDJ TST 5-6 STUDIES: CPT

## 2021-12-17 NOTE — PROCEDURES
EMG and Nerve Conduction Studies    I.      Instrument used: Neuromax 1002  II.     Please see data sheets for tabular summary of NCS and details on methods, temperatures and lab standards.   III.    EMG muscles tested for upper extremity studies include the deltoid, biceps, triceps, pronator teres, extensor digitorum communis, first dorsal interosseous and abductor pollicis brevis.    IV.   EMG muscles tested for lower extremity studies include the vastus lateralis, tibialis anterior, peroneus longus, medial gastrocnemius and extensor digitorum brevis.    V.    Additional muscles tested as needed.  Paraspinal muscles tested as needed.   VI.   Please see data sheets for tabular summary of EMG findings.   VII. The complete report includes the data sheets.      Indication: Low back pain with bilateral hip pain, left-sided sciatica, numbness tingling left lower extremity and weakness in both legs  History: 36-year-old woman with the above-mentioned symptoms      Ht: 172.7 cm  Wt: 137 kg  HbA1C: No results found for: HGBA1C  TSH:   Lab Results   Component Value Date    TSH 1.910 03/02/2021       Technical summary:  Nerve conduction studies were obtained in the left leg with 1 comparison on the right.  Skin temperatures were a bit cool but temperature correction was not needed.  Needle examination was obtained on selected muscles in both legs.    Results:  1.  Normal left sural sensory distal latency and amplitude.  2.  Normal superficial peroneal sensory distal latencies and amplitudes bilaterally.  3.  Normal left peroneal motor conduction velocities, distal latency and amplitudes.  4.  Normal left tibial motor conduction velocity, distal latency and amplitudes.  5.  Needle examination of selected muscles in both legs showed normal insertional activities throughout.  There were normal motor units and recruitment patterns throughout.    Impression:  Normal study.  No evidence of peripheral neuropathy, a myopathy or a  lumbosacral radiculopathy on either side by this study.  Study results were discussed with the patient.    Farhad Thao M.D.              Dictated utilizing Dragon dictation.

## 2021-12-22 ENCOUNTER — HOSPITAL ENCOUNTER (OUTPATIENT)
Dept: PHYSICAL THERAPY | Facility: HOSPITAL | Age: 36
Setting detail: THERAPIES SERIES
End: 2021-12-22

## 2021-12-27 ENCOUNTER — TELEPHONE (OUTPATIENT)
Dept: NEUROLOGY | Facility: CLINIC | Age: 36
End: 2021-12-27

## 2021-12-27 NOTE — TELEPHONE ENCOUNTER
The patient had an MRI of the pelvis 8/13/2021.  I reviewed the films on disc and report also on disc.  These were done at [facility].  She had facet arthropathy at L4/5 with some fluid in the joint.  The SI joints were normal.  No specific lesion of the sciatic nerve was visible.    GNS

## 2021-12-29 ENCOUNTER — OFFICE VISIT (OUTPATIENT)
Dept: NEUROLOGY | Facility: CLINIC | Age: 36
End: 2021-12-29

## 2021-12-29 VITALS
HEART RATE: 88 BPM | SYSTOLIC BLOOD PRESSURE: 118 MMHG | DIASTOLIC BLOOD PRESSURE: 88 MMHG | BODY MASS INDEX: 44.41 KG/M2 | HEIGHT: 68 IN | WEIGHT: 293 LBS | OXYGEN SATURATION: 98 %

## 2021-12-29 DIAGNOSIS — R26.89 IMBALANCE: ICD-10-CM

## 2021-12-29 DIAGNOSIS — G56.13 MEDIAN NEUROPATHY OF BOTH UPPER EXTREMITIES: Primary | ICD-10-CM

## 2021-12-29 DIAGNOSIS — R29.6 FREQUENT FALLS: ICD-10-CM

## 2021-12-29 DIAGNOSIS — M79.7 FIBROMYALGIA: Chronic | ICD-10-CM

## 2021-12-29 DIAGNOSIS — G56.22 ULNAR NEUROPATHY AT WRIST, LEFT: ICD-10-CM

## 2021-12-29 PROCEDURE — 99215 OFFICE O/P EST HI 40 MIN: CPT | Performed by: PHYSICIAN ASSISTANT

## 2021-12-29 RX ORDER — PREGABALIN 150 MG/1
150 CAPSULE ORAL 2 TIMES DAILY
Qty: 60 CAPSULE | Refills: 2 | Status: SHIPPED | OUTPATIENT
Start: 2021-12-29 | End: 2022-02-28

## 2021-12-29 NOTE — PROGRESS NOTES
CC: Follow-up multiple neurological issues    HPI:  Bri Pfeiffer is a  36 y.o.  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  Additionally patient reports some longstanding neck and back pain.  Her other past medical history is notable for enteropathic arthritis associated with Crohn's disease status post small bowel resection 2020 -she is on disease modifying therapy per GI with Humira, fibromyalgia, history of recurrent vasodepressor syncope, GERD, allergies, depression and anxiety.  Patient was last seen in our office on 10/20/2021, a summary of her condition is taken from previous note with amendments and additions as needed:     Bilateral upper extremity numbness/tingling and weakness  The patient describes numbness in both upper extremities more so in the left arm than the right and has been diagnosed with a bilateral carpal tunnel syndrome and left cubital tunnel syndrome based on an EMG done by Dr. Newell.  Both median motor distal latencies were mildly prolonged at 4.5 ms with normal conduction velocities in the forearms.  The amplitude was normal in the right but a little low on the left at 4 mV.  Both ulnar motor studies were obtained and there were normal conduction velocities above and below the elbow on the right but on the left the standard study showed some difference at 58.9 m/s below the elbow with 52.9 m/s across the elbow and so the study was repeated recording over the first dorsal interosseous showing a slow velocity across the elbow at 46.6 m/s and a normal velocity below the elbow at 55.9 m/s.  Patient reports she has been having more pain of late in her  "wrists bilaterally and left elbow especially when she sleeps.  She has been wearing wrist splints which initially seemed effective but now seems less so.  Additionally she is bothered by her hand clumsiness and repeatedly dropping objects.  She does report some neck pain, but states \" I have pain all over, so it does not particularly stand out.\"  She denies much pain radiating from her neck into her upper extremities also denies significant pain radiating from her neck up into her head.  She states sometimes her neck does feel stiff, especially when she wakes up in the morning but this usually goes away after a few hours.  Lastly patient notices some shakiness/tremulousness in her hands, she states it is very subtle, and occurs occasionally -seems to be present with both postural movements and at rest. Family history is negative for any peripheral neuropathy the patient is aware of, she does states she has an uncle with Parkinson's disease.    Other work-up has included and MRI of her C-spine performed on 9/8/2021 which also looked quite normal, there is no evidence for central canal stenosis or cord compression, and additionally no root level impingement.  She also had some additional laboratory studies looking for treatable causes of a peripheral neuropathy, results are as follows: RPR was nonreactive, cryoglobulins were negative, copper was within normal limits at 122, heavy metals including lead arsenic and mercury were all within normal, vitamin D levels were within normal, sed rate normal at 13 and IEP/SPEP showed no evidence for monoclonal gammopathy.      Left lower extremity numbness  In addition she describes numbness in the left lower extremity -she states it affects her entire lower extremity but seems to be worse in her foot.   She reports the symptoms are not constant though does indicate that she has daily notation of some left foot numbness mostly on the top and including all of the toes.  She " reports she notices the numbness in her leg if she is sitting for prolonged period of time or sitting on the toilet. She does have some painful symptoms at times in her left leg/foot as well, recently she has been put on Lyrica per her PCP for the fibromyalgia symptoms and this to have helped pain in her legs quite a bit. In terms of weakness she endorses feeling generally weak and fatigued basically all the time.  She says that on the stairs the left quad wants to give out on her at times but it is hard for her to tell when this is just from pain from her fibromyalgia or other causes.  Patient also endorses longstanding low back pain. She has had an MRI of the pelvis at [facility] on 5/29/2020, ordered per Dr. Estephanie Thao for her arthritis.  The films were reviewed and per Dr. Thao MRI showed L4/L5 facet arthropathy, SI joints looked within normal limits and there was no evidence of any sciatic nerve lesion     On 12/17/2021 patient underwent an EMG of her lower extremities, study was essentially normal.  No evidence of peripheral neuropathy, a myopathy or a lumbosacral radiculopathy on either side by this study.    In terms of treatment patient has been taking Lyrica 100 mg 3 times daily she reports this has been helpful though admits oftentimes she forgets the midday dose.  Additionally a few months ago we changed her Zoloft to Cymbalta.  Patient notes that with the addition of the Cymbalta to the Lyrica pain seems still more improved.     Gait disturbance/incoordination/imbalance and frequent falls  The patient reports for at least since early 2021 she has had some noticeable gait disturbance which she describes as incoordination. She states it is particularly apparent when she tries to ambulate after she has been seated for a while, or after prolonged standing or if she is walking and quickly changes direction. She also complains of some imbalance issues though she does not associate this with her  numbness she sometimes has in her left leg.  Additionally she denies associated vertigo type symptoms, there is no dizziness, no visual disturbance no decreased level of consciousness or mental status change.  She says this causes her to fall a lot, states she falls or almost falls almost every day.  Back in June 2020 she fell and sprained her right ankle. She also indicates that when she falls she typically falls of the left. She reports before a fall, she does feel a sense of weakness in her legs but cannot say whether or not it is an acute weakness in her legs that causes her to fall.   As mentioned of recurrent syncopal episodes teenager.  She was seen by cardiology probably 10 years ago, underwent EKG and echo, it was concluded that she was having vasodepressor syncope. In recent years, she avoids loss of consciousness pretty well (it only happens maybe once every 3 months or so) -she states she is able to recognize prodromal symptoms, she gets lightheaded/dizzy, sweaty and slightly nauseous and then will quickly sitting down when she starts feeling dizzy.   Work-up to date has included an MRI of her brain looking for hydrocephalus, pontine lesion or a midline cerebellar abnormality - study was performed on 8/10/2021 and findings were essentially normal.    In the last few months patient has worked with PT, she reports she did not have a great experience with the [facility] physical therapy team though they did advise her that they suspected a lot of her gait disturbance came from weakness and instability in her ankles and knees and so she has been working on strengthening exercises for these joints and muscle groups.  She thinks this has been helpful overall.  She continues to have falls about once a month or so, states last fall was about 3 weeks while she was at work she was standing and all of a sudden she simply collapsed, no loss of consciousness.          Past Medical History:   Diagnosis Date   • Allergies     • Anemia    • Anxiety    • Asthma    • Coronary artery disease     Congenital pericardial cyst   • Crohn's disease ([facility]) 05/2019   • CTS (carpal tunnel syndrome)    • Depression    • Difficulty walking    • Eczema    • Fibromyalgia, primary    • GERD (gastroesophageal reflux disease)    • Headache, tension-type    • HL (hearing loss)    • IBS (irritable bowel syndrome)    • Irregular heartbeat     OCCAS   • Lactose intolerance    • Memory loss    • Migraine    • Pericardial cyst    • Peripheral neuropathy    • Rheumatoid arthritis (HCC)    • Sensitive skin    • Spondylosis    • Syncope          Past Surgical History:   Procedure Laterality Date   • ADENOIDECTOMY     • COLON RESECTION N/A 11/4/2020    Procedure: laparoscopic ileocecectomy;  Surgeon: Lisa Kerr MD;  Location: [facility] MAIN OR;  Service: General;  Laterality: N/A;   • COLONOSCOPY N/A 5/10/2019    Procedure: COLONOSCOPY TO CECUM TO TERMINAL ILEUM WITH BIOPSY;  Surgeon: Tae Turcios MD;  [facility] ENDOSCOPY;  Service: Gastroenterology   • ENDOSCOPY N/A 5/10/2019    Procedure: ESOPHAGOGASTRODUODENOSCOPY WITH BIOPSY;  Surgeon: Tae Turcios MD;  [facility];  Service: Gastroenterology   • ENDOSCOPY N/A 12/13/2019    Procedure: ESOPHAGOGASTRODUODENOSCOPY WITH COLD BIOPSIES;  Surgeon: Tae Turcios MD;  Location: [facility] ENDOSCOPY;  Service: Gastroenterology   • LIPOMA EXCISION  2007   • SIGMOIDOSCOPY N/A 12/13/2019    Procedure: SIGMOIDOSCOPY FLEXIBLE TO T.I.;  Surgeon: Tae Turcios MD;  Location: [facility] ENDOSCOPY;  Service: Gastroenterology   • TONSILLECTOMY AND ADENOIDECTOMY     • WISDOM TOOTH EXTRACTION             Current Outpatient Medications:   •  acetaminophen (Tylenol 8 Hour) 650 MG 8 hr tablet, Take 2 tablets by mouth Every 8 (Eight) Hours., Disp: , Rfl:   •  cetirizine (ZYRTEC ALLERGY) 10 MG tablet, Take 10 mg by mouth Daily., Disp: , Rfl:   •  Cholecalciferol (VITAMIN D-3) 5000 units tablet, Take  5,000 Units by mouth Daily., Disp: , Rfl:   •  Cyanocobalamin (VITAMIN B 12 PO), Take 1 tablet by mouth Daily., Disp: , Rfl:   •  DULoxetine (Cymbalta) 60 MG capsule, Take 1 capsule by mouth Daily., Disp: 30 capsule, Rfl: 2  •  esomeprazole (nexIUM) 40 MG capsule, TAKE ONE CAPSULE BY MOUTH DAILY, Disp: 30 capsule, Rfl: 5  •  ferrous sulfate 324 (65 Fe) MG tablet delayed-release EC tablet, Take 324 mg by mouth Daily With Breakfast., Disp: , Rfl:   •  fluticasone (FLONASE ALLERGY RELIEF) 50 MCG/ACT nasal spray, 2 sprays into the nostril(s) as directed by provider Daily., Disp: , Rfl:   •  folic acid (FOLVITE) 1 MG tablet, TAKE ONE TABLET BY MOUTH DAILY, Disp: 90 tablet, Rfl: 0  •  Humira Pen 40 MG/0.4ML Pen-injector Kit, INJECT 1 PEN UNDER THE SKIN EVERY 7 DAYS., Disp: 4 each, Rfl: 5  •  mesalamine (APRISO) 0.375 g 24 hr capsule, TAKE FOUR CAPSULES BY MOUTH DAILY, Disp: 120 capsule, Rfl: 5  •  mometasone-formoterol (Dulera) 100-5 MCG/ACT inhaler, Inhale 2 puffs 2 (Two) Times a Day., Disp: 8.8 g, Rfl: 11  •  montelukast (Singulair) 10 MG tablet, Take 1 tablet by mouth Daily., Disp: 90 tablet, Rfl: 3  •  Multiple Vitamin (MULTIVITAMIN PO), Take 1 tablet by mouth Daily., Disp: , Rfl:   •  pregabalin (Lyrica) 100 MG capsule, Take 1 capsule by mouth 3 (Three) Times a Day., Disp: 90 capsule, Rfl: 5  •  spironolactone (ALDACTONE) 100 MG tablet, Take 100 mg by mouth Daily., Disp: , Rfl:   •  albuterol sulfate HFA (PROAIR HFA) 108 (90 Base) MCG/ACT inhaler, Inhale 2 puffs Every 4 (Four) Hours As Needed., Disp: , Rfl:   •  dicyclomine (Bentyl) 20 MG tablet, Take 1 tablet by mouth 3 (Three) Times a Day As Needed (Cramping)., Disp: 90 tablet, Rfl: 2  •  diphenoxylate-atropine (LOMOTIL) 2.5-0.025 MG per tablet, Take 1 tablet by mouth 4 (Four) Times a Day As Needed for Diarrhea., Disp: 120 tablet, Rfl: 3  •  famotidine (Pepcid) 20 MG tablet, Take 1 tablet by mouth Every Morning., Disp: 90 tablet, Rfl: 3  •  polyethylene glycol  (MiraLax) 17 GM/SCOOP powder, Take  by mouth., Disp: , Rfl:       Family History   Problem Relation Age of Onset   • Hypertension Father    • Skin cancer Father    • Cancer Maternal Grandmother         Colorectal   • Colon cancer Maternal Grandmother    • Prostate cancer Maternal Grandfather    • Prostate cancer Paternal Grandfather    • Hypertension Paternal Grandfather    • Hypertension Paternal Grandmother    • Parkinsonism Maternal Uncle    • Malig Hyperthermia Neg Hx    • Colon polyps Neg Hx          Social History     Socioeconomic History   • Marital status: Single   • Number of children: 0   • Highest education level: Some college, no degree   Tobacco Use   • Smoking status: Never Smoker   • Smokeless tobacco: Never Used   • Tobacco comment: caffeine use a few days weekly   Vaping Use   • Vaping Use: Never used   Substance and Sexual Activity   • Alcohol use: Not Currently     Alcohol/week: 0.0 standard drinks     Comment: No alcohol since october 2020   • Drug use: No   • Sexual activity: Yes     Partners: Male     Birth control/protection: Condom, I.U.D.         Allergies   Allergen Reactions   • Lactose Intolerance (Gi) GI Intolerance   • Levaquin [Levofloxacin] Hallucinations         Pain Scale:        ROS:  Review of Systems   Constitutional: Positive for fatigue. Negative for activity change and appetite change.   Eyes: Negative for pain, redness and itching.   Respiratory: Negative for cough, choking and shortness of breath.    Allergic/Immunologic: Negative for environmental allergies and food allergies.   Neurological: Positive for dizziness, weakness and numbness. Negative for tremors, seizures, syncope, facial asymmetry, speech difficulty, light-headedness and headaches.   Psychiatric/Behavioral: Positive for decreased concentration and sleep disturbance. Negative for agitation, behavioral problems, confusion, dysphoric mood, hallucinations, self-injury and suicidal ideas. The patient is  "nervous/anxious. The patient is not hyperactive.      I have reviewed the above ROS put in by the medical assistant and am in agreement.     Physical Exam:  Vitals:    12/29/21 1306   BP: 118/88   Pulse: 88   SpO2: 98%   Weight: (!) 140 kg (309 lb)   Height: 172.7 cm (68\")     Orthostatic BP: Lying down /82, heart rate 76, standing /78, heart rate 88    Body mass index is 46.98 kg/m².    Physical Exam  General: obese white female no acute distress  HEENT: Normocephalic, atraumatic  Neck: Supple.  Reports some midline neck with flexion but no Lhermitte sign.    Heart: Regular rate and rhythm without murmur.   Extremities:  No pedal edema     Neurological Exam:   Mental Status: Awake, alert, oriented to person, place and time.  Conversant without evidence of an affective disorder, thought disorder, delusions or hallucinations.  Attention span and concentration are normal.  HCF: No aphasia, apraxia or dysarthria.  Recent and remote memory intact.  Knowledge of recent events intact.  CN:      I:              II: Visual fields full without left inattention              III, IV, VI: Eye movements intact without nystagmus or ptosis.  Pupils equal  round and reactive to light.              V,VII: Light touch and pinprick intact all 3 divisions of V.  Facial muscles symmetrical.              VIII: Hearing intact to finger rub              IX,X: Soft palate elevates symmetrically              XI: Sternomastoid and trapezius are strong.               XII: Tongue midline without atrophy or fasciculations  Motor: Normal tone and bulk in the upper and lower extremities              Power testing: Mild weakness of APBs bilaterally, mild weakness of her interossei on left, otherwise all muscles tested in both arms were normal.  Some weakness of toe extension left greater than right, otherwise normal power in all muscles tested in her lower extremities.   Reflexes: Upper extremities: +2 diffusely                   Lower " extremities: +2 diffusely  Sensory: Light touch:                    Pinprick:   Cerebellar: Finger-to-nose: Intact                      Rapid movement: Intact      Gait and Station:  Mildly broad-based.    Casual walk appears normal.  No Romberg and no drift  Results:      Lab Results   Component Value Date    GLUCOSE 91 10/01/2021    BUN 8 10/01/2021    CREATININE 0.82 10/01/2021    EGFRIFNONA 80 10/01/2021    EGFRIFAFRI 120 03/02/2021    BCR 9.8 10/01/2021    CO2 26.4 10/01/2021    CALCIUM 9.3 10/01/2021    PROTENTOTREF 6.8 09/28/2021    ALBUMIN 4.30 10/01/2021    LABIL2 1.0 09/28/2021    AST 15 10/01/2021    ALT 11 10/01/2021       Lab Results   Component Value Date    WBC 9.62 10/01/2021    HGB 14.7 10/01/2021    HCT 41.9 10/01/2021    MCV 91.1 10/01/2021     10/01/2021         .  Lab Results   Component Value Date    RPR Non-Reactive 09/28/2021         Lab Results   Component Value Date    TSH 1.910 03/02/2021         Lab Results   Component Value Date    KFSZZEQC02 1,017 03/02/2021         No results found for: FOLATE      No results found for: HGBA1C      Lab Results   Component Value Date    GLUCOSE 91 10/01/2021    BUN 8 10/01/2021    CREATININE 0.82 10/01/2021    EGFRIFNONA 80 10/01/2021    EGFRIFAFRI 120 03/02/2021    BCR 9.8 10/01/2021    K 3.8 10/01/2021    CO2 26.4 10/01/2021    CALCIUM 9.3 10/01/2021    PROTENTOTREF 6.8 09/28/2021    ALBUMIN 4.30 10/01/2021    LABIL2 1.0 09/28/2021    AST 15 10/01/2021    ALT 11 10/01/2021         Lab Results   Component Value Date    WBC 9.62 10/01/2021    HGB 14.7 10/01/2021    HCT 41.9 10/01/2021    MCV 91.1 10/01/2021     10/01/2021             Assessment:   1.  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  Left lower extremity numbness with some painful symptoms mostly within hip girdle and  also at knees today, plus persistent chronic low back pain  3.  Reported gait disturbance/incoordination/imbalance and frequent falls  4.  History of vasodepressor syncope        Plan:        1. Patient's recent MRI of the brain and neck are both within normal limits.  Still suspect that that her paresthesias in her upper extremities are due to a peripheral nerve compression.  Additionally there were no laboratory findings to indicate a treatable cause of a more diffuse polyneuropathy.    As she reports conservative measures are no longer very effective and she is having more painful symptoms I think referral to hand specialty is warranted.  We will send this out today.  Likely patient will be offered some sort of surgical intervention    2.  Also discussed findings from recent EMG of her lower extremities, study was essentially normal.  Also MRI of her pelvis was reviewed per Dr. Thao and other than some mild facet arthropathy at L4 and L5 there is no findings to suggest major central stenosis or neuroforaminal narrowing.  Most likely her joint pain is arthritic in nature with some contribution possibly coming from her fibromyalgia as well.    -As she does report improvement on the combination of Lyrica and Cymbalta we will continue this for now.  She states she has trouble being compliant with the 3 times daily dosing of the Lyrica however and so instead of ordering 100 mg 3 times daily would change this to 150 mg twice daily.      3.  And finally as patient still reports gait disturbance/incoordination and imbalance issues despite extensive work-up and physical therapy I think a referral to a tertiary clinic is in order.  She is not orthostatic on exam today there are no symptoms or findings of POTS.  However will send send for autonomic testing at [facility].  Referral placed today    Plan for follow-up in 6 months or sooner should need arise      Time 40 minutes        Dictated utilizing Dragon dictation.

## 2022-01-03 ENCOUNTER — PATIENT MESSAGE (OUTPATIENT)
Dept: GASTROENTEROLOGY | Facility: CLINIC | Age: 37
End: 2022-01-03

## 2022-01-03 DIAGNOSIS — L73.2 HIDRADENITIS SUPPURATIVA: ICD-10-CM

## 2022-01-03 DIAGNOSIS — K50.111 CROHN'S DISEASE OF LARGE INTESTINE WITH RECTAL BLEEDING: ICD-10-CM

## 2022-01-03 RX ORDER — ADALIMUMAB 40MG/0.4ML
40 KIT SUBCUTANEOUS
Qty: 4 EACH | Refills: 5 | Status: SHIPPED | OUTPATIENT
Start: 2022-01-03 | End: 2022-04-19 | Stop reason: SDUPTHER

## 2022-01-31 RX ORDER — DULOXETIN HYDROCHLORIDE 60 MG/1
CAPSULE, DELAYED RELEASE ORAL
Qty: 30 CAPSULE | Refills: 2 | Status: SHIPPED | OUTPATIENT
Start: 2022-01-31 | End: 2022-05-23

## 2022-02-09 ENCOUNTER — OFFICE VISIT (OUTPATIENT)
Dept: FAMILY MEDICINE CLINIC | Facility: CLINIC | Age: 37
End: 2022-02-09

## 2022-02-09 VITALS
RESPIRATION RATE: 18 BRPM | TEMPERATURE: 97.4 F | HEART RATE: 98 BPM | SYSTOLIC BLOOD PRESSURE: 146 MMHG | DIASTOLIC BLOOD PRESSURE: 96 MMHG | HEIGHT: 68 IN | BODY MASS INDEX: 44.41 KG/M2 | WEIGHT: 293 LBS

## 2022-02-09 DIAGNOSIS — K43.9 VENTRAL HERNIA WITHOUT OBSTRUCTION OR GANGRENE: Primary | ICD-10-CM

## 2022-02-09 DIAGNOSIS — E66.09 OBESITY DUE TO EXCESS CALORIES WITHOUT SERIOUS COMORBIDITY, UNSPECIFIED CLASSIFICATION: ICD-10-CM

## 2022-02-09 PROCEDURE — 99213 OFFICE O/P EST LOW 20 MIN: CPT | Performed by: FAMILY MEDICINE

## 2022-02-09 NOTE — PROGRESS NOTES
"Subjective   Bri Pfeiffer is a 36 y.o. female.     CC: Possible Abdominal Hernia    History of Present Illness     Pt comes in today reporting a \"bulge\" of the abdominal region that she would like to have looked at. No pain reported. Pt had prior abdominal surgery at the end of 2020 and reports some abdominal pain with coughing and some distention that was never there prior to the surgery.   Pt reports it's gotten a little bigger with time.    Due to this, pt has had great difficulty with eating certain foods and mainly eats somewhat of a liquid diet. Pt is interested in seeing Nutrition Services for help with her weight and eating.     The following portions of the patient's history were reviewed and updated as appropriate: allergies, current medications, past family history, past medical history, past social history, past surgical history and problem list.    Review of Systems   Constitutional: Negative for activity change, chills and fever.   Respiratory: Negative for cough.    Cardiovascular: Negative for chest pain.   Psychiatric/Behavioral: Negative for dysphoric mood.       /96   Pulse 98   Temp 97.4 °F (36.3 °C) (Oral)   Resp 18   Ht 172.7 cm (68\")   Wt (!) 143 kg (315 lb)   BMI 47.90 kg/m²     Objective   Physical Exam  Constitutional:       General: She is not in acute distress.     Appearance: She is well-developed.   Pulmonary:      Effort: Pulmonary effort is normal.   Abdominal:          Comments: Hernia noted   Neurological:      Mental Status: She is alert and oriented to person, place, and time.   Psychiatric:         Behavior: Behavior normal.         Thought Content: Thought content normal.     Agueda present for exam.    Assessment/Plan   Diagnoses and all orders for this visit:    1. Ventral hernia without obstruction or gangrene (Primary)  -     Ambulatory Referral to General Surgery    2. Obesity due to excess calories without serious comorbidity, unspecified " classification  -     Ambulatory Referral to Nutrition Services    Nature discussed with pt. Pt elects to not see a surgeon at this time but rather watch for changes.

## 2022-02-28 ENCOUNTER — OFFICE VISIT (OUTPATIENT)
Dept: SURGERY | Facility: CLINIC | Age: 37
End: 2022-02-28

## 2022-02-28 VITALS — HEIGHT: 68 IN | WEIGHT: 293 LBS | BODY MASS INDEX: 44.41 KG/M2

## 2022-02-28 DIAGNOSIS — E66.01 CLASS 3 SEVERE OBESITY DUE TO EXCESS CALORIES WITH BODY MASS INDEX (BMI) OF 45.0 TO 49.9 IN ADULT, UNSPECIFIED WHETHER SERIOUS COMORBIDITY PRESENT: ICD-10-CM

## 2022-02-28 DIAGNOSIS — K43.2 INCISIONAL HERNIA, WITHOUT OBSTRUCTION OR GANGRENE: Primary | ICD-10-CM

## 2022-02-28 PROCEDURE — 99214 OFFICE O/P EST MOD 30 MIN: CPT | Performed by: SURGERY

## 2022-02-28 RX ORDER — PREGABALIN 150 MG/1
150 CAPSULE ORAL 2 TIMES DAILY
COMMUNITY
End: 2022-03-31

## 2022-03-01 PROBLEM — K43.2 INCISIONAL HERNIA, WITHOUT OBSTRUCTION OR GANGRENE: Status: ACTIVE | Noted: 2022-03-01

## 2022-03-09 ENCOUNTER — PREP FOR SURGERY (OUTPATIENT)
Dept: SURGERY | Facility: SURGERY CENTER | Age: 37
End: 2022-03-09

## 2022-03-09 ENCOUNTER — OFFICE VISIT (OUTPATIENT)
Dept: GASTROENTEROLOGY | Facility: CLINIC | Age: 37
End: 2022-03-09

## 2022-03-09 VITALS
HEIGHT: 67 IN | TEMPERATURE: 97.8 F | OXYGEN SATURATION: 97 % | BODY MASS INDEX: 45.99 KG/M2 | WEIGHT: 293 LBS | DIASTOLIC BLOOD PRESSURE: 77 MMHG | HEART RATE: 77 BPM | SYSTOLIC BLOOD PRESSURE: 132 MMHG

## 2022-03-09 DIAGNOSIS — Z90.49 STATUS POST SMALL BOWEL RESECTION: ICD-10-CM

## 2022-03-09 DIAGNOSIS — K58.8 OTHER IRRITABLE BOWEL SYNDROME: ICD-10-CM

## 2022-03-09 DIAGNOSIS — K50.111 CROHN'S DISEASE OF LARGE INTESTINE WITH RECTAL BLEEDING: ICD-10-CM

## 2022-03-09 DIAGNOSIS — K50.111 CROHN'S DISEASE OF LARGE INTESTINE WITH RECTAL BLEEDING: Primary | ICD-10-CM

## 2022-03-09 DIAGNOSIS — K63.89 ILEOCECAL VALVE STENOSIS: ICD-10-CM

## 2022-03-09 DIAGNOSIS — K59.09 OTHER CONSTIPATION: ICD-10-CM

## 2022-03-09 DIAGNOSIS — K21.00 GASTROESOPHAGEAL REFLUX DISEASE WITH ESOPHAGITIS WITHOUT HEMORRHAGE: Primary | ICD-10-CM

## 2022-03-09 DIAGNOSIS — K43.2 INCISIONAL HERNIA, WITHOUT OBSTRUCTION OR GANGRENE: ICD-10-CM

## 2022-03-09 DIAGNOSIS — K59.09 OTHER CONSTIPATION: Primary | ICD-10-CM

## 2022-03-09 PROCEDURE — 99214 OFFICE O/P EST MOD 30 MIN: CPT | Performed by: INTERNAL MEDICINE

## 2022-03-09 RX ORDER — SODIUM CHLORIDE, SODIUM LACTATE, POTASSIUM CHLORIDE, CALCIUM CHLORIDE 600; 310; 30; 20 MG/100ML; MG/100ML; MG/100ML; MG/100ML
30 INJECTION, SOLUTION INTRAVENOUS CONTINUOUS PRN
Status: CANCELLED | OUTPATIENT
Start: 2022-03-09

## 2022-03-09 RX ORDER — SODIUM CHLORIDE 0.9 % (FLUSH) 0.9 %
3 SYRINGE (ML) INJECTION EVERY 12 HOURS SCHEDULED
Status: CANCELLED | OUTPATIENT
Start: 2022-03-09

## 2022-03-09 RX ORDER — SODIUM CHLORIDE 0.9 % (FLUSH) 0.9 %
10 SYRINGE (ML) INJECTION AS NEEDED
Status: CANCELLED | OUTPATIENT
Start: 2022-03-09

## 2022-03-09 NOTE — PROGRESS NOTES
"Chief Complaint   Patient presents with   • Follow-up   • Crohn's Disease     crohns      History of Present Illness  36-year-old female with Crohn's disease status post laparoscopic ileocecectomy in 11/2020.  Pathology from her surgery revealed reactive hyperplasia and vascular congestion.  Patient has an incisional hernia for which she is following up with her general surgeon.  Limiting the patient is her concern over an anal and rectal stricture.  Patient does have a history of fissure but there was no stricture on her last colonoscopic exam but here to discuss the possibility of reexamining that area    She has the urge to have a bowel movement and will push and strain and is unable to push stool out., She will have to manually disimpact and press on vaginal wall to help eliminate.     She is still having rectal bleeding.     She has regurgitation if she tries to drink a smoothie or bends over, this is stable. No pain with swallowing.     Review of Systems   Incisional hernia  Result Review :       CBC & Differential (10/01/2021 22:54)   Comprehensive Metabolic Panel (10/01/2021 22:54)       Vital Signs:   /77   Pulse 77   Temp 97.8 °F (36.6 °C)   Ht 170.2 cm (67\")   Wt (!) 143 kg (316 lb)   SpO2 97%   BMI 49.49 kg/m²     Body mass index is 49.49 kg/m².     Physical Exam  Vitals reviewed.   Constitutional:       Appearance: Normal appearance.   HENT:      Nose: No nasal deformity.   Eyes:      General: No scleral icterus.  Neck:      Comments: Trachea midline.  Cardiovascular:      Rate and Rhythm: Normal rate and regular rhythm.   Pulmonary:      Effort: No respiratory distress.      Breath sounds: Normal breath sounds.   Abdominal:      General: Bowel sounds are normal. There is no distension.      Palpations: Abdomen is soft. There is no mass.      Tenderness: There is no abdominal tenderness.   Lymphadenopathy:      Comments: No periumbilical lymphadenopathy.   Skin:     General: Skin is warm. "   Neurological:      Mental Status: She is alert.           Assessment and Plan    Diagnoses and all orders for this visit:    1. Gastroesophageal reflux disease with esophagitis without hemorrhage (Primary)    2. Other irritable bowel syndrome    3. Crohn's disease of large intestine with rectal bleeding (HCC)    4. Other constipation    5. Ileocecal valve stenosis    6. Status post small bowel resection    7. Incisional hernia, without obstruction or gangrene       Offered flexible sigmoidoscopy for endoscopic evaluation of rectal vault versus full colonoscopy which will allow for reassessment of resection site and rectal/distal evaluation.     Based on results, to consider anal rectal manometry and defecating proctogram and colon transit test. Orders placed for colonoscopy and SITZ marker study.     Based on colon transit study and colonoscopy, to consider ARM and defecation protogram.       I have reviewed and confirmed the accuracy of the HPI and Assessment and Plan as documented by the APRN BANDAR Hopkins        Follow Up   No follow-ups on file.    There are no Patient Instructions on file for this visit.

## 2022-03-11 ENCOUNTER — HOSPITAL ENCOUNTER (OUTPATIENT)
Dept: GENERAL RADIOLOGY | Facility: HOSPITAL | Age: 37
Discharge: HOME | End: 2022-03-11

## 2022-03-11 DIAGNOSIS — K59.09 OTHER CONSTIPATION: ICD-10-CM

## 2022-03-11 PROCEDURE — 74018 RADEX ABDOMEN 1 VIEW: CPT

## 2022-03-15 ENCOUNTER — HOSPITAL ENCOUNTER (OUTPATIENT)
Dept: GENERAL RADIOLOGY | Facility: HOSPITAL | Age: 37
Discharge: HOME | End: 2022-03-15
Admitting: INTERNAL MEDICINE

## 2022-03-15 PROCEDURE — 74018 RADEX ABDOMEN 1 VIEW: CPT

## 2022-03-16 ENCOUNTER — APPOINTMENT (OUTPATIENT)
Dept: DIABETES SERVICES | Facility: HOSPITAL | Age: 37
End: 2022-03-16

## 2022-03-17 ENCOUNTER — TELEPHONE (OUTPATIENT)
Dept: CARDIOLOGY | Facility: CLINIC | Age: 37
End: 2022-03-17

## 2022-03-17 NOTE — TELEPHONE ENCOUNTER
Pt was last seen on 10/7/2021 by Dr. Gomez  Placed form in your in box at the main office for signature if cleared

## 2022-03-31 ENCOUNTER — OFFICE VISIT (OUTPATIENT)
Dept: FAMILY MEDICINE CLINIC | Facility: CLINIC | Age: 37
End: 2022-03-31

## 2022-03-31 VITALS
RESPIRATION RATE: 20 BRPM | TEMPERATURE: 97.8 F | SYSTOLIC BLOOD PRESSURE: 115 MMHG | HEIGHT: 67 IN | HEART RATE: 113 BPM | BODY MASS INDEX: 45.99 KG/M2 | WEIGHT: 293 LBS | DIASTOLIC BLOOD PRESSURE: 76 MMHG

## 2022-03-31 DIAGNOSIS — M79.7 FIBROMYALGIA: Primary | ICD-10-CM

## 2022-03-31 PROCEDURE — 99213 OFFICE O/P EST LOW 20 MIN: CPT | Performed by: FAMILY MEDICINE

## 2022-03-31 RX ORDER — PREGABALIN 100 MG/1
100 CAPSULE ORAL 3 TIMES DAILY
Qty: 90 CAPSULE | Refills: 5 | Status: SHIPPED | OUTPATIENT
Start: 2022-03-31 | End: 2022-11-22 | Stop reason: SDUPTHER

## 2022-03-31 RX ORDER — PREGABALIN 150 MG/1
150 CAPSULE ORAL 3 TIMES DAILY
Qty: 90 CAPSULE | Refills: 5 | Status: SHIPPED | OUTPATIENT
Start: 2022-03-31 | End: 2022-03-31

## 2022-03-31 NOTE — PROGRESS NOTES
"Subjective   Bri Pfeiffer is a 36 y.o. female.     CC: Fibromyalgia    History of Present Illness     Pt comes in today reporting a flare of her known fibromyalgia over the past few days. Is on Cymbalta and Lyrica but at BID doing. No other changes or sx.      The following portions of the patient's history were reviewed and updated as appropriate: allergies, current medications, past family history, past medical history, past social history, past surgical history and problem list.    Review of Systems   Constitutional: Negative for activity change, chills and fever.   Respiratory: Negative for cough.    Cardiovascular: Negative for chest pain.   Musculoskeletal: Positive for myalgias.   Psychiatric/Behavioral: Negative for dysphoric mood.       /76   Pulse 113   Temp 97.8 °F (36.6 °C) (Oral)   Resp 20   Ht 170.2 cm (67\")   Wt (!) 143 kg (316 lb)   BMI 49.49 kg/m²     Objective   Physical Exam  Constitutional:       General: She is not in acute distress.     Appearance: She is well-developed.   Pulmonary:      Effort: Pulmonary effort is normal.   Musculoskeletal:         General: Tenderness (multiple trigger points noted) present.   Neurological:      Mental Status: She is alert and oriented to person, place, and time.   Psychiatric:         Behavior: Behavior normal.         Thought Content: Thought content normal.         Assessment/Plan   Diagnoses and all orders for this visit:    1. Fibromyalgia (Primary)  Comments:  FLAREUP  Orders:  -     Discontinue: pregabalin (LYRICA) 150 MG capsule; Take 1 capsule by mouth 3 (Three) Times a Day.  Dispense: 90 capsule; Refill: 5  -     pregabalin (LYRICA) 100 MG capsule; Take 1 capsule by mouth 3 (Three) Times a Day.  Dispense: 90 capsule; Refill: 5    Will move Lyrica dosing to TID for better bioavailability. If not improving in 7-10 days, will either increase the Cymbalta or consider change to Savella.           "

## 2022-04-12 ENCOUNTER — ANESTHESIA EVENT (OUTPATIENT)
Dept: PERIOP | Facility: HOSPITAL | Age: 37
End: 2022-04-12

## 2022-04-13 ENCOUNTER — ANESTHESIA (OUTPATIENT)
Dept: PERIOP | Facility: HOSPITAL | Age: 37
End: 2022-04-13

## 2022-04-13 ENCOUNTER — HOSPITAL ENCOUNTER (OUTPATIENT)
Facility: HOSPITAL | Age: 37
Setting detail: HOSPITAL OUTPATIENT SURGERY
Discharge: HOME | End: 2022-04-13
Attending: INTERNAL MEDICINE | Admitting: INTERNAL MEDICINE

## 2022-04-13 VITALS
RESPIRATION RATE: 16 BRPM | SYSTOLIC BLOOD PRESSURE: 120 MMHG | HEART RATE: 62 BPM | HEIGHT: 68 IN | BODY MASS INDEX: 44.41 KG/M2 | OXYGEN SATURATION: 98 % | DIASTOLIC BLOOD PRESSURE: 88 MMHG | WEIGHT: 293 LBS | TEMPERATURE: 97.8 F

## 2022-04-13 DIAGNOSIS — Z90.49 STATUS POST SMALL BOWEL RESECTION: ICD-10-CM

## 2022-04-13 DIAGNOSIS — K50.111 CROHN'S DISEASE OF LARGE INTESTINE WITH RECTAL BLEEDING: ICD-10-CM

## 2022-04-13 DIAGNOSIS — K59.09 OTHER CONSTIPATION: ICD-10-CM

## 2022-04-13 DIAGNOSIS — K63.89 ILEOCECAL VALVE STENOSIS: ICD-10-CM

## 2022-04-13 PROCEDURE — 45378 DIAGNOSTIC COLONOSCOPY: CPT | Performed by: INTERNAL MEDICINE

## 2022-04-13 PROCEDURE — 25010000002 PROPOFOL 10 MG/ML EMULSION: Performed by: NURSE ANESTHETIST, CERTIFIED REGISTERED

## 2022-04-13 RX ORDER — SODIUM CHLORIDE, SODIUM LACTATE, POTASSIUM CHLORIDE, CALCIUM CHLORIDE 600; 310; 30; 20 MG/100ML; MG/100ML; MG/100ML; MG/100ML
30 INJECTION, SOLUTION INTRAVENOUS CONTINUOUS PRN
Status: DISCONTINUED | OUTPATIENT
Start: 2022-04-13 | End: 2022-04-13 | Stop reason: HOSPADM

## 2022-04-13 RX ORDER — SODIUM CHLORIDE, SODIUM LACTATE, POTASSIUM CHLORIDE, CALCIUM CHLORIDE 600; 310; 30; 20 MG/100ML; MG/100ML; MG/100ML; MG/100ML
100 INJECTION, SOLUTION INTRAVENOUS CONTINUOUS
Status: DISCONTINUED | OUTPATIENT
Start: 2022-04-13 | End: 2022-04-13 | Stop reason: HOSPADM

## 2022-04-13 RX ORDER — SODIUM CHLORIDE 0.9 % (FLUSH) 0.9 %
3 SYRINGE (ML) INJECTION EVERY 12 HOURS SCHEDULED
Status: DISCONTINUED | OUTPATIENT
Start: 2022-04-13 | End: 2022-04-13 | Stop reason: HOSPADM

## 2022-04-13 RX ORDER — PROPOFOL 10 MG/ML
VIAL (ML) INTRAVENOUS AS NEEDED
Status: DISCONTINUED | OUTPATIENT
Start: 2022-04-13 | End: 2022-04-13 | Stop reason: SURG

## 2022-04-13 RX ORDER — LIDOCAINE HYDROCHLORIDE 10 MG/ML
0.5 INJECTION, SOLUTION EPIDURAL; INFILTRATION; INTRACAUDAL; PERINEURAL ONCE AS NEEDED
Status: DISCONTINUED | OUTPATIENT
Start: 2022-04-13 | End: 2022-04-13 | Stop reason: HOSPADM

## 2022-04-13 RX ORDER — DIPHENHYDRAMINE HYDROCHLORIDE 50 MG/ML
12.5 INJECTION INTRAMUSCULAR; INTRAVENOUS
Status: DISCONTINUED | OUTPATIENT
Start: 2022-04-13 | End: 2022-04-13 | Stop reason: HOSPADM

## 2022-04-13 RX ORDER — LIDOCAINE HYDROCHLORIDE 20 MG/ML
INJECTION, SOLUTION INFILTRATION; PERINEURAL AS NEEDED
Status: DISCONTINUED | OUTPATIENT
Start: 2022-04-13 | End: 2022-04-13 | Stop reason: SURG

## 2022-04-13 RX ORDER — SODIUM CHLORIDE, SODIUM LACTATE, POTASSIUM CHLORIDE, CALCIUM CHLORIDE 600; 310; 30; 20 MG/100ML; MG/100ML; MG/100ML; MG/100ML
9 INJECTION, SOLUTION INTRAVENOUS CONTINUOUS
Status: DISCONTINUED | OUTPATIENT
Start: 2022-04-13 | End: 2022-04-13 | Stop reason: HOSPADM

## 2022-04-13 RX ORDER — ONDANSETRON 2 MG/ML
4 INJECTION INTRAMUSCULAR; INTRAVENOUS ONCE AS NEEDED
Status: DISCONTINUED | OUTPATIENT
Start: 2022-04-13 | End: 2022-04-13 | Stop reason: HOSPADM

## 2022-04-13 RX ORDER — SODIUM CHLORIDE 0.9 % (FLUSH) 0.9 %
10 SYRINGE (ML) INJECTION AS NEEDED
Status: DISCONTINUED | OUTPATIENT
Start: 2022-04-13 | End: 2022-04-13 | Stop reason: HOSPADM

## 2022-04-13 RX ADMIN — PROPOFOL 100 MG: 10 INJECTION, EMULSION INTRAVENOUS at 10:24

## 2022-04-13 RX ADMIN — LIDOCAINE HYDROCHLORIDE 80 MG: 20 INJECTION, SOLUTION INFILTRATION; PERINEURAL at 10:24

## 2022-04-13 RX ADMIN — PROPOFOL 50 MG: 10 INJECTION, EMULSION INTRAVENOUS at 10:30

## 2022-04-13 RX ADMIN — PROPOFOL 50 MG: 10 INJECTION, EMULSION INTRAVENOUS at 10:35

## 2022-04-13 RX ADMIN — PROPOFOL 100 MG: 10 INJECTION, EMULSION INTRAVENOUS at 10:27

## 2022-04-13 RX ADMIN — SODIUM CHLORIDE, POTASSIUM CHLORIDE, SODIUM LACTATE AND CALCIUM CHLORIDE 9 ML/HR: 600; 310; 30; 20 INJECTION, SOLUTION INTRAVENOUS at 09:16

## 2022-04-13 NOTE — ANESTHESIA POSTPROCEDURE EVALUATION
Patient: Bri Pfeiffer    Procedure Summary     Date: 04/13/22 Room / Location: [facility] ENDOSCOPY 2 /  [facility]    Anesthesia Start: 1021 Anesthesia Stop: 1040    Procedure: COLONOSCOPY (N/A ) Diagnosis:       Crohn's disease of large intestine with rectal bleeding (HCC)      Ileocecal valve stenosis      (Crohn's disease of large intestine with rectal bleeding (HCC) [K50.111])      (Ileocecal valve stenosis [K63.89])    Surgeons: Jaylen Scott MD Provider: Gee Flores CRNA    Anesthesia Type: MAC ASA Status: 3          Anesthesia Type: MAC    Vitals  Vitals Value Taken Time   /87 04/13/22 1051   Temp 97.8 °F (36.6 °C) 04/13/22 1041   Pulse 62 04/13/22 1051   Resp 16 04/13/22 1051   SpO2 98 % 04/13/22 1051           Post Anesthesia Care and Evaluation    Patient location during evaluation: PHASE II  Patient participation: complete - patient participated  Level of consciousness: awake  Pain score: 0  Pain management: adequate  Airway patency: patent  Anesthetic complications: No anesthetic complications  PONV Status: none  Cardiovascular status: acceptable  Respiratory status: acceptable  Hydration status: acceptable

## 2022-04-13 NOTE — H&P
No chief complaint on file.      HPI  Patient today for colonoscopy for follow-up of Crohn's disease and change in bowel habits.         Problem List:    Patient Active Problem List   Diagnosis   • Pericardial cyst   • Vasodepressor syncope   • Esophageal dysphagia   • Rectal bleeding   • Colitis   • Gastroesophageal reflux disease   • Crohn's disease of large intestine with rectal bleeding ([facility])   • Other irritable bowel syndrome   • Skin rash   • Moderate episode of recurrent major depressive disorder ([facility])   • Anxiety   • Low back pain with sciatica   • Ileocecal valve stenosis   • Crohn's disease ([facility])   • BMI 40.0-44.9, adult ([facility])   • Status post small bowel resection   • Fibromyalgia   • Non-seasonal allergic rhinitis due to pollen   • Incisional hernia, without obstruction or gangrene       Medical History:    Past Medical History:   Diagnosis Date   • Allergies    • Anemia    • Anxiety    • Asthma    • Breast mass 2021    Recheck diagnostic 6/22   • Coronary artery disease     Congenital pericardial cyst   • Crohn's disease ([facility]) 05/2019   • CTS (carpal tunnel syndrome)    • Depression    • Difficulty walking    • Diverticulitis of colon    • Eczema    • Fibrocystic breast    • Fibromyalgia, primary    • Fissure, anal    • GERD (gastroesophageal reflux disease)    • GI (gastrointestinal bleed)    • Headache, tension-type    • HL (hearing loss)    • IBS (irritable bowel syndrome)    • Irregular heartbeat     OCCAS   • Lactose intolerance    • Memory loss    • Migraine    • Pericardial cyst    • Peripheral neuropathy    • Rectal bleeding    • Rheumatoid arthritis ([facility])    • Sensitive skin    • Spondylosis    • Syncope    • Wound dehiscence         Social History:    Social History     Socioeconomic History   • Marital status: Single   • Number of children: 0   • Highest education level: Some college, no degree   Tobacco Use   • Smoking status: Never Smoker   • Smokeless tobacco: Never Used   • Tobacco  comment: caffeine use a few days weekly   Vaping Use   • Vaping Use: Never used   Substance and Sexual Activity   • Alcohol use: Not Currently     Alcohol/week: 0.0 standard drinks     Comment: No alcohol since october 2020   • Drug use: Never   • Sexual activity: Yes     Partners: Male     Birth control/protection: Condom, I.U.D.       Family History:   Family History   Problem Relation Age of Onset   • Hypertension Father    • Skin cancer Father    • Cancer Father         Basal cell skin cancer   • Hearing loss Father         Mass causing hearing loss   • Irritable bowel syndrome Sister    • Parkinsonism Maternal Uncle    • Cancer Maternal Grandmother         [place]-rectal cancer   • Colon cancer Maternal Grandmother    • Arthritis Maternal Grandmother    • Prostate cancer Maternal Grandfather    • Cancer Maternal Grandfather         Prostate cancer   • Hypertension Paternal Grandmother    • Prostate cancer Paternal Grandfather    • Hypertension Paternal Grandfather    • Cancer Paternal Grandfather         Prostate cancer, skin cancer   • Hearing loss Paternal Grandfather    • Malig Hyperthermia Neg Hx    • Colon polyps Neg Hx    • Crohn's disease Neg Hx    • Ulcerative colitis Neg Hx        Surgical History:   Past Surgical History:   Procedure Laterality Date   • ADENOIDECTOMY     • APPENDECTOMY     • COLON RESECTION N/A 11/4/2020    Procedure: laparoscopic ileocecectomy;  Surgeon: Lisa Kerr MD;  Location: [facility] MAIN OR;  Service: General;  Laterality: N/A;   • COLONOSCOPY N/A 5/10/2019    Procedure: COLONOSCOPY TO CECUM TO TERMINAL ILEUM WITH BIOPSY;  Surgeon: Tae Turcios MD;  Location: [facility] ENDOSCOPY;  Service: Gastroenterology   • ENDOSCOPY N/A 5/10/2019    Procedure: ESOPHAGOGASTRODUODENOSCOPY WITH BIOPSY;  Surgeon: Tae Turcios MD;  Location: [facility] ENDOSCOPY;  Service: Gastroenterology   • ENDOSCOPY N/A 12/13/2019    Procedure: ESOPHAGOGASTRODUODENOSCOPY WITH COLD BIOPSIES;  Surgeon:  "Tae Turcios MD;  Location: [facility] ENDOSCOPY;  Service: Gastroenterology   • LIPOMA EXCISION  2007   • SIGMOIDOSCOPY N/A 12/13/2019    Procedure: SIGMOIDOSCOPY FLEXIBLE TO T.I.;  Surgeon: Tae Turcios MD;  Location: [facility] ENDOSCOPY;  Service: Gastroenterology   • TONSILLECTOMY AND ADENOIDECTOMY     • WISDOM TOOTH EXTRACTION         No current facility-administered medications for this encounter.    Allergies:   Allergies   Allergen Reactions   • Lactose Intolerance (Gi) GI Intolerance   • Levaquin [Levofloxacin] Hallucinations          Review of Systems   Pertinent items are noted in HPI      The following portions of the patient's history were reviewed by me and updated as appropriate: review of systems, allergies, current medications, past family history, past medical history, past social history, past surgical history and problem list.    Ht 172.7 cm (68\")   BMI 48.05 kg/m²     PHYSICAL EXAM:    CONSTITUTIONAL:  today's vital signs reviewed by me  GASTROINTESTINAL: abdomen is soft nontender nondistended with normal active bowel sounds, no masses are appreciated    Debilities: None  Emotional Behavior: Appropriate    Assessment/ Plan  We will proceed today with colonoscopy.    Risks and benefits as well as alternatives to endoscopic evaluation were explained to the patient and they voiced understanding and wish to proceed.  These risks include but are not limited to the risk of bleeding, perforation, adverse reaction to sedation, and missed lesions.  The patient was given the opportunity to ask questions prior to the endoscopic procedure.          "

## 2022-04-13 NOTE — ANESTHESIA PREPROCEDURE EVALUATION
Anesthesia Evaluation     Patient summary reviewed and Nursing notes reviewed   no history of anesthetic complications:  NPO Solid Status: > 8 hours  NPO Liquid Status: > 8 hours           Airway   Mallampati: II  TM distance: >3 FB  Neck ROM: full  No difficulty expected  Dental - normal exam     Pulmonary     breath sounds clear to auscultation  (+) asthma ( last inhaler last night.),  Cardiovascular   Exercise tolerance: good (4-7 METS)    ECG reviewed  Rhythm: regular  Rate: normal        Neuro/Psych  (+) headaches (migraines.), syncope ( neurocardiogenic. Possible [place].Passed out last week. Has had many syncopal episodes for years. Is being referred to [facility] for autonomic workup), numbness, psychiatric history Anxiety and Depression,    GI/Hepatic/Renal/Endo    (+) morbid obesity, GERD well controlled,      ROS Comment: crohns disease    Musculoskeletal     (+) back pain, chronic pain, myalgias (fibromyalgia. polyneuropathy),   Abdominal     Abdomen: soft.   Substance History      OB/GYN          Other   arthritis (rheumatoid),      ROS/Med Hx Other: Narrative & Impression      HEART RATE= 71  bpm  RR Interval= 840  ms  CO Interval= 164  ms  P Horizontal Axis= 3  deg  P Front Axis= 36  deg  QRSD Interval= 86  ms  QT Interval= 392  ms  QRS Axis= 24  deg  T Wave Axis= 30  deg  - BORDERLINE ECG -  Sinus rhythm  Borderline T abnormalities, anterior leads  NO PRIOR TRACING AVAILABLE FOR COMPARISON  Electronically Signed By: Jyothi Kearney ([facility]) 02-Oct-2021 17:49:49  Date and Time of Study: 2021-10-01 20:30:35    Specimen Collected: 10/01/21 20:30      Interpretation Summary    · Normal stress echo with no significant echocardiographic evidence for myocardial ischemia.  · Findings consistent with a normal ECG stress test.  · Calculated left ventricular EF = 68% Estimated left ventricular EF was in agreement with the calculated left ventricular EF. Left ventricular systolic function is normal. Normal left  ventricular cavity size and wall thickness noted. All left ventricular wall segments contract normally. Left ventricular diastolic function was normal.    Interpretation Summary    · Normal stress echo with no significant echocardiographic evidence for myocardial ischemia.  · Findings consistent with a normal ECG stress test.  · Calculated left ventricular EF = 68% Estimated left ventricular EF was in agreement with the calculated left ventricular EF. Left ventricular systolic function is normal. Normal left ventricular cavity size and wall thickness noted. All left ventricular wall segments contract normally. Left ventricular diastolic function was normal.                        Anesthesia Plan    ASA 3     MAC     intravenous induction     Anesthetic plan, all risks, benefits, and alternatives have been provided, discussed and informed consent has been obtained with: patient.  Use of blood products discussed with patient  Consented to blood products.   Plan discussed with CRNA.

## 2022-04-16 DIAGNOSIS — M79.7 FIBROMYALGIA: Chronic | ICD-10-CM

## 2022-04-18 RX ORDER — PREGABALIN 150 MG/1
150 CAPSULE ORAL 2 TIMES DAILY
Qty: 60 CAPSULE | Refills: 2 | Status: SHIPPED | OUTPATIENT
Start: 2022-04-18 | End: 2022-11-21

## 2022-04-19 DIAGNOSIS — L73.2 HIDRADENITIS SUPPURATIVA: ICD-10-CM

## 2022-04-19 DIAGNOSIS — K50.111 CROHN'S DISEASE OF LARGE INTESTINE WITH RECTAL BLEEDING: ICD-10-CM

## 2022-04-19 RX ORDER — ADALIMUMAB 40MG/0.4ML
40 KIT SUBCUTANEOUS
Qty: 4 EACH | Refills: 5 | Status: SHIPPED | OUTPATIENT
Start: 2022-04-19 | End: 2022-09-14

## 2022-05-09 ENCOUNTER — PATIENT MESSAGE (OUTPATIENT)
Dept: GASTROENTEROLOGY | Facility: CLINIC | Age: 37
End: 2022-05-09

## 2022-05-09 DIAGNOSIS — K59.09 OTHER CONSTIPATION: Primary | ICD-10-CM

## 2022-05-10 NOTE — TELEPHONE ENCOUNTER
From: Bri Pfeiffer  To: BANDAR Hopkins  Sent: 5/9/2022 8:54 PM EDT  Subject: Anorectal manometry, ect.    Greg Lemon!  When should I schedule the anorectal manometry or the next step in investigating my inability to evacuate stool without assistance?    I was pleased my scope looked good! That brings up the question of stopping humira for my cubital/carpal tunnel surgeries. Should I just hold off on surgery for as long as I can so I don't interrupt my humira?    Should I schedule an appointment to settle some of my questions first, or do we move forward with other diagnostics?    Thanks for your help!  Bri

## 2022-05-23 RX ORDER — DULOXETIN HYDROCHLORIDE 60 MG/1
CAPSULE, DELAYED RELEASE ORAL
Qty: 30 CAPSULE | Refills: 2 | Status: SHIPPED | OUTPATIENT
Start: 2022-05-23 | End: 2022-06-23

## 2022-05-31 DIAGNOSIS — K21.9 GASTROESOPHAGEAL REFLUX DISEASE WITHOUT ESOPHAGITIS: ICD-10-CM

## 2022-05-31 DIAGNOSIS — Z79.899 HIGH RISK MEDICATION USE: ICD-10-CM

## 2022-05-31 DIAGNOSIS — K50.111 CROHN'S DISEASE OF LARGE INTESTINE WITH RECTAL BLEEDING: ICD-10-CM

## 2022-05-31 RX ORDER — ESOMEPRAZOLE MAGNESIUM 40 MG/1
40 CAPSULE, DELAYED RELEASE ORAL DAILY
Qty: 30 CAPSULE | Refills: 5 | Status: SHIPPED | OUTPATIENT
Start: 2022-05-31 | End: 2022-12-31

## 2022-06-07 ENCOUNTER — APPOINTMENT (OUTPATIENT)
Dept: WOMENS IMAGING | Facility: HOSPITAL | Age: 37
End: 2022-06-07

## 2022-06-07 PROCEDURE — G0279 TOMOSYNTHESIS, MAMMO: HCPCS | Performed by: RADIOLOGY

## 2022-06-07 PROCEDURE — 77066 DX MAMMO INCL CAD BI: CPT | Performed by: RADIOLOGY

## 2022-06-07 PROCEDURE — 77062 BREAST TOMOSYNTHESIS BI: CPT | Performed by: RADIOLOGY

## 2022-06-07 PROCEDURE — 76641 ULTRASOUND BREAST COMPLETE: CPT | Performed by: RADIOLOGY

## 2022-06-07 NOTE — PROGRESS NOTES
"Subjective   Bri Pfeiffer is a 36 y.o. female.     CC: Chronic Fatigue/Weight Gain    History of Present Illness     Pt comes in today after sending this message last month:    Dr. Benavidez,  I am interested with speaking with an endocrinologist and was hoping you could recommend someone for me. My heat intolerance, weight gain, polyneuropathies, chronic pain. severe fatigue. Etc.  - I would be interested in exploring deeper to ensure I do not have anything I am missing since several of my chronic conditions tend to blanket most \"unwell\" symptoms.     Pt's menses has always been irregular, too.    The following portions of the patient's history were reviewed and updated as appropriate: allergies, current medications, past family history, past medical history, past social history, past surgical history and problem list.    Review of Systems   Constitutional: Positive for fatigue and unexpected weight change. Negative for activity change, chills and fever.   Respiratory: Negative for cough.    Cardiovascular: Negative for chest pain.   Psychiatric/Behavioral: Negative for dysphoric mood.       /76   Pulse 78   Temp 98.1 °F (36.7 °C) (Oral)   Resp 16   Ht 172.7 cm (68\")   Wt (!) 147 kg (323 lb)   BMI 49.11 kg/m²     Objective   Physical Exam  Constitutional:       General: She is not in acute distress.     Appearance: She is well-developed.   Cardiovascular:      Rate and Rhythm: Normal rate and regular rhythm.   Pulmonary:      Effort: Pulmonary effort is normal.      Breath sounds: Normal breath sounds.   Neurological:      Mental Status: She is alert and oriented to person, place, and time.   Psychiatric:         Behavior: Behavior normal.         Thought Content: Thought content normal.         Assessment & Plan   Diagnoses and all orders for this visit:    1. Weight gain (Primary)  -     TSH  -     T4, Free  -     T3, Free  -     Comprehensive Metabolic Panel  -     CBC & Differential  -     " Ambulatory Referral to Endocrinology  -     Insulin, Total  -     DHEA-Sulfate  -     FSH & LH  -     Cortisol    2. Heat intolerance  -     CBC & Differential  -     Ambulatory Referral to Endocrinology  -     Insulin, Total  -     DHEA-Sulfate  -     Cortisol    3. Polyneuropathy  -     Vitamin B12  -     Folate  -     CBC & Differential  -     Ambulatory Referral to Endocrinology  -     Insulin, Total  -     DHEA-Sulfate    4. Menstrual irregularity  -     FSH & LH

## 2022-06-08 ENCOUNTER — OFFICE VISIT (OUTPATIENT)
Dept: FAMILY MEDICINE CLINIC | Facility: CLINIC | Age: 37
End: 2022-06-08

## 2022-06-08 VITALS
BODY MASS INDEX: 44.41 KG/M2 | HEIGHT: 68 IN | RESPIRATION RATE: 16 BRPM | TEMPERATURE: 98.1 F | HEART RATE: 78 BPM | WEIGHT: 293 LBS | SYSTOLIC BLOOD PRESSURE: 110 MMHG | DIASTOLIC BLOOD PRESSURE: 76 MMHG

## 2022-06-08 DIAGNOSIS — G62.9 POLYNEUROPATHY: ICD-10-CM

## 2022-06-08 DIAGNOSIS — N92.6 MENSTRUAL IRREGULARITY: ICD-10-CM

## 2022-06-08 DIAGNOSIS — R63.5 WEIGHT GAIN: Primary | ICD-10-CM

## 2022-06-08 DIAGNOSIS — R68.89 HEAT INTOLERANCE: ICD-10-CM

## 2022-06-08 PROCEDURE — 99213 OFFICE O/P EST LOW 20 MIN: CPT | Performed by: FAMILY MEDICINE

## 2022-06-09 LAB
ALBUMIN SERPL-MCNC: 4.2 G/DL (ref 3.8–4.8)
ALBUMIN/GLOB SERPL: 1.6 {RATIO} (ref 1.2–2.2)
ALP SERPL-CCNC: 85 IU/L (ref 44–121)
ALT SERPL-CCNC: 11 IU/L (ref 0–32)
AST SERPL-CCNC: 14 IU/L (ref 0–40)
BASOPHILS # BLD AUTO: 0.1 X10E3/UL (ref 0–0.2)
BASOPHILS NFR BLD AUTO: 2 %
BILIRUB SERPL-MCNC: 0.5 MG/DL (ref 0–1.2)
BUN SERPL-MCNC: 8 MG/DL (ref 6–20)
BUN/CREAT SERPL: 9 (ref 9–23)
CALCIUM SERPL-MCNC: 9.2 MG/DL (ref 8.7–10.2)
CHLORIDE SERPL-SCNC: 103 MMOL/L (ref 96–106)
CO2 SERPL-SCNC: 24 MMOL/L (ref 20–29)
CORTIS SERPL-MCNC: 6.4 UG/DL
CREAT SERPL-MCNC: 0.87 MG/DL (ref 0.57–1)
DHEA-S SERPL-MCNC: 120 UG/DL (ref 57.3–279.2)
EGFRCR SERPLBLD CKD-EPI 2021: 89 ML/MIN/1.73
EOSINOPHIL # BLD AUTO: 0.3 X10E3/UL (ref 0–0.4)
EOSINOPHIL NFR BLD AUTO: 5 %
ERYTHROCYTE [DISTWIDTH] IN BLOOD BY AUTOMATED COUNT: 12.5 % (ref 11.7–15.4)
FOLATE SERPL-MCNC: 15.4 NG/ML
FSH SERPL-ACNC: 4.2 MIU/ML
GLOBULIN SER CALC-MCNC: 2.6 G/DL (ref 1.5–4.5)
GLUCOSE SERPL-MCNC: 90 MG/DL (ref 65–99)
HCT VFR BLD AUTO: 41.9 % (ref 34–46.6)
HGB BLD-MCNC: 13.6 G/DL (ref 11.1–15.9)
IMM GRANULOCYTES # BLD AUTO: 0 X10E3/UL (ref 0–0.1)
IMM GRANULOCYTES NFR BLD AUTO: 0 %
INSULIN SERPL-ACNC: 17.4 UIU/ML (ref 2.6–24.9)
LH SERPL-ACNC: 3.5 MIU/ML
LYMPHOCYTES # BLD AUTO: 2.3 X10E3/UL (ref 0.7–3.1)
LYMPHOCYTES NFR BLD AUTO: 33 %
MCH RBC QN AUTO: 29.5 PG (ref 26.6–33)
MCHC RBC AUTO-ENTMCNC: 32.5 G/DL (ref 31.5–35.7)
MCV RBC AUTO: 91 FL (ref 79–97)
MONOCYTES # BLD AUTO: 0.7 X10E3/UL (ref 0.1–0.9)
MONOCYTES NFR BLD AUTO: 10 %
NEUTROPHILS # BLD AUTO: 3.5 X10E3/UL (ref 1.4–7)
NEUTROPHILS NFR BLD AUTO: 50 %
PLATELET # BLD AUTO: 353 X10E3/UL (ref 150–450)
POTASSIUM SERPL-SCNC: 4.5 MMOL/L (ref 3.5–5.2)
PROT SERPL-MCNC: 6.8 G/DL (ref 6–8.5)
RBC # BLD AUTO: 4.61 X10E6/UL (ref 3.77–5.28)
SODIUM SERPL-SCNC: 139 MMOL/L (ref 134–144)
T3FREE SERPL-MCNC: 2.8 PG/ML (ref 2–4.4)
T4 FREE SERPL-MCNC: 0.9 NG/DL (ref 0.82–1.77)
TSH SERPL DL<=0.005 MIU/L-ACNC: 1.07 UIU/ML (ref 0.45–4.5)
VIT B12 SERPL-MCNC: 439 PG/ML (ref 232–1245)
WBC # BLD AUTO: 6.9 X10E3/UL (ref 3.4–10.8)

## 2022-06-13 ENCOUNTER — OFFICE VISIT (OUTPATIENT)
Dept: ENDOCRINOLOGY | Age: 37
End: 2022-06-13

## 2022-06-13 ENCOUNTER — TELEPHONE (OUTPATIENT)
Dept: ENDOCRINOLOGY | Age: 37
End: 2022-06-13

## 2022-06-13 VITALS
HEART RATE: 80 BPM | DIASTOLIC BLOOD PRESSURE: 72 MMHG | HEIGHT: 68 IN | BODY MASS INDEX: 44.41 KG/M2 | TEMPERATURE: 97.8 F | WEIGHT: 293 LBS | OXYGEN SATURATION: 98 % | SYSTOLIC BLOOD PRESSURE: 118 MMHG

## 2022-06-13 DIAGNOSIS — E66.01 MORBID OBESITY: Primary | ICD-10-CM

## 2022-06-13 DIAGNOSIS — R53.82 CHRONIC FATIGUE: ICD-10-CM

## 2022-06-13 PROCEDURE — 99204 OFFICE O/P NEW MOD 45 MIN: CPT | Performed by: INTERNAL MEDICINE

## 2022-06-13 NOTE — PROGRESS NOTES
"Chief Complaint  Weight Gain, Heat intolerance, and Polyneuropathy    Subjective            History of Present Illness  Bri Pfeiffer,36 y.o. is here as a new patient evaluation of Weight gain, heat intolerance.  Consulted by .     Pt's based line weight was 260 - 280 pounds, she has hx of chron's disease and she had surgery and it was complicated with adhesions and that's when her weight bumped up to 324 pounds. All this weight gain happened since Nov 2020. She has been on and off steroids for a max time of 2 weeks.   Because of her chron's disease she is vegan. Does c/o digestive issues due to chron's disease.   Exercise is limited due to pain and fatigue. During summer she swims.     C/o heat intolerance with excessive sweating, c/o feeling dizzy with this, along with nausea due to the heat.   C/o low BP - she has vasovagal syncopy.   Does have family hx of obesity.     Menstrual hx - She has IUD. Never been pregnant.     Reviewed primary care physician's/consulting physician documentation and lab results     I have reviewed the patient's allergies, medicines, past medical hx, family hx and social hx in detail.    Objective   Vital Signs:   /72   Pulse 80   Temp 97.8 °F (36.6 °C)   Ht 172.7 cm (67.99\")   Wt (!) 147 kg (324 lb 6.4 oz)   SpO2 98%   BMI 49.34 kg/m²     Physical Exam   General appearance - no distress  Eyes- anicteric sclera  Ear nose and throat-external ears and nose normal.    Respiratory-normal chest on inspection.  No respiratory distress noted.  Skin-no rashes.  Neuro-alert and oriented x3            Result Review :   The following data was reviewed by: Oumou Sanders MD on 06/13/2022:  Orders Only on 06/08/2022   Component Date Value Ref Range Status   • WBC 06/08/2022 6.9  3.4 - 10.8 x10E3/uL Final   • RBC 06/08/2022 4.61  3.77 - 5.28 x10E6/uL Final   • Hemoglobin 06/08/2022 13.6  11.1 - 15.9 g/dL Final   • Hematocrit 06/08/2022 41.9  34.0 - 46.6 % Final   • " MCV 06/08/2022 91  79 - 97 fL Final   • MCH 06/08/2022 29.5  26.6 - 33.0 pg Final   • MCHC 06/08/2022 32.5  31.5 - 35.7 g/dL Final   • RDW 06/08/2022 12.5  11.7 - 15.4 % Final   • Platelets 06/08/2022 353  150 - 450 x10E3/uL Final   • Neutrophil Rel % 06/08/2022 50  Not Estab. % Final   • Lymphocyte Rel % 06/08/2022 33  Not Estab. % Final   • Monocyte Rel % 06/08/2022 10  Not Estab. % Final   • Eosinophil Rel % 06/08/2022 5  Not Estab. % Final   • Basophil Rel % 06/08/2022 2  Not Estab. % Final   • Neutrophils Absolute 06/08/2022 3.5  1.4 - 7.0 x10E3/uL Final   • Lymphocytes Absolute 06/08/2022 2.3  0.7 - 3.1 x10E3/uL Final   • Monocytes Absolute 06/08/2022 0.7  0.1 - 0.9 x10E3/uL Final   • Eosinophils Absolute 06/08/2022 0.3  0.0 - 0.4 x10E3/uL Final   • Basophils Absolute 06/08/2022 0.1  0.0 - 0.2 x10E3/uL Final   • Immature Granulocyte Rel % 06/08/2022 0  Not Estab. % Final   • Immature Grans Absolute 06/08/2022 0.0  0.0 - 0.1 x10E3/uL Final   • Glucose 06/08/2022 90  65 - 99 mg/dL Final   • BUN 06/08/2022 8  6 - 20 mg/dL Final   • Creatinine 06/08/2022 0.87  0.57 - 1.00 mg/dL Final   • EGFR Result 06/08/2022 89  >59 mL/min/1.73 Final   • BUN/Creatinine Ratio 06/08/2022 9  9 - 23 Final   • Sodium 06/08/2022 139  134 - 144 mmol/L Final   • Potassium 06/08/2022 4.5  3.5 - 5.2 mmol/L Final   • Chloride 06/08/2022 103  96 - 106 mmol/L Final   • Total CO2 06/08/2022 24  20 - 29 mmol/L Final   • Calcium 06/08/2022 9.2  8.7 - 10.2 mg/dL Final   • Total Protein 06/08/2022 6.8  6.0 - 8.5 g/dL Final   • Albumin 06/08/2022 4.2  3.8 - 4.8 g/dL Final   • Globulin 06/08/2022 2.6  1.5 - 4.5 g/dL Final   • A/G Ratio 06/08/2022 1.6  1.2 - 2.2 Final   • Total Bilirubin 06/08/2022 0.5  0.0 - 1.2 mg/dL Final   • Alkaline Phosphatase 06/08/2022 85  44 - 121 IU/L Final   • AST (SGOT) 06/08/2022 14  0 - 40 IU/L Final   • ALT (SGPT) 06/08/2022 11  0 - 32 IU/L Final   • LH 06/08/2022 3.5  mIU/mL Final    Comment:                      Adult Female:                        Follicular phase      2.4 -  12.6                        Ovulation phase      14.0 -  95.6                        Luteal phase          1.0 -  11.4                        Postmenopausal        7.7 -  58.5     • FSH 06/08/2022 4.2  mIU/mL Final    Comment:                     Adult Female:                        Follicular phase      3.5 -  12.5                        Ovulation phase       4.7 -  21.5                        Luteal phase          1.7 -   7.7                        Postmenopausal       25.8 - 134.8     • Free T4 06/08/2022 0.90  0.82 - 1.77 ng/dL Final   • Folate 06/08/2022 15.4  >3.0 ng/mL Final    Comment: A serum folate concentration of less than 3.1 ng/mL is  considered to represent clinical deficiency.     • DHEA-Sulfate 06/08/2022 120.0  57.3 - 279.2 ug/dL Final   • Cortisol 06/08/2022 6.4  ug/dL Final    Comment:                         Cortisol AM         6.2 - 19.4                          Cortisol PM         2.3 - 11.9     • TSH 06/08/2022 1.070  0.450 - 4.500 uIU/mL Final   • Vitamin B-12 06/08/2022 439  232 - 1,245 pg/mL Final   • Insulin 06/08/2022 17.4  2.6 - 24.9 uIU/mL Final   • T3, Free 06/08/2022 2.8  2.0 - 4.4 pg/mL Final     Data reviewed: PCP notes        I reviewed the patient's new clinical results and mentioned them above in HPI and in plan as well.          Assessment and Plan    Problem List Items Addressed This Visit    None     Visit Diagnoses     Morbid obesity (HCC)    -  Primary    Relevant Orders    TSH    Thyroid Peroxidase Antibody    T3, Free    T4, Free    ACTH    Cortisol, Free    Cortisol, Urine, Free 24Hr - Urine, Clean Catch    Salivary Cortisol X2, Timed    Chronic fatigue        Relevant Orders    TSH    Thyroid Peroxidase Antibody    T3, Free    T4, Free    ACTH    Cortisol, Free    Cortisol, Urine, Free 24Hr - Urine, Clean Catch    Salivary Cortisol X2, Timed        Morbid obesity  Rule out hormonal abnormality-Cushing's  disease.  Check free cortisol levels, 24-hour urine cortisol levels and salivary cortisol levels.  Rule out thyroid levels to be the cause for the weight gain  Check TPO antibody given her history of autoimmune disease-Crohn's disease.    Discussed with the patient options of weight loss drugs-especially Wegovy and Saxenda.  Patient understands the risks and the benefits of these drugs.  Especially with her history of Crohn's in the stomach issues apprehensive if she will be able to tolerate any of these medications but at least it is worth a try of the above hormonal abnormalities are ruled out.    Chronic fatigue  Could be related to the weight gain  Also discussed with the patient briefly about benefits of bariatric procedures as well.    Follow Up   No follow-ups on file.    Refills/Meds sent to pharmacy    Interpreted the blood work-up/imaging results performed by the primary care/consulting physician -    Patient was given instructions and counseling regarding her condition or for health maintenance advice. Please see specific information pulled into the AVS if appropriate.

## 2022-06-16 ENCOUNTER — PATIENT ROUNDING (BHMG ONLY) (OUTPATIENT)
Dept: ENDOCRINOLOGY | Age: 37
End: 2022-06-16

## 2022-06-19 LAB
ACTH PLAS-MCNC: NORMAL PG/ML
CORTIS F SERPL-MCNC: 0.18 UG/DL
SPECIMEN STATUS: NORMAL
T3FREE SERPL-MCNC: 2.8 PG/ML (ref 2–4.4)
T4 FREE SERPL-MCNC: 0.85 NG/DL (ref 0.82–1.77)
THYROPEROXIDASE AB SERPL-ACNC: 8 IU/ML (ref 0–34)
TSH SERPL DL<=0.005 MIU/L-ACNC: 1.21 UIU/ML (ref 0.45–4.5)

## 2022-06-23 ENCOUNTER — OFFICE VISIT (OUTPATIENT)
Dept: NEUROLOGY | Facility: CLINIC | Age: 37
End: 2022-06-23

## 2022-06-23 VITALS
HEIGHT: 68 IN | DIASTOLIC BLOOD PRESSURE: 98 MMHG | HEART RATE: 92 BPM | OXYGEN SATURATION: 99 % | WEIGHT: 293 LBS | SYSTOLIC BLOOD PRESSURE: 138 MMHG | BODY MASS INDEX: 44.41 KG/M2

## 2022-06-23 DIAGNOSIS — R49.9 CHANGE IN VOICE: ICD-10-CM

## 2022-06-23 DIAGNOSIS — G56.13 MEDIAN NEUROPATHY OF BOTH UPPER EXTREMITIES: Primary | ICD-10-CM

## 2022-06-23 DIAGNOSIS — R29.6 FREQUENT FALLS: ICD-10-CM

## 2022-06-23 DIAGNOSIS — E66.01 CLASS 3 SEVERE OBESITY WITH BODY MASS INDEX (BMI) OF 45.0 TO 49.9 IN ADULT, UNSPECIFIED OBESITY TYPE, UNSPECIFIED WHETHER SERIOUS COMORBIDITY PRESENT: ICD-10-CM

## 2022-06-23 DIAGNOSIS — R26.89 IMBALANCE: ICD-10-CM

## 2022-06-23 DIAGNOSIS — G56.22 ULNAR NEUROPATHY AT WRIST, LEFT: ICD-10-CM

## 2022-06-23 DIAGNOSIS — M79.7 FIBROMYALGIA: ICD-10-CM

## 2022-06-23 DIAGNOSIS — G89.29 OTHER CHRONIC PAIN: ICD-10-CM

## 2022-06-23 DIAGNOSIS — G44.209 TENSION HEADACHE: ICD-10-CM

## 2022-06-23 PROCEDURE — 99215 OFFICE O/P EST HI 40 MIN: CPT | Performed by: PHYSICIAN ASSISTANT

## 2022-06-23 RX ORDER — VENLAFAXINE HYDROCHLORIDE 150 MG/1
150 CAPSULE, EXTENDED RELEASE ORAL DAILY
Qty: 30 CAPSULE | Refills: 2 | Status: SHIPPED | OUTPATIENT
Start: 2022-06-23 | End: 2022-09-28 | Stop reason: SDUPTHER

## 2022-06-23 NOTE — PROGRESS NOTES
CC: Follow-up multiple neurological issues    HPI:  Bri Pfeiffer is a  36 y.o.  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  She also has widespread associated with enteropathic arthritis due to Crohn's disease status post small bowel resection 2020 and currently on disease modifying therapy per GI with Humira plus fibromyalgia. Her other past medical history is notable for recurrent vasodepressor syncope, GERD, allergies, depression and anxiety.  Patient was last seen in our office on 12/29/2021, a summary of her condition is taken from previous note with amendments and additions as needed:     Bilateral upper extremity numbness/tingling and weakness  The patient describes numbness in both upper extremities more so in the left arm than the right and has been diagnosed with a bilateral carpal tunnel syndrome and left cubital tunnel syndrome based on an EMG done by Dr. Newell.  Both median motor distal latencies were mildly prolonged at 4.5 ms with normal conduction velocities in the forearms.  The amplitude was normal in the right but a little low on the left at 4 mV.  Both ulnar motor studies were obtained and there were normal conduction velocities above and below the elbow on the right but on the left the standard study showed some difference at 58.9 m/s below the elbow with 52.9 m/s across the elbow and so the study was repeated recording over the first dorsal interosseous showing a slow velocity across the elbow at 46.6 m/s and a normal velocity below the elbow at 55.9 m/s.  Patient reports she has been having more pain of late in her  "wrists bilaterally and left elbow especially when she sleeps.  She wears wrist splints which initially seemed effective but now seems less so.  Additionally she is bothered by her hand clumsiness and repeatedly dropping objects.  She does report some neck pain, but states \" I have pain all over, so it does not particularly stand out.\"  She denies much pain radiating pain.       Other work-up has included and MRI of her C-spine performed on 9/8/2021 which also looked quite normal, there is no evidence for central canal stenosis or cord compression, and additionally no root level impingement.  She also had some additional laboratory studies looking for treatable causes of a peripheral neuropathy, results are as follows: RPR was nonreactive, cryoglobulins were negative, copper was within normal limits at 122, heavy metals including lead arsenic and mercury were all within normal, vitamin D levels were within normal, sed rate normal at 13 and IEP/SPEP showed no evidence for monoclonal gammopathy.     Interim history  Since we have seen her she has been seen by hand specialty, saw Dr. Greene several months ago.  Apparently he did recommend surgical intervention for her, they discussed decompression of median nerve at the wrist and also ulnar nerve at the elbow and the left hand.  As her left hand is worse than the right - thought that they would work on left first and then could possibly later on get her right arm for median nerve decompression. The surgery was never scheduled however because patient would have to be off of her Humira for about 3 months and GI advised against this at present, stating that it would probably induce a flare of her Crohn's disease.  Of note patient has recently had more work-up from GI, most recently she underwent defecography to assess for muscle weakness involving her lower GI tract, results are still pending.    Patient continues to wear wrist splints at night, which control symptoms " suboptimally.  She still reports symptoms are worse on her left hand and particularly involving her left fourth and fifth digit.       Widespread pain/paresthesias  Previously patient is also described numbness in the left lower extremity -she states it affects her entire lower extremity but seems to be worse in her foot.   She reports the symptoms are not constant though does indicate that she has daily notation of some left foot numbness mostly on the top and including all of the toes.  She reports she notices the numbness in her leg if she is sitting for prolonged period of time or sitting on the toilet. She does have some painful symptoms at times in her left leg/foot as well, recently she has been put on Lyrica per her PCP for the fibromyalgia symptoms and this to have helped pain in her legs quite a bit. In terms of weakness she endorses feeling generally weak and fatigued basically all the time.  She says that on the stairs the left quad wants to give out on her at times but it is hard for her to tell when this is just from pain from her fibromyalgia or other causes.  Patient also endorses longstanding low back pain. She has had an MRI of the pelvis at [facility] on 5/29/2020, ordered per Dr. Estephanie Thao for her arthritis.  The films were reviewed and per Dr. Thao MRI showed L4/L5 facet arthropathy, SI joints looked within normal limits and there was no evidence of any sciatic nerve lesion      On 12/17/2021 patient underwent an EMG of her lower extremities, study was essentially normal.  No evidence of peripheral neuropathy, a myopathy or a lumbosacral radiculopathy on either side by this study.  In terms of treatment patient has been taking Lyrica 100 mg 3 times daily she reports this has been helpful though admits oftentimes she forgets the midday dose.  Additionally in October 2021 we changed her Zoloft to Cymbalta.     Interim history  Patient continues to endorse widespread pain,  generalized fatigue and feeling overall weak.  She denies any significant numbness and tingling in her left leg per se though states she has some paresthesias throughout.  She does still think that the Lyrica is helping, noted we tried changing her from 100 mg 3 times a day to 150 twice a day, apparently she felt like coverage was inadequate and so she went back to the 100 mg 3 times a day.  She also is taking the Cymbalta but wonders how effective it is.  Certainly she states that her mood seems to be worse on the Cymbalta but it was with the Zoloft, states she feels depressed most days out of the week.     Also more recently she has had headaches, these happen basically every day -toward the end of the day.  She describes them as unilateral, typically affecting her left side behind her eye.  The intensity is mild to moderate, it is associated with some photosensitivity but no significant nausea, vomiting, tinnitus or hearing loss..  She has a history of migraines as a teenager, she states these were mostly resolved when she started taking OCPs. She feels like the headaches she is getting now are not migraines, they do not seem to be as debilitating.      Gait disturbance/incoordination/imbalance and frequent falls  The patient reports for at least since early 2021 she has had some noticeable gait disturbance which she describes as incoordination. She states it is particularly apparent when she tries to ambulate after she has been seated for a while, or after prolonged standing or if she is walking and quickly changes direction. She also complains of some imbalance issues.  Additionally she denies associated vertigo type symptoms, there is no dizziness, no visual disturbance, no decreased level of consciousness or mental status change.  She says this causes her to fall a lot. She also indicates that when she falls she typically falls of the left. She reports before a fall, she does feel a sense of weakness in her  legs but cannot say whether or not it is an acute weakness in her legs that causes her to fall.   As mentioned of recurrent syncopal episodes teenager.  She was seen by cardiology probably 10 years ago, underwent EKG and echo, it was concluded that she was having vasodepressor syncope. In recent years, she avoids loss of consciousness pretty well (it only happens maybe once every 3 months or so) -she states she is able to recognize prodromal symptoms, she gets lightheaded/dizzy, sweaty and slightly nauseous and then will quickly sitting down when she starts feeling dizzy.   Work-up to date has included an MRI of her brain looking for hydrocephalus, pontine lesion or a midline cerebellar abnormality - study was performed on 8/10/2021 and findings were essentially normal.     We have referred her to PT, she reports she did not have a great experience with the [facility] physical therapy team though they did advise her that they suspected a lot of her gait disturbance came from general deconditioning - especially weakness and instability in her ankles and knees and so she tries to work on strengthening exercises for these joints and muscle groups at home.  She continues to have falls about once a month or so.    Interim history  Patient indicates that her gait and sense of imbalance is also fairly stable since she was last seen.  I have referred her to [facility] given her history, she states they did reach out to her but stated that they required that she keep a log of home BPs for a month to submit before they can schedule her, she has not yet done this.     She reports in the past 6 months or so she is also noticed some emergence of new nonspecific symptoms, for instance voice fatigue -she states this happens toward the end of the day, she notices volume decreased and words are more slurred.  She also endorses some word finding difficulties in general, worsening memory - recent recall as well.  She  states she has noticed her hearing has been diminished for the past 2 years or so and this does seem to be progressive.  She denies any tinnitus.      Past Medical History:   Diagnosis Date   • Allergies    • Anemia    • Anxiety    • Asthma    • Breast mass 2021    Recheck diagnostic 6/22   • Coronary artery disease     Congenital pericardial cyst   • Crohn's disease (HCC) 05/2019   • CTS (carpal tunnel syndrome)    • Depression    • Difficulty walking     POLY NEUROPATHY   • Diverticulitis of colon    • Eczema    • Fibrocystic breast    • Fibromyalgia, primary    • Fissure, anal    • GERD (gastroesophageal reflux disease)    • GI (gastrointestinal bleed)    • Headache, tension-type    • Hernia, abdominal    • HL (hearing loss)    • IBS (irritable bowel syndrome)    • Irregular heartbeat     OCCAS   • Lactose intolerance    • Memory loss    • Migraine    • Pericardial cyst    • Peripheral neuropathy    • Rectal bleeding    • Rheumatoid arthritis (HCC)    • Sensitive skin    • Spondylosis    • Syncope     STATES LAST EPISODE 4/6/22 AT WORK   • Wound dehiscence          Past Surgical History:   Procedure Laterality Date   • ADENOIDECTOMY     • APPENDECTOMY     • COLON RESECTION N/A 11/4/2020    Procedure: laparoscopic ileocecectomy;  Surgeon: Lisa Kerr MD;  Location: [facility] MAIN OR;  Service: General;  Laterality: N/A;   • COLONOSCOPY N/A 5/10/2019    Procedure: COLONOSCOPY TO CECUM TO TERMINAL ILEUM WITH BIOPSY;  Surgeon: Tae Turcios MD;  Location: [facility] ENDOSCOPY;  Service: Gastroenterology   • COLONOSCOPY N/A 4/13/2022    Procedure: COLONOSCOPY;  Surgeon: Jaylen Scott MD;  Location: [facility];  Service: Gastroenterology;  Laterality: N/A;  ANAL FISSURE   • ENDOSCOPY N/A 5/10/2019    Procedure: ESOPHAGOGASTRODUODENOSCOPY WITH BIOPSY;  Surgeon: Tae Turcios MD;  Location: [facility] ENDOSCOPY;  Service: Gastroenterology   • ENDOSCOPY N/A 12/13/2019    Procedure: ESOPHAGOGASTRODUODENOSCOPY WITH  COLD BIOPSIES;  Surgeon: Tae Turcios MD;  Location: [facility] ENDOSCOPY;  Service: Gastroenterology   • LIPOMA EXCISION  2007   • SIGMOIDOSCOPY N/A 12/13/2019    Procedure: SIGMOIDOSCOPY FLEXIBLE TO T.I.;  Surgeon: Tae Turcios MD;  Location: [facility] ENDOSCOPY;  Service: Gastroenterology   • TONSILLECTOMY AND ADENOIDECTOMY     • WISDOM TOOTH EXTRACTION             Current Outpatient Medications:   •  Adalimumab (Humira Pen) 40 MG/0.4ML Pen-injector Kit, Inject 40 mg under the skin into the appropriate area as directed Every 7 (Seven) Days., Disp: 4 each, Rfl: 5  •  albuterol sulfate  (90 Base) MCG/ACT inhaler, Inhale 2 puffs Every 4 (Four) Hours As Needed., Disp: , Rfl:   •  cetirizine (zyrTEC) 10 MG tablet, Take 10 mg by mouth Daily., Disp: , Rfl:   •  Cholecalciferol (VITAMIN D-3) 5000 units tablet, Take 5,000 Units by mouth Daily., Disp: , Rfl:   •  Cyanocobalamin (VITAMIN B 12 PO), Take 1 tablet by mouth Daily., Disp: , Rfl:   •  esomeprazole (nexIUM) 40 MG capsule, Take 1 capsule by mouth Daily., Disp: 30 capsule, Rfl: 5  •  ferrous sulfate 324 (65 Fe) MG tablet delayed-release EC tablet, Take 324 mg by mouth Daily With Breakfast., Disp: , Rfl:   •  fluticasone (FLONASE) 50 MCG/ACT nasal spray, 2 sprays into the nostril(s) as directed by provider Daily., Disp: , Rfl:   •  mometasone-formoterol (Dulera) 100-5 MCG/ACT inhaler, Inhale 2 puffs 2 (Two) Times a Day., Disp: 8.8 g, Rfl: 11  •  montelukast (Singulair) 10 MG tablet, Take 1 tablet by mouth Daily., Disp: 90 tablet, Rfl: 3  •  Multiple Vitamin (MULTIVITAMIN PO), Take 1 tablet by mouth Daily., Disp: , Rfl:   •  pregabalin (LYRICA) 100 MG capsule, Take 1 capsule by mouth 3 (Three) Times a Day., Disp: 90 capsule, Rfl: 5  •  spironolactone (ALDACTONE) 100 MG tablet, Take 100 mg by mouth Daily., Disp: , Rfl:   •  acetaminophen (TYLENOL) 650 MG 8 hr tablet, Take 2 tablets by mouth Every 8 (Eight) Hours., Disp: , Rfl:   •  folic acid (FOLVITE) 1 MG  tablet, TAKE ONE TABLET BY MOUTH DAILY, Disp: 90 tablet, Rfl: 0  •  polyethylene glycol (MIRALAX) 17 GM/SCOOP powder, Take  by mouth., Disp: , Rfl:   •  pregabalin (LYRICA) 150 MG capsule, Take 1 capsule by mouth 2 (Two) Times a Day for 30 days., Disp: 60 capsule, Rfl: 2  •  venlafaxine XR (EFFEXOR-XR) 150 MG 24 hr capsule, Take 1 capsule by mouth Daily., Disp: 30 capsule, Rfl: 2      Family History   Problem Relation Age of Onset   • Hypertension Father    • Skin cancer Father    • Cancer Father         Basal cell skin cancer   • Hearing loss Father         Mass causing hearing loss   • Irritable bowel syndrome Sister    • Parkinsonism Maternal Uncle    • Cancer Maternal Grandmother         Hammond-rectal cancer   • Colon cancer Maternal Grandmother    • Arthritis Maternal Grandmother    • Prostate cancer Maternal Grandfather    • Cancer Maternal Grandfather         Prostate cancer   • Hypertension Paternal Grandmother    • Prostate cancer Paternal Grandfather    • Hypertension Paternal Grandfather    • Cancer Paternal Grandfather         Prostate cancer, skin cancer   • Hearing loss Paternal Grandfather    • Malig Hyperthermia Neg Hx    • Colon polyps Neg Hx    • Crohn's disease Neg Hx    • Ulcerative colitis Neg Hx          Social History     Socioeconomic History   • Marital status: Single   • Number of children: 0   • Highest education level: Some college, no degree   Tobacco Use   • Smoking status: Never Smoker   • Smokeless tobacco: Never Used   • Tobacco comment: caffeine use a few days weekly   Vaping Use   • Vaping Use: Never used   Substance and Sexual Activity   • Alcohol use: Not Currently     Alcohol/week: 0.0 standard drinks     Comment: No alcohol since october 2020   • Drug use: Never   • Sexual activity: Yes     Partners: Male     Birth control/protection: Condom, I.U.D.         Allergies   Allergen Reactions   • Lactose Intolerance (Gi) GI Intolerance   • Levaquin [Levofloxacin] Hallucinations  "        Pain Scale: 4/10        ROS:  Review of Systems   Constitutional: Positive for fatigue.   HENT: Positive for hearing loss, rhinorrhea and voice change. Negative for ear pain, postnasal drip, sinus pain, sore throat, tinnitus and trouble swallowing.    Eyes: Positive for photophobia and visual disturbance.   Gastrointestinal: Positive for constipation.   Musculoskeletal: Positive for arthralgias, back pain, gait problem and myalgias.   Neurological: Positive for syncope, speech difficulty (word-finding), weakness, numbness and headaches. Negative for dizziness, tremors, seizures, facial asymmetry and light-headedness.   Psychiatric/Behavioral: Positive for decreased concentration. Negative for agitation, behavioral problems, confusion, dysphoric mood, hallucinations, self-injury, sleep disturbance and suicidal ideas. The patient is not nervous/anxious and is not hyperactive.      I have reviewed the above ROS put in by the medical assistant and am in agreement.     Physical Exam:  Vitals:    06/23/22 1506   BP: 138/98   BP Location: Left arm   Patient Position: Sitting   Cuff Size: Adult   Pulse: 92   SpO2: 99%   Weight: (!) 147 kg (325 lb)   Height: 172.7 cm (67.99\")     Body mass index is 49.43 kg/m².    Physical Exam  General: obese white female no acute distress  HEENT: Normocephalic, atraumatic  Neck: Supple.  No masses  Heart: Regular rate and rhythm without murmur.   Extremities: Distal pulses are palpable and equal.  No pedal edema.     Neurological Exam:   Mental Status: Awake, alert, oriented to person, place and time.  Conversant without evidence of an affective disorder, thought disorder, delusions or hallucinations.  Attention span and concentration are normal.  HCF: No aphasia, apraxia or dysarthria.  Recent and remote memory intact.  Knowledge of recent events intact.  CN:      I:              II: Visual fields full without left inattention              III, IV, VI: Eye movements intact without " nystagmus or ptosis.  Pupils equal  round and reactive to light.              V,VII: Light touch and pinprick intact all 3 divisions of V.  Facial muscles symmetrical.              VIII: Hearing intact to finger rub              IX,X: Soft palate elevates symmetrically              XI: Sternomastoid and trapezius are strong.               XII: Tongue midline without atrophy or fasciculations  Motor: Normal tone and bulk in the upper and lower extremities              Power testing: There is minimal weakness detected in her APBs, right greater than left.  All other muscles tested in her arms are within normal limits.  There is normal power in all muscles tested in her lower extremities.   Reflexes: Upper extremities: +2 diffusely                   Lower extremities: +2 diffusely  Sensory: Light touch:                    Pinprick:   Cerebellar: Finger-to-nose: Intact                      Rapid movement: Intact      Gait and Station: Patient comes to stand slowly but otherwise without difficulty, she is mildly broad-based but otherwise casual walk looks normal.  Negative Romberg, negative drift.      Results:      Lab Results   Component Value Date    GLUCOSE 90 06/08/2022    BUN 8 06/08/2022    CREATININE 0.87 06/08/2022    EGFRIFNONA 80 10/01/2021    EGFRIFAFRI 120 03/02/2021    BCR 9 06/08/2022    CO2 24 06/08/2022    CALCIUM 9.2 06/08/2022    PROTENTOTREF 6.8 06/08/2022    ALBUMIN 4.2 06/08/2022    LABIL2 1.6 06/08/2022    AST 14 06/08/2022    ALT 11 06/08/2022       Lab Results   Component Value Date    WBC 6.9 06/08/2022    HGB 13.6 06/08/2022    HCT 41.9 06/08/2022    MCV 91 06/08/2022     06/08/2022         .  Lab Results   Component Value Date    RPR Non-Reactive 09/28/2021         Lab Results   Component Value Date    TSH 1.210 06/13/2022    THYROIDAB 8 06/13/2022         Lab Results   Component Value Date    IDDPSGSM53 439 06/08/2022         Lab Results   Component Value Date    FOLATE 15.4 06/08/2022          No results found for: HGBA1C      Lab Results   Component Value Date    GLUCOSE 90 06/08/2022    BUN 8 06/08/2022    CREATININE 0.87 06/08/2022    EGFRIFNONA 80 10/01/2021    EGFRIFAFRI 120 03/02/2021    BCR 9 06/08/2022    K 4.5 06/08/2022    CO2 24 06/08/2022    CALCIUM 9.2 06/08/2022    PROTENTOTREF 6.8 06/08/2022    ALBUMIN 4.2 06/08/2022    LABIL2 1.6 06/08/2022    AST 14 06/08/2022    ALT 11 06/08/2022         Lab Results   Component Value Date    WBC 6.9 06/08/2022    HGB 13.6 06/08/2022    HCT 41.9 06/08/2022    MCV 91 06/08/2022     06/08/2022             Assessment:   1.  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  Widespread pain associated with enteropathic arthritis due to Crohn's disease, currently on disease modifying therapy per GI with Humira plus fibromyalgia; suboptimally controlled with current therapies  3.  Reported gait disturbance/incoordination/imbalance and frequent falls; etiology is still uncertain  4.  History of vasodepressor syncope  5.  New onset daily headache, most consistent with tension type headaches  6.  Several various nonspecific symptoms: Voice change, hearing loss, memory problems and general fatigue and malaise      Plan:  1.  Patient was seen by Dr. Greene (hand spec) a few months ago, he did recommend surgery for her however stated that she would have to be off of her Humira for at least 3 months prior to surgery.  Her GI doctor advised her against doing this -at the time she was undergoing some work-up due to some worsening GI symptoms.  She reports work-up is nearly complete and perhaps he will sign off on the Humira vacation if evaluation is negative  -For now continue conservative measures until she can get a more definitive treatment done     2.  In terms of her widespread pain/paresthesias/diastases patient  indicates that she feels like painful symptoms are suboptimally controlled on current regimen of Lyrica 100 mg 3 times daily and Cymbalta 60 mg daily.  She also states that since changing her Zoloft to Cymbalta her depression symptoms are worse.   -I would like to try changing her Cymbalta to Effexor, see if this is a better fit for her.  We will discontinue her Cymbalta 60 mg and start Effexor  mg daily.  Risk and benefits of medicine discussed.    3.  We have already referred her to the [facility] for further evaluation of her imbalance and frequent falls.  She is got history of vasodepressor syncope.  Apparently they have reached out to her but instructed her that she needs to have a log of home blood pressures for a month to submit before they can schedule her.  I advised her to definitely do this and get on their books ASAP as it likely will take quite a while for her to be seen  -Also, I want her to get back into physical therapy.  She did not have a good experience with [facility] but is interested in aquatic therapy.  We will refer to the [facility] which apparently has a pool.    4.  In terms of her new headache, she has had a brain scan fairly recently and this was within normal limits.  I feel like the headache that she is describing fits mostly with a tension type headache.  Hopefully the Effexor will be helpful for this.    5.  And finally as to her voice change and hearing loss I am going to go ahead and refer her to ENT for eval. For the memory problems as well as general fatigue and malaise - advised her that this certainly could be associated with the fibromyalgia and/or her depression but also could be a manifestation of obstructive sleep apnea.  Patient has never had a sleep study.  I will refer her to sleep specialist for this as well.    Follow-up in about 4 months with studies been completed or sooner should need arise      I spent 40 minutes  face-to-face with patient/family today, 20 minutes of that time was counseling patient on disease course and plan of care and answering any questions that they had.         Dictated utilizing Dragon dictation.

## 2022-06-25 LAB
CORTIS BS SAL-MCNC: <0.01 UG/DL
CORTIS F 24H UR-MCNC: 12 UG/L
CORTIS F 24H UR-MRATE: 19 UG/24 HR (ref 6–42)
CORTIS SP1 P CHAL SAL-MCNC: <0.01 UG/DL

## 2022-07-05 ENCOUNTER — PATIENT MESSAGE (OUTPATIENT)
Dept: GASTROENTEROLOGY | Facility: CLINIC | Age: 37
End: 2022-07-05

## 2022-07-05 DIAGNOSIS — M62.89 PELVIC FLOOR DYSFUNCTION: Primary | ICD-10-CM

## 2022-07-06 NOTE — TELEPHONE ENCOUNTER
From: Bri Pfeiffer  To: BANDAR Hopkins  Sent: 7/5/2022 7:12 PM EDT  Subject: Results available yet?    Annette Lemon!  Any chance my results are avaliable yet?  Thanks,    Bri

## 2022-07-18 ENCOUNTER — PATIENT MESSAGE (OUTPATIENT)
Dept: NEUROLOGY | Facility: CLINIC | Age: 37
End: 2022-07-18

## 2022-07-18 DIAGNOSIS — R47.9 SPEECH DISTURBANCE, UNSPECIFIED TYPE: Primary | ICD-10-CM

## 2022-07-27 ENCOUNTER — LAB (OUTPATIENT)
Dept: LAB | Facility: HOSPITAL | Age: 37
End: 2022-07-27

## 2022-07-27 DIAGNOSIS — R47.9 SPEECH DISTURBANCE, UNSPECIFIED TYPE: ICD-10-CM

## 2022-07-27 PROCEDURE — 83519 RIA NONANTIBODY: CPT

## 2022-07-27 PROCEDURE — 36415 COLL VENOUS BLD VENIPUNCTURE: CPT

## 2022-08-02 LAB
ACHR BIND AB SER-SCNC: 0.04 NMOL/L (ref 0–0.24)
ACHR BLOCK AB SER-ACNC: 9 % (ref 0–25)
ACHR MOD AB/ACHR TOTAL SFR SER: NORMAL %

## 2022-08-31 NOTE — PATIENT INSTRUCTIONS
Obtain blood work, orders placed.    Schedule gastric emptying study and HIDA scan for further evaluation of symptoms.    Start MiraLAX, daily, adjust dose as needed to help promote more complete bowel movements.    For joint pain, start prednisone taper, take as directed.    Follow-up with rheumatology as scheduled.    We will discuss today's office visit with Dr. Escalona and contact you with additional recommendations and possible referral.  
Epifanio Villatoro

## 2022-09-12 ENCOUNTER — APPOINTMENT (OUTPATIENT)
Dept: SLEEP MEDICINE | Facility: HOSPITAL | Age: 37
End: 2022-09-12

## 2022-09-14 DIAGNOSIS — K50.111 CROHN'S DISEASE OF LARGE INTESTINE WITH RECTAL BLEEDING: Primary | ICD-10-CM

## 2022-09-14 DIAGNOSIS — L73.2 HIDRADENITIS SUPPURATIVA: ICD-10-CM

## 2022-09-14 RX ORDER — ADALIMUMAB 40MG/0.4ML
KIT SUBCUTANEOUS
Qty: 4 EACH | Refills: 6 | Status: SHIPPED | OUTPATIENT
Start: 2022-09-14 | End: 2022-11-21

## 2022-09-14 NOTE — PROGRESS NOTES
"Subjective   Bri Pfeiffer is a 36 y.o. female.     CC: Head Pain    History of Present Illness     Pt comes in today after sending me this message 2 days ago:    Dr. Benavidez /support staff  I had an event on Saturday night while trying to have a bowrl movement, where I had severe debilitating pain shoot from the top of my neck/ base of my skull up and wrap around the right side of my head. I had some major vision disturbances for about 2 mins, then my vision returned, but a bit blurry. The pain stayed for a while. It eventually settled Into feeling like a ton of pressure in the front right side of my head. It worrsened with bending or moving my head much. The pain came and went, but was severe enough to wake me up Enio morning.   I was able to convince myself to stay out of the ER. And things have resolved.   Should I do anything to follow up about this event?   I appreciate your input. Thank you    Pt reports no limb weakness when all this happened.       The following portions of the patient's history were reviewed and updated as appropriate: allergies, current medications, past family history, past medical history, past social history, past surgical history and problem list.    Review of Systems   Constitutional: Negative for activity change, chills and fever.   Respiratory: Negative for cough.    Cardiovascular: Negative for chest pain.   Psychiatric/Behavioral: Negative for dysphoric mood.       /78   Pulse 78   Temp 97.9 °F (36.6 °C) (Oral)   Resp 16   Ht 172.7 cm (68\")   Wt (!) 150 kg (331 lb)   SpO2 99%   BMI 50.33 kg/m²     Objective   Physical Exam  Constitutional:       General: She is not in acute distress.     Appearance: She is well-developed.   Eyes:      Extraocular Movements: Extraocular movements intact.      Pupils: Pupils are equal, round, and reactive to light.   Pulmonary:      Effort: Pulmonary effort is normal.   Musculoskeletal:         General: Normal range of " motion.   Neurological:      Mental Status: She is alert and oriented to person, place, and time.   Psychiatric:         Behavior: Behavior normal.         Thought Content: Thought content normal.         Assessment & Plan   Diagnoses and all orders for this visit:    1. Severe headache (Primary)  -     MRI angiogram head w wo contrast; Future    2. Visual disturbance  -     MRI angiogram head w wo contrast; Future    Pt could certainly have acutely irritated the right C2 nerve root, however shouldn't affect the vision. Will get cerebral vasculature w/u to r/o Aneurysm.

## 2022-09-15 ENCOUNTER — OFFICE VISIT (OUTPATIENT)
Dept: FAMILY MEDICINE CLINIC | Facility: CLINIC | Age: 37
End: 2022-09-15

## 2022-09-15 VITALS
OXYGEN SATURATION: 99 % | WEIGHT: 293 LBS | BODY MASS INDEX: 44.41 KG/M2 | TEMPERATURE: 97.9 F | HEART RATE: 78 BPM | RESPIRATION RATE: 16 BRPM | DIASTOLIC BLOOD PRESSURE: 78 MMHG | HEIGHT: 68 IN | SYSTOLIC BLOOD PRESSURE: 121 MMHG

## 2022-09-15 DIAGNOSIS — H53.9 VISUAL DISTURBANCE: ICD-10-CM

## 2022-09-15 DIAGNOSIS — R51.9 SEVERE HEADACHE: Primary | ICD-10-CM

## 2022-09-15 PROCEDURE — 99214 OFFICE O/P EST MOD 30 MIN: CPT | Performed by: FAMILY MEDICINE

## 2022-09-17 DIAGNOSIS — J30.1 NON-SEASONAL ALLERGIC RHINITIS DUE TO POLLEN: Chronic | ICD-10-CM

## 2022-09-19 RX ORDER — MONTELUKAST SODIUM 10 MG/1
TABLET ORAL
Qty: 30 TABLET | Refills: 0 | Status: SHIPPED | OUTPATIENT
Start: 2022-09-19 | End: 2022-11-23 | Stop reason: SDUPTHER

## 2022-09-28 RX ORDER — VENLAFAXINE HYDROCHLORIDE 150 MG/1
150 CAPSULE, EXTENDED RELEASE ORAL DAILY
Qty: 30 CAPSULE | Refills: 2 | Status: SHIPPED | OUTPATIENT
Start: 2022-09-28 | End: 2023-09-28

## 2022-10-09 ENCOUNTER — HOSPITAL ENCOUNTER (OUTPATIENT)
Dept: MRI IMAGING | Facility: HOSPITAL | Age: 37
Discharge: HOME | End: 2022-10-09
Admitting: FAMILY MEDICINE

## 2022-10-09 DIAGNOSIS — H53.9 VISUAL DISTURBANCE: ICD-10-CM

## 2022-10-09 DIAGNOSIS — R51.9 SEVERE HEADACHE: ICD-10-CM

## 2022-10-09 PROCEDURE — 70544 MR ANGIOGRAPHY HEAD W/O DYE: CPT

## 2022-10-11 ENCOUNTER — TELEMEDICINE (OUTPATIENT)
Dept: NEUROLOGY | Facility: CLINIC | Age: 37
End: 2022-10-11

## 2022-10-11 DIAGNOSIS — G43.719 INTRACTABLE CHRONIC MIGRAINE WITHOUT AURA AND WITHOUT STATUS MIGRAINOSUS: Primary | ICD-10-CM

## 2022-10-11 PROCEDURE — 99214 OFFICE O/P EST MOD 30 MIN: CPT | Performed by: PHYSICIAN ASSISTANT

## 2022-10-11 NOTE — PROGRESS NOTES
CC: follow up    HPI:  Bri Pfeiffer is a  36 y.o.  R-handed female who I am seeing for video visit today.  PMH includes crohns disease, fibromyalgia, syncope, and numbness.  She had f/u appt scheduled for December but had an episode for which she is seeking advice for via phone message.  One month ago she describes having a bowel movement with straining.  She developed acute worsening of headache and visual disturbance described as thinking her glasses were no longer on her face.  This intensity lasted minutes but she continued with worsened headache throughout the weekend.  She was able to mostly lay in bed with minimal activity then. She had some recurrence of this type headache a few days later and made appt with her pcp.  He ordered MRA head and this was completed this month and is read normal.  She is asking if there is more she should do r/t this incident    She has daily headaches and has since about June.  She does not take NSAIDs due to crohns disease.  She usually does conservative therapies, ice, cold pack, seeks dark room. She has nausea as well and has zofran to take.  She has remote h/o migraine headache as teen and was on topamax at one point but seemed to improve with oral contraception.    She follows in neurology department for multiple symptoms of numbness, gait problem with falls, and h/o carpal tunnel syndrome based on EMG by previous neurologist, Dr Newell.  EMG in 2021 by Dr Thao was normal.  Other w/u includes MRI cervical spine, MRI brain, neuropathic labs that have not been telling.  She was referred to [facility] for autonomic testing.    Social history: Non-smoker drinks maybe 1 alcoholic beverage a month, not , no kids, works in a  clinic mostly in [employer]    Family history: Sister with syncope as well, uncle with Parkinson's, parents still living      Pain Scale:          Physical Exam:  There were no vitals filed for this visit.  Orthostatic BP:    There is no  height or weight on file to calculate BMI.        General: pt well appearing, no distress      General: pt well appearing, no distress  HEENT: Normocephalic, conjunctiva normal, external canals normal, no nasal discharge, moist mucous membranes  Pulmonary: Normal respiratory effort  Mental: alert, conversant, AOx3, good attention, good insight, good memory   CN: EOM appear full, no facial assymetry, normal hearing, tongue and palate rise is midline  Motor: no abnormal movements, no pronator drift  Sensory: not tested  Reflexes: not tested  Coordination: no ataxia on finger-nose      Results:      Lab Results   Component Value Date    GLUCOSE 90 06/08/2022    BUN 8 06/08/2022    CREATININE 0.87 06/08/2022    EGFRIFNONA 80 10/01/2021    EGFRIFAFRI 120 03/02/2021    BCR 9 06/08/2022    CO2 24 06/08/2022    CALCIUM 9.2 06/08/2022    PROTENTOTREF 6.8 06/08/2022    ALBUMIN 4.2 06/08/2022    LABIL2 1.6 06/08/2022    AST 14 06/08/2022    ALT 11 06/08/2022       Lab Results   Component Value Date    WBC 6.9 06/08/2022    HGB 13.6 06/08/2022    HCT 41.9 06/08/2022    MCV 91 06/08/2022     06/08/2022         .  Lab Results   Component Value Date    RPR Non-Reactive 09/28/2021         Lab Results   Component Value Date    TSH 1.210 06/13/2022    THYROIDAB 8 06/13/2022         Lab Results   Component Value Date    CVFIMOHQ60 439 06/08/2022         Lab Results   Component Value Date    FOLATE 15.4 06/08/2022         No results found for: HGBA1C      Lab Results   Component Value Date    GLUCOSE 90 06/08/2022    BUN 8 06/08/2022    CREATININE 0.87 06/08/2022    EGFRIFNONA 80 10/01/2021    EGFRIFAFRI 120 03/02/2021    BCR 9 06/08/2022    K 4.5 06/08/2022    CO2 24 06/08/2022    CALCIUM 9.2 06/08/2022    PROTENTOTREF 6.8 06/08/2022    ALBUMIN 4.2 06/08/2022    LABIL2 1.6 06/08/2022    AST 14 06/08/2022    ALT 11 06/08/2022         Diagnosis:  1. Migraine headache    Impression: 36-year-old female with past medical history of  Crohn's disease on immunosuppressive therapy, syncope, anxiety, fibromyalgia who I am seeing today after an episode 1 month ago concerning for thunderclap headache.  She describes acute worsening of headache and visual disturbance when straining for bowel movement.  She did not have evaluation until several days later by her PCP.  She had MRA head that was read on 10/9 has as normal.  Personally reviewed and no evidence of ischemia, flair change, significant vascular abnormality-GRE sequence not done.  She endorses daily headache c/w migraine.  She has long standing history of neurologic symptoms, though we have not discussed these today.  I have performed a video face to face screening exam     Plan:  1. Begin riboflavin 400mg and magnesium 400mg daily.  Or can get migreleif-available on amazon-take 2 tablets daily  2. Will provide nurtec samples.  I do not prefer triptans in this patient  3. Discussed ER return precautions- if another thunderclap type headache or new onset neurologic symptoms she will go to ER    I spent at least 30 minutes interviewing, examining, and counseling patient.  I independently reviewed documentation, laboratory and diagnostic findings, external documentation where applicable, and formulated treatment plan which was discussed with the patient.      The service was rendered under secure interactive video and   audio technology.  Patient verbally consented and I confirmed patient identity and advised patient of the inherent limitations of not in-person live examination

## 2022-10-27 ENCOUNTER — TELEPHONE (OUTPATIENT)
Dept: NEUROLOGY | Facility: CLINIC | Age: 37
End: 2022-10-27

## 2022-11-03 DIAGNOSIS — M79.7 FIBROMYALGIA: ICD-10-CM

## 2022-11-04 RX ORDER — PREGABALIN 100 MG/1
CAPSULE ORAL
Qty: 90 CAPSULE | OUTPATIENT
Start: 2022-11-04

## 2022-11-09 RX ORDER — AMITRIPTYLINE HYDROCHLORIDE 10 MG/1
10 TABLET, FILM COATED ORAL NIGHTLY
Qty: 30 TABLET | Refills: 2 | Status: SHIPPED | OUTPATIENT
Start: 2022-11-09 | End: 2023-02-15

## 2022-11-21 ENCOUNTER — LAB (OUTPATIENT)
Dept: LAB | Facility: HOSPITAL | Age: 37
End: 2022-11-21

## 2022-11-21 ENCOUNTER — OFFICE VISIT (OUTPATIENT)
Dept: GASTROENTEROLOGY | Facility: CLINIC | Age: 37
End: 2022-11-21

## 2022-11-21 VITALS
OXYGEN SATURATION: 100 % | SYSTOLIC BLOOD PRESSURE: 120 MMHG | TEMPERATURE: 98.4 F | DIASTOLIC BLOOD PRESSURE: 96 MMHG | WEIGHT: 293 LBS | BODY MASS INDEX: 44.41 KG/M2 | HEIGHT: 68 IN | HEART RATE: 77 BPM

## 2022-11-21 DIAGNOSIS — K50.111 CROHN'S DISEASE OF LARGE INTESTINE WITH RECTAL BLEEDING: Primary | ICD-10-CM

## 2022-11-21 DIAGNOSIS — Z90.49 STATUS POST SMALL BOWEL RESECTION: ICD-10-CM

## 2022-11-21 DIAGNOSIS — K59.09 OTHER CONSTIPATION: ICD-10-CM

## 2022-11-21 DIAGNOSIS — M62.89 PELVIC FLOOR DYSFUNCTION: ICD-10-CM

## 2022-11-21 DIAGNOSIS — R68.81 EARLY SATIETY: ICD-10-CM

## 2022-11-21 DIAGNOSIS — L50.8 URTICARIA, ACUTE: ICD-10-CM

## 2022-11-21 DIAGNOSIS — Z79.899 HIGH RISK MEDICATION USE: ICD-10-CM

## 2022-11-21 DIAGNOSIS — R11.10 REGURGITATION OF FOOD: ICD-10-CM

## 2022-11-21 LAB
ALBUMIN SERPL-MCNC: 3.9 G/DL (ref 3.5–5.2)
ALBUMIN/GLOB SERPL: 1.2 G/DL
ALP SERPL-CCNC: 72 U/L (ref 39–117)
ALT SERPL W P-5'-P-CCNC: 14 U/L (ref 1–33)
ANION GAP SERPL CALCULATED.3IONS-SCNC: 9.6 MMOL/L (ref 5–15)
AST SERPL-CCNC: 18 U/L (ref 1–32)
BILIRUB SERPL-MCNC: 0.4 MG/DL (ref 0–1.2)
BUN SERPL-MCNC: 18 MG/DL (ref 6–20)
BUN/CREAT SERPL: 20.5 (ref 7–25)
CALCIUM SPEC-SCNC: 9.5 MG/DL (ref 8.6–10.5)
CHLORIDE SERPL-SCNC: 103 MMOL/L (ref 98–107)
CO2 SERPL-SCNC: 29.4 MMOL/L (ref 22–29)
CREAT SERPL-MCNC: 0.88 MG/DL (ref 0.57–1)
DEPRECATED RDW RBC AUTO: 44 FL (ref 37–54)
EGFRCR SERPLBLD CKD-EPI 2021: 87.5 ML/MIN/1.73
ERYTHROCYTE [DISTWIDTH] IN BLOOD BY AUTOMATED COUNT: 12.8 % (ref 12.3–15.4)
GLOBULIN UR ELPH-MCNC: 3.2 GM/DL
GLUCOSE SERPL-MCNC: 78 MG/DL (ref 65–99)
HAV IGM SERPL QL IA: NORMAL
HBV CORE IGM SERPL QL IA: NORMAL
HBV SURFACE AG SERPL QL IA: NORMAL
HCT VFR BLD AUTO: 43.1 % (ref 34–46.6)
HCV AB SER DONR QL: NORMAL
HGB BLD-MCNC: 13.7 G/DL (ref 12–15.9)
MCH RBC QN AUTO: 29.9 PG (ref 26.6–33)
MCHC RBC AUTO-ENTMCNC: 31.8 G/DL (ref 31.5–35.7)
MCV RBC AUTO: 94.1 FL (ref 79–97)
PLATELET # BLD AUTO: 329 10*3/MM3 (ref 140–450)
PMV BLD AUTO: 9.1 FL (ref 6–12)
POTASSIUM SERPL-SCNC: 3.7 MMOL/L (ref 3.5–5.2)
PROT SERPL-MCNC: 7.1 G/DL (ref 6–8.5)
RBC # BLD AUTO: 4.58 10*6/MM3 (ref 3.77–5.28)
SODIUM SERPL-SCNC: 142 MMOL/L (ref 136–145)
VIT B12 BLD-MCNC: 488 PG/ML (ref 211–946)
WBC NRBC COR # BLD: 13.58 10*3/MM3 (ref 3.4–10.8)

## 2022-11-21 PROCEDURE — 36415 COLL VENOUS BLD VENIPUNCTURE: CPT | Performed by: NURSE PRACTITIONER

## 2022-11-21 PROCEDURE — 82607 VITAMIN B-12: CPT | Performed by: NURSE PRACTITIONER

## 2022-11-21 PROCEDURE — 80074 ACUTE HEPATITIS PANEL: CPT | Performed by: NURSE PRACTITIONER

## 2022-11-21 PROCEDURE — 85027 COMPLETE CBC AUTOMATED: CPT | Performed by: NURSE PRACTITIONER

## 2022-11-21 PROCEDURE — 99214 OFFICE O/P EST MOD 30 MIN: CPT | Performed by: NURSE PRACTITIONER

## 2022-11-21 PROCEDURE — 86480 TB TEST CELL IMMUN MEASURE: CPT | Performed by: NURSE PRACTITIONER

## 2022-11-21 PROCEDURE — 80053 COMPREHEN METABOLIC PANEL: CPT | Performed by: NURSE PRACTITIONER

## 2022-11-21 RX ORDER — B2/MAGNESIUM CIT,OXID/FEVERFEW 200-180-50
TABLET ORAL
COMMUNITY
Start: 2022-10-22

## 2022-11-21 RX ORDER — PREDNISONE 10 MG/1
TABLET ORAL
Qty: 18 TABLET | Refills: 0 | Status: SHIPPED | OUTPATIENT
Start: 2022-11-21 | End: 2022-12-14 | Stop reason: SDUPTHER

## 2022-11-21 NOTE — PROGRESS NOTES
Chief Complaint   Patient presents with   • Follow-up     Discussed treatment options for Crohn's disease, currently on Humira           History of Present Illness  37-year-old female presents today for follow-up.  She has a history of Crohn's disease status post laparoscopic ileocecectomy November 2020.  She has a history of anal fissure.  Last colonoscopy April 13, 2022 With 8 mm anal fissure otherwise normal, no evidence of recurrence at ileocolonic anastomosis.    Patient presents today to discuss treatment options for Crohn's disease.  She contacted the office last week with the development of whelps on her upper and lower extremities.  Etiology was unknown but there is some concern it may be related to Humira.  She took her Humira injection Saturday night and whelps occurred Sunday morning.  She is on prednisone prescribed by [facility] for whelps.    She was started on Humira by her previous gastroenterologist Dr. Turcios.  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    Daily regurgitation with liquids and solids, throughout the day. Occurs after eating and drinking. Feels full easily with bloating and distention.  She has mild gastroparesis on previous gastric emptying study.    For constipation, she will take miralax as needed for hard stools. She generally has looser stools but they are infrequent. They are described as softer. She will use bidet to help with evacuation. She has incomplete evacuation and can go several days without having the urge or rectal fullness to have a bowel movement.     Given constipation defecating proctogram was performed Ivette 15, 2022 with small anterior rectocele.  Anal rectal manometry was performed Ivette 15, 2022.    Findings consistent with pelvic floor dyssynergy-   With the recommendation to consider pelvic floor therapy.    She is scheduled to start pelvic floor physical therapy with her GYN December 2022.     She is  "currently taking over-the-counter B12 oral replacement.    Review of Systems      Result Review :       COLONOSCOPY (04/13/2022 10:20)   OPERATIVE/PROCEDURE REPORT - SCAN - ANORECTAL [facility]OM_[facility]_6/15/2022 (06/15/2022)   CR DEFECOGRAPHY (06/15/2022 11:47)   NM Gastric Emptying (05/07/2021 12:11)     Vital Signs:   /96   Pulse 77   Temp 98.4 °F (36.9 °C)   Ht 172.7 cm (68\")   Wt (!) 149 kg (328 lb 14.4 oz)   SpO2 100%   BMI 50.01 kg/m²     Body mass index is 50.01 kg/m².     Physical Exam  Constitutional:       General: She is not in acute distress.     Appearance: Normal appearance. She is well-developed. She is not ill-appearing.   Pulmonary:      Effort: No respiratory distress.   Skin:     Coloration: Skin is not pale.      Comments: Circular welt present bilateral upper extremity and right axilla   Neurological:      Mental Status: She is alert.         Assessment and Plan    Diagnoses and all orders for this visit:    1. Crohn's disease of large intestine with rectal bleeding (HCC) (Primary)  -     QuantiFERON TB Gold  -     Hepatitis Panel, Acute  -     Vitamin B12  -     CBC (No Diff)  -     Comprehensive Metabolic Panel  -     predniSONE (DELTASONE) 10 MG tablet; 30 mg x 3 days, 20 mg x 3 days, 10 mg x 3 days then stop  Dispense: 18 tablet; Refill: 0    2. Urticaria, acute    3. High risk medication use  -     QuantiFERON TB Gold  -     Hepatitis Panel, Acute  -     Vitamin B12  -     CBC (No Diff)  -     Comprehensive Metabolic Panel    4. Pelvic floor dysfunction    5. Regurgitation of food    6. Other constipation    7. Early satiety    8. Status post small bowel resection  -     Vitamin B12    Allergic reaction with whelps, unknown etiology.  I am unable to say if it is related to Humira however the timing is suspicious.  Cannot give clearance to restart Humira given the presence of whelps as allergic reaction if related to Humira with next Humira could be more pronounced.    She continues " to have joint pain that has been worked up with rheumatology in the past and thought to be related to inflammatory bowel disease.    It has improved with Humira but is not completely resolved.     Also suspicion for hidradenitis suppurativa however this has not been officially diagnosed it did improve with weekly Humira.  If at bedtime becomes active with change in therapy, patient will need to follow-up with dermatology for additional treatment recommendations.  Endoscopic evaluation recently with no evidence of active inflammatory bowel disease.  We discussed change in therapy with same mechanism of action given extraintestinal manifestations of inflammatory bowel disease with arthralgias.    Will begin insurance approval process for infliximab, patient to be treated with Benadryl and Tylenol, premedicated protocol.  If there are any signs or symptoms concerning for allergic reaction, we will discontinue therapy and consider alternative mechanism of action as discussed.    Patient with constipation, mild gastroparesis, discussed the importance of pelvic floor physical therapy given abnormal anal rectal manometry results and defecating proctogram.  She is scheduled for physical therapy with her GYN office.    Also discussed mild gastroparesis and the importance of treating constipation more adequately as would like to see if symptoms improve with more regular and complete bowel movements.    Recommend follow-up blood work after she completes induction dose, patient to send food.de message and we will place orders.    Patient verbalized agreement and understanding with the above plan, all questions answered.  Discussed treatment options including Cimzia and infliximab.      EMR Dragon/Transcription Disclaimer:  This document has been Dictated utilizing Dragon dictation.

## 2022-11-22 NOTE — PROGRESS NOTES
Subjective   Bri Pfeiffer is a 37 y.o. female.     CC: Annual Exam    History of Present Illness     Bri Pfeiffer 37 y.o. female who presents for an Annual Wellness Visit.  she has a history of   Patient Active Problem List   Diagnosis   • Pericardial cyst   • Vasodepressor syncope   • Esophageal dysphagia   • Rectal bleeding   • Colitis   • Gastroesophageal reflux disease   • Crohn's disease of large intestine with rectal bleeding (HCC)   • Other irritable bowel syndrome   • Skin rash   • Moderate episode of recurrent major depressive disorder (HCC)   • Anxiety   • Low back pain with sciatica   • Ileocecal valve stenosis   • Crohn's disease (HCC)   • BMI 40.0-44.9, adult (HCC)   • Status post small bowel resection   • Fibromyalgia   • Non-seasonal allergic rhinitis due to pollen   • Incisional hernia, without obstruction or gangrene   .  she has been feeling well.   I  reviewed health maintenance with her as part of my preventative care plan.    The following portions of the patient's history were reviewed and updated as appropriate: allergies, current medications, past family history, past medical history, past social history, past surgical history and problem list.    Review of Systems   Constitutional: Negative for appetite change, fever and unexpected weight change.   HENT: Negative for ear pain, facial swelling and sore throat.    Eyes: Negative for pain and visual disturbance.   Respiratory: Negative for chest tightness, shortness of breath and wheezing.    Cardiovascular: Negative for chest pain and palpitations.   Gastrointestinal: Negative for abdominal pain and blood in stool.   Endocrine: Negative.    Genitourinary: Negative for difficulty urinating and hematuria.   Musculoskeletal: Negative for joint swelling.   Neurological: Negative for tremors, seizures and syncope.   Hematological: Negative for adenopathy.   Psychiatric/Behavioral: Negative.        /86   Pulse 80   Temp  "97.8 °F (36.6 °C) (Oral)   Resp 16   Ht 97.8 cm (38.5\")   Wt (!) 147 kg (323 lb)   .18 kg/m²     Objective   Physical Exam  Vitals and nursing note reviewed. Exam conducted with a chaperone present.   Constitutional:       Appearance: She is well-developed.   HENT:      Head: Normocephalic and atraumatic.      Right Ear: External ear normal.      Left Ear: External ear normal.      Mouth/Throat:      Pharynx: No oropharyngeal exudate.   Eyes:      General: No scleral icterus.     Conjunctiva/sclera: Conjunctivae normal.      Pupils: Pupils are equal, round, and reactive to light.   Neck:      Thyroid: No thyromegaly.   Cardiovascular:      Rate and Rhythm: Normal rate and regular rhythm.      Heart sounds: No murmur heard.    No gallop.   Pulmonary:      Effort: Pulmonary effort is normal.      Breath sounds: Normal breath sounds. No wheezing or rales.   Abdominal:      General: Bowel sounds are normal. There is no distension.      Palpations: Abdomen is soft. There is no mass.      Tenderness: There is no abdominal tenderness.      Hernia: No hernia is present.   Musculoskeletal:         General: No tenderness or deformity. Normal range of motion.      Cervical back: Normal range of motion and neck supple.   Lymphadenopathy:      Cervical: No cervical adenopathy.   Skin:     General: Skin is warm and dry.      Findings: No rash.   Neurological:      Mental Status: She is alert and oriented to person, place, and time.      Deep Tendon Reflexes: Reflexes are normal and symmetric.   Psychiatric:         Behavior: Behavior normal.     Agueda present for exam.    Assessment & Plan   Diagnoses and all orders for this visit:    1. Annual physical exam (Primary)    2. Fibromyalgia  -     pregabalin (LYRICA) 100 MG capsule; Take 1 capsule by mouth 3 (Three) Times a Day.  Dispense: 270 capsule; Refill: 1    3. Non-seasonal allergic rhinitis due to pollen  -     montelukast (SINGULAIR) 10 MG tablet; Take 1 tablet " by mouth Daily.  Dispense: 90 tablet; Refill: 3    Healthy diet/exercise/weight management discussed with pt.

## 2022-11-23 ENCOUNTER — OFFICE VISIT (OUTPATIENT)
Dept: FAMILY MEDICINE CLINIC | Facility: CLINIC | Age: 37
End: 2022-11-23

## 2022-11-23 VITALS
HEIGHT: 55 IN | HEART RATE: 80 BPM | WEIGHT: 293 LBS | DIASTOLIC BLOOD PRESSURE: 86 MMHG | TEMPERATURE: 97.8 F | BODY MASS INDEX: 67.81 KG/M2 | SYSTOLIC BLOOD PRESSURE: 128 MMHG | RESPIRATION RATE: 16 BRPM

## 2022-11-23 DIAGNOSIS — M79.7 FIBROMYALGIA: Chronic | ICD-10-CM

## 2022-11-23 DIAGNOSIS — Z00.00 ANNUAL PHYSICAL EXAM: Primary | ICD-10-CM

## 2022-11-23 DIAGNOSIS — J30.1 NON-SEASONAL ALLERGIC RHINITIS DUE TO POLLEN: Chronic | ICD-10-CM

## 2022-11-23 LAB
GAMMA INTERFERON BACKGROUND BLD IA-ACNC: 0 IU/ML
M TB IFN-G BLD-IMP: NEGATIVE
M TB IFN-G CD4+ BCKGRND COR BLD-ACNC: 0.04 IU/ML
M TB IFN-G CD4+CD8+ BCKGRND COR BLD-ACNC: 0 IU/ML
MITOGEN IGNF BLD-ACNC: >10 IU/ML
QUANTIFERON INCUBATION: NORMAL
SERVICE CMNT-IMP: NORMAL

## 2022-11-23 PROCEDURE — 99395 PREV VISIT EST AGE 18-39: CPT | Performed by: FAMILY MEDICINE

## 2022-11-23 RX ORDER — PREGABALIN 100 MG/1
100 CAPSULE ORAL 3 TIMES DAILY
Qty: 270 CAPSULE | Refills: 1 | Status: SHIPPED | OUTPATIENT
Start: 2022-11-23

## 2022-11-23 RX ORDER — MONTELUKAST SODIUM 10 MG/1
10 TABLET ORAL DAILY
Qty: 90 TABLET | Refills: 3 | Status: SHIPPED | OUTPATIENT
Start: 2022-11-23

## 2022-11-29 ENCOUNTER — OFFICE VISIT (OUTPATIENT)
Dept: ENDOCRINOLOGY | Age: 37
End: 2022-11-29

## 2022-11-29 VITALS
HEIGHT: 68 IN | OXYGEN SATURATION: 99 % | HEART RATE: 97 BPM | WEIGHT: 293 LBS | DIASTOLIC BLOOD PRESSURE: 90 MMHG | TEMPERATURE: 98 F | SYSTOLIC BLOOD PRESSURE: 132 MMHG | BODY MASS INDEX: 44.41 KG/M2

## 2022-11-29 DIAGNOSIS — R53.82 CHRONIC FATIGUE: ICD-10-CM

## 2022-11-29 DIAGNOSIS — E66.01 MORBID OBESITY: Primary | ICD-10-CM

## 2022-11-29 PROCEDURE — 99213 OFFICE O/P EST LOW 20 MIN: CPT | Performed by: INTERNAL MEDICINE

## 2022-11-29 NOTE — PROGRESS NOTES
"Chief Complaint  Morbid obesity    Subjective            History of Present Illness  Bri Pfeiffer,37 y.o. is here as a F/u     Pt's based line weight was 260 - 280 pounds, she has hx of chron's disease and she had surgery and it was complicated with adhesions and that's when her weight bumped up to 324 pounds. All this weight gain happened since Nov 2020.   Weight has been stable since June 2022, how ever due to the rash from humira she is placed on prednisone taper and then will be on remcaid.   During last visit discussed options of GLP-1 analogs but patient could not start his medications as they were expensive plus per the gastroenterologist she might not be a good candidate on these drugs as she already gastric emptying.    Exercise is limited due to pain and fatigue. During summer she swims.   Still has issues with heat intolerance, excessive sweating and occasional dizzy spells, concerned about vasovagal syncope. She does have upcoming appointment with [facility] in march for the work up of pott's syndrome.   Does have family hx of obesity.      Menstrual hx - She has IUD. Never been pregnant.             Reviewed primary care physician's/consulting physician documentation and lab results     I have reviewed the patient's allergies, medicines, past medical hx, family hx and social hx in detail.    Objective   Vital Signs:   /90   Pulse 97   Temp 98 °F (36.7 °C) (Temporal)   Ht 172.7 cm (67.99\")   Wt (!) 147 kg (323 lb 9.6 oz)   SpO2 99%   BMI 49.21 kg/m²     Physical Exam   General appearance - no distress  Eyes- anicteric sclera  Ear nose and throat-external ears and nose normal.    Respiratory-normal chest on inspection.  No respiratory distress noted.  Skin-no rashes.  Neuro-alert and oriented x3    Result Review :   The following data was reviewed by: Oumou Sanders MD on 11/29/2022:  Office Visit on 11/21/2022   Component Date Value Ref Range Status   • QuantiFERON Incubation " 11/21/2022 Incubation performed.   Final   • QuantiFERON Criteria 11/21/2022 Comment   Final    QuantiFERON-TB Gold Plus is a qualitative indirect test for  M tuberculosis infection (including disease) and is intended for use  in conjunction with risk assessment, radiography, and other medical  and diagnostic evaluations. The QuantiFERON-TB Gold Plus result is  determined by subtracting the Nil value from either TB antigen (Ag)  value. The Mitogen tube serves as a control for the test.   • QUANTIFERON TB1 AG VALUE 11/21/2022 0.04  IU/mL Final   • QUANTIFERON TB2 AG VALUE 11/21/2022 0.00  IU/mL Final   • QuantiFERON Nil Value 11/21/2022 0.00  IU/mL Final   • QuantiFERON Mitogen Value 11/21/2022 >10.00  IU/mL Final   • QUANTIFERON-TB GOLD PLUS 11/21/2022 Negative  Negative Final    No response to M tuberculosis antigens detected.  Infection with M tuberculosis is unlikely, but high risk  individuals should be considered for additional testing  (ATS/IDSA/CDC Clinical Practice Guidelines, 2017). The  reference range is an Antigen minus Nil result of <0.35 IU/mL.  Chemiluminescence immunoassay methodology   • Hepatitis B Surface Ag 11/21/2022 Non-Reactive  Non-Reactive Final   • Hep A IgM 11/21/2022 Non-Reactive  Non-Reactive Final   • Hep B C IgM 11/21/2022 Non-Reactive  Non-Reactive Final   • Hepatitis C Ab 11/21/2022 Non-Reactive  Non-Reactive Final   • Vitamin B-12 11/21/2022 488  211 - 946 pg/mL Final   • WBC 11/21/2022 13.58 (H)  3.40 - 10.80 10*3/mm3 Final   • RBC 11/21/2022 4.58  3.77 - 5.28 10*6/mm3 Final   • Hemoglobin 11/21/2022 13.7  12.0 - 15.9 g/dL Final   • Hematocrit 11/21/2022 43.1  34.0 - 46.6 % Final   • MCV 11/21/2022 94.1  79.0 - 97.0 fL Final   • MCH 11/21/2022 29.9  26.6 - 33.0 pg Final   • MCHC 11/21/2022 31.8  31.5 - 35.7 g/dL Final   • RDW 11/21/2022 12.8  12.3 - 15.4 % Final   • RDW-SD 11/21/2022 44.0  37.0 - 54.0 fl Final   • MPV 11/21/2022 9.1  6.0 - 12.0 fL Final   • Platelets 11/21/2022 329   140 - 450 10*3/mm3 Final   • Glucose 11/21/2022 78  65 - 99 mg/dL Final   • BUN 11/21/2022 18  6 - 20 mg/dL Final   • Creatinine 11/21/2022 0.88  0.57 - 1.00 mg/dL Final   • Sodium 11/21/2022 142  136 - 145 mmol/L Final   • Potassium 11/21/2022 3.7  3.5 - 5.2 mmol/L Final   • Chloride 11/21/2022 103  98 - 107 mmol/L Final   • CO2 11/21/2022 29.4 (H)  22.0 - 29.0 mmol/L Final   • Calcium 11/21/2022 9.5  8.6 - 10.5 mg/dL Final   • Total Protein 11/21/2022 7.1  6.0 - 8.5 g/dL Final   • Albumin 11/21/2022 3.90  3.50 - 5.20 g/dL Final   • ALT (SGPT) 11/21/2022 14  1 - 33 U/L Final   • AST (SGOT) 11/21/2022 18  1 - 32 U/L Final   • Alkaline Phosphatase 11/21/2022 72  39 - 117 U/L Final   • Total Bilirubin 11/21/2022 0.4  0.0 - 1.2 mg/dL Final   • Globulin 11/21/2022 3.2  gm/dL Final   • A/G Ratio 11/21/2022 1.2  g/dL Final   • BUN/Creatinine Ratio 11/21/2022 20.5  7.0 - 25.0 Final   • Anion Gap 11/21/2022 9.6  5.0 - 15.0 mmol/L Final   • eGFR 11/21/2022 87.5  >60.0 mL/min/1.73 Final    National Kidney Foundation and American Society of Nephrology (ASN) Task Force recommended calculation based on the Chronic Kidney Disease Epidemiology Collaboration (CKD-EPI) equation refit without adjustment for race.     Data reviewed: Reviewed primary care physician's/consulting physician documentation and lab results         I reviewed the patient's new clinical results and mentioned them above in HPI and in plan as well.          Assessment and Plan    Problem List Items Addressed This Visit    None  Visit Diagnoses     Morbid obesity (HCC)    -  Primary    Chronic fatigue            Morbid obesity  Advised the patient to discuss with the primary care and also the gastroenterologist to see if she would want to proceed with the bariatric surgery option.  Given her Crohn's disease, adhesions she might not be great candidate with another abdominal surgery.  And also with the delayed gastric emptying study she would not be good candidate  for GLP-1 analogs.  At this time her options are limited apart from the behavioral changes.    Patient will follow-up with us as needed.    Follow Up   No follow-ups on file.    Refills/Meds sent to pharmacy    Interpreted the blood work-up/imaging results performed by the primary care/consulting physician -    Patient was given instructions and counseling regarding her condition or for health maintenance advice. Please see specific information pulled into the AVS if appropriate.

## 2022-12-14 ENCOUNTER — TELEPHONE (OUTPATIENT)
Dept: GASTROENTEROLOGY | Facility: CLINIC | Age: 37
End: 2022-12-14

## 2022-12-14 ENCOUNTER — PATIENT MESSAGE (OUTPATIENT)
Dept: GASTROENTEROLOGY | Facility: CLINIC | Age: 37
End: 2022-12-14

## 2022-12-14 ENCOUNTER — APPOINTMENT (OUTPATIENT)
Dept: WOMENS IMAGING | Facility: HOSPITAL | Age: 37
End: 2022-12-14

## 2022-12-14 DIAGNOSIS — K50.111 CROHN'S DISEASE OF LARGE INTESTINE WITH RECTAL BLEEDING: ICD-10-CM

## 2022-12-14 PROCEDURE — G0279 TOMOSYNTHESIS, MAMMO: HCPCS | Performed by: RADIOLOGY

## 2022-12-14 PROCEDURE — 77066 DX MAMMO INCL CAD BI: CPT | Performed by: RADIOLOGY

## 2022-12-14 PROCEDURE — 77062 BREAST TOMOSYNTHESIS BI: CPT | Performed by: RADIOLOGY

## 2022-12-14 PROCEDURE — 76642 ULTRASOUND BREAST LIMITED: CPT | Performed by: RADIOLOGY

## 2022-12-14 RX ORDER — PREDNISONE 10 MG/1
TABLET ORAL
Qty: 60 TABLET | Refills: 0 | Status: SHIPPED | OUTPATIENT
Start: 2022-12-14 | End: 2023-02-15

## 2022-12-14 NOTE — TELEPHONE ENCOUNTER
From: Bri Pfeiffer  To: BANDAR Hopkins  Sent: 12/14/2022 8:17 AM EST  Subject: Prednisone     Hi Xochilt,  My full body and joint pain is fully back to the worst its been in years. I'm pretty miserable and having a hard time standing or moving much. I am still waiting on a call to schedule my remicade (I have my savings card and [employer] has all my info). Can I start back on prednisone to help bring down the pain level as I wait for my first loading dose of remicade?   Thank you,  Bri

## 2022-12-14 NOTE — TELEPHONE ENCOUNTER
Spoke with [facility] and the patient.  They spoke to the patient this morning regarding the infusion.  Arcadio Bryan will call her to set up the infusion. karl

## 2022-12-31 DIAGNOSIS — Z79.899 HIGH RISK MEDICATION USE: ICD-10-CM

## 2022-12-31 DIAGNOSIS — K21.9 GASTROESOPHAGEAL REFLUX DISEASE WITHOUT ESOPHAGITIS: ICD-10-CM

## 2022-12-31 DIAGNOSIS — K50.111 CROHN'S DISEASE OF LARGE INTESTINE WITH RECTAL BLEEDING: ICD-10-CM

## 2022-12-31 RX ORDER — ESOMEPRAZOLE MAGNESIUM 40 MG/1
CAPSULE, DELAYED RELEASE ORAL
Qty: 30 CAPSULE | Refills: 5 | Status: SHIPPED | OUTPATIENT
Start: 2022-12-31 | End: 2023-02-15 | Stop reason: SDUPTHER

## 2023-01-10 ENCOUNTER — PATIENT MESSAGE (OUTPATIENT)
Dept: GASTROENTEROLOGY | Facility: CLINIC | Age: 38
End: 2023-01-10
Payer: COMMERCIAL

## 2023-01-10 ENCOUNTER — OFFICE VISIT (OUTPATIENT)
Dept: NEUROLOGY | Facility: CLINIC | Age: 38
End: 2023-01-10
Payer: COMMERCIAL

## 2023-01-10 ENCOUNTER — TELEPHONE (OUTPATIENT)
Dept: GASTROENTEROLOGY | Facility: CLINIC | Age: 38
End: 2023-01-10
Payer: COMMERCIAL

## 2023-01-10 VITALS
BODY MASS INDEX: 44.41 KG/M2 | SYSTOLIC BLOOD PRESSURE: 114 MMHG | OXYGEN SATURATION: 99 % | DIASTOLIC BLOOD PRESSURE: 80 MMHG | HEIGHT: 68 IN | WEIGHT: 293 LBS | HEART RATE: 86 BPM

## 2023-01-10 DIAGNOSIS — Z87.898 HISTORY OF SYNCOPE: ICD-10-CM

## 2023-01-10 DIAGNOSIS — E66.01 CLASS 3 SEVERE OBESITY WITH BODY MASS INDEX (BMI) OF 45.0 TO 49.9 IN ADULT, UNSPECIFIED OBESITY TYPE, UNSPECIFIED WHETHER SERIOUS COMORBIDITY PRESENT: ICD-10-CM

## 2023-01-10 DIAGNOSIS — G56.13 MEDIAN NEUROPATHY OF BOTH UPPER EXTREMITIES: ICD-10-CM

## 2023-01-10 DIAGNOSIS — M79.7 FIBROMYALGIA: ICD-10-CM

## 2023-01-10 DIAGNOSIS — G43.719 INTRACTABLE CHRONIC MIGRAINE WITHOUT AURA AND WITHOUT STATUS MIGRAINOSUS: Primary | ICD-10-CM

## 2023-01-10 PROCEDURE — 99214 OFFICE O/P EST MOD 30 MIN: CPT | Performed by: PHYSICIAN ASSISTANT

## 2023-01-10 RX ORDER — AMITRIPTYLINE HYDROCHLORIDE 10 MG/1
10 TABLET, FILM COATED ORAL NIGHTLY
Qty: 30 TABLET | Refills: 5 | Status: SHIPPED | OUTPATIENT
Start: 2023-01-10 | End: 2023-03-14

## 2023-01-10 RX ORDER — RIMEGEPANT SULFATE 75 MG/75MG
75 TABLET, ORALLY DISINTEGRATING ORAL DAILY PRN
Qty: 8 TABLET | Refills: 1 | Status: SHIPPED | OUTPATIENT
Start: 2023-01-10 | End: 2023-02-21 | Stop reason: SDUPTHER

## 2023-01-10 NOTE — PROGRESS NOTES
CC: Follow-up    HPI:  Bri Pfeiffer is a  37 y.o. right-handed female with past medical history of IBD status post small bowel resection in 2020, fibromyalgia, migraine headaches, vasodepressor syndrome, arthritis, depression/anxiety who is here for follow-up regarding migraines. She started seeing us in 2021 for issues of paresthesias.  There is report of an EMG done by Dr. Newell noting bilateral carpal tunnel with both median motor distal latencies were mildly prolonged at 4.5 ms with normal conduction velocities in the forearms.  The amplitude was normal in the right but a little low on the left at 4 mV.  Both ulnar motor studies were obtained and there were normal conduction velocities above and below the elbow on the right but on the left the standard study showed some difference at 58.9 m/s below the elbow with 52.9 m/s across the elbow and so the study was repeated recording over the first dorsal interosseous showing a slow velocity across the elbow at 46.6 m/s and a normal velocity below the elbow at 55.9 m/s.    She saw a hand specialist and surgery was recommended but she opted against it since she would have to suspend her Humira.  She also had issues with left leg numbness, imbalance, lightheadedness, postural syncope, diffuse pain of joints and back.      She has had multiple specialty visits including endocrinology, rheumatology.  She has what is thought to be inflammatory arthritis related to her Crohn's.  She is due to have first appointment at [facility] in the dysautonomia clinic in March.  Work-up I reviewed includes unremarkable C-spine, RPR was nonreactive, cryoglobulins were negative, copper was within normal limits at 122, heavy metals including lead arsenic and mercury were all within normal, vitamin D levels were within normal, sed rate normal at 13 and IEP/SPEP showed no evidence for monoclonal gammopathy.   Normal EMG of lower extremities done by Dr. Thao in 12/21.  MRI of the  lumbar spine and pelvis, referenced as without significant nerve root compression, facet arthropathy at L4/5, normal SI joints, and no sciatic lesion.      She has been on Cymbalta but that was changed to Effexor in June 2022.  She continues on Lyrica.  She reports being on Topamax when she was a teenager and does not recall if there were any adverse affects.  She believes she stopped this because her headaches improved.    I am seeing her in the office for the first time.  I conducted a video visit back in October as a work-in for headaches and a episode of severe headache after bowel movement.  She had associated visual disturbance and headache reached maximum intensity quickly and then persisted throughout the weekend.  Her PCP ordered neuroimaging including vascular imaging with no significant vessel finding and abbreviated MRI appeared normal.  She was describing daily headaches with migrainous features of light sensitivity and nausea.  We started amitriptyline and while she thinks there is increase of her tiredness her headaches are significantly improved.  She has 1 rarely maybe once a week.  Back in November she had allergic reaction to her humira and was changed to Remicade.  For flare symptoms and abdominal pain she is been started on prednisone and is currently at 10 mg and has been taking it for about 4 to 5 weeks.  She thinks she is going to stay on it for about a week longer.    Social history:    Family history: Uncle with Parkinson's disease      Pain Scale:        ROS:  Review of Systems   Eyes: Positive for photophobia and visual disturbance. Negative for redness.   Respiratory: Negative for cough, shortness of breath and wheezing.    Cardiovascular: Negative for chest pain, palpitations and leg swelling.   Gastrointestinal: Positive for nausea. Negative for diarrhea and vomiting.   Endocrine: Negative for cold intolerance, heat intolerance and polydipsia.   Genitourinary: Negative for decreased  urine volume, difficulty urinating and urgency.   Musculoskeletal: Positive for neck pain and neck stiffness. Negative for gait problem.   Skin: Negative for color change, rash and wound.   Allergic/Immunologic: Negative for environmental allergies, food allergies and immunocompromised state.   Neurological: Positive for dizziness, weakness, numbness and headaches. Negative for tremors, seizures, syncope, facial asymmetry, speech difficulty and light-headedness.   Hematological: Negative for adenopathy. Does not bruise/bleed easily.         Reviewed ROS conducted by MA and geraldine        Physical Exam:  Vitals:    01/10/23 0912   BP: 114/80   BP Location: Left arm   Patient Position: Sitting   Cuff Size: Large Adult   Pulse: 86   SpO2: 99%   Weight: (!) 151 kg (333 lb)   Height: 172.7 cm (67.99\")      Orthostatic BP:    Body mass index is 50.65 kg/m².        General: pt well appearing, no distress  HEENT: Normocephalic, conjunctiva normal, external canals normal, no nasal discharge, moist mucous membranes  Neck: No lymphadenopathy, thyroid not enlarged, no JVD  CV: Regular rate and rhythm, no murmurs negative no bruits auscultated at neck, equal pulses  Pulmonary: Normal respiratory effort, clear to auscultation bilaterally  Extremities: no edema, bruising, or skin lesions  Pysch: good eye contact, cooperative, full affect, euthymic mood, good attention, good insight  Mental: alert, conversant, AOx3, provides histor.  Language fluent, names, repeats.  CN: CN II-XII intact, PERRL, EOMi, no gaze palsy or nystagmus, intact facial sensation, no facial assymetry, intact hearing, symmetric palate elevation, tongue midline, good SCM strength  Motor: no abnormal movements, no pronator drift, normal  bulk and tone, 5/5 strength b/l UE & LE  Sensory: normal sensation to crude touch, temperature, and vibration throughout  Reflexes: symmetrically brisk, reyes's on R, down toes  Coordination: no ataxia on finger-nose  Gait:  normal gait, no ataxia        Results:      Lab Results   Component Value Date    GLUCOSE 78 11/21/2022    BUN 18 11/21/2022    CREATININE 0.88 11/21/2022    EGFRIFNONA 80 10/01/2021    EGFRIFAFRI 120 03/02/2021    BCR 20.5 11/21/2022    CO2 29.4 (H) 11/21/2022    CALCIUM 9.5 11/21/2022    PROTENTOTREF 6.8 06/08/2022    ALBUMIN 3.90 11/21/2022    LABIL2 1.6 06/08/2022    AST 18 11/21/2022    ALT 14 11/21/2022       Lab Results   Component Value Date    WBC 13.58 (H) 11/21/2022    HGB 13.7 11/21/2022    HCT 43.1 11/21/2022    MCV 94.1 11/21/2022     11/21/2022         .  Lab Results   Component Value Date    RPR Non-Reactive 09/28/2021         Lab Results   Component Value Date    TSH 1.210 06/13/2022    THYROIDAB 8 06/13/2022         Lab Results   Component Value Date    UMOEEPCE44 488 11/21/2022         Lab Results   Component Value Date    FOLATE 15.4 06/08/2022         No results found for: HGBA1C      Lab Results   Component Value Date    GLUCOSE 78 11/21/2022    BUN 18 11/21/2022    CREATININE 0.88 11/21/2022    EGFRIFNONA 80 10/01/2021    EGFRIFAFRI 120 03/02/2021    BCR 20.5 11/21/2022    K 3.7 11/21/2022    CO2 29.4 (H) 11/21/2022    CALCIUM 9.5 11/21/2022    PROTENTOTREF 6.8 06/08/2022    ALBUMIN 3.90 11/21/2022    LABIL2 1.6 06/08/2022    AST 18 11/21/2022    ALT 14 11/21/2022         Diagnosis:  1. Migraine headache  2. Presumed vasodepressor syndrome  3. Paresthesias      Impression: 37-year-old female with above-stated medical history with multiple neurologic complaints but whom I am seeing today for follow-up regarding migraine headache.  Back in October she was describing resolved episode of what sounded concerning for thunderclap headache.  She had normal MRA and MRI and no recurrence.  She was endorsing daily headaches consistent with migraines and we started amitriptyline and Migrelief which seem to have really helped.  She was given CGRP samples and she has used these a few times also  noting improvement of headache.  She is also on prednisone for IBD flare and so I wonder if her headaches will return when she comes off this.  In regards to her other neurologic issues these seem rather static.  In regards to her presumed vasodepressive syndrome she has a specialty appointment coming up.  Per documentation she did have a cardiology work-up including EKG and echo and it was said that vasodepressor syncope was the thought.  I do not see a tilt table study.  She endorses orthostasic recordings and symptoms occurring on standing.  At times her pulse is as high as 120s for no apparent reason she tells.  I will be interested in her eval.  I think an AI autonomic impairment unlikely without evidence of true neuropathy but she does have Crohns.      Plan:  Rx for amitriptyline and nurtec given    F/u in 6 mos    I spent at least 30 minutes interviewing, examining, and counseling patient.  I independently reviewed documentation, laboratory and diagnostic findings, external documentation where applicable, and formulated treatment plan which was discussed with the patient.

## 2023-01-10 NOTE — TELEPHONE ENCOUNTER
Ly, this is a duplicate message.  Please see my message regarding an additional half dose of infliximab during induction phase.  Also patient needs to be premedicated for at least 42 hours after her infusion with at least 500 mg of Tylenol for 48 hours each day.  Thank you.  Kellie

## 2023-01-10 NOTE — LETTER
January 10, 2023     Alan Benavidez MD  [address] [facility]  [address]  [place] [id number]    Patient: Bri Pfeiffer   YOB: 1985   Date of Visit: 1/10/2023       Dear Dr. Reema MD:    Thank you for referring Bri Pfeiffer to me for evaluation. Below are the relevant portions of my assessment and plan of care.    If you have questions, please do not hesitate to call me. I look forward to following Bri along with you.         Sincerely,        MERLE Espinoza        CC: No Recipients  Mackenzie Nova PA  01/10/23 1023  Signed  CC: Follow-up    HPI:  Bri Pfeiffer is a  37 y.o. right-handed female with past medical history of IBD status post small bowel resection in 2020, fibromyalgia, migraine headaches, vasodepressor syndrome, arthritis, depression/anxiety who is here for follow-up regarding migraines. She started seeing us in 2021 for issues of paresthesias.  There is report of an EMG done by Dr. Newell noting bilateral carpal tunnel with both median motor distal latencies were mildly prolonged at 4.5 ms with normal conduction velocities in the forearms.  The amplitude was normal in the right but a little low on the left at 4 mV.  Both ulnar motor studies were obtained and there were normal conduction velocities above and below the elbow on the right but on the left the standard study showed some difference at 58.9 m/s below the elbow with 52.9 m/s across the elbow and so the study was repeated recording over the first dorsal interosseous showing a slow velocity across the elbow at 46.6 m/s and a normal velocity below the elbow at 55.9 m/s.    She saw a hand specialist and surgery was recommended but she opted against it since she would have to suspend her Humira.  She also had issues with left leg numbness, imbalance, lightheadedness, postural syncope, diffuse pain of joints and back.      She has had multiple specialty visits including endocrinology, rheumatology.   She has what is thought to be inflammatory arthritis related to her Crohn's.  She is due to have first appointment at [place] in the dysautonomia clinic in March.  Work-up I reviewed includes unremarkable C-spine, RPR was nonreactive, cryoglobulins were negative, copper was within normal limits at 122, heavy metals including lead arsenic and mercury were all within normal, vitamin D levels were within normal, sed rate normal at 13 and IEP/SPEP showed no evidence for monoclonal gammopathy.   Normal EMG of lower extremities done by Dr. Thao in 12/21.  MRI of the lumbar spine and pelvis, referenced as without significant nerve root compression, facet arthropathy at L4/5, normal SI joints, and no sciatic lesion.      She has been on Cymbalta but that was changed to Effexor in June 2022.  She continues on Lyrica.  She reports being on Topamax when she was a teenager and does not recall if there were any adverse affects.  She believes she stopped this because her headaches improved.    I am seeing her in the office for the first time.  I conducted a video visit back in October as a work-in for headaches and a episode of severe headache after bowel movement.  She had associated visual disturbance and headache reached maximum intensity quickly and then persisted throughout the weekend.  Her PCP ordered neuroimaging including vascular imaging with no significant vessel finding and abbreviated MRI appeared normal.  She was describing daily headaches with migrainous features of light sensitivity and nausea.  We started amitriptyline and while she thinks there is increase of her tiredness her headaches are significantly improved.  She has 1 rarely maybe once a week.  Back in November she had allergic reaction to her humira and was changed to Remicade.  For flare symptoms and abdominal pain she is been started on prednisone and is currently at 10 mg and has been taking it for about 4 to 5 weeks.  She thinks she is going to  stay on it for about a week longer.    Social history:    Family history: Uncle with Parkinson's disease      Pain Scale:        ROS:  Review of Systems   Eyes: Positive for photophobia and visual disturbance. Negative for redness.   Respiratory: Negative for cough, shortness of breath and wheezing.    Cardiovascular: Negative for chest pain, palpitations and leg swelling.   Gastrointestinal: Positive for nausea. Negative for diarrhea and vomiting.   Endocrine: Negative for cold intolerance, heat intolerance and polydipsia.   Genitourinary: Negative for decreased urine volume, difficulty urinating and urgency.   Musculoskeletal: Positive for neck pain and neck stiffness. Negative for gait problem.   Skin: Negative for color change, rash and wound.   Allergic/Immunologic: Negative for environmental allergies, food allergies and immunocompromised state.   Neurological: Positive for dizziness, weakness, numbness and headaches. Negative for tremors, seizures, syncope, facial asymmetry, speech difficulty and light-headedness.   Hematological: Negative for adenopathy. Does not bruise/bleed easily.         Reviewed ROS conducted by MA and geraldine        Physical Exam:  Vitals:    01/10/23 0912   BP: 114/80   BP Location: Left arm   Patient Position: Sitting   Cuff Size: Large Adult   Pulse: 86   SpO2: 99%   Weight: (!) 151 kg (333 lb)   Height: 172.7 cm (67.99\")      Orthostatic BP:    Body mass index is 50.65 kg/m².        General: pt well appearing, no distress  HEENT: Normocephalic, conjunctiva normal, external canals normal, no nasal discharge, moist mucous membranes  Neck: No lymphadenopathy, thyroid not enlarged, no JVD  CV: Regular rate and rhythm, no murmurs negative no bruits auscultated at neck, equal pulses  Pulmonary: Normal respiratory effort, clear to auscultation bilaterally  Extremities: no edema, bruising, or skin lesions  Pysch: good eye contact, cooperative, full affect, euthymic mood, good attention, good  insight  Mental: alert, conversant, AOx3, provides histor.  Language fluent, names, repeats.  CN: CN II-XII intact, PERRL, EOMi, no gaze palsy or nystagmus, intact facial sensation, no facial assymetry, intact hearing, symmetric palate elevation, tongue midline, good SCM strength  Motor: no abnormal movements, no pronator drift, normal  bulk and tone, 5/5 strength b/l UE & LE  Sensory: normal sensation to crude touch, temperature, and vibration throughout  Reflexes: symmetrically brisk, reyes's on R, down toes  Coordination: no ataxia on finger-nose  Gait: normal gait, no ataxia        Results:      Lab Results   Component Value Date    GLUCOSE 78 11/21/2022    BUN 18 11/21/2022    CREATININE 0.88 11/21/2022    EGFRIFNONA 80 10/01/2021    EGFRIFAFRI 120 03/02/2021    BCR 20.5 11/21/2022    CO2 29.4 (H) 11/21/2022    CALCIUM 9.5 11/21/2022    PROTENTOTREF 6.8 06/08/2022    ALBUMIN 3.90 11/21/2022    LABIL2 1.6 06/08/2022    AST 18 11/21/2022    ALT 14 11/21/2022       Lab Results   Component Value Date    WBC 13.58 (H) 11/21/2022    HGB 13.7 11/21/2022    HCT 43.1 11/21/2022    MCV 94.1 11/21/2022     11/21/2022         .  Lab Results   Component Value Date    RPR Non-Reactive 09/28/2021         Lab Results   Component Value Date    TSH 1.210 06/13/2022    THYROIDAB 8 06/13/2022         Lab Results   Component Value Date    APOACHCL73 488 11/21/2022         Lab Results   Component Value Date    FOLATE 15.4 06/08/2022         No results found for: HGBA1C      Lab Results   Component Value Date    GLUCOSE 78 11/21/2022    BUN 18 11/21/2022    CREATININE 0.88 11/21/2022    EGFRIFNONA 80 10/01/2021    EGFRIFAFRI 120 03/02/2021    BCR 20.5 11/21/2022    K 3.7 11/21/2022    CO2 29.4 (H) 11/21/2022    CALCIUM 9.5 11/21/2022    PROTENTOTREF 6.8 06/08/2022    ALBUMIN 3.90 11/21/2022    LABIL2 1.6 06/08/2022    AST 18 11/21/2022    ALT 14 11/21/2022         Diagnosis:  1. Migraine headache  2. Presumed vasodepressor  syndrome  3. Paresthesias      Impression: 37-year-old female with above-stated medical history with multiple neurologic complaints but whom I am seeing today for follow-up regarding migraine headache.  Back in October she was describing resolved episode of what sounded concerning for thunderclap headache.  She had normal MRA and MRI and no recurrence.  She was endorsing daily headaches consistent with migraines and we started amitriptyline and Migrelief which seem to have really helped.  She was given CGRP samples and she has used these a few times also noting improvement of headache.  She is also on prednisone for IBD flare and so I wonder if her headaches will return when she comes off this.  In regards to her other neurologic issues these seem rather static.  In regards to her presumed vasodepressive syndrome she has a specialty appointment coming up.  Per documentation she did have a cardiology work-up including EKG and echo and it was said that vasodepressor syncope was the thought.  I do not see a tilt table study.  She endorses orthostasic recordings and symptoms occurring on standing.  At times her pulse is as high as 120s for no apparent reason she tells.  I will be interested in her eval.  I think an AI autonomic impairment unlikely without evidence of true neuropathy but she does have Crohns.      Plan:  Rx for amitriptyline and nurtec given    F/u in 6 mos    I spent at least 30 minutes interviewing, examining, and counseling patient.  I independently reviewed documentation, laboratory and diagnostic findings, external documentation where applicable, and formulated treatment plan which was discussed with the patient.

## 2023-01-10 NOTE — TELEPHONE ENCOUNTER
Ly, patient needs to receive the entire amount for the induction dose.  Since they only gave her half of the dose for the first infusion I would recommend they give her another half infusion 2 weeks from her second dose.  She would then receive the third induction dose at the scheduled 6-week interval, 4 weeks from the half dose.    I also recommend that she be premedicated with Benadryl and Tylenol and continue Tylenol at least 500 mg once daily for an additional 48 hours after her infusion.  Let me know if you have any questions.    Kellie

## 2023-01-10 NOTE — TELEPHONE ENCOUNTER
Xochilt,  Patient received the first dose of Remicade with only half of the dose due to an error with her weight by [employer].   She was given the correct dose with the second infusion.    Do we need to make any adjustments to her next dose due to the error?  Please advise.              Discussed Xochilt's recommendations with patient.  Called Deedee at [facility] to discuss giving patient the other 1/2 dose two weeks after the second infusion which was the full dose.  pk/sw      ----- Message from Anastasia Elam MA sent at 1/4/2023  1:53 PM EST -----  Regarding: FW: Remicade dose questions  Contact: [phone]    ----- Message -----  From: Bri Pfeiffer  Sent: 1/4/2023  10:47 AM EST  To: [facility] [place]  Subject: Remicade dose questions                          Hi Dr. Escalona and Xochilt!  I happened to notice today when u pulled out my remicade that it instructs the dose to be 5mg/kg.     However, I am only being infused 340mg    I weigh approximately 148 kgs, so my 5.g/kg dose should be closer to 740mg.     I wanted to check in and see if this was a calculation error or if it was intentional.      My second infusion of 340mg scheduled today, so I imagine I won't hear back today, but i wanted this dose confirmed.     Thank you so much!    Discussed recommendations with patient. pk

## 2023-01-23 ENCOUNTER — PATIENT MESSAGE (OUTPATIENT)
Dept: GASTROENTEROLOGY | Facility: CLINIC | Age: 38
End: 2023-01-23
Payer: COMMERCIAL

## 2023-01-23 DIAGNOSIS — Z79.899 HIGH RISK MEDICATION USE: ICD-10-CM

## 2023-01-23 DIAGNOSIS — K50.111 CROHN'S DISEASE OF LARGE INTESTINE WITH RECTAL BLEEDING: Primary | ICD-10-CM

## 2023-01-24 NOTE — TELEPHONE ENCOUNTER
Ly, I would like blood work done after her induction doses of Remicade infusions.  It can be the week after she has her infusion.  I have placed orders for CBC and CMP.  She can have it done at lab close to her.  I have placed orders.  Please make sure patient gets orders for blood work and respond to patient via [employer].  Thank you so much.    Kellie

## 2023-01-24 NOTE — TELEPHONE ENCOUNTER
From: Bri Pfeiffer  To: Xochilt CELSA Shaniqua  Sent: 1/23/2023 4:17 PM EST  Subject: Follow up bloodwork    Good Afternoon Xochilt,  I have my 4th (technically 3rd dose) induction infusion on February 1st. I know you had said to reach out to schedule some follow up labs after. I cannot remember if you wanted me to be seen, or have the labs first. Brain fog is no jami martin    The home nurse said she was going to pull blood before my next infusion. I told her that I don't belive that is what you had requested, and that you wanted labs after that dose.     Just wanted to get some clarification and things scheduled beforehand since things haven't gone to most smoothly so far!    Thank you and the team for all your help! I so appreciate you!    Bri

## 2023-02-09 LAB
ALBUMIN SERPL-MCNC: 4 G/DL (ref 3.5–5.2)
ALP SERPL-CCNC: 70 U/L (ref 39–117)
ALT SERPL-CCNC: 14 U/L (ref 1–33)
AST SERPL-CCNC: 19 U/L (ref 1–32)
BILIRUB DIRECT SERPL-MCNC: <0.2 MG/DL (ref 0–0.3)
BILIRUB SERPL-MCNC: 0.3 MG/DL (ref 0–1.2)
ERYTHROCYTE [DISTWIDTH] IN BLOOD BY AUTOMATED COUNT: 12.5 % (ref 12.3–15.4)
HCT VFR BLD AUTO: 40.6 % (ref 34–46.6)
HGB BLD-MCNC: 13.2 G/DL (ref 12–15.9)
MCH RBC QN AUTO: 29.9 PG (ref 26.6–33)
MCHC RBC AUTO-ENTMCNC: 32.5 G/DL (ref 31.5–35.7)
MCV RBC AUTO: 91.9 FL (ref 79–97)
PLATELET # BLD AUTO: 341 10*3/MM3 (ref 140–450)
PROT SERPL-MCNC: 6.4 G/DL (ref 6–8.5)
RBC # BLD AUTO: 4.42 10*6/MM3 (ref 3.77–5.28)
WBC # BLD AUTO: 6.02 10*3/MM3 (ref 3.4–10.8)

## 2023-02-14 ENCOUNTER — PATIENT MESSAGE (OUTPATIENT)
Dept: GASTROENTEROLOGY | Facility: CLINIC | Age: 38
End: 2023-02-14
Payer: COMMERCIAL

## 2023-02-15 ENCOUNTER — OFFICE VISIT (OUTPATIENT)
Dept: GASTROENTEROLOGY | Facility: CLINIC | Age: 38
End: 2023-02-15
Payer: COMMERCIAL

## 2023-02-15 VITALS
WEIGHT: 293 LBS | HEIGHT: 68 IN | BODY MASS INDEX: 44.41 KG/M2 | DIASTOLIC BLOOD PRESSURE: 82 MMHG | TEMPERATURE: 97.3 F | OXYGEN SATURATION: 98 % | HEART RATE: 93 BPM | SYSTOLIC BLOOD PRESSURE: 118 MMHG

## 2023-02-15 DIAGNOSIS — L73.2 HIDRADENITIS SUPPURATIVA: ICD-10-CM

## 2023-02-15 DIAGNOSIS — R11.0 NAUSEA: ICD-10-CM

## 2023-02-15 DIAGNOSIS — K60.2 ANAL FISSURE: ICD-10-CM

## 2023-02-15 DIAGNOSIS — K59.09 OTHER CONSTIPATION: ICD-10-CM

## 2023-02-15 DIAGNOSIS — M25.50 CHRONIC JOINT PAIN: ICD-10-CM

## 2023-02-15 DIAGNOSIS — Z79.899 HIGH RISK MEDICATION USE: ICD-10-CM

## 2023-02-15 DIAGNOSIS — K50.111 CROHN'S DISEASE OF LARGE INTESTINE WITH RECTAL BLEEDING: Primary | ICD-10-CM

## 2023-02-15 DIAGNOSIS — G89.29 CHRONIC JOINT PAIN: ICD-10-CM

## 2023-02-15 DIAGNOSIS — K21.9 GASTROESOPHAGEAL REFLUX DISEASE WITHOUT ESOPHAGITIS: ICD-10-CM

## 2023-02-15 PROCEDURE — 99215 OFFICE O/P EST HI 40 MIN: CPT | Performed by: NURSE PRACTITIONER

## 2023-02-15 RX ORDER — ONDANSETRON HYDROCHLORIDE 8 MG/1
8 TABLET, FILM COATED ORAL EVERY 8 HOURS PRN
Qty: 60 TABLET | Refills: 2 | Status: SHIPPED | OUTPATIENT
Start: 2023-02-15

## 2023-02-15 RX ORDER — CYANOCOBALAMIN (VITAMIN B-12) 500 MCG
TABLET ORAL
COMMUNITY
Start: 2023-01-25

## 2023-02-15 RX ORDER — PREDNISONE 10 MG/1
TABLET ORAL
Qty: 67 TABLET | Refills: 0 | Status: SHIPPED | OUTPATIENT
Start: 2023-02-15 | End: 2023-03-09 | Stop reason: SDUPTHER

## 2023-02-15 RX ORDER — ESOMEPRAZOLE MAGNESIUM 40 MG/1
40 CAPSULE, DELAYED RELEASE ORAL DAILY
Qty: 90 CAPSULE | Refills: 3 | Status: SHIPPED | OUTPATIENT
Start: 2023-02-15

## 2023-02-15 RX ORDER — LECITHIN/GINKGO/S.GINSENG/GOTU 50-150-250
TABLET ORAL
COMMUNITY
Start: 2023-01-01

## 2023-02-15 RX ORDER — INFLIXIMAB 100 MG/10ML
INJECTION, POWDER, LYOPHILIZED, FOR SOLUTION INTRAVENOUS
COMMUNITY
Start: 2022-12-21 | End: 2023-02-15

## 2023-02-15 NOTE — PROGRESS NOTES
Chief Complaint   Patient presents with   • Follow-up     Crohns, Remicade infusion           History of Present Illness  37-year-old female presents today for follow-up.    Last visit November 21, 2022.    History of Crohn's disease status post laparoscopic ileocecectomy November 2020.  History of hidradenitis suppurativa, anal fissure, oral ulcers, uveitis and rectal stricture.  Last colonoscopy April 13, 2022 with 8 mm anal fissure otherwise normal, no evidence of recurrence at ileocolonic anastomosis.    Given concern for allergic reaction November 2022 with whelps while on adalimumab (initiated 2019), she has started infliximab and has completed her induction infusions.    She is experiencing symptoms of severe fatigue, full body ache, bone pain, joint pain, teeth pain, dry eye, dry mouth, hair loss.    She will experience a fever for 4 days after her infliximab infusions.    She will experience nausea that comes and goes.    She will have constipation and urgent diarrhea.   This continues to fluctuate.   She has had to manually remove stool when it is a large fecal ball.    She also has anal fissure again.    She fluctuates between diarrhea and constipation and felt that she had better control of symptoms with regular use of MiraLAX on Humira.  She has had difficulty finding dose of MiraLAX that controls symptoms since starting infliximab.  Overall she has felt terrible since starting infliximab and since weaning off prednisone.    She has had rheumatology evaluation, last visit 2021 diagnosis with arthritis including low back pain, Thought to be inflammatory arthritis secondary to Crohn's disease.  There was some improvement in joint pain with weekly Humira compared to every 14-day Humira.  Since discontinuing adalimumab and prednisone taper, joint pain has worsened and is constant.    She continues to experience dry mouth and dry eye and has had opthalmologic evaluation recently with diagnosis of secondary  "katelynn.     She started pelvic floor physical therapy with two evaluations, discontinued due to intolerance for physical therapy due to to joint pain.  She has started using a bidet, she is using a squatty potty, abdominal massage and scheduled bathroom times.    Defecating proctogram Ivette 15, 2022 with small anterior rectocele.  Anal rectal manometry Ivette 15, 2022.  Findings consistent with pelvic floor dyssynergy.    She is requesting refill of anal fissure cream.     Her weight is up 9 pounds since her last office visit.    Review of Systems      Result Review :       Hepatic Function Panel (02/08/2023 14:08)   CBC (No Diff) (02/08/2023 14:08)     Vital Signs:   /82   Pulse 93   Temp 97.3 °F (36.3 °C)   Ht 172.7 cm (68\")   Wt (!) 151 kg (332 lb 9.6 oz)   SpO2 98%   BMI 50.57 kg/m²     Body mass index is 50.57 kg/m².     Physical Exam  Vitals reviewed.   Constitutional:       General: She is not in acute distress.     Appearance: Normal appearance. She is not ill-appearing.   Pulmonary:      Effort: No respiratory distress.      Breath sounds: Normal breath sounds. No stridor. No wheezing or rhonchi.   Abdominal:      General: Bowel sounds are normal. There is no distension.      Palpations: Abdomen is soft. Abdomen is not rigid. There is no pulsatile mass.      Tenderness: There is abdominal tenderness (Diffuse mild tenderness with light palpation). There is no guarding or rebound.   Neurological:      Mental Status: She is alert.           Assessment and Plan    Diagnoses and all orders for this visit:    1. Crohn's disease of large intestine with rectal bleeding (HCC) (Primary)  -     predniSONE (DELTASONE) 10 MG tablet; Take 3 tablets by mouth Daily for 7 days, THEN 2.5 tablets Daily for 7 days, THEN 2 tablets Daily for 14 days.  Dispense: 67 tablet; Refill: 0    2. Gastroesophageal reflux disease without esophagitis  -     esomeprazole (nexIUM) 40 MG capsule; Take 1 capsule by mouth Daily.  " Dispense: 90 capsule; Refill: 3    3. High risk medication use    4. Nausea  -     ondansetron (ZOFRAN) 8 MG tablet; Take 1 tablet by mouth Every 8 (Eight) Hours As Needed for Nausea or Vomiting.  Dispense: 60 tablet; Refill: 2    5. Other constipation    6. Hidradenitis suppurativa    Crohn's disease status post small bowel resection, previously on adalimumab, discontinued due to concern for allergic reaction November 2022.  Given joint pain and extraintestinal manifestations she started infliximab, she has completed induction infusions and feels poorly with a constellation of symptoms that she either did not have prior to starting infliximab or have worsened while on infliximab.    Will stop infliximab.    We discussed treatment options for Crohn's disease including Stelara and Skyrizi.  She has diagnosis of hidradenitis suppurativa and while ustekinumab is not approved for HS there has been studies documenting improvement with use.    QuantiFERON gold and acute hepatitis panel up-to-date November 2022.  Patient will review medication information regarding Stelara and Skyrizi and let us know how she wishes to proceed.    We will start prednisone taper, 30 mg and decreasing, I want her to stay on 20 mg of prednisone daily until she has had at least the first induction dose of biologic therapy and we will determine how to taper once she has initiated change in biologic therapy.    Would recommend repeat endoscopic evaluation after she has been on new biologic therapy no sooner than 9 months to assess for disease recurrence at anastomotic site and active luminal Crohn's disease.    Anal fissure, compound cream will be sent to pharmacy, diltiazem with lidocaine.    Alternating bowel habits with chronic constipation, abnormal anal rectal manometry and defecating proctogram, recommend continued use of pelvic floor physical therapy modifications and may need to consider referral back to pelvic floor once joint pain is  better controlled.    If joint pain persists despite initiation of a different biologic medication and we have tapered off of prednisone and joint pain is unbearable, to consider sulfasalazine.    Acid reflux, chronic, stable, continue Nexium.    If constellation of symptoms linger despite discontinuing infliximab, prednisone taper and change in biologic therapy, recommend referral back to primary care provider.    We will continue to closely monitor and will wait to hear from patient regarding which biologic she is interested in starting.    I spent 50 minutes caring for Bri on this date of service. This time includes time spent by me in the following activities:preparing for the visit, reviewing tests, performing a medically appropriate examination and/or evaluation , counseling and educating the patient/family/caregiver and documenting information in the medical record        EMR Dragon/Transcription Disclaimer:  This document has been Dictated utilizing Dragon dictation.

## 2023-02-16 NOTE — ADDENDUM NOTE
Addended by: GEORGE MATHEW on: 2/16/2023 07:10 AM     Modules accepted: Orders     Day 0 note  Courtesy visit. Reviewed H&P from earlier this morning. History of Crohn's disease, has been on Humira sonce 9/2019. Seen in the ED 1/24 for abdominal pain, fevers and started on course of Cipro and metronidazole after CT revealed findings concerning for enterocolitis. Patient has been experiencing headache, chills, myalgias, worse over the last week and also episodes of vomiting in the last 24 hours. Admitted under Obs    PE  Gen: no acute distress  Lungs: CTAB  Abd: soft, mod tenderness, +BS  Skin: pinpoint non-blanching pinpoint rash in 4 extremities, most noticible in b/l ankles    Elevated ESR and CRP. Nonspecific.  Negative for mono    Follow-up ANCA panel, DODIE  Appreciate rheumatology consult, findings most consistent with drug rash  Start IV steroids, likely wean quickly switched to p.o. and DC on tapering course  Hold Humira for now until follow-up with GI, though thought less likely the cause of his symptoms

## 2023-02-21 ENCOUNTER — TELEPHONE (OUTPATIENT)
Dept: NEUROLOGY | Facility: CLINIC | Age: 38
End: 2023-02-21
Payer: COMMERCIAL

## 2023-02-21 RX ORDER — RIMEGEPANT SULFATE 75 MG/75MG
1 TABLET, ORALLY DISINTEGRATING ORAL AS NEEDED
Qty: 8 TABLET | Refills: 11 | Status: SHIPPED | OUTPATIENT
Start: 2023-02-21

## 2023-02-21 NOTE — TELEPHONE ENCOUNTER
Spoke with patient.  She states that Nurtec Rx was not taken care of.    Nurtec Rx sent to [facility] as her insurance will not cover it from her pharmacy and they do not accept the co-pay cards.  They should contact the patient to have it shipped to her home and will assist with the PA if one is needed.    Also offered her samples if she would like to come pick some up in case she needs them before her prescription arrives.

## 2023-02-21 NOTE — TELEPHONE ENCOUNTER
----- Message from MERLE Espinoza sent at 2/21/2023  1:50 PM EST -----  Regarding: FW: Nurtec / non-formulary drug not covered by insurance  Contact: [phone]  [facility]  Does she needs some samples?    -C  ----- Message -----  From: Anamaria Alicia MA  Sent: 1/17/2023  10:04 AM EST  To: MERLE Espinoza  Subject: Nurtec / non-formulary drug not covered by i#    Please let me know if you want to start an addendum or something else ,Thanks all       ----- Message -----  From: Bri Pfeiffer  Sent: 1/16/2023   4:16 PM EST  To: [facility]  Subject: Nurtec / non-formulary drug not covered by i#    The company provides it for free, but the prescription needs to be sent to [facility].

## 2023-03-09 DIAGNOSIS — K50.111 CROHN'S DISEASE OF LARGE INTESTINE WITH RECTAL BLEEDING: ICD-10-CM

## 2023-03-09 RX ORDER — PREDNISONE 10 MG/1
TABLET ORAL
Qty: 67 TABLET | Refills: 0 | Status: SHIPPED | OUTPATIENT
Start: 2023-03-09 | End: 2023-04-06 | Stop reason: SDUPTHER

## 2023-03-13 ENCOUNTER — TELEPHONE (OUTPATIENT)
Dept: NEUROLOGY | Facility: CLINIC | Age: 38
End: 2023-03-13
Payer: COMMERCIAL

## 2023-03-13 NOTE — TELEPHONE ENCOUNTER
Pharm[facility] Name:  [facility]    Pharmacy representative name: JULI    Pharmacy representative phone number: [phone] [facility] [address]    What medication are you calling in regards to: NURTEC    What question does the pharmacy have: CHECKING ON PRIOR AUTH STATUS    Who is the provider that prescribed the medication: AMANDA CASH PA

## 2023-03-14 ENCOUNTER — PATIENT MESSAGE (OUTPATIENT)
Dept: GASTROENTEROLOGY | Facility: CLINIC | Age: 38
End: 2023-03-14
Payer: COMMERCIAL

## 2023-03-14 ENCOUNTER — TELEPHONE (OUTPATIENT)
Dept: FAMILY MEDICINE CLINIC | Facility: CLINIC | Age: 38
End: 2023-03-14

## 2023-03-14 VITALS — BODY MASS INDEX: 44.41 KG/M2 | WEIGHT: 293 LBS | HEIGHT: 68 IN

## 2023-03-14 DIAGNOSIS — U07.1 COVID-19: Primary | ICD-10-CM

## 2023-03-14 RX ORDER — AZITHROMYCIN 250 MG/1
TABLET, FILM COATED ORAL
Qty: 6 TABLET | Refills: 0 | Status: CANCELLED | OUTPATIENT
Start: 2023-03-14

## 2023-03-14 RX ORDER — AMITRIPTYLINE HYDROCHLORIDE 10 MG/1
TABLET, FILM COATED ORAL
Qty: 30 TABLET | Refills: 5 | Status: SHIPPED | OUTPATIENT
Start: 2023-03-14

## 2023-03-14 NOTE — PROGRESS NOTES
Subjective   Bri Pfeiffer is a 37 y.o. female.     CC: VV for COVID    History of Present Illness     Pt seen today on a VV after testing positive for Covid yesterday. Pt reports a ST,       The following portions of the patient's history were reviewed and updated as appropriate: allergies, current medications, past family history, past medical history, past social history, past surgical history and problem list.    Review of Systems   Constitutional: Negative for activity change, chills and fever.   HENT: Positive for sore throat.    Respiratory: Negative for cough.    Cardiovascular: Negative for chest pain.   Psychiatric/Behavioral: Negative for dysphoric mood.       Objective   Physical Exam  Constitutional:       General: She is not in acute distress.     Appearance: She is well-developed.   Pulmonary:      Effort: Pulmonary effort is normal.   Neurological:      Mental Status: She is alert and oriented to person, place, and time.   Psychiatric:         Behavior: Behavior normal.         Thought Content: Thought content normal.         Assessment & Plan   Diagnoses and all orders for this visit:    1. Upper respiratory tract infection due to COVID-19 virus (Primary)          Spent  14   minutes with chart and interview and consent for this encounter given by the patient.  You have chosen to receive care through a telehealth visit.  Do you consent to use a video/audio connection for your medical care today? Yes  Patient was in their residence when this visit took place and I was in my medical office.

## 2023-03-14 NOTE — TELEPHONE ENCOUNTER
Caller: Bri Pfeiffer    Relationship: Self    Best call back number: [phone]  What is the best time to reach you:ANYTIME     Who are you requesting to speak with (clinical staff, provider,  specific staff member): CLINICAL STAFF    Do you know the name of the person who called:SELF    What was the call regarding:PATIENT CALLED AND STATED SHE TESTED POSITIVE TODAY. PATIENT STATED SHE HAS SORE THROAT, PATIENT STATES SHE WOULD LIKE PAXLOVID CALLED IN TO HER PHARMACY TO [facility]  Do you require a callback: YES

## 2023-03-15 ENCOUNTER — TELEPHONE (OUTPATIENT)
Dept: FAMILY MEDICINE CLINIC | Facility: CLINIC | Age: 38
End: 2023-03-15

## 2023-03-15 ENCOUNTER — TELEMEDICINE (OUTPATIENT)
Dept: FAMILY MEDICINE CLINIC | Facility: CLINIC | Age: 38
End: 2023-03-15
Payer: COMMERCIAL

## 2023-03-15 NOTE — TELEPHONE ENCOUNTER
Caller: Bri Pfeiffer    Relationship: Self    Best call back number: [phone]     What is the best time to reach you: ANYTIME    Who are you requesting to speak with (clinical staff, provider,  specific staff member):     Do you know the name of the person who called: VIRGILIO    What was the call regarding: PATIENT RETURNING CALL SHE STATED IT WAS ABOUT THE APPOINTMENT SCHEDULED FOR TODAY     Do you require a callback: YES

## 2023-03-15 NOTE — TELEPHONE ENCOUNTER
My nurse reached out to pt this AM regarding her desire to take Paxlovid. My nurse had noted that her GI office agreed to give her this medication and in discussion with pt, she reports she is going to reach out to GI for that script instead of completing the VV with me today to discuss treatment options.

## 2023-04-04 ENCOUNTER — PATIENT MESSAGE (OUTPATIENT)
Dept: GASTROENTEROLOGY | Facility: CLINIC | Age: 38
End: 2023-04-04
Payer: COMMERCIAL

## 2023-04-04 DIAGNOSIS — K50.111 CROHN'S DISEASE OF LARGE INTESTINE WITH RECTAL BLEEDING: ICD-10-CM

## 2023-04-06 RX ORDER — PREDNISONE 10 MG/1
TABLET ORAL
Qty: 49 TABLET | Refills: 0 | Status: SHIPPED | OUTPATIENT
Start: 2023-04-06 | End: 2023-05-04

## 2023-04-06 NOTE — TELEPHONE ENCOUNTER
From: Bri Pfeiffer  To: Xochilt Mcgovern  Sent: 4/4/2023 2:57 PM EDT  Subject: Diaphragm spasms / skyriz start!    Greg Lemon / GI team!  I had my first dose of Skyrizi on Thursday morning and it went great! Infusion at [facility] was great with none of the side effects remicade gave me. YAY!    I had an odd event Enio night I wanted to pass on just in case it could be a concern. I am going to write my neurologist too.     On Enio night/early Monday morning I had an event where I had sudden upper abdominal pain that built through my upper torso and going into my throat a bit. I had a sudden difficulty breathing and pain that I couldn't locate where was the root cause. It did not feel like GI pain or cramping, and it wasn't spinal/ rib/ costochrondritis - any if my normal joint or bone pain. The only recent change in my GI symptoms has been more gas and bloating, but it isn't painful.     During this event, I could not breathe more than a short pant if I laid down flat. I was able to take the best breaths if I was standing and bent over on the counter while trying to relax my abdomen as much as possible.   I took a 10mg dicyclomine in case it was spasms.  There was not hernia entrapment.   As I was waiting for a ride to the hospital it just stopped. I was able to fully expand my chest again and breathe normally, so I did not go to ER.    I have had more tremors than typical (historically in my hands) and i have felt shaky, but I've been on pred and drink coffee so I've paired the symptoms with that.     Sharing just in case it could be related or i need to do anything.  Thanks!  Bri

## 2023-04-11 DIAGNOSIS — M79.7 FIBROMYALGIA: Primary | ICD-10-CM

## 2023-05-30 ENCOUNTER — TELEPHONE (OUTPATIENT)
Dept: FAMILY MEDICINE CLINIC | Facility: CLINIC | Age: 38
End: 2023-05-30

## 2023-05-30 DIAGNOSIS — M79.7 FIBROMYALGIA: Chronic | ICD-10-CM

## 2023-05-30 DIAGNOSIS — K21.9 GASTROESOPHAGEAL REFLUX DISEASE WITHOUT ESOPHAGITIS: ICD-10-CM

## 2023-05-30 RX ORDER — MOMETASONE FUROATE AND FORMOTEROL FUMARATE DIHYDRATE 100; 5 UG/1; UG/1
2 AEROSOL RESPIRATORY (INHALATION)
Qty: 8.8 G | Refills: 1 | Status: SHIPPED | OUTPATIENT
Start: 2023-05-30

## 2023-05-30 RX ORDER — ESOMEPRAZOLE MAGNESIUM 40 MG/1
40 CAPSULE, DELAYED RELEASE ORAL DAILY
Qty: 90 CAPSULE | Refills: 3 | Status: SHIPPED | OUTPATIENT
Start: 2023-05-30

## 2023-05-30 RX ORDER — PREGABALIN 100 MG/1
CAPSULE ORAL
Qty: 270 CAPSULE | Refills: 1 | OUTPATIENT
Start: 2023-05-30

## 2023-05-30 NOTE — TELEPHONE ENCOUNTER
----- Message from Bri Pfeiffer sent at 5/30/2023 12:00 PM EDT -----  Regarding: Dulera inhaler refill to [facility]  Contact: [phone]  Myriam Benavidez and team,  Can I please have the Dulera prescription sent to [facility] mail order pharmacy? I've picked it up at [facility] in the past, but im getting my meds from online order now.     Thanks so much!  Bri

## 2023-06-02 DIAGNOSIS — K50.111 CROHN'S DISEASE OF LARGE INTESTINE WITH RECTAL BLEEDING: Primary | ICD-10-CM

## 2023-06-02 RX ORDER — RISANKIZUMAB-RZAA 360 MG/2.4
2.4 WEARABLE INJECTOR SUBCUTANEOUS
Qty: 2.4 ML | Refills: 3 | Status: SHIPPED | OUTPATIENT
Start: 2023-06-02

## 2023-06-10 DIAGNOSIS — M79.7 FIBROMYALGIA: Chronic | ICD-10-CM

## 2023-06-12 RX ORDER — PREGABALIN 100 MG/1
100 CAPSULE ORAL 3 TIMES DAILY
Qty: 270 CAPSULE | Refills: 1 | OUTPATIENT
Start: 2023-06-12

## 2023-06-13 NOTE — PROGRESS NOTES
Chief Complaint:   Chief Complaint   Patient presents with    Arthritis     Med refill due        Bri Pfeiffer 37 y.o. female who presents today for Medical Management of the below listed issues. She  has a problem list of   Patient Active Problem List   Diagnosis    Pericardial cyst    Vasodepressor syncope    Esophageal dysphagia    Rectal bleeding    Colitis    Gastroesophageal reflux disease    Crohn's disease of large intestine with rectal bleeding    Other irritable bowel syndrome    Skin rash    Moderate episode of recurrent major depressive disorder    Anxiety    Low back pain with sciatica    Ileocecal valve stenosis    Crohn's disease    BMI 40.0-44.9, adult    Status post small bowel resection    Fibromyalgia    Non-seasonal allergic rhinitis due to pollen    Incisional hernia, without obstruction or gangrene   .  Since the last visit, She has overall felt well.  she has been compliant with   Current Outpatient Medications:     pregabalin (LYRICA) 100 MG capsule, Take 1 capsule by mouth 3 (Three) Times a Day., Disp: 270 capsule, Rfl: 1    albuterol sulfate  (90 Base) MCG/ACT inhaler, Inhale 2 puffs Every 4 (Four) Hours As Needed., Disp: , Rfl:     Aloe Vera 25 MG capsule, , Disp: , Rfl:     amitriptyline (ELAVIL) 10 MG tablet, TAKE 1/2 TABLET BY MOUTH ONCE NIGHTLY FOR 1 WEEK THEN INCREASE TO TAKE ONE TABLET BY MOUTH ONCE NIGHTLY, Disp: 30 tablet, Rfl: 5    cetirizine (zyrTEC) 10 MG tablet, Take 10 mg by mouth Daily., Disp: , Rfl:     Cholecalciferol (VITAMIN D-3) 5000 units tablet, Take 5,000 Units by mouth Daily., Disp: , Rfl:     Cyanocobalamin (VITAMIN B 12 PO), Take 1 tablet by mouth Daily., Disp: , Rfl:     dilTIAZem HCl (diltiazem 2% and lidocaine 5%), Apply 1 application topically to the appropriate area as directed 2 (Two) Times a Day. Per rectum, Disp: 30 g, Rfl: 3    esomeprazole (nexIUM) 40 MG capsule, Take 1 capsule by mouth Daily., Disp: 90 capsule, Rfl: 3    ferrous  "sulfate 324 (65 Fe) MG tablet delayed-release EC tablet, Take 324 mg by mouth Daily With Breakfast., Disp: , Rfl:     fluticasone (FLONASE) 50 MCG/ACT nasal spray, 2 sprays into the nostril(s) as directed by provider Daily., Disp: , Rfl:     Folic Acid 0.8 MG capsule, , Disp: , Rfl:     mometasone-formoterol (Dulera) 100-5 MCG/ACT inhaler, Inhale 2 puffs 2 (Two) Times a Day., Disp: 8.8 g, Rfl: 1    montelukast (SINGULAIR) 10 MG tablet, Take 1 tablet by mouth Daily., Disp: 90 tablet, Rfl: 3    Multiple Vitamin (MULTIVITAMIN PO), Take 1 tablet by mouth Daily., Disp: , Rfl:     ondansetron (ZOFRAN) 8 MG tablet, Take 1 tablet by mouth Every 8 (Eight) Hours As Needed for Nausea or Vomiting., Disp: 60 tablet, Rfl: 2    polyethylene glycol (MIRALAX) 17 GM/SCOOP powder, Take  by mouth., Disp: , Rfl:     Riboflavin-Magnesium-Feverfew (MigreLief) 200-180-50 MG tablet, , Disp: , Rfl:     Rimegepant Sulfate (Nurtec) 75 MG tablet dispersible tablet, Take 1 tablet by mouth As Needed (Take 1 tablet by mouth daily as needed for headache/migraine.  Do not take more than one in a 24 hour period.)., Disp: 8 tablet, Rfl: 11    Risankizumab-rzaa (Skyrizi) 360 MG/2.4ML solution cartridge, Inject 2.4 mL under the skin into the appropriate area as directed Every 2 (Two) Months., Disp: 2.4 mL, Rfl: 3    spironolactone (ALDACTONE) 100 MG tablet, Take 100 mg by mouth Daily., Disp: , Rfl:     venlafaxine XR (EFFEXOR-XR) 150 MG 24 hr capsule, Take 1 capsule by mouth Daily., Disp: 30 capsule, Rfl: 2.  She denies medication side effects.    All of the other chronic condition(s) listed above are stable w/o issues.    /94   Pulse 104   Temp 98.4 °F (36.9 °C) (Oral)   Resp 20   Ht 172.7 cm (68\")   Wt (!) 149 kg (329 lb)   SpO2 98%   BMI 50.02 kg/m²     Results for orders placed or performed in visit on 01/23/23   CBC (No Diff)    Specimen: Blood   Result Value Ref Range    WBC 6.02 3.40 - 10.80 10*3/mm3    RBC 4.42 3.77 - 5.28 10*6/mm3 "    Hemoglobin 13.2 12.0 - 15.9 g/dL    Hematocrit 40.6 34.0 - 46.6 %    MCV 91.9 79.0 - 97.0 fL    MCH 29.9 26.6 - 33.0 pg    MCHC 32.5 31.5 - 35.7 g/dL    RDW 12.5 12.3 - 15.4 %    Platelets 341 140 - 450 10*3/mm3   Hepatic Function Panel    Specimen: Blood   Result Value Ref Range    Total Protein 6.4 6.0 - 8.5 g/dL    Albumin 4.0 3.5 - 5.2 g/dL    Total Bilirubin 0.3 0.0 - 1.2 mg/dL    Bilirubin, Direct <0.2 0.0 - 0.3 mg/dL    Alkaline Phosphatase 70 39 - 117 U/L    AST (SGOT) 19 1 - 32 U/L    ALT (SGPT) 14 1 - 33 U/L             The following portions of the patient's history were reviewed and updated as appropriate: allergies, current medications, past family history, past medical history, past social history, past surgical history, and problem list.    Review of Systems   Constitutional:  Negative for activity change, chills and fever.   Respiratory:  Negative for cough.    Cardiovascular:  Negative for chest pain.   Psychiatric/Behavioral:  Negative for dysphoric mood.      Objective            Physical Exam  Constitutional:       General: She is not in acute distress.     Appearance: She is well-developed.   Cardiovascular:      Rate and Rhythm: Normal rate and regular rhythm.   Pulmonary:      Effort: Pulmonary effort is normal.      Breath sounds: Normal breath sounds.   Neurological:      Mental Status: She is alert and oriented to person, place, and time.   Psychiatric:         Behavior: Behavior normal.         Thought Content: Thought content normal.           Diagnoses and all orders for this visit:    1. Fibromyalgia (Primary)  -     pregabalin (LYRICA) 100 MG capsule; Take 1 capsule by mouth 3 (Three) Times a Day.  Dispense: 270 capsule; Refill: 1

## 2023-06-14 ENCOUNTER — OFFICE VISIT (OUTPATIENT)
Dept: FAMILY MEDICINE CLINIC | Facility: CLINIC | Age: 38
End: 2023-06-14
Payer: COMMERCIAL

## 2023-06-14 VITALS
BODY MASS INDEX: 44.41 KG/M2 | TEMPERATURE: 98.4 F | RESPIRATION RATE: 20 BRPM | HEIGHT: 68 IN | SYSTOLIC BLOOD PRESSURE: 132 MMHG | WEIGHT: 293 LBS | DIASTOLIC BLOOD PRESSURE: 94 MMHG | OXYGEN SATURATION: 98 % | HEART RATE: 104 BPM

## 2023-06-14 DIAGNOSIS — M79.7 FIBROMYALGIA: Primary | Chronic | ICD-10-CM

## 2023-06-14 PROCEDURE — 99213 OFFICE O/P EST LOW 20 MIN: CPT | Performed by: FAMILY MEDICINE

## 2023-06-14 RX ORDER — PREGABALIN 100 MG/1
100 CAPSULE ORAL 3 TIMES DAILY
Qty: 270 CAPSULE | Refills: 1 | Status: SHIPPED | OUTPATIENT
Start: 2023-06-14

## 2023-07-12 PROBLEM — R10.9 ABDOMINAL PAIN: Status: ACTIVE | Noted: 2023-07-12

## 2023-07-13 PROBLEM — K46.9 HERNIA OF ABDOMINAL CAVITY: Status: ACTIVE | Noted: 2023-07-13

## 2023-07-13 PROBLEM — R74.01 TRANSAMINITIS: Status: ACTIVE | Noted: 2023-07-13

## 2023-07-14 PROBLEM — K81.0 ACUTE CHOLECYSTITIS: Status: RESOLVED | Noted: 2023-07-12 | Resolved: 2023-07-14

## 2023-07-14 PROBLEM — K81.0 ACUTE CHOLECYSTITIS: Status: ACTIVE | Noted: 2023-07-12

## 2023-07-14 PROBLEM — R10.9 ABDOMINAL PAIN: Status: RESOLVED | Noted: 2023-07-12 | Resolved: 2023-07-14

## 2023-07-15 PROBLEM — N83.202 LEFT OVARIAN CYST: Status: ACTIVE | Noted: 2023-07-15

## 2023-07-24 NOTE — PROGRESS NOTES
Transitional Care Follow Up Visit  Subjective     Bri Pfeiffer is a 37 y.o. female who presents for a transitional care management visit.    Within 48 business hours after discharge our office contacted her via telephone to coordinate her care and needs.      I reviewed and discussed the details of that call along with the discharge summary, hospital problems, inpatient lab results, inpatient diagnostic studies, and consultation reports with Bri.     Current outpatient and discharge medications have been reconciled for the patient.  Reviewed by: Alan Benavidez MD          7/15/2023     3:14 PM   Date of TCM Phone Call   [facility]   Date of Admission 7/13/2023   Date of Discharge 7/14/2023   Discharge Disposition Home or Self Care     Risk for Readmission (LACE) Score: 3 (7/15/2023  6:00 AM)      History of Present Illness   Course During Hospital Stay:      Date of Admission: 7/12/2023  Date of Discharge:  7/15/2023  Primary Care Physician: Alan Benavidez MD        Discharge Diagnoses            Active Hospital Problems     Diagnosis   POA    **Acute cholecystitis [K81.0]   Yes    Left ovarian cyst [N83.202]   Yes    Hernia of abdominal cavity [K46.9]   Yes    Transaminitis [R74.01]   Yes    Fibromyalgia [M79.7]   Yes    BMI 40.0-44.9, adult [Z68.41]   Not Applicable    Anxiety [F41.9]   Yes    Crohn's disease of large intestine with rectal bleeding [K50.111]   Yes    Gastroesophageal reflux disease [K21.9]   Yes       Resolved Hospital Problems     Diagnosis Date Resolved POA    Abdominal pain [R10.9] 07/14/2023 Yes         Hospital Course      Brief admission history and physical.  He is referred for the H&P for full details.  A pleasant 37 years old white female with a past history of Crohn's disease/fibromyalgia/migraine/GERD/partial bowel resection who presented to the emergency department with upper abdominal pain associated with nausea and vomiting.  Physical  examination admission revealed an afebrile patient with stable vital signs/obesity/upper abdominal epigastric pain and tenderness  Hospital course.  Initial ER evaluation included a UA with no evidence of UTI.  CBC was normal.  Serum hCG was negative.  CMP normal  80 albumin 3.3, , , alkaline phosphatase 142.  Lactic acid was normal.  Magnesium was normal.  INR was normal.  Lipase was normal.  CT scan of the abdomen pelvis with IV contrast revealed a supraumbilical hernia containing segment of transverse colon with no incarceration and a complex left ovarian cyst measuring 2.4 cm.  Patient was admitted with abdominal pain nausea/vomiting/transaminitis in a patient with a history of Crohn's disease s/p partial bowel resection and a noncomplicated ventral hernia and a history of GERD.  Hepatitis panel was done and was negative.  Right upper quadrant ultrasound revealed distended gallbladder with probable sludge with no gallbladder wall thickening.  HIDA scan revealed findings consistent with cystic duct obstruction and acute cholecystitis.  Surgery consult was obtained and the patient underwent laparoscopic cholecystectomy on 7/14/2023 with intraoperative cholangiogram that did not reveal any obstruction.  Postoperatively patient did well her diet was advanced and by the time of discharge she was tolerating regular diet and ambulating well.  Surgery recommended weight reduction Prior to surgery for the ventral hernia.  Surgery okayed the discharge with follow-up in 2 weeks.  Her immunotherapy for Crohn's disease was felt that The inflammation infection subsides, she has an appointment with her GI in 1 week at which time consideration for resumption of Skyrizi to take place.  CBC was found to have an incidental left complex ovarian cyst.  She will follow-up with her GYN in 2 weeks as an outpatient.  She does have a history of bronchial asthma and that this remained stable on her bronchodilators and  "Singulair.  For her fibromyalgia/migraine/depression we will maintain her on the Lyrica/amitriptyline/Effexor her CNS examination remained stable.  She was maintained on her proton pump inhibitors for her GERD.  At the time of discharge she was hemodynamically stable.  Her liver function test is trending down by the time of discharge.  Follow-up with primary MD/surgery/GI/GYN as directed.    Current outpatient and discharge medications have been reconciled for the patient.  Reviewed by: Alan Benavidez MD         The following portions of the patient's history were reviewed and updated as appropriate: allergies, current medications, past family history, past medical history, past social history, past surgical history, and problem list.    Review of Systems   Constitutional:  Negative for activity change, chills and fever.   Respiratory:  Negative for cough.    Cardiovascular:  Negative for chest pain.   Gastrointestinal:  Positive for nausea.   Psychiatric/Behavioral:  Negative for dysphoric mood.      /78   Pulse 93   Temp 98.4 °F (36.9 °C) (Oral)   Resp 16   Ht 172.7 cm (68\")   Wt (!) 150 kg (330 lb)   SpO2 99%   BMI 50.18 kg/m²     Objective   Physical Exam  Exam conducted with a chaperone present.   Constitutional:       General: She is not in acute distress.     Appearance: She is well-developed.   Cardiovascular:      Rate and Rhythm: Normal rate and regular rhythm.   Pulmonary:      Effort: Pulmonary effort is normal.      Breath sounds: Normal breath sounds.   Abdominal:      General: Abdomen is flat.      Palpations: Abdomen is soft.   Neurological:      Mental Status: She is alert and oriented to person, place, and time.   Psychiatric:         Behavior: Behavior normal.         Thought Content: Thought content normal.   Hospital records reviewed with pt confirming HPIAbdiaziz Romeo present for visit.    Assessment & Plan   Diagnoses and all orders for this visit:    1. Acute cholecystitis " (Primary)  -     CBC & Differential    2. Left ovarian cyst    3. Hernia of abdominal cavity    4. Hospital discharge follow-up    5. Elevated LFTs  -     Cancel: Hepatic Function Panel  -     Hepatic Function Panel    6. Nausea  -     ondansetron (ZOFRAN) 8 MG tablet; Take 1 tablet by mouth Every 8 (Eight) Hours As Needed for Nausea or Vomiting.  Dispense: 60 tablet; Refill: 5    Good follow-up with all of her specialist appointments discussed and stressed.  Patient to slowly advance diet as tolerated.

## 2023-07-25 ENCOUNTER — OFFICE VISIT (OUTPATIENT)
Dept: FAMILY MEDICINE CLINIC | Facility: CLINIC | Age: 38
End: 2023-07-25
Payer: COMMERCIAL

## 2023-07-25 VITALS
WEIGHT: 293 LBS | BODY MASS INDEX: 44.41 KG/M2 | SYSTOLIC BLOOD PRESSURE: 118 MMHG | TEMPERATURE: 98.4 F | RESPIRATION RATE: 16 BRPM | HEIGHT: 68 IN | DIASTOLIC BLOOD PRESSURE: 78 MMHG | HEART RATE: 93 BPM | OXYGEN SATURATION: 99 %

## 2023-07-25 DIAGNOSIS — R79.89 ELEVATED LFTS: ICD-10-CM

## 2023-07-25 DIAGNOSIS — K81.0 ACUTE CHOLECYSTITIS: Primary | ICD-10-CM

## 2023-07-25 DIAGNOSIS — R11.0 NAUSEA: ICD-10-CM

## 2023-07-25 DIAGNOSIS — Z09 HOSPITAL DISCHARGE FOLLOW-UP: ICD-10-CM

## 2023-07-25 DIAGNOSIS — K46.9 HERNIA OF ABDOMINAL CAVITY: ICD-10-CM

## 2023-07-25 DIAGNOSIS — N83.202 LEFT OVARIAN CYST: ICD-10-CM

## 2023-07-25 PROCEDURE — 99495 TRANSJ CARE MGMT MOD F2F 14D: CPT | Performed by: FAMILY MEDICINE

## 2023-07-25 RX ORDER — ONDANSETRON HYDROCHLORIDE 8 MG/1
8 TABLET, FILM COATED ORAL EVERY 8 HOURS PRN
Qty: 60 TABLET | Refills: 5 | Status: SHIPPED | OUTPATIENT
Start: 2023-07-25

## 2023-07-26 LAB
ALBUMIN SERPL-MCNC: 4.5 G/DL (ref 3.5–5.2)
ALP SERPL-CCNC: 100 U/L (ref 39–117)
ALT SERPL-CCNC: 20 U/L (ref 1–33)
AST SERPL-CCNC: 15 U/L (ref 1–32)
BASOPHILS # BLD AUTO: 0.07 10*3/MM3 (ref 0–0.2)
BASOPHILS NFR BLD AUTO: 0.6 % (ref 0–1.5)
BILIRUB DIRECT SERPL-MCNC: <0.2 MG/DL (ref 0–0.3)
BILIRUB SERPL-MCNC: 0.3 MG/DL (ref 0–1.2)
EOSINOPHIL # BLD AUTO: 0.23 10*3/MM3 (ref 0–0.4)
EOSINOPHIL NFR BLD AUTO: 1.9 % (ref 0.3–6.2)
ERYTHROCYTE [DISTWIDTH] IN BLOOD BY AUTOMATED COUNT: 12 % (ref 12.3–15.4)
HCT VFR BLD AUTO: 42.9 % (ref 34–46.6)
HGB BLD-MCNC: 14.1 G/DL (ref 12–15.9)
IMM GRANULOCYTES # BLD AUTO: 0.02 10*3/MM3 (ref 0–0.05)
IMM GRANULOCYTES NFR BLD AUTO: 0.2 % (ref 0–0.5)
LYMPHOCYTES # BLD AUTO: 2.96 10*3/MM3 (ref 0.7–3.1)
LYMPHOCYTES NFR BLD AUTO: 24.7 % (ref 19.6–45.3)
MCH RBC QN AUTO: 30.1 PG (ref 26.6–33)
MCHC RBC AUTO-ENTMCNC: 32.9 G/DL (ref 31.5–35.7)
MCV RBC AUTO: 91.5 FL (ref 79–97)
MONOCYTES # BLD AUTO: 0.95 10*3/MM3 (ref 0.1–0.9)
MONOCYTES NFR BLD AUTO: 7.9 % (ref 5–12)
NEUTROPHILS # BLD AUTO: 7.73 10*3/MM3 (ref 1.7–7)
NEUTROPHILS NFR BLD AUTO: 64.7 % (ref 42.7–76)
NRBC BLD AUTO-RTO: 0 /100 WBC (ref 0–0.2)
PLATELET # BLD AUTO: 408 10*3/MM3 (ref 140–450)
PROT SERPL-MCNC: 6.8 G/DL (ref 6–8.5)
RBC # BLD AUTO: 4.69 10*6/MM3 (ref 3.77–5.28)
WBC # BLD AUTO: 11.96 10*3/MM3 (ref 3.4–10.8)

## 2023-07-27 NOTE — PROGRESS NOTES
CC: Postop check    S: This is a 37 y.o. female who presents for a post-operative visit after undergoing a Laparoscopic Cholecystectomy with IOC on 7/14/2023.     Patient reports she is doing well. Eating well without any significant nausea. Having good bowel function. No problems with constipation or diarrhea. No urinary complaints. Denies fever. Ambulating well and slowly returning to normal activities.    Pathology report:     Final Diagnosis   1. Gallbladder, Cholecystectomy:                 A. Chronic cholecystitis and cholelithiasis.      O:   Alert, no acute distress  soft, nontender, nondistended, no masses or organomegaly, easily reducible upper midline incisional hernia  Incisions are healing well without any erythema or signs of infection.     Assessment and plan:     The patient is a very pleasant 37 y.o. female s/p laparoscopic cholecystectomy with intraoperative cholangiogram.  Patient is doing fine and does not have any specific complaints other than mild incisional pain.   She has an incisional hernia.  Discussed with her about the need to lose weight in order to be candidate for hernia repair  I recommend [facility] program.  She will try to see if that would work for her.  I will see her in 2 months and if no success with weight loss then we will discuss about bariatric surgery.  I think that would be a great option for her.  Discussed with her about the findings of ovarian cyst on her CAT scan.  She has appointment to follow-up with her gynecologist.    I advised the patient to continue to refrain from doing any heavy lifting of more than 15 pounds for a total of 6 weeks.  Patient may start doing an aerobic type exercise in 4 weeks after surgery.    Patient was given time to ask questions and all of them were answered appropriately.  The patient verbalized understanding and agreed with the plan.    She will follow-up in my office in 2 months

## 2023-07-28 ENCOUNTER — OFFICE VISIT (OUTPATIENT)
Dept: SURGERY | Facility: CLINIC | Age: 38
End: 2023-07-28
Payer: COMMERCIAL

## 2023-07-28 ENCOUNTER — TELEPHONE (OUTPATIENT)
Dept: SURGERY | Facility: CLINIC | Age: 38
End: 2023-07-28

## 2023-07-28 VITALS — HEIGHT: 68 IN | BODY MASS INDEX: 44.41 KG/M2 | WEIGHT: 293 LBS

## 2023-07-28 DIAGNOSIS — K43.2 INCISIONAL HERNIA, WITHOUT OBSTRUCTION OR GANGRENE: ICD-10-CM

## 2023-07-28 DIAGNOSIS — Z09 POSTOP CHECK: Primary | ICD-10-CM

## 2023-07-28 PROCEDURE — 99024 POSTOP FOLLOW-UP VISIT: CPT | Performed by: SURGERY

## 2023-07-28 NOTE — TELEPHONE ENCOUNTER
Pt is going to call the [facility] program to see if they have allergy friendly food and to see if she would like to go forward. She will call us back to let us know and we will send over referral form.

## 2023-08-01 ENCOUNTER — PATIENT MESSAGE (OUTPATIENT)
Dept: GASTROENTEROLOGY | Facility: CLINIC | Age: 38
End: 2023-08-01
Payer: COMMERCIAL

## 2023-08-04 DIAGNOSIS — K50.812 CROHN'S DISEASE OF BOTH SMALL AND LARGE INTESTINE WITH INTESTINAL OBSTRUCTION: Primary | ICD-10-CM

## 2023-08-09 ENCOUNTER — TELEPHONE (OUTPATIENT)
Dept: NEUROLOGY | Facility: CLINIC | Age: 38
End: 2023-08-09
Payer: COMMERCIAL

## 2023-08-11 NOTE — PROGRESS NOTES
CC: f/u    HPI:  Bri Pfeiffer is a  37 y.o.  right-handed female  with past medical history of IBD status post small bowel resection in 2020, fibromyalgia, migraine headaches, vasodepressor syndrome, arthritis, depression/anxiety who is here for follow-up regarding migraines. She started seeing us in 2021 for issues of paresthesias.  There is report of an EMG done by Dr. Newell noting bilateral carpal tunnel with both median motor distal latencies were mildly prolonged at 4.5 ms with normal conduction velocities in the forearms. The amplitude was normal in the right but a little low on the left at 4 mV.  Both ulnar motor studies were obtained and there were normal conduction velocities above and below the elbow on the right but on the left the standard study showed some difference at 58.9 m/s below the elbow with 52.9 m/s across the elbow and so the study was repeated recording over the first dorsal interosseous showing a slow velocity across the elbow at 46.6 m/s and a normal velocity below the elbow at 55.9 m/s.    She saw a hand specialist and surgery was recommended but she opted against it since she would have to suspend her Humira.  She also had issues with left leg numbness, imbalance, lightheadedness, postural syncope, diffuse pain of joints and back.       She has had multiple specialty visits including endocrinology, rheumatology.  She has what is thought to be inflammatory arthritis related to her Crohn's.  Work-up I reviewed includes unremarkable C-spine, RPR was nonreactive, cryoglobulins were negative, copper was within normal limits at 122, heavy metals including lead arsenic and mercury were all within normal, vitamin D levels were within normal, sed rate normal at 13 and IEP/SPEP showed no evidence for monoclonal gammopathy.   Normal EMG of lower extremities done by Dr. Thao in 12/21.  MRI of the lumbar spine and pelvis, referenced as without significant nerve root compression, facet  arthropathy at L4/5, normal SI joints, and no sciatic lesion.    She has been on Cymbalta but that was changed to Effexor in June 2022.  She continues on Lyrica.  She reports being on Topamax when she was a teenager and does not recall if there were any adverse affects. She believes she stopped this because her headaches improved.    She has been to [place] for a short orthostasis test and is supposed to hear back from them about upcoming appt.  Her symptoms are pretty much the same.  Her numbness is about th same.  She never had a skin bx.   She was in the ER with GI sxs and had cholecystectomy about 4 wks ago.  She ran out of [employer] samples and ins didn't cover rx.  She just finds a dark room when she has her migraines.       Social history:    Family history:      Pain Scale:        ROS:  Review of Systems   Constitutional:  Positive for fatigue.   Eyes:  Positive for photophobia. Negative for pain and visual disturbance.   Respiratory:  Negative for chest tightness, shortness of breath and wheezing.    Cardiovascular:  Negative for chest pain and palpitations.   Musculoskeletal:  Positive for gait problem (balance problem). Negative for neck pain and neck stiffness.   Neurological:  Positive for numbness (hands & lt leg) and headaches (decreased frequency). Negative for dizziness and weakness.   Psychiatric/Behavioral:  Negative for confusion and decreased concentration.        Reviewed ROS conducted by MA and geraldine        Physical Exam:  There were no vitals filed for this visit.   Orthostatic BP:    There is no height or weight on file to calculate BMI.        General: pt well appearing, no distress  HEENT: Normocephalic, conjunctiva normal, external canals normal, no nasal discharge, moist mucous membranes  Neck: No lymphadenopathy, thyroid not enlarged, no JVD  CV: Regular rate and rhythm, no murmurs negative no bruits auscultated at neck, equal pulses  Pulmonary: Normal respiratory effort, clear to  auscultation bilaterally  Extremities: no edema, bruising, or skin lesions  Pysch: good eye contact, cooperative, full affect, euthymic mood, good attention, good insight  Mental: alert, conversant, AOx3, provides history, 3/3 recall.  Language fluent, names, repeats.  CN: CN II-XII intact, PERRL, EOMi, no gaze palsy or nystagmus, intact facial sensation, no facial assymetry, intact hearing, symmetric palate elevation, tongue midline, good SCM strength  Motor: no abnormal movements, no pronator drift, normal  bulk and tone, 5/5 strength b/l UE & LE  Sensory: normal sensation to crude touch, temperature, and vibration throughout  Reflexes: 2+ throughout, neg babinski  Coordination: no ataxia on finger-nose, heel-shin  Gait: normal gait, no ataxia, intact heel and toe walking        Results:      Lab Results   Component Value Date    GLUCOSE 102 (H) 07/15/2023    BUN 7 07/15/2023    CREATININE 0.69 07/15/2023    EGFRIFNONA 80 10/01/2021    EGFRIFAFRI 120 03/02/2021    BCR 10.1 07/15/2023    CO2 23.4 07/15/2023    CALCIUM 9.3 07/15/2023    PROTENTOTREF 6.8 07/25/2023    ALBUMIN 4.5 07/25/2023    LABIL2 1.6 06/08/2022    AST 15 07/25/2023    ALT 20 07/25/2023       Lab Results   Component Value Date    WBC 11.96 (H) 07/25/2023    HGB 14.1 07/25/2023    HCT 42.9 07/25/2023    MCV 91.5 07/25/2023     07/25/2023         .  Lab Results   Component Value Date    RPR Non-Reactive 09/28/2021         Lab Results   Component Value Date    TSH 1.210 06/13/2022    THYROIDAB 8 06/13/2022         Lab Results   Component Value Date    AZKPKOPZ53 488 11/21/2022         Lab Results   Component Value Date    FOLATE 15.4 06/08/2022         No results found for: HGBA1C      Lab Results   Component Value Date    GLUCOSE 102 (H) 07/15/2023    BUN 7 07/15/2023    CREATININE 0.69 07/15/2023    EGFRIFNONA 80 10/01/2021    EGFRIFAFRI 120 03/02/2021    BCR 10.1 07/15/2023    K 4.3 07/15/2023    CO2 23.4 07/15/2023    CALCIUM 9.3  07/15/2023    PROTENTOTREF 6.8 07/25/2023    ALBUMIN 4.5 07/25/2023    LABIL2 1.6 06/08/2022    AST 15 07/25/2023    ALT 20 07/25/2023         Diagnosis:  1. Migraine headache  2. Presumed vasodepressor syndrome  3. Paresthesias        Impression: 37-year-old female with above-stated medical history with multiple neurologic complaints but whom we follow for migraine headache.   In regards to her other neurologic issues these seem rather static.  In regards to her presumed vasodepressive syndrome she has a specialty appointment coming up.  Per documentation she did have a cardiology work-up including EKG and echo and it was said that vasodepressor syncope was the thought.  I do not see a tilt table study.  She endorses orthostasic recordings and symptoms occurring on standing.  I will be interested in her eval.  I think an AI autonomic impairment unlikely without evidence of true polyneuropathy-b/l median neuropathy and L ulnar neuroapathy per Dr Villalobos' EMG in past.  Has never had skin bx     Plan:  1 Cont Nurtec.  Call if issues getting rx      I spent at least 40 minutes interviewing, examining, and counseling patient.  I independently reviewed documentation, laboratory and diagnostic findings, external documentation where applicable, and formulated treatment plan which was discussed with the patient.    F/u in 6 mos

## 2023-08-14 RX ORDER — MOMETASONE FUROATE AND FORMOTEROL FUMARATE DIHYDRATE 100; 5 UG/1; UG/1
AEROSOL RESPIRATORY (INHALATION)
Qty: 13 G | Refills: 1 | Status: SHIPPED | OUTPATIENT
Start: 2023-08-14

## 2023-08-14 NOTE — TELEPHONE ENCOUNTER
Rx Refill Note  Requested Prescriptions     Pending Prescriptions Disp Refills    Dulera 100-5 MCG/ACT inhaler [Pharmacy Med Name: DULERA -5MCG] 13 g 1     Sig: USE 2 INHALATIONS ORALLY   TWICE DAILY      Last office visit with prescribing clinician: 7/25/2023   Last telemedicine visit with prescribing clinician: Visit date not found   Next office visit with prescribing clinician: Visit date not found

## 2023-08-16 ENCOUNTER — OFFICE VISIT (OUTPATIENT)
Dept: NEUROLOGY | Facility: CLINIC | Age: 38
End: 2023-08-16
Payer: COMMERCIAL

## 2023-08-16 ENCOUNTER — PRIOR AUTHORIZATION (OUTPATIENT)
Dept: FAMILY MEDICINE CLINIC | Facility: CLINIC | Age: 38
End: 2023-08-16
Payer: COMMERCIAL

## 2023-08-16 VITALS
HEART RATE: 95 BPM | DIASTOLIC BLOOD PRESSURE: 60 MMHG | BODY MASS INDEX: 44.41 KG/M2 | WEIGHT: 293 LBS | SYSTOLIC BLOOD PRESSURE: 110 MMHG | HEIGHT: 68 IN | OXYGEN SATURATION: 99 %

## 2023-08-16 DIAGNOSIS — G56.22 ULNAR NEUROPATHY AT WRIST, LEFT: ICD-10-CM

## 2023-08-16 DIAGNOSIS — G43.909 MIGRAINE WITHOUT STATUS MIGRAINOSUS, NOT INTRACTABLE, UNSPECIFIED MIGRAINE TYPE: ICD-10-CM

## 2023-08-16 DIAGNOSIS — G56.13 MEDIAN NEUROPATHY OF BOTH UPPER EXTREMITIES: ICD-10-CM

## 2023-08-16 DIAGNOSIS — Z87.898 HISTORY OF SYNCOPE: ICD-10-CM

## 2023-08-16 DIAGNOSIS — M79.7 FIBROMYALGIA: Primary | ICD-10-CM

## 2023-08-16 RX ORDER — RIMEGEPANT SULFATE 75 MG/75MG
1 TABLET, ORALLY DISINTEGRATING ORAL AS NEEDED
Qty: 8 TABLET | Refills: 11 | Status: SHIPPED | OUTPATIENT
Start: 2023-08-16

## 2023-08-16 NOTE — LETTER
August 16, 2023       No Recipients    Patient: Bri Pfeiffer   YOB: 1985   Date of Visit: 8/16/2023     Dear Alan Benavidez MD:       Thank you for referring Bri Pfeiffer to me for evaluation. Below are the relevant portions of my assessment and plan of care.    If you have questions, please do not hesitate to call me. I look forward to following Bri along with you.         Sincerely,        MERLE Espinoza        CC:   No Recipients    Mackenzie Nova PA  08/16/23 1323  Sign when Signing Visit  CC: f/u    HPI:  Bri Pfeiffer is a  37 y.o.  right-handed female  with past medical history of IBD status post small bowel resection in 2020, fibromyalgia, migraine headaches, vasodepressor syndrome, arthritis, depression/anxiety who is here for follow-up regarding migraines. She started seeing us in 2021 for issues of paresthesias.  There is report of an EMG done by Dr. Newell noting bilateral carpal tunnel with both median motor distal latencies were mildly prolonged at 4.5 ms with normal conduction velocities in the forearms. The amplitude was normal in the right but a little low on the left at 4 mV.  Both ulnar motor studies were obtained and there were normal conduction velocities above and below the elbow on the right but on the left the standard study showed some difference at 58.9 m/s below the elbow with 52.9 m/s across the elbow and so the study was repeated recording over the first dorsal interosseous showing a slow velocity across the elbow at 46.6 m/s and a normal velocity below the elbow at 55.9 m/s.    She saw a hand specialist and surgery was recommended but she opted against it since she would have to suspend her Humira.  She also had issues with left leg numbness, imbalance, lightheadedness, postural syncope, diffuse pain of joints and back.       She has had multiple specialty visits including endocrinology, rheumatology.  She has what is thought to be  inflammatory arthritis related to her Crohn's.  Work-up I reviewed includes unremarkable C-spine, RPR was nonreactive, cryoglobulins were negative, copper was within normal limits at 122, heavy metals including lead arsenic and mercury were all within normal, vitamin D levels were within normal, sed rate normal at 13 and IEP/SPEP showed no evidence for monoclonal gammopathy.   Normal EMG of lower extremities done by Dr. Thao in 12/21.  MRI of the lumbar spine and pelvis, referenced as without significant nerve root compression, facet arthropathy at L4/5, normal SI joints, and no sciatic lesion.    She has been on Cymbalta but that was changed to Effexor in June 2022.  She continues on Lyrica.  She reports being on Topamax when she was a teenager and does not recall if there were any adverse affects. She believes she stopped this because her headaches improved.    She has been to [place] for a short orthostasis test and is supposed to hear back from them about upcoming appt.  Her symptoms are pretty much the same.  Her numbness is about th same.  She never had a skin bx.   She was in the ER with GI sxs and had cholecystectomy about 4 wks ago.  She ran out of [employer] samples and ins didn't cover rx.  She just finds a dark room when she has her migraines.       Social history:    Family history:      Pain Scale:        ROS:  Review of Systems   Constitutional:  Positive for fatigue.   Eyes:  Positive for photophobia. Negative for pain and visual disturbance.   Respiratory:  Negative for chest tightness, shortness of breath and wheezing.    Cardiovascular:  Negative for chest pain and palpitations.   Musculoskeletal:  Positive for gait problem (balance problem). Negative for neck pain and neck stiffness.   Neurological:  Positive for numbness (hands & lt leg) and headaches (decreased frequency). Negative for dizziness and weakness.   Psychiatric/Behavioral:  Negative for confusion and decreased concentration.         Reviewed ROS conducted by MA and geraldine        Physical Exam:  There were no vitals filed for this visit.   Orthostatic BP:    There is no height or weight on file to calculate BMI.        General: pt well appearing, no distress  HEENT: Normocephalic, conjunctiva normal, external canals normal, no nasal discharge, moist mucous membranes  Neck: No lymphadenopathy, thyroid not enlarged, no JVD  CV: Regular rate and rhythm, no murmurs negative no bruits auscultated at neck, equal pulses  Pulmonary: Normal respiratory effort, clear to auscultation bilaterally  Extremities: no edema, bruising, or skin lesions  Pysch: good eye contact, cooperative, full affect, euthymic mood, good attention, good insight  Mental: alert, conversant, AOx3, provides history, 3/3 recall.  Language fluent, names, repeats.  CN: CN II-XII intact, PERRL, EOMi, no gaze palsy or nystagmus, intact facial sensation, no facial assymetry, intact hearing, symmetric palate elevation, tongue midline, good SCM strength  Motor: no abnormal movements, no pronator drift, normal  bulk and tone, 5/5 strength b/l UE & LE  Sensory: normal sensation to crude touch, temperature, and vibration throughout  Reflexes: 2+ throughout, neg babinski  Coordination: no ataxia on finger-nose, heel-shin  Gait: normal gait, no ataxia, intact heel and toe walking        Results:      Lab Results   Component Value Date    GLUCOSE 102 (H) 07/15/2023    BUN 7 07/15/2023    CREATININE 0.69 07/15/2023    EGFRIFNONA 80 10/01/2021    EGFRIFAFRI 120 03/02/2021    BCR 10.1 07/15/2023    CO2 23.4 07/15/2023    CALCIUM 9.3 07/15/2023    PROTENTOTREF 6.8 07/25/2023    ALBUMIN 4.5 07/25/2023    LABIL2 1.6 06/08/2022    AST 15 07/25/2023    ALT 20 07/25/2023       Lab Results   Component Value Date    WBC 11.96 (H) 07/25/2023    HGB 14.1 07/25/2023    HCT 42.9 07/25/2023    MCV 91.5 07/25/2023     07/25/2023         .  Lab Results   Component Value Date    RPR Non-Reactive  09/28/2021         Lab Results   Component Value Date    TSH 1.210 06/13/2022    THYROIDAB 8 06/13/2022         Lab Results   Component Value Date    UTPEDOSE27 488 11/21/2022         Lab Results   Component Value Date    FOLATE 15.4 06/08/2022         No results found for: HGBA1C      Lab Results   Component Value Date    GLUCOSE 102 (H) 07/15/2023    BUN 7 07/15/2023    CREATININE 0.69 07/15/2023    EGFRIFNONA 80 10/01/2021    EGFRIFAFRI 120 03/02/2021    BCR 10.1 07/15/2023    K 4.3 07/15/2023    CO2 23.4 07/15/2023    CALCIUM 9.3 07/15/2023    PROTENTOTREF 6.8 07/25/2023    ALBUMIN 4.5 07/25/2023    LABIL2 1.6 06/08/2022    AST 15 07/25/2023    ALT 20 07/25/2023         Diagnosis:  1. Migraine headache  2. Presumed vasodepressor syndrome  3. Paresthesias        Impression: 37-year-old female with above-stated medical history with multiple neurologic complaints but whom we follow for migraine headache.   In regards to her other neurologic issues these seem rather static.  In regards to her presumed vasodepressive syndrome she has a specialty appointment coming up.  Per documentation she did have a cardiology work-up including EKG and echo and it was said that vasodepressor syncope was the thought.  I do not see a tilt table study.  She endorses orthostasic recordings and symptoms occurring on standing.  I will be interested in her eval.  I think an AI autonomic impairment unlikely without evidence of true neuropathy-EMGs as above, no skin bx in past     Plan:  1 Cont Nurtec.  Call if issues getting rx      F/u in 6 mos

## 2023-08-16 NOTE — TELEPHONE ENCOUNTER
PRIOR AUTH FOR [facility]NDANSETRON  8 MGS REQUESTED -Bri Pfeiffer Key: [id number] - PA Case ID: [id number] - Rx #: [id number]

## 2023-08-21 NOTE — TELEPHONE ENCOUNTER
I TALKED TO PATIENT. PT WAS ABLE TO GET MEDICATION AND PAID CASH     Spoke with patient and she will get labs done tomorrow morning and mammogram that is scheduled for tomorrow AM.    Patient transferred to scheduling to get CT scan scheduled

## 2023-08-23 ENCOUNTER — OFFICE VISIT (OUTPATIENT)
Dept: GASTROENTEROLOGY | Facility: CLINIC | Age: 38
End: 2023-08-23
Payer: COMMERCIAL

## 2023-08-23 VITALS
WEIGHT: 293 LBS | BODY MASS INDEX: 44.41 KG/M2 | HEART RATE: 97 BPM | HEIGHT: 68 IN | SYSTOLIC BLOOD PRESSURE: 120 MMHG | DIASTOLIC BLOOD PRESSURE: 80 MMHG | OXYGEN SATURATION: 97 % | TEMPERATURE: 97 F

## 2023-08-23 DIAGNOSIS — G89.29 CHRONIC JOINT PAIN: ICD-10-CM

## 2023-08-23 DIAGNOSIS — M62.89 PELVIC FLOOR DYSFUNCTION: ICD-10-CM

## 2023-08-23 DIAGNOSIS — M25.50 CHRONIC JOINT PAIN: ICD-10-CM

## 2023-08-23 DIAGNOSIS — K50.812 CROHN'S DISEASE OF BOTH SMALL AND LARGE INTESTINE WITH INTESTINAL OBSTRUCTION: Primary | ICD-10-CM

## 2023-08-23 DIAGNOSIS — K21.9 GASTROESOPHAGEAL REFLUX DISEASE WITHOUT ESOPHAGITIS: ICD-10-CM

## 2023-08-23 DIAGNOSIS — R11.0 NAUSEA: ICD-10-CM

## 2023-08-23 RX ORDER — ESOMEPRAZOLE MAGNESIUM 40 MG/1
40 CAPSULE, DELAYED RELEASE ORAL DAILY
Qty: 90 CAPSULE | Refills: 3 | Status: SHIPPED | OUTPATIENT
Start: 2023-08-23

## 2023-08-23 NOTE — PROGRESS NOTES
Chief Complaint   Patient presents with    Follow-up     Recent hospitalization, also started Skyrizi           History of Present Illness    37-year-old female presents today for follow-up after hospitalization July 13, 2023 with epigastric pain, nausea, vomiting HIDA scan revealed cystic duct obstruction and acute cholecystitis and laparoscopic cholecystectomy with IOC was performed July 14, 2023 with Dr Flores.     Pathology with chronic cholecystitis and cholelithiasis.  She is followed outpatient by Dr. Escalona and myself for diagnosis of Crohn's disease status post laparoscopic ileocecectomy November 2020 with Dr. Kerr.    She has a a history of chronic constipation with anterior rectocele and abnormal anal rectal manometry consistent with pelvic floor dyssynergy, hidradenitis suppurativa, anal fissure, aphthous ulcers,Uveitis and rectal stricture.  Last colonoscopy April 13, 2022 with 8 mm anal fissure otherwise normal, no evidence of recurrence at ileocolonic anastomosis.     CT scan performed July 12, 2023 with supraumbilical hernia containing a segment of transverse colon, no incarceration. Also complex 2.4 cm left ovarian/adnexal cyst.  She has discussed surgical intervention for hernia however Dr. Flores recommends weight loss in order to be a candidate for hernia repair.      Continues on Nexium for acid reflux.  She can experience regurgitation at times.    Skyrizi was initiated March 30, 2023.    Her second maintenance on body injection therapy was August 16, 2023.  Since starting Skyrizi she has noticed looser stools and more regular bowel movements.     She has a return of rectal bleeding, cramping, gurgling two before her Skyrizi is due.   This improves after her injections.   She will have diarrhea and joint pain 4-7 days after her injection.     She continues to have joint pain that is less severe than when she was on Remicade but not as well controlled as when she was on Humira.   She continues  "on treatment for fibromyalgia.     She has infrequent bowel movements consistent with constipation but has not had rectal bleeding or symptoms concerning for Crohn's disease.     Previous treatments for Crohn's disease include infliximab discontinued due to worsening joint pain, fevers and constellation of symptoms she did not have prior to  infliximab February 2023, adalimumab initiated 2019, discontinued given concern for allergic reaction with whelps November 2022.  Also prednisone.     Result Review :       CBC & Differential (07/25/2023 13:54)  Hepatic Function Panel (07/25/2023 13:54)  Tissue Pathology Exam (07/14/2023 16:50)  Vital Signs:   /80   Pulse 97   Temp 97 øF (36.1 øC)   Ht 172.2 cm (67.8\")   Wt (!) 143 kg (315 lb 9.6 oz)   SpO2 97%   BMI 48.28 kg/mý     Body mass index is 48.28 kg/mý.     Physical Exam  Vitals reviewed.   Constitutional:       General: She is not in acute distress.     Appearance: Normal appearance. She is not ill-appearing.   Eyes:      General: No scleral icterus.  Pulmonary:      Effort: Pulmonary effort is normal. No respiratory distress.   Abdominal:      General: Bowel sounds are normal. There is no distension.      Palpations: Abdomen is soft. Abdomen is not rigid. There is no pulsatile mass.      Tenderness: There is no abdominal tenderness. There is no guarding.   Skin:     Coloration: Skin is not jaundiced.   Neurological:      Mental Status: She is alert and oriented to person, place, and time.   Psychiatric:         Thought Content: Thought content normal.         Judgment: Judgment normal.         Assessment and Plan    Diagnoses and all orders for this visit:    1. Crohn's disease of both small and large intestine with intestinal obstruction (Primary)    2. Pelvic floor dysfunction  -     Ambulatory Referral to Physical Therapy Evaluate and treat    3. Chronic joint pain    4. Gastroesophageal reflux disease without esophagitis  -     esomeprazole (nexIUM) " 40 MG capsule; Take 1 capsule by mouth Daily.  Dispense: 90 capsule; Refill: 3    5. Nausea         Reviewed recent hospitalization including CT scan, operative report from laparoscopic cholecystectomy, pathology and blood work.    Alternating bowel habits with chronic constipation, abnormal anal rectal manometry and defecating proctogram, continue pelvic floor physical therapy modifications and will refer back to pelvic floor physical therapy to help with passage, process of stools.     Start FiberCon to help bulk stools and promote more complete evacuation.   If constipation returns, stop fiber or decrease fiber and see if this provides improvement, if not, stop fiber and restart Miralax.     Acid reflux, chronic, stable, continue Nexium.    Nausea, controlled with zofran several days per week. No vomiting.     Continue Skyrizi on body maintenance injections, next due October 11, 2023.   Crohn's disease status post laparoscopic ileocecectomy, on Skyrizi, initiated March 30, 2023.     Anal fissure,resolved,continue to monitor.  To consider restarting compounded cream if symptoms return.    She has not needed MiraLAX as she is having bowel movements daily but incomplete evacuation with difficulty eliminating.  To consider restarting MiraLAX based on clinical course.    Discussed hernia and weight loss options at today's visit.  She has had previous endocrinology consult regarding GLP-1 analogs and given mild gastroparesis this was not thought to be a good treatment option.    Patient is concerned about the option of bariatric surgery given diagnosis of Crohn's disease and her history of adhesions as well as frequent regurgitation.    We will discuss today's visit with Dr. Escalona and contact patient with additional recommendations.    She is scheduled to follow-up with gynecology regarding ovarian cyst on CT scan.    Follow-up visit scheduled January 2024.  At her follow-up visit we will discuss repeat endoscopic  evaluation since she has started Skyrizi.  Skyrizi was initiated [place] 30th 2023 as she was having side effects and did not tolerate infliximab not due to active inflammatory changes endoscopically.          Ovarian cyst on CAT scan, patient follow-up with gynecology.  Patient Instructions   Recommend starting a daily fiber supplement such as FiberCon tablets.  These can be purchased over-the-counter, generic brand is fine.  Fiber supplements can take 12 to 72 hours to start working.  Start fiber supplement at a low dose, 2 per night and gradually increase if needed based on your response.    Drink 6 to 12 ounces of water or noncarbonated beverage with fiber supplement.            EMR Dragon/Transcription Disclaimer:  This document has been Dictated utilizing Dragon dictation.

## 2023-08-23 NOTE — PATIENT INSTRUCTIONS
Recommend starting a daily fiber supplement such as FiberCon tablets.  These can be purchased over-the-counter, generic brand is fine.  Fiber supplements can take 12 to 72 hours to start working.  Start fiber supplement at a low dose, 2 per night and gradually increase if needed based on your response.    Drink 6 to 12 ounces of water or noncarbonated beverage with fiber supplement.

## 2023-09-12 ENCOUNTER — TELEPHONE (OUTPATIENT)
Dept: NEUROLOGY | Facility: CLINIC | Age: 38
End: 2023-09-12
Payer: COMMERCIAL

## 2023-09-12 NOTE — TELEPHONE ENCOUNTER
----- Message from MERLE Espinoza sent at 9/12/2023 12:02 PM EDT -----  Regarding: FW: [place] AFT results /dysautonomia   Contact: [phone]  You can cell her that all I can see is that her values appear normal-no autonomous abnormality.  There was an attachment 'not available'    That is really the only info I seem to be able to view to share.  Seems that that office should really be advising pt.      -C  ----- Message -----  From: Lou Gary RN  Sent: 9/12/2023  11:12 AM EDT  To: MERLE Espinoza  Subject: FW: [place] AFT results /dysautonomia         [place] is on [employer].  I updated the Care Everywhere so you should be able to see their notes.  I scanned the autonomic function test in to the media tab as well.  It is dated 6/20/23.    Thank you!    Lou    ----- Message -----  From: Mackenzie Nova PA  Sent: 9/11/2023   9:59 PM EDT  To: Lou Gary RN  Subject: FW: [place] AFT results /dysautonomia         Can we retrieve her ananth nitin-thx    -C  ----- Message -----  From: Rosangela Castillo MA  Sent: 9/11/2023   3:55 PM EDT  To: MERLE Espinoza  Subject: FW: [place] AFT results /dysautonomia         Please review  Thank you  ----- Message -----  From: Bri Pfeiffer  Sent: 9/11/2023   2:53 PM EDT  To: [facility]  Subject: [place] AFT results /dysautonomia             Greg Hope!  I have called [place] and left a few messages and I still cannot get anyone to call me back about my results of my dysautonomia mini test or doing followup testing. Could you please try to reach out and try to get some details? I've had an increase in symptoms and would like at least some details from that testing.   I appreciate your help!  Bri

## 2023-09-26 RX ORDER — MOMETASONE FUROATE AND FORMOTEROL FUMARATE DIHYDRATE 100; 5 UG/1; UG/1
AEROSOL RESPIRATORY (INHALATION)
Qty: 13 G | Refills: 1 | OUTPATIENT
Start: 2023-09-26

## 2023-09-27 ENCOUNTER — OFFICE VISIT (OUTPATIENT)
Dept: FAMILY MEDICINE CLINIC | Facility: CLINIC | Age: 38
End: 2023-09-27
Payer: COMMERCIAL

## 2023-09-27 VITALS
SYSTOLIC BLOOD PRESSURE: 107 MMHG | DIASTOLIC BLOOD PRESSURE: 72 MMHG | HEIGHT: 68 IN | BODY MASS INDEX: 44.41 KG/M2 | OXYGEN SATURATION: 99 % | TEMPERATURE: 97.9 F | RESPIRATION RATE: 18 BRPM | WEIGHT: 293 LBS | HEART RATE: 92 BPM

## 2023-09-27 DIAGNOSIS — M72.2 PLANTAR FASCIITIS OF LEFT FOOT: Primary | ICD-10-CM

## 2023-09-27 NOTE — PROGRESS NOTES
"Subjective   Bri Pfeiffer is a 37 y.o. female.     CC: Heel Pain    History of Present Illness     Patient comes in today with some ongoing left heel pain, worse in the morning when she puts her foot on the ground.  Denies any injury. Pt reports this started out-of-the-blue and started 3 weeks ago. Has been stretching it, with some improvement.      The following portions of the patient's history were reviewed and updated as appropriate: allergies, current medications, past family history, past medical history, past social history, past surgical history, and problem list.    Review of Systems   Constitutional:  Negative for activity change, chills and fever.   Respiratory:  Negative for cough.    Cardiovascular:  Negative for chest pain.   Psychiatric/Behavioral:  Negative for dysphoric mood.      /72   Pulse 92   Temp 97.9 °F (36.6 °C) (Oral)   Resp 18   Ht 172.2 cm (67.8\")   Wt (!) 144 kg (318 lb)   SpO2 99%   BMI 48.64 kg/m²     Objective   Physical Exam  Constitutional:       General: She is not in acute distress.     Appearance: She is well-developed.   Pulmonary:      Effort: Pulmonary effort is normal.   Musculoskeletal:         General: Tenderness (proximal insertion of left Plantar Fascia) present.   Neurological:      Mental Status: She is alert and oriented to person, place, and time.   Psychiatric:         Behavior: Behavior normal.         Thought Content: Thought content normal.       Assessment & Plan   Diagnoses and all orders for this visit:    1. Plantar fasciitis of left foot (Primary)    Splint/stretching/orthotic shoes/Voltaren Gel discussed.              "

## 2023-10-22 NOTE — PROGRESS NOTES
"Subjective   Bri Pfeiffer is a 37 y.o. female.     CC: Rash    History of Present Illness     Pt comes in today after sending this pre-visit message:    I have had contact dermatitis (maybe poison ivy?) for a about a week that isnt resolving with topical steroids, but it isn't getting worse.     Had this for approximately 2 weeks or so. No new contacts reported.       The following portions of the patient's history were reviewed and updated as appropriate: allergies, current medications, past family history, past medical history, past social history, past surgical history, and problem list.    Review of Systems   Constitutional:  Negative for activity change, chills and fever.   Respiratory:  Negative for cough.    Cardiovascular:  Negative for chest pain.   Skin:  Positive for rash.   Psychiatric/Behavioral:  Negative for dysphoric mood.        /84   Pulse 86   Temp 98 °F (36.7 °C) (Oral)   Resp 16   Ht 172.2 cm (67.8\")   Wt (!) 144 kg (318 lb)   SpO2 100%   BMI 48.64 kg/m²     Objective   Physical Exam  Constitutional:       General: She is not in acute distress.     Appearance: She is well-developed.   Pulmonary:      Effort: Pulmonary effort is normal.   Neurological:      Mental Status: She is alert and oriented to person, place, and time.   Psychiatric:         Behavior: Behavior normal.         Thought Content: Thought content normal.         Assessment & Plan   Diagnoses and all orders for this visit:    1. Rash (Primary)  -     predniSONE (DELTASONE) 20 MG tablet; Take 3 tabs QDPC x 3 days then 2 tabs QDPC x 4 days then 1 tab QDPC x 3 days  Dispense: 20 tablet; Refill: 0    Pt has a dermatologist and will see if not better. Continue with Zyrtec.             "

## 2023-10-23 ENCOUNTER — OFFICE VISIT (OUTPATIENT)
Dept: FAMILY MEDICINE CLINIC | Facility: CLINIC | Age: 38
End: 2023-10-23
Payer: COMMERCIAL

## 2023-10-23 VITALS
RESPIRATION RATE: 16 BRPM | HEIGHT: 68 IN | WEIGHT: 293 LBS | OXYGEN SATURATION: 100 % | DIASTOLIC BLOOD PRESSURE: 84 MMHG | SYSTOLIC BLOOD PRESSURE: 128 MMHG | TEMPERATURE: 98 F | HEART RATE: 86 BPM | BODY MASS INDEX: 44.41 KG/M2

## 2023-10-23 DIAGNOSIS — R21 RASH: Primary | ICD-10-CM

## 2023-10-23 PROCEDURE — 99213 OFFICE O/P EST LOW 20 MIN: CPT | Performed by: FAMILY MEDICINE

## 2023-10-23 RX ORDER — PREDNISONE 20 MG/1
TABLET ORAL
Qty: 20 TABLET | Refills: 0 | Status: SHIPPED | OUTPATIENT
Start: 2023-10-23

## 2023-10-24 DIAGNOSIS — J30.1 NON-SEASONAL ALLERGIC RHINITIS DUE TO POLLEN: Chronic | ICD-10-CM

## 2023-10-24 RX ORDER — MONTELUKAST SODIUM 10 MG/1
10 TABLET ORAL DAILY
Qty: 30 TABLET | Refills: 0 | Status: SHIPPED | OUTPATIENT
Start: 2023-10-24

## 2023-11-13 RX ORDER — VENLAFAXINE HYDROCHLORIDE 150 MG/1
150 CAPSULE, EXTENDED RELEASE ORAL DAILY
Qty: 30 CAPSULE | Refills: 5 | Status: SHIPPED | OUTPATIENT
Start: 2023-11-13 | End: 2023-12-13

## 2023-11-16 NOTE — PROGRESS NOTES
Chief Complaint:   Chief Complaint   Patient presents with    Allergies    Asthma    Arthritis    FLU VACCINE       Bri Pfeiffer 38 y.o. female who presents today for Medical Management of the below listed issues. She  has a problem list of   Patient Active Problem List   Diagnosis    Pericardial cyst    Vasodepressor syncope    Esophageal dysphagia    Rectal bleeding    Colitis    Gastroesophageal reflux disease    Crohn's disease of large intestine with rectal bleeding    Other irritable bowel syndrome    Skin rash    Moderate episode of recurrent major depressive disorder    Anxiety    Low back pain with sciatica    Ileocecal valve stenosis    Crohn's disease    BMI 40.0-44.9, adult    Status post small bowel resection    Fibromyalgia    Non-seasonal allergic rhinitis due to pollen    Incisional hernia, without obstruction or gangrene    Hernia of abdominal cavity    Transaminitis    Acute cholecystitis    Left ovarian cyst   .  Since the last visit, She has overall felt well.  she has been compliant with   Current Outpatient Medications:     mometasone-formoterol (Dulera) 100-5 MCG/ACT inhaler, Inhale 2 puffs 2 (Two) Times a Day., Disp: 13 g, Rfl: 1    montelukast (SINGULAIR) 10 MG tablet, Take 1 tablet by mouth Daily., Disp: 90 tablet, Rfl: 1    pregabalin (LYRICA) 100 MG capsule, Take 1 capsule by mouth 3 (Three) Times a Day., Disp: 270 capsule, Rfl: 1    albuterol sulfate  (90 Base) MCG/ACT inhaler, Inhale 2 puffs Every 4 (Four) Hours As Needed., Disp: , Rfl:     Aloe Vera 25 MG capsule, , Disp: , Rfl:     amitriptyline (ELAVIL) 10 MG tablet, TAKE 1 TABLET EVERY NIGHT, Disp: 30 tablet, Rfl: 5    cetirizine (zyrTEC) 10 MG tablet, Take 1 tablet by mouth Daily., Disp: , Rfl:     Cholecalciferol (VITAMIN D-3) 5000 units tablet, Take 1 tablet by mouth Daily., Disp: , Rfl:     Cyanocobalamin (VITAMIN B 12 PO), Take 1 tablet by mouth Daily., Disp: , Rfl:     dilTIAZem HCl (diltiazem 2% and  "lidocaine 5%), Apply 1 application topically to the appropriate area as directed 2 (Two) Times a Day. Per rectum, Disp: 30 g, Rfl: 3    esomeprazole (nexIUM) 40 MG capsule, Take 1 capsule by mouth Daily., Disp: 90 capsule, Rfl: 3    ferrous sulfate 324 (65 Fe) MG tablet delayed-release EC tablet, Take 1 tablet by mouth Daily With Breakfast., Disp: , Rfl:     fluticasone (FLONASE) 50 MCG/ACT nasal spray, 2 sprays into the nostril(s) as directed by provider Daily., Disp: , Rfl:     Folic Acid 0.8 MG capsule, , Disp: , Rfl:     Multiple Vitamin (MULTIVITAMIN PO), Take 1 tablet by mouth Daily., Disp: , Rfl:     ondansetron (ZOFRAN) 8 MG tablet, Take 1 tablet by mouth Every 8 (Eight) Hours As Needed for Nausea or Vomiting., Disp: 60 tablet, Rfl: 5    polyethylene glycol (MIRALAX) 17 GM/SCOOP powder, Take  by mouth., Disp: , Rfl:     predniSONE (DELTASONE) 20 MG tablet, Take 3 tabs QDPC x 3 days then 2 tabs QDPC x 4 days then 1 tab QDPC x 3 days, Disp: 20 tablet, Rfl: 0    Riboflavin-Magnesium-Feverfew (MigreLief) 200-180-50 MG tablet, , Disp: , Rfl:     Rimegepant Sulfate (Nurtec) 75 MG tablet dispersible tablet, Take 1 tablet by mouth As Needed (Take 1 tablet by mouth daily as needed for headache/migraine.  Do not take more than one in a 24 hour period.)., Disp: 8 tablet, Rfl: 11    Risankizumab-rzaa 360 MG/2.4ML solution cartridge, Inject 1 Cartridge under the skin into the appropriate area as directed Every 2 (Two) Months., Disp: 2.4 mL, Rfl: 4    spironolactone (ALDACTONE) 100 MG tablet, Take 1 tablet by mouth Daily., Disp: , Rfl:     venlafaxine XR (EFFEXOR-XR) 150 MG 24 hr capsule, Take 1 capsule by mouth Daily for 30 days., Disp: 30 capsule, Rfl: 5.  She denies medication side effects.    All of the other chronic condition(s) listed above are stable w/o issues.    /76   Pulse 96   Temp 97.5 °F (36.4 °C) (Oral)   Resp 18   Ht 172.2 cm (67.8\")   Wt (!) 144 kg (318 lb)   SpO2 98%   BMI 48.64 kg/m² "     Results for orders placed or performed in visit on 07/25/23   Hepatic Function Panel    Specimen: Blood   Result Value Ref Range    Total Protein 6.8 6.0 - 8.5 g/dL    Albumin 4.5 3.5 - 5.2 g/dL    Total Bilirubin 0.3 0.0 - 1.2 mg/dL    Bilirubin, Direct <0.2 0.0 - 0.3 mg/dL    Alkaline Phosphatase 100 39 - 117 U/L    AST (SGOT) 15 1 - 32 U/L    ALT (SGPT) 20 1 - 33 U/L   CBC & Differential    Specimen: Blood   Result Value Ref Range    WBC 11.96 (H) 3.40 - 10.80 10*3/mm3    RBC 4.69 3.77 - 5.28 10*6/mm3    Hemoglobin 14.1 12.0 - 15.9 g/dL    Hematocrit 42.9 34.0 - 46.6 %    MCV 91.5 79.0 - 97.0 fL    MCH 30.1 26.6 - 33.0 pg    MCHC 32.9 31.5 - 35.7 g/dL    RDW 12.0 (L) 12.3 - 15.4 %    Platelets 408 140 - 450 10*3/mm3    Neutrophil Rel % 64.7 42.7 - 76.0 %    Lymphocyte Rel % 24.7 19.6 - 45.3 %    Monocyte Rel % 7.9 5.0 - 12.0 %    Eosinophil Rel % 1.9 0.3 - 6.2 %    Basophil Rel % 0.6 0.0 - 1.5 %    Neutrophils Absolute 7.73 (H) 1.70 - 7.00 10*3/mm3    Lymphocytes Absolute 2.96 0.70 - 3.10 10*3/mm3    Monocytes Absolute 0.95 (H) 0.10 - 0.90 10*3/mm3    Eosinophils Absolute 0.23 0.00 - 0.40 10*3/mm3    Basophils Absolute 0.07 0.00 - 0.20 10*3/mm3    Immature Granulocyte Rel % 0.2 0.0 - 0.5 %    Immature Grans Absolute 0.02 0.00 - 0.05 10*3/mm3    nRBC 0.0 0.0 - 0.2 /100 WBC             The following portions of the patient's history were reviewed and updated as appropriate: allergies, current medications, past family history, past medical history, past social history, past surgical history, and problem list.    Review of Systems   Constitutional:  Negative for activity change, chills and fever.   Respiratory:  Negative for cough.    Cardiovascular:  Negative for chest pain.   Psychiatric/Behavioral:  Negative for dysphoric mood.        Objective              Physical Exam  Constitutional:       General: She is not in acute distress.     Appearance: She is well-developed.   Cardiovascular:      Rate and Rhythm:  Normal rate and regular rhythm.   Pulmonary:      Effort: Pulmonary effort is normal.      Breath sounds: Normal breath sounds.   Neurological:      Mental Status: She is alert and oriented to person, place, and time.   Psychiatric:         Behavior: Behavior normal.         Thought Content: Thought content normal.             Diagnoses and all orders for this visit:    1. Fibromyalgia (Primary)  -     pregabalin (LYRICA) 100 MG capsule; Take 1 capsule by mouth 3 (Three) Times a Day.  Dispense: 270 capsule; Refill: 1    2. Non-seasonal allergic rhinitis due to pollen  -     mometasone-formoterol (Dulera) 100-5 MCG/ACT inhaler; Inhale 2 puffs 2 (Two) Times a Day.  Dispense: 13 g; Refill: 1  -     montelukast (SINGULAIR) 10 MG tablet; Take 1 tablet by mouth Daily.  Dispense: 90 tablet; Refill: 1    3. Immunization due  -     Fluzone (or Fluarix & Flulaval for VFC) >6mos

## 2023-11-20 ENCOUNTER — OFFICE VISIT (OUTPATIENT)
Dept: FAMILY MEDICINE CLINIC | Facility: CLINIC | Age: 38
End: 2023-11-20
Payer: COMMERCIAL

## 2023-11-20 VITALS
BODY MASS INDEX: 44.41 KG/M2 | DIASTOLIC BLOOD PRESSURE: 76 MMHG | OXYGEN SATURATION: 98 % | RESPIRATION RATE: 18 BRPM | HEIGHT: 68 IN | WEIGHT: 293 LBS | TEMPERATURE: 97.5 F | HEART RATE: 96 BPM | SYSTOLIC BLOOD PRESSURE: 117 MMHG

## 2023-11-20 DIAGNOSIS — M79.7 FIBROMYALGIA: Primary | Chronic | ICD-10-CM

## 2023-11-20 DIAGNOSIS — Z23 IMMUNIZATION DUE: ICD-10-CM

## 2023-11-20 DIAGNOSIS — J30.1 NON-SEASONAL ALLERGIC RHINITIS DUE TO POLLEN: Chronic | ICD-10-CM

## 2023-11-20 RX ORDER — MONTELUKAST SODIUM 10 MG/1
10 TABLET ORAL DAILY
Qty: 90 TABLET | Refills: 1 | Status: SHIPPED | OUTPATIENT
Start: 2023-11-20

## 2023-11-20 RX ORDER — PREGABALIN 100 MG/1
100 CAPSULE ORAL 3 TIMES DAILY
Qty: 270 CAPSULE | Refills: 1 | Status: SHIPPED | OUTPATIENT
Start: 2023-11-20

## 2023-11-20 RX ORDER — MOMETASONE FUROATE AND FORMOTEROL FUMARATE DIHYDRATE 100; 5 UG/1; UG/1
2 AEROSOL RESPIRATORY (INHALATION)
Qty: 13 G | Refills: 1 | Status: SHIPPED | OUTPATIENT
Start: 2023-11-20

## 2023-11-20 NOTE — PROGRESS NOTES
Injection  Flu vaccine Injection performed in left deltoid   by Estephanie Stapleton MA. Patient tolerated the procedure well without complications.  11/20/23   Estephanie Stapleton MA  .

## 2023-12-26 RX ORDER — AMITRIPTYLINE HYDROCHLORIDE 10 MG/1
TABLET, FILM COATED ORAL
Qty: 30 TABLET | Refills: 5 | Status: SHIPPED | OUTPATIENT
Start: 2023-12-26

## 2023-12-30 DIAGNOSIS — J30.1 NON-SEASONAL ALLERGIC RHINITIS DUE TO POLLEN: Chronic | ICD-10-CM

## 2024-01-02 RX ORDER — MOMETASONE FUROATE AND FORMOTEROL FUMARATE DIHYDRATE 100; 5 UG/1; UG/1
AEROSOL RESPIRATORY (INHALATION)
Qty: 13 G | Refills: 1 | OUTPATIENT
Start: 2024-01-02

## 2024-01-03 RX ORDER — AMITRIPTYLINE HYDROCHLORIDE 10 MG/1
10 TABLET, FILM COATED ORAL NIGHTLY
Qty: 90 TABLET | Refills: 0 | Status: SHIPPED | OUTPATIENT
Start: 2024-01-03 | End: 2024-01-05 | Stop reason: SDUPTHER

## 2024-01-05 RX ORDER — AMITRIPTYLINE HYDROCHLORIDE 10 MG/1
10 TABLET, FILM COATED ORAL NIGHTLY
Qty: 90 TABLET | Refills: 0 | Status: SHIPPED | OUTPATIENT
Start: 2024-01-05 | End: 2024-04-04

## 2024-02-12 ENCOUNTER — PREP FOR SURGERY (OUTPATIENT)
Dept: SURGERY | Facility: SURGERY CENTER | Age: 39
End: 2024-02-12
Payer: COMMERCIAL

## 2024-02-12 ENCOUNTER — OFFICE VISIT (OUTPATIENT)
Dept: GASTROENTEROLOGY | Facility: CLINIC | Age: 39
End: 2024-02-12
Payer: COMMERCIAL

## 2024-02-12 VITALS
HEART RATE: 90 BPM | HEIGHT: 68 IN | WEIGHT: 293 LBS | OXYGEN SATURATION: 96 % | DIASTOLIC BLOOD PRESSURE: 70 MMHG | BODY MASS INDEX: 44.41 KG/M2 | TEMPERATURE: 96.4 F | SYSTOLIC BLOOD PRESSURE: 110 MMHG

## 2024-02-12 DIAGNOSIS — Z51.81 MEDICATION MONITORING ENCOUNTER: ICD-10-CM

## 2024-02-12 DIAGNOSIS — Z00.00 HEALTH CARE MAINTENANCE: ICD-10-CM

## 2024-02-12 DIAGNOSIS — K60.2 ANAL FISSURE: ICD-10-CM

## 2024-02-12 DIAGNOSIS — R13.19 ESOPHAGEAL DYSPHAGIA: ICD-10-CM

## 2024-02-12 DIAGNOSIS — R53.81 MALAISE AND FATIGUE: ICD-10-CM

## 2024-02-12 DIAGNOSIS — M62.89 PELVIC FLOOR DYSFUNCTION: ICD-10-CM

## 2024-02-12 DIAGNOSIS — K21.9 GASTROESOPHAGEAL REFLUX DISEASE: Primary | ICD-10-CM

## 2024-02-12 DIAGNOSIS — K50.111 CROHN'S DISEASE OF LARGE INTESTINE WITH RECTAL BLEEDING: ICD-10-CM

## 2024-02-12 DIAGNOSIS — K21.9 GASTROESOPHAGEAL REFLUX DISEASE WITHOUT ESOPHAGITIS: ICD-10-CM

## 2024-02-12 DIAGNOSIS — K59.09 OTHER CONSTIPATION: ICD-10-CM

## 2024-02-12 DIAGNOSIS — R53.83 MALAISE AND FATIGUE: ICD-10-CM

## 2024-02-12 DIAGNOSIS — K50.812 CROHN'S DISEASE OF BOTH SMALL AND LARGE INTESTINE WITH INTESTINAL OBSTRUCTION: Primary | ICD-10-CM

## 2024-02-12 DIAGNOSIS — Z90.49 STATUS POST SMALL BOWEL RESECTION: ICD-10-CM

## 2024-02-12 DIAGNOSIS — K62.5 RECTAL BLEEDING: ICD-10-CM

## 2024-02-12 DIAGNOSIS — E55.9 VITAMIN D DEFICIENCY: ICD-10-CM

## 2024-02-12 RX ORDER — SODIUM CHLORIDE, SODIUM LACTATE, POTASSIUM CHLORIDE, CALCIUM CHLORIDE 600; 310; 30; 20 MG/100ML; MG/100ML; MG/100ML; MG/100ML
30 INJECTION, SOLUTION INTRAVENOUS CONTINUOUS PRN
OUTPATIENT
Start: 2024-02-12

## 2024-02-12 RX ORDER — DICYCLOMINE HCL 20 MG
20 TABLET ORAL DAILY PRN
COMMUNITY

## 2024-02-12 RX ORDER — PYRIDOSTIGMINE BROMIDE 60 MG/1
60 TABLET ORAL 3 TIMES DAILY
COMMUNITY
Start: 2024-01-31 | End: 2024-05-01

## 2024-02-12 RX ORDER — GUANFACINE 1 MG/1
TABLET ORAL
COMMUNITY
Start: 2024-01-31

## 2024-02-12 RX ORDER — FAMOTIDINE 20 MG/1
20 TABLET, FILM COATED ORAL 2 TIMES DAILY PRN
Qty: 180 TABLET | Refills: 2 | Status: SHIPPED | OUTPATIENT
Start: 2024-02-12

## 2024-02-12 RX ORDER — SODIUM CHLORIDE 0.9 % (FLUSH) 0.9 %
10 SYRINGE (ML) INJECTION AS NEEDED
OUTPATIENT
Start: 2024-02-12

## 2024-02-12 RX ORDER — SODIUM CHLORIDE 0.9 % (FLUSH) 0.9 %
3 SYRINGE (ML) INJECTION EVERY 12 HOURS SCHEDULED
OUTPATIENT
Start: 2024-02-12

## 2024-02-12 NOTE — PATIENT INSTRUCTIONS
Referral placed for pelvic floor physical therapy  Recommend only:   Jeni Harris and Lashonda Callejas Pelvic Floor Physical Therapy  [facility]    Blood work today    Start famotidine once or twice during the day   Continue nexium in the evening  Continue SKYRIZI  4-5 weeks after OBI start 1/4 cup miralax every other day    Schedule EGD and colonoscopy, orders placed.    Additional recommendations will be made based on results of EGD and colonoscopy findings.    Follow-up visit with myself and Dr Escalona after procedures to discuss results and make any additional recommendations.

## 2024-02-12 NOTE — PROGRESS NOTES
No chief complaint on file.          History of Present Illness  38-year-old female presents today for follow-up.  Last office visit 8/23/2023 following hospitalization July 13, 2023 with epigastric pain, nausea, vomiting HIDA scan revealed cystic duct obstruction and acute cholecystitis and laparoscopic cholecystectomy with IOC was performed July 14, 2023 with Dr Flores.   Pathology with chronic cholecystitis and cholelithiasis.  She is followed outpatient by Dr. Escalona and myself for diagnosis of Crohn's disease status post laparoscopic ileocecectomy November 2020 with Dr. Kerr.    She has a a history of chronic constipation with anterior rectocele and abnormal anal rectal manometry consistent with pelvic floor dyssynergy, hidradenitis suppurativa, anal fissure, aphthous ulcers,Uveitis and rectal stricture.  Last colonoscopy April 13, 2022 with 8 mm anal fissure otherwise normal, no evidence of recurrence at ileocolonic anastomosis.     Skyrizi was initiated March 30, 2023.    Her second maintenance on body injection therapy was August 16, 2023.  Since starting Skyrizi she has noticed looser stools and more regular bowel movements.   She continues on SKYRIZI, her next OBI is due March 27, 2024.    4-5 weeks after her OBI with SKYRIZI she will have lower back pain And Change in bowel habits with either diarrhea with fecal soiling or constipation that will require manual disimpaction, tearing of anal fissure with rectal bleeding requiring topical diltiazem.     When she receives her next dose of SKYRIZI she will have diarrhea for 24 hours, bone pain for approx 72 hours then all symptoms resolve.     She has worsening symptoms with stress and certain foods.   She takes Miralax as needed, if she has not had a BM for several days,   Every other day dosing of Miralax caused to loose of stools.     She is on Nexium with reflux.   She is [facility] Dr Rose, Shane Spence,  "last visit 01/31/2024.    Previous treatments for Crohn's disease include infliximab discontinued due to worsening joint pain, fevers and constellation of symptoms she did not have prior to infliximab February 2023, adalimumab initiated 2019, discontinued given concern for allergic reaction with whelps November 2022.  Also prior treatment with prednisone.       Result Review :           Vital Signs:   /70   Pulse 90   Temp 96.4 °F (35.8 °C)   Ht 172.2 cm (67.81\")   Wt (!) 146 kg (321 lb 1.6 oz)   SpO2 96%   BMI 49.10 kg/m²     Body mass index is 49.1 kg/m².     Physical Exam  Vitals reviewed.   Constitutional:       General: She is not in acute distress.     Appearance: Normal appearance. She is not ill-appearing or toxic-appearing.   Eyes:      General: No scleral icterus.  Pulmonary:      Effort: Pulmonary effort is normal. No respiratory distress.   Skin:     Coloration: Skin is not jaundiced.   Neurological:      Mental Status: She is alert and oriented to person, place, and time.   Psychiatric:         Mood and Affect: Mood normal.         Behavior: Behavior normal.         Thought Content: Thought content normal.         Judgment: Judgment normal.           Assessment and Plan    Diagnoses and all orders for this visit:    1. Crohn's disease of both small and large intestine with intestinal obstruction (Primary)  -     Vitamin B12  -     CBC (No Diff)  -     Vitamin D,25-Hydroxy  -     TSH Rfx On Abnormal To Free T4  -     Folate  -     Comprehensive Metabolic Panel    2. Gastroesophageal reflux disease without esophagitis  -     famotidine (PEPCID) 20 MG tablet; Take 1 tablet by mouth 2 (Two) Times a Day As Needed (GERD).  Dispense: 180 tablet; Refill: 2    3. Pelvic floor dysfunction  -     Ambulatory Referral to Physical Therapy Evaluate and treat    4. Other constipation    5. Anal fissure  -     Ambulatory Referral to Physical Therapy Evaluate and treat    6. Status post small bowel " resection  -     Vitamin B12    7. Vitamin D deficiency  -     Vitamin D,25-Hydroxy    8. Malaise and fatigue  -     Vitamin D,25-Hydroxy  -     TSH Rfx On Abnormal To Free T4    9. Medication monitoring encounter  -     CBC (No Diff)  -     Comprehensive Metabolic Panel  -     Lipid Panel    10. Health care maintenance  -     Lipid Panel    Referral placed for pelvic floor physical therapy  Recommend only:   Jeni Callejas Pelvic Floor Physical Therapy  [facility]    Blood work today    Start famotidine once or twice during the day   Continue nexium in the evening  Crohn's disease status post laparoscopic ileocecectomy November 2020, continue SKYRIZI OBI every 8 weeks    For change in bowel movements that occurs 4 to 5 weeks after Skyrizi treatment, start 1/4 cup of MiraLAX every other day to help promote more regular bowel movements and avoid worsening constipation and then diarrhea and anal fissure pain as a result of bowel movement changes.      To consider sublingual B12 if B12 is low, patient is currently taking capsule supplement    Orders placed for blood work, if abnormal lipid panel and TSH abnormality, patient is aware that we will recommend she follow-up with her primary care provider.  Orders placed for blood work for medication monitoring.    Schedule EGD and colonoscopy, orders placed.    Additional recommendations will be made based on results of EGD and colonoscopy findings.    Follow-up visit with myself and Dr Escalona after procedures to discuss results and make any additional recommendations.           Patient Instructions   Referral placed for pelvic floor physical therapy  Recommend only:   Jeni Callejas Pelvic Floor Physical Therapy  [facility]    Blood work today    Start famotidine once or twice during the day   Continue nexium in the evening  Continue SKYRIZI  4-5 weeks after OBI start 1/4 cup  miralax every other day    Schedule EGD and colonoscopy, orders placed.    Additional recommendations will be made based on results of EGD and colonoscopy findings.    Follow-up visit with myself and Dr Escalona after procedures to discuss results and make any additional recommendations.         EMR Dragon/Transcription Disclaimer:  This document has been Dictated utilizing Dragon dictation.

## 2024-02-13 LAB
25(OH)D3+25(OH)D2 SERPL-MCNC: 39.1 NG/ML (ref 30–100)
ALBUMIN SERPL-MCNC: 4.2 G/DL (ref 3.5–5.2)
ALBUMIN/GLOB SERPL: 1.6 G/DL
ALP SERPL-CCNC: 91 U/L (ref 39–117)
ALT SERPL-CCNC: 12 U/L (ref 1–33)
AST SERPL-CCNC: 14 U/L (ref 1–32)
BILIRUB SERPL-MCNC: 0.3 MG/DL (ref 0–1.2)
BUN SERPL-MCNC: 11 MG/DL (ref 6–20)
BUN/CREAT SERPL: 13.9 (ref 7–25)
CALCIUM SERPL-MCNC: 9.3 MG/DL (ref 8.6–10.5)
CHLORIDE SERPL-SCNC: 102 MMOL/L (ref 98–107)
CHOLEST SERPL-MCNC: 186 MG/DL (ref 0–200)
CO2 SERPL-SCNC: 28.3 MMOL/L (ref 22–29)
CREAT SERPL-MCNC: 0.79 MG/DL (ref 0.57–1)
EGFRCR SERPLBLD CKD-EPI 2021: 98.9 ML/MIN/1.73
ERYTHROCYTE [DISTWIDTH] IN BLOOD BY AUTOMATED COUNT: 12.1 % (ref 12.3–15.4)
FOLATE SERPL-MCNC: >20 NG/ML (ref 4.78–24.2)
GLOBULIN SER CALC-MCNC: 2.6 GM/DL
GLUCOSE SERPL-MCNC: 80 MG/DL (ref 65–99)
HCT VFR BLD AUTO: 42.1 % (ref 34–46.6)
HDLC SERPL-MCNC: 37 MG/DL (ref 40–60)
HGB BLD-MCNC: 14.1 G/DL (ref 12–15.9)
LDLC SERPL CALC-MCNC: 95 MG/DL (ref 0–100)
MCH RBC QN AUTO: 29.7 PG (ref 26.6–33)
MCHC RBC AUTO-ENTMCNC: 33.5 G/DL (ref 31.5–35.7)
MCV RBC AUTO: 88.8 FL (ref 79–97)
PLATELET # BLD AUTO: 361 10*3/MM3 (ref 140–450)
POTASSIUM SERPL-SCNC: 4.5 MMOL/L (ref 3.5–5.2)
PROT SERPL-MCNC: 6.8 G/DL (ref 6–8.5)
RBC # BLD AUTO: 4.74 10*6/MM3 (ref 3.77–5.28)
SODIUM SERPL-SCNC: 140 MMOL/L (ref 136–145)
TRIGL SERPL-MCNC: 321 MG/DL (ref 0–150)
TSH SERPL DL<=0.005 MIU/L-ACNC: 1.68 UIU/ML (ref 0.27–4.2)
VIT B12 SERPL-MCNC: >2000 PG/ML (ref 211–946)
VLDLC SERPL CALC-MCNC: 54 MG/DL (ref 5–40)
WBC # BLD AUTO: 7.85 10*3/MM3 (ref 3.4–10.8)

## 2024-03-22 ENCOUNTER — PREP FOR SURGERY (OUTPATIENT)
Dept: OTHER | Facility: HOSPITAL | Age: 39
End: 2024-03-22
Payer: COMMERCIAL

## 2024-03-22 DIAGNOSIS — K50.111 CROHN'S DISEASE OF LARGE INTESTINE WITH RECTAL BLEEDING: Primary | ICD-10-CM

## 2024-03-22 DIAGNOSIS — K58.8 OTHER IRRITABLE BOWEL SYNDROME: ICD-10-CM

## 2024-03-22 DIAGNOSIS — K21.00 GASTROESOPHAGEAL REFLUX DISEASE WITH ESOPHAGITIS WITHOUT HEMORRHAGE: ICD-10-CM

## 2024-03-22 DIAGNOSIS — K60.2 ANAL FISSURE: ICD-10-CM

## 2024-03-22 DIAGNOSIS — K62.5 RECTAL BLEEDING: ICD-10-CM

## 2024-03-22 DIAGNOSIS — R13.19 ESOPHAGEAL DYSPHAGIA: ICD-10-CM

## 2024-03-22 DIAGNOSIS — Z90.49 STATUS POST SMALL BOWEL RESECTION: ICD-10-CM

## 2024-03-25 DIAGNOSIS — K50.812 CROHN'S DISEASE OF BOTH SMALL AND LARGE INTESTINE WITH INTESTINAL OBSTRUCTION: ICD-10-CM

## 2024-03-25 RX ORDER — RISANKIZUMAB-RZAA 360 MG/2.4
WEARABLE INJECTOR SUBCUTANEOUS
Qty: 2.4 ML | Refills: 4 | Status: SHIPPED | OUTPATIENT
Start: 2024-03-25

## 2024-03-29 ENCOUNTER — ANESTHESIA EVENT (OUTPATIENT)
Dept: PERIOP | Facility: HOSPITAL | Age: 39
End: 2024-03-29
Payer: COMMERCIAL

## 2024-04-03 ENCOUNTER — HOSPITAL ENCOUNTER (OUTPATIENT)
Facility: HOSPITAL | Age: 39
Setting detail: HOSPITAL OUTPATIENT SURGERY
Discharge: HOME | End: 2024-04-03
Attending: STUDENT IN AN ORGANIZED HEALTH CARE EDUCATION/TRAINING PROGRAM | Admitting: STUDENT IN AN ORGANIZED HEALTH CARE EDUCATION/TRAINING PROGRAM
Payer: COMMERCIAL

## 2024-04-03 ENCOUNTER — ANESTHESIA (OUTPATIENT)
Dept: PERIOP | Facility: HOSPITAL | Age: 39
End: 2024-04-03
Payer: COMMERCIAL

## 2024-04-03 VITALS
TEMPERATURE: 98.4 F | DIASTOLIC BLOOD PRESSURE: 90 MMHG | HEART RATE: 85 BPM | BODY MASS INDEX: 46.49 KG/M2 | RESPIRATION RATE: 16 BRPM | SYSTOLIC BLOOD PRESSURE: 130 MMHG | WEIGHT: 293 LBS | OXYGEN SATURATION: 99 %

## 2024-04-03 DIAGNOSIS — K50.111 CROHN'S DISEASE OF LARGE INTESTINE WITH RECTAL BLEEDING: ICD-10-CM

## 2024-04-03 DIAGNOSIS — R13.19 ESOPHAGEAL DYSPHAGIA: ICD-10-CM

## 2024-04-03 DIAGNOSIS — Z90.49 STATUS POST SMALL BOWEL RESECTION: ICD-10-CM

## 2024-04-03 DIAGNOSIS — K21.00 GASTROESOPHAGEAL REFLUX DISEASE WITH ESOPHAGITIS WITHOUT HEMORRHAGE: ICD-10-CM

## 2024-04-03 DIAGNOSIS — K62.5 RECTAL BLEEDING: ICD-10-CM

## 2024-04-03 DIAGNOSIS — K58.8 OTHER IRRITABLE BOWEL SYNDROME: ICD-10-CM

## 2024-04-03 DIAGNOSIS — K60.2 ANAL FISSURE: ICD-10-CM

## 2024-04-03 PROCEDURE — 45378 DIAGNOSTIC COLONOSCOPY: CPT | Performed by: STUDENT IN AN ORGANIZED HEALTH CARE EDUCATION/TRAINING PROGRAM

## 2024-04-03 PROCEDURE — 88305 TISSUE EXAM BY PATHOLOGIST: CPT | Performed by: STUDENT IN AN ORGANIZED HEALTH CARE EDUCATION/TRAINING PROGRAM

## 2024-04-03 PROCEDURE — 43239 EGD BIOPSY SINGLE/MULTIPLE: CPT | Performed by: STUDENT IN AN ORGANIZED HEALTH CARE EDUCATION/TRAINING PROGRAM

## 2024-04-03 PROCEDURE — 25010000002 PROPOFOL 200 MG/20ML EMULSION

## 2024-04-03 PROCEDURE — 25810000003 LACTATED RINGERS PER 1000 ML: Performed by: NURSE ANESTHETIST, CERTIFIED REGISTERED

## 2024-04-03 RX ORDER — PROPOFOL 10 MG/ML
INJECTION, EMULSION INTRAVENOUS AS NEEDED
Status: DISCONTINUED | OUTPATIENT
Start: 2024-04-03 | End: 2024-04-03 | Stop reason: SURG

## 2024-04-03 RX ORDER — ONDANSETRON 2 MG/ML
4 INJECTION INTRAMUSCULAR; INTRAVENOUS ONCE AS NEEDED
Status: DISCONTINUED | OUTPATIENT
Start: 2024-04-03 | End: 2024-04-03 | Stop reason: HOSPADM

## 2024-04-03 RX ORDER — SODIUM CHLORIDE, SODIUM LACTATE, POTASSIUM CHLORIDE, CALCIUM CHLORIDE 600; 310; 30; 20 MG/100ML; MG/100ML; MG/100ML; MG/100ML
100 INJECTION, SOLUTION INTRAVENOUS ONCE
Status: DISCONTINUED | OUTPATIENT
Start: 2024-04-03 | End: 2024-04-03 | Stop reason: HOSPADM

## 2024-04-03 RX ORDER — SODIUM CHLORIDE 0.9 % (FLUSH) 0.9 %
10 SYRINGE (ML) INJECTION AS NEEDED
Status: DISCONTINUED | OUTPATIENT
Start: 2024-04-03 | End: 2024-04-03 | Stop reason: HOSPADM

## 2024-04-03 RX ORDER — LIDOCAINE HYDROCHLORIDE 20 MG/ML
INJECTION, SOLUTION INFILTRATION; PERINEURAL AS NEEDED
Status: DISCONTINUED | OUTPATIENT
Start: 2024-04-03 | End: 2024-04-03 | Stop reason: SURG

## 2024-04-03 RX ORDER — SODIUM CHLORIDE 0.9 % (FLUSH) 0.9 %
10 SYRINGE (ML) INJECTION EVERY 12 HOURS SCHEDULED
Status: DISCONTINUED | OUTPATIENT
Start: 2024-04-03 | End: 2024-04-03 | Stop reason: HOSPADM

## 2024-04-03 RX ORDER — SODIUM CHLORIDE, SODIUM LACTATE, POTASSIUM CHLORIDE, CALCIUM CHLORIDE 600; 310; 30; 20 MG/100ML; MG/100ML; MG/100ML; MG/100ML
9 INJECTION, SOLUTION INTRAVENOUS CONTINUOUS PRN
Status: DISCONTINUED | OUTPATIENT
Start: 2024-04-03 | End: 2024-04-03 | Stop reason: HOSPADM

## 2024-04-03 RX ORDER — SODIUM CHLORIDE 9 MG/ML
40 INJECTION, SOLUTION INTRAVENOUS AS NEEDED
Status: DISCONTINUED | OUTPATIENT
Start: 2024-04-03 | End: 2024-04-03 | Stop reason: HOSPADM

## 2024-04-03 RX ORDER — DEXMEDETOMIDINE HYDROCHLORIDE 100 UG/ML
INJECTION, SOLUTION INTRAVENOUS AS NEEDED
Status: DISCONTINUED | OUTPATIENT
Start: 2024-04-03 | End: 2024-04-03 | Stop reason: SURG

## 2024-04-03 RX ADMIN — PROPOFOL 100 MG: 10 INJECTION, EMULSION INTRAVENOUS at 12:07

## 2024-04-03 RX ADMIN — PROPOFOL 100 MG: 10 INJECTION, EMULSION INTRAVENOUS at 12:01

## 2024-04-03 RX ADMIN — PROPOFOL 100 MG: 10 INJECTION, EMULSION INTRAVENOUS at 12:15

## 2024-04-03 RX ADMIN — DEXMEDETOMIDINE 4 MCG: 100 INJECTION, SOLUTION INTRAVENOUS at 12:15

## 2024-04-03 RX ADMIN — PROPOFOL 100 MG: 10 INJECTION, EMULSION INTRAVENOUS at 11:58

## 2024-04-03 RX ADMIN — LIDOCAINE HYDROCHLORIDE 100 MG: 20 INJECTION, SOLUTION INFILTRATION; PERINEURAL at 11:58

## 2024-04-03 RX ADMIN — SODIUM CHLORIDE, POTASSIUM CHLORIDE, SODIUM LACTATE AND CALCIUM CHLORIDE 9 ML/HR: 600; 310; 30; 20 INJECTION, SOLUTION INTRAVENOUS at 10:34

## 2024-04-03 RX ADMIN — DEXMEDETOMIDINE 8 MCG: 100 INJECTION, SOLUTION INTRAVENOUS at 12:04

## 2024-04-03 RX ADMIN — DEXMEDETOMIDINE 12 MCG: 100 INJECTION, SOLUTION INTRAVENOUS at 11:58

## 2024-04-03 NOTE — ANESTHESIA PREPROCEDURE EVALUATION
Anesthesia Evaluation     Patient summary reviewed and Nursing notes reviewed   no history of anesthetic complications:   NPO Solid Status: > 8 hours  NPO Liquid Status: > 8 hours           Airway   Mallampati: II  Neck ROM: full  no difficulty expected  Dental - normal exam     Pulmonary - normal exam   (+) asthma,sleep apnea on CPAP  (-) COPD, not a smoker    ROS comment: Rescue inhaler used 1 month ago  PE comment: nonlabored  Cardiovascular - normal exam  Exercise tolerance: poor (<4 METS)    ECG reviewed  Rhythm: regular  Rate: normal    (+) CAD  (-) hypertension, valvular problems/murmurs, past MI, dysrhythmias, angina    ROS comment: Hx: POTS, congenital pericardial cyst    Echo- 10/07/21  Normal stress echo with no significant echocardiographic evidence for myocardial ischemia.  Findings consistent with a normal ECG stress test.  Calculated left ventricular EF = 68% Estimated left ventricular EF was in agreement with the calculated left ventricular EF. Left ventricular systolic function is normal. Normal left ventricular cavity size and wall thickness noted. All left ventricular wall segments contract normally. Left ventricular diastolic function was normal.    HEART RATE= 71  bpm  RR Interval= 840  ms  LA Interval= 164  ms  P Horizontal Axis= 3  deg  P Front Axis= 36  deg  QRSD Interval= 86  ms  QT Interval= 392  ms  QRS Axis= 24  deg  T Wave Axis= 30  deg  - BORDERLINE ECG -  Sinus rhythm  Borderline T abnormalities, anterior leads  NO PRIOR TRACING AVAILABLE FOR COMPARISON  Electronically Signed By: Jyothi Kearney ([facility]) 02-Oct-2021 17:49:49  Date and Time of Study: 2021-10-01 20:30:35    Neuro/Psych  (+) headaches, dizziness/light headedness, syncope (Vasodepressor syncope), weakness, numbness, psychiatric history Anxiety and Depression  (-) seizures, TIA, CVA    ROS Comment: Numbness in both hands. Generalized weakness, patient states lower extremities are worse.  GI/Hepatic/Renal/Endo    (+) obesity,  morbid obesity, GERD poorly controlled, liver disease history of elevated LFT  (-) no renal disease, diabetes, no thyroid disorder    ROS Comment: Crohn's disease    Musculoskeletal     (+) arthralgias, back pain, myalgias (fibromyalgia)  Abdominal    Substance History      OB/GYN      Comment: PCOS      Other   arthritis, autoimmune disease rheumatoid arthritis,                 Anesthesia Plan    ASA 3     MAC     intravenous induction     Anesthetic plan, risks, benefits, and alternatives have been provided, discussed and informed consent has been obtained with: patient.    Use of blood products discussed with patient  Consented to blood products.    Plan discussed with CRNA and resident.    CODE STATUS:

## 2024-04-03 NOTE — ANESTHESIA POSTPROCEDURE EVALUATION
Patient: Bri Pfeiffer    Procedure Summary       Date: 04/03/24 Room / Location: [facility] ENDOSCOPY 2 /  [facility]    Anesthesia Start: 1154 Anesthesia Stop: 1229    Procedures:       ESOPHAGOGASTRODUODENOSCOPY WITH BIOPSY (Esophagus)      COLONOSCOPY Diagnosis:       Gastroesophageal reflux disease with esophagitis without hemorrhage      Crohn's disease of large intestine with rectal bleeding      Other irritable bowel syndrome      Anal fissure      Rectal bleeding      Esophageal dysphagia      Status post small bowel resection      (Gastroesophageal reflux disease with esophagitis without hemorrhage [K21.00])      (Crohn's disease of large intestine with rectal bleeding [K50.111])      (Other irritable bowel syndrome [K58.8])      (Anal fissure [K60.2])      (Rectal bleeding [K62.5])      (Esophageal dysphagia [R13.19])      (Status post small bowel resection [Z90.49])    Surgeons: Ede Benton MD Provider: Gee Porter CRNA    Anesthesia Type: MAC ASA Status: 3            Anesthesia Type: MAC    Vitals  Vitals Value Taken Time   /81 04/03/24 1300   Temp     Pulse 86 04/03/24 1303   Resp 16 04/03/24 1250   SpO2 98 % 04/03/24 1303   Vitals shown include unfiled device data.        Post Anesthesia Care and Evaluation    Patient location during evaluation: PHASE II  Patient participation: complete - patient participated  Level of consciousness: awake  Pain score: 0  Pain management: adequate    Airway patency: patent  Anesthetic complications: No anesthetic complications  PONV Status: none  Cardiovascular status: acceptable  Respiratory status: acceptable  Hydration status: acceptable

## 2024-04-03 NOTE — H&P
.Patient Care Team:  Alan Benavidez MD as PCP - General (Family Medicine)  Tae Turcios MD as Consulting Physician (Gastroenterology)  Dioni Escalona MD as Consulting Physician (Gastroenterology)  Tapan Gomez MD as Consulting Physician (Cardiology)  Xochilt Mcgovern APRN as Nurse Practitioner (Gastroenterology)  Farhad Thao MD as Consulting Physician (Neurology)    CHIEF COMPLAINT:  GERD and Crohn's Disease    HISTORY OF PRESENT ILLNESS:  EGD for GERD.   C/s for surveillance of Crohn's Disease.     Past Medical History:   Diagnosis Date    Allergic     Allergies     Anemia     Anxiety     Asthma     Breast mass 2021    Recheck diagnostic 6/22    Coronary artery disease     Congenital pericardial cyst    Crohn's disease 05/2019    CTS (carpal tunnel syndrome)     Depression     Difficulty walking     POLY NEUROPATHY    Diverticulitis of colon     Eczema     Fibrocystic breast     Fibromyalgia, primary     Fissure, anal     GERD (gastroesophageal reflux disease)     GI (gastrointestinal bleed)     Headache, tension-type     Hernia, abdominal     History of medical problems     Crohn's disease    HL (hearing loss)     Hypoglycemia     IBS (irritable bowel syndrome)     Irregular heartbeat     OCCAS    Lactose intolerance     Low back pain     Memory loss     Migraine     Obesity     Pericardial cyst     Peripheral neuropathy     Polycystic ovary syndrome 2022    Hemorrhagic ovarian cysts    Rectal bleeding     Rheumatoid arthritis     Sensitive skin     Sleep apnea     Spondylosis     Syncope     MORE RECENTLY: JAN 27 2023///PREVIOUS ENTRY: STATES LAST EPISODE 4/6/22 AT WORK    Visual impairment     Vitamin D deficiency     Wound dehiscence      Past Surgical History:   Procedure Laterality Date    ADENOIDECTOMY      APPENDECTOMY      CHOLECYSTECTOMY WITH INTRAOPERATIVE CHOLANGIOGRAM N/A 7/14/2023    Procedure: CHOLECYSTECTOMY LAPAROSCOPIC INTRAOPERATIVE CHOLANGIOGRAM;  Surgeon: Teto Flores  MD Deric;  Location: [facility];  Service: General;  Laterality: N/A;    COLON RESECTION N/A 11/04/2020    Procedure: laparoscopic ileocecectomy;  Surgeon: Lisa Kerr MD;  Location:  [facility];  Service: General;  Laterality: N/A;    COLONOSCOPY N/A 05/10/2019    Procedure: COLONOSCOPY TO CECUM TO TERMINAL ILEUM WITH BIOPSY;  Surgeon: Tae Turcios MD;  Location:  [facility];  Service: Gastroenterology    COLONOSCOPY N/A 04/13/2022    Procedure: COLONOSCOPY;  Surgeon: Jaylen Scott MD;  Location:  [facility];  Service: Gastroenterology;  Laterality: N/A;  ANAL FISSURE    ENDOSCOPY N/A 05/10/2019    Procedure: ESOPHAGOGASTRODUODENOSCOPY WITH BIOPSY;  Surgeon: Tae Turcios MD;  [facility];  Service: Gastroenterology    ENDOSCOPY N/A 12/13/2019    Procedure: ESOPHAGOGASTRODUODENOSCOPY WITH COLD BIOPSIES;  Surgeon: Tae Turcios MD;  [facility];  Service: Gastroenterology    LIPOMA EXCISION  2007    SIGMOIDOSCOPY N/A 12/13/2019    Procedure: SIGMOIDOSCOPY FLEXIBLE TO T.I.;  Surgeon: Tae Turcios MD;  [facility];  Service: Gastroenterology    TONSILLECTOMY AND ADENOIDECTOMY      WISDOM TOOTH EXTRACTION       Family History   Problem Relation Age of Onset    Obesity Mother     Hypertension Father     Skin cancer Father     Cancer Father         Basal cell skin cancer    Hearing loss Father         Mass causing hearing loss (acoustic neuroma)    Obesity Father     Irritable bowel syndrome Sister     Parkinsonism Maternal Uncle     Cancer Maternal Grandmother         Colorectal cancer    Colon cancer Maternal Grandmother     Arthritis Maternal Grandmother     Prostate cancer Maternal Grandfather     Cancer Maternal Grandfather         Prostate cancer    Hypertension Paternal Grandmother     Obesity Paternal Grandmother     Hearing loss Paternal Grandmother         Age related Hearing loss    Vision loss Paternal Grandmother          Macular degeneration    Prostate cancer Paternal Grandfather     Hypertension Paternal Grandfather     Cancer Paternal Grandfather         Prostate cancer, skin cancer    Hearing loss Paternal Grandfather         Age related hearing loss    Obesity Paternal Grandfather     Obesity Sister     Obesity Brother     Obesity Maternal Uncle     Malig Hyperthermia Neg Hx     Colon polyps Neg Hx     Crohn's disease Neg Hx     Ulcerative colitis Neg Hx      Social History     Tobacco Use    Smoking status: Never    Smokeless tobacco: Never    Tobacco comments:     caffeine use a few days weekly   Vaping Use    Vaping status: Never Used   Substance Use Topics    Alcohol use: Not Currently     Comment: Maybe one drink a month. Very rare.    Drug use: Never     Medications Prior to Admission   Medication Sig Dispense Refill Last Dose    Aloe Vera 25 MG capsule    Past Week    amitriptyline (ELAVIL) 10 MG tablet Take 1 tablet by mouth Every Night for 90 days. 90 tablet 0 Past Week    cetirizine (zyrTEC) 10 MG tablet Take 1 tablet by mouth Daily.   4/2/2024    Cholecalciferol (VITAMIN D-3) 5000 units tablet Take 1 tablet by mouth Daily.   4/2/2024    Cyanocobalamin (VITAMIN B 12 PO) Take 1 tablet by mouth Daily.   4/2/2024    esomeprazole (nexIUM) 40 MG capsule Take 1 capsule by mouth Daily. 90 capsule 3 4/2/2024    famotidine (PEPCID) 20 MG tablet Take 1 tablet by mouth 2 (Two) Times a Day As Needed (GERD). 180 tablet 2 4/2/2024    ferrous sulfate 324 (65 Fe) MG tablet delayed-release EC tablet Take 1 tablet by mouth Daily With Breakfast.   Past Week    fluticasone (FLONASE) 50 MCG/ACT nasal spray 2 sprays into the nostril(s) as directed by provider Daily.   4/3/2024    Folic Acid 0.8 MG capsule    Past Week    guanFACINE (TENEX) 1 MG tablet    Past Week    mometasone-formoterol (Dulera) 100-5 MCG/ACT inhaler Inhale 2 puffs 2 (Two) Times a Day. 13 g 1 Past Week    montelukast (SINGULAIR) 10 MG tablet Take 1 tablet by mouth Daily.  90 tablet 1 Past Week    Multiple Vitamin (MULTIVITAMIN PO) Take 1 tablet by mouth Daily.   Past Week    ondansetron (ZOFRAN) 8 MG tablet Take 1 tablet by mouth Every 8 (Eight) Hours As Needed for Nausea or Vomiting. 60 tablet 5 Past Month    polyethylene glycol (MIRALAX) 17 GM/SCOOP powder Take  by mouth.   4/2/2024    pregabalin (LYRICA) 100 MG capsule Take 1 capsule by mouth 3 (Three) Times a Day. 270 capsule 1 4/2/2024    pyridostigmine (MESTINON) 60 MG tablet Take 1 tablet by mouth 3 (Three) Times a Day.   4/2/2024    Riboflavin-Magnesium-Feverfew (MigreLief) 200-180-50 MG tablet    Past Week    Rimegepant Sulfate (Nurtec) 75 MG tablet dispersible tablet Take 1 tablet by mouth As Needed (Take 1 tablet by mouth daily as needed for headache/migraine.  Do not take more than one in a 24 hour period.). 8 tablet 11 Past Month    spironolactone (ALDACTONE) 100 MG tablet Take 1 tablet by mouth Daily.   4/2/2024    venlafaxine XR (EFFEXOR-XR) 150 MG 24 hr capsule Take 1 capsule by mouth Daily for 30 days. 30 capsule 5 4/2/2024    albuterol sulfate  (90 Base) MCG/ACT inhaler Inhale 2 puffs Every 4 (Four) Hours As Needed.   More than a month    dicyclomine (BENTYL) 20 MG tablet Take 1 tablet by mouth Daily As Needed for Abdominal Cramping.   More than a month    dilTIAZem HCl (diltiazem 2% and lidocaine 5%) Apply 1 application topically to the appropriate area as directed 2 (Two) Times a Day. Per rectum 30 g 3 More than a month    Risankizumab-rzaa (Skyrizi) 360 MG/2.4ML solution cartridge INSERT 1 CARTRIDGE INTO ON-BODY INJECTOR AND INJECT 360 MG UNDER THE SKIN EVERY 8 WEEKS. 2.4 mL 4 More than a month     Allergies:  Humira [adalimumab], Lactose intolerance (gi), Levaquin [levofloxacin], Nsaids, Pseudoephedrine, and Percocet [oxycodone-acetaminophen]    REVIEW OF SYSTEMS:  Please see the above history of present illness for pertinent positives and negatives.  The remainder of the patient's systems have been  reviewed and are negative.     Vital Signs  Temp:  [98.4 °F (36.9 °C)] 98.4 °F (36.9 °C)  Heart Rate:  [88] 88  Resp:  [10] 10  BP: (131)/(94) 131/94    Flowsheet Rows      Flowsheet Row First Filed Value   Admission Height --   Admission Weight 138 kg (304 lb) Documented at 04/03/2024 1011             Physical Exam:  Physical Exam   Constitutional: Patient appears well-developed and well-nourished and in no acute distress   HEENT:   Head: Normocephalic and atraumatic.   Eyes:  Pupils are equal, round, and reactive to light. EOM are intact. Sclerae are anicteric and non-injected.  Mouth and Throat: Patient has moist mucous membranes. Oropharynx is clear of any erythema or exudate.     Neck: Neck supple. No JVD present. No thyromegaly present. No lymphadenopathy present.  Cardiovascular: Regular rate, regular rhythm, S1 normal and S2 normal.  Exam reveals no gallop and no friction rub.  No murmur heard.  Pulmonary/Chest: Lungs are clear to auscultation bilaterally. No respiratory distress. No wheezes. No rhonchi. No rales.   Abdominal: Soft. Bowel sounds are normal. No distension and no mass. There is no hepatosplenomegaly. There is no tenderness.   Musculoskeletal: Normal Muscle tone  Extremities: No edema. Pulses are palpable in all 4 extremities.  Neurological: Patient is alert and oriented to person, place, and time. Cranial nerves II-XII are grossly intact with no focal deficits.  Skin: Skin is warm. No rash noted. Nails show no clubbing.  No cyanosis or erythema.    Debilities/Disabilities Identified: None  Emotional Behavior: Appropriate     Results Review:   I reviewed the patient's new clinical results.    Lab Results (most recent)       None            Imaging Results (Most Recent)       None          reviewed    ECG/EMG Results (most recent)       None          reviewed    Assessment & Plan   GERD and Crohn's Disease /  EGD and colonoscopy      I discussed the patient's findings and my recommendations with  patient.     Ede Benton MD  04/03/24  11:57 EDT    Time: 10 min prior to procedure.

## 2024-04-05 LAB
LAB AP CASE REPORT: NORMAL
PATH REPORT.FINAL DX SPEC: NORMAL
PATH REPORT.GROSS SPEC: NORMAL

## 2024-04-09 ENCOUNTER — OFFICE VISIT (OUTPATIENT)
Dept: FAMILY MEDICINE CLINIC | Facility: CLINIC | Age: 39
End: 2024-04-09
Payer: COMMERCIAL

## 2024-04-09 VITALS
RESPIRATION RATE: 18 BRPM | DIASTOLIC BLOOD PRESSURE: 81 MMHG | HEIGHT: 68 IN | WEIGHT: 293 LBS | HEART RATE: 94 BPM | BODY MASS INDEX: 44.41 KG/M2 | OXYGEN SATURATION: 100 % | SYSTOLIC BLOOD PRESSURE: 116 MMHG | TEMPERATURE: 98.4 F
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
## 2024-04-09 DIAGNOSIS — M79.7 FIBROMYALGIA: Chronic | ICD-10-CM

## 2024-04-09 DIAGNOSIS — S20.461A TICK BITE OF RIGHT BACK WALL OF THORAX, INITIAL ENCOUNTER: Primary | ICD-10-CM

## 2024-04-09 DIAGNOSIS — J30.1 NON-SEASONAL ALLERGIC RHINITIS DUE TO POLLEN: Chronic | ICD-10-CM

## 2024-04-09 PROCEDURE — 99214 OFFICE O/P EST MOD 30 MIN: CPT | Performed by: FAMILY MEDICINE

## 2024-04-09 RX ORDER — MOMETASONE FUROATE AND FORMOTEROL FUMARATE DIHYDRATE 100; 5 UG/1; UG/1
2 AEROSOL RESPIRATORY (INHALATION)
Qty: 13 G | Refills: 1 | OUTPATIENT
Start: 2024-04-09

## 2024-04-09 RX ORDER — MOMETASONE FUROATE AND FORMOTEROL FUMARATE DIHYDRATE 100; 5 UG/1; UG/1
2 AEROSOL RESPIRATORY (INHALATION)
Qty: 13 G | Refills: 1 | Status: SHIPPED | OUTPATIENT
Start: 2024-04-09

## 2024-04-09 RX ORDER — PREGABALIN 100 MG/1
100 CAPSULE ORAL 3 TIMES DAILY
Qty: 270 CAPSULE | Refills: 1 | Status: SHIPPED | OUTPATIENT
Start: 2024-04-09

## 2024-04-09 RX ORDER — DOXYCYCLINE HYCLATE 100 MG
100 TABLET ORAL 2 TIMES DAILY
Qty: 28 TABLET | Refills: 0 | Status: SHIPPED | OUTPATIENT
Start: 2024-04-09 | End: 2024-04-23

## 2024-04-09 RX ORDER — PREGABALIN 100 MG/1
100 CAPSULE ORAL 3 TIMES DAILY
Qty: 270 CAPSULE | Refills: 1 | OUTPATIENT
Start: 2024-04-09

## 2024-04-09 NOTE — PROGRESS NOTES
"Subjective   Bri Pfeiffer is a 38 y.o. female.     CC: Tick Bite    Patient comes in today after sending this message earlier:    Myriam Benavidez and staff!  Just a quick question, I unfortunately found an adult female lone star tick attached to me this morning.  I wasn't sure, since I am on immunosuppressants, if prophylactic treatment is warranted to reduce chances of tick borne illness.   Is there general guidelines that I should be aware of?    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  She found it this morning between her shoulder blades in her upper back.  Denies any fever/chills or other symptoms at this time.  She remove this herself.    Patient also doing well with her Lyrica and Dulera, reports no side effects with these medications, and is due a refill today.    The following portions of the patient's history were reviewed and updated as appropriate: allergies, current medications, past family history, past medical history, past social history, past surgical history, and problem list.    Review of Systems   Constitutional:  Negative for activity change, chills and fever.   Respiratory:  Negative for cough.    Cardiovascular:  Negative for chest pain.   Psychiatric/Behavioral:  Negative for dysphoric mood.        /81   Pulse 94   Temp 98.4 °F (36.9 °C) (Oral)   Resp 18   Ht 172.2 cm (67.81\")   Wt (!) 141 kg (310 lb)   SpO2 100%   BMI 47.40 kg/m²     Objective   Physical Exam  Constitutional:       General: She is not in acute distress.     Appearance: She is well-developed.   Pulmonary:      Effort: Pulmonary effort is normal.   Neurological:      Mental Status: She is alert and oriented to person, place, and time.   Psychiatric:         Behavior: Behavior normal.         Thought Content: Thought content normal.         Assessment & Plan   Diagnoses and all orders for this visit:    1. Tick bite of right " back wall of thorax, initial encounter (Primary)  -     Lyme Disease, Line Blot  -     Spotted Fever Group AB, IgG/IgM  -     Ehrlichia Antibody Panel  -     doxycycline (VIBRAMYICN) 100 MG tablet; Take 1 tablet by mouth 2 (Two) Times a Day for 14 days.  Dispense: 28 tablet; Refill: 0    2. Non-seasonal allergic rhinitis due to pollen  -     mometasone-formoterol (Dulera) 100-5 MCG/ACT inhaler; Inhale 2 puffs 2 (Two) Times a Day.  Dispense: 13 g; Refill: 1    3. Fibromyalgia  -     pregabalin (LYRICA) 100 MG capsule; Take 1 capsule by mouth 3 (Three) Times a Day.  Dispense: 270 capsule; Refill: 1    Reviewed tickborne illnesses and possibility also of cellulitis due to the bite.  Will go ahead and cover with doxycycline and we will go ahead and get some baseline labs today to assess for prior tickborne exposure diseases.

## 2024-04-11 NOTE — PROGRESS NOTES
- EGD for GERD  - Findings: gastritis (biopsied -- negative for H Pylori)  - POC: No evidence of esophagitis seen. Continue Nexium and Pepcid.    - C/s for surveillance of Crohn's Disease.   - Findings/path: normal TI, no inflammation seen  - POC: repeat c/s in 2 to 3 years

## 2024-04-13 LAB
A PHAGOCYTOPH IGG TITR SER IF: NEGATIVE {TITER}
A PHAGOCYTOPH IGM TITR SER IF: NEGATIVE {TITER}
B BURGDOR IGG PATRN SER IB-IMP: NEGATIVE
B BURGDOR IGM PATRN SER IB-IMP: NEGATIVE
B BURGDOR18KD IGG SER QL IB: ABNORMAL
B BURGDOR23KD IGG SER QL IB: ABNORMAL
B BURGDOR23KD IGM SER QL IB: ABNORMAL
B BURGDOR28KD IGG SER QL IB: ABNORMAL
B BURGDOR30KD IGG SER QL IB: ABNORMAL
B BURGDOR39KD IGG SER QL IB: ABNORMAL
B BURGDOR39KD IGM SER QL IB: ABNORMAL
B BURGDOR41KD IGG SER QL IB: PRESENT
B BURGDOR41KD IGM SER QL IB: ABNORMAL
B BURGDOR45KD IGG SER QL IB: ABNORMAL
B BURGDOR58KD IGG SER QL IB: PRESENT
B BURGDOR66KD IGG SER QL IB: ABNORMAL
B BURGDOR93KD IGG SER QL IB: ABNORMAL
E CHAFFEENSIS IGG TITR SER IF: NEGATIVE {TITER}
E CHAFFEENSIS IGM TITR SER IF: NEGATIVE {TITER}
RESULT COMMENT:: NORMAL
RESULT COMMENT:: NORMAL
RICK SF IGG TITR SER: NORMAL {TITER}
RICK SF IGM TITR SER: NORMAL {TITER}

## 2024-04-18 DIAGNOSIS — J30.1 NON-SEASONAL ALLERGIC RHINITIS DUE TO POLLEN: Chronic | ICD-10-CM

## 2024-04-18 DIAGNOSIS — M79.7 FIBROMYALGIA: Chronic | ICD-10-CM

## 2024-04-18 RX ORDER — PREGABALIN 100 MG/1
100 CAPSULE ORAL 3 TIMES DAILY
Qty: 270 CAPSULE | Refills: 1 | Status: CANCELLED | OUTPATIENT
Start: 2024-04-18

## 2024-04-18 RX ORDER — MOMETASONE FUROATE AND FORMOTEROL FUMARATE DIHYDRATE 100; 5 UG/1; UG/1
2 AEROSOL RESPIRATORY (INHALATION)
Qty: 13 G | Refills: 1 | Status: SHIPPED | OUTPATIENT
Start: 2024-04-18

## 2024-04-18 RX ORDER — MOMETASONE FUROATE AND FORMOTEROL FUMARATE DIHYDRATE 100; 5 UG/1; UG/1
2 AEROSOL RESPIRATORY (INHALATION)
Qty: 13 G | Refills: 1 | Status: CANCELLED | OUTPATIENT
Start: 2024-04-18

## 2024-04-18 RX ORDER — PREGABALIN 100 MG/1
100 CAPSULE ORAL 3 TIMES DAILY
Qty: 270 CAPSULE | Refills: 1 | Status: SHIPPED | OUTPATIENT
Start: 2024-04-18

## 2024-04-18 NOTE — TELEPHONE ENCOUNTER
----- Message from Bri Pfefifer sent at 4/18/2024 12:42 PM EDT -----  Regarding: Pregabalin / Dulera   Contact: [phone]  Good afternoon,  It appears as though my refill request to transfer my Pregabalin and Dulera from [facility] to [facility] have been denied by this office Can I please have this investigated? I do need these medications.     My only guess is perhaps I need another office visit to Continue the medications?      Please call [phone] or message me at your earliest convenience.   Thanks  Bri pfeiffer

## 2024-04-18 NOTE — TELEPHONE ENCOUNTER
"----- Message from Bri Pfeiffer sent at 4/18/2024  2:42 PM EDT -----  Regarding: Pregabalin / Dulera   Contact: [phone]  Thank you for getting back to me!    I think the miscommunication is that these scripts needed to be sent to [facility], not [facility].   My health insurance was changed, and that is what caused the change in Pharmacy.     Below is copied from the Prescription request message showing these scripts needed to go to [facility].       \"Refills have been requested for the following medications:      mometasone-formoterol (Dulera) 100-5 MCG/ACT inhaler [Alan Benavidez]      Patient Comment: Pharmacy change       pregabalin (LYRICA) 100 MG capsule [Alan Benavidez]      Patient Comment: Pharmacy change     Preferred pharmacy: [facility] - [address] [facility] [phone] [facility] [phone] FX  Delivery method: Mail\"    I'd appreciate it if your office could submit the prescriptions to [facility].    Thank you,  Bri Pfeiffer   "

## 2024-04-19 ENCOUNTER — SPECIALTY PHARMACY (OUTPATIENT)
Dept: GASTROENTEROLOGY | Facility: CLINIC | Age: 39
End: 2024-04-19
Payer: COMMERCIAL

## 2024-04-19 NOTE — PROGRESS NOTES
Specialty Pharmacy     PA submitted 4/19/24  [employer]  Prior Auth (EOC) ID:[id number]  PA approved  Beverly $1,000+ check coupon card        Dana Huizar  Specialty Pharmacy Technician

## 2024-04-23 ENCOUNTER — SPECIALTY PHARMACY (OUTPATIENT)
Dept: GASTROENTEROLOGY | Facility: CLINIC | Age: 39
End: 2024-04-23
Payer: COMMERCIAL

## 2024-04-23 ENCOUNTER — OFFICE VISIT (OUTPATIENT)
Dept: GASTROENTEROLOGY | Facility: CLINIC | Age: 39
End: 2024-04-23
Payer: COMMERCIAL

## 2024-04-23 VITALS
DIASTOLIC BLOOD PRESSURE: 80 MMHG | WEIGHT: 293 LBS | TEMPERATURE: 96.2 F | OXYGEN SATURATION: 99 % | HEART RATE: 105 BPM | SYSTOLIC BLOOD PRESSURE: 110 MMHG | HEIGHT: 68 IN | BODY MASS INDEX: 44.41 KG/M2

## 2024-04-23 DIAGNOSIS — K50.812 CROHN'S DISEASE OF BOTH SMALL AND LARGE INTESTINE WITH INTESTINAL OBSTRUCTION: ICD-10-CM

## 2024-04-23 DIAGNOSIS — Z90.49 STATUS POST SMALL BOWEL RESECTION: ICD-10-CM

## 2024-04-23 DIAGNOSIS — R74.01 TRANSAMINITIS: ICD-10-CM

## 2024-04-23 DIAGNOSIS — K58.8 OTHER IRRITABLE BOWEL SYNDROME: ICD-10-CM

## 2024-04-23 DIAGNOSIS — K21.9 GASTROESOPHAGEAL REFLUX DISEASE, UNSPECIFIED WHETHER ESOPHAGITIS PRESENT: ICD-10-CM

## 2024-04-23 DIAGNOSIS — K50.00 CROHN'S DISEASE OF SMALL INTESTINE WITHOUT COMPLICATION: Primary | ICD-10-CM

## 2024-04-23 PROCEDURE — 99214 OFFICE O/P EST MOD 30 MIN: CPT | Performed by: INTERNAL MEDICINE

## 2024-04-23 RX ORDER — RISANKIZUMAB-RZAA 360 MG/2.4
360 WEARABLE INJECTOR SUBCUTANEOUS
Qty: 2.4 ML | Refills: 6 | Status: SHIPPED | OUTPATIENT
Start: 2024-04-23

## 2024-04-23 NOTE — PROGRESS NOTES
Specialty Pharmacy Patient Management Program  Gastroenterology Clinical Outreach     Bri Pfeiffer is a 38 y.o. female seen by a Gastroenterology provider for Crohn's Disease and enrolled in the Gastroenterology Patient Management program offered by [facility].     Spoke to patient at office visit with Dr. Escalona today and let her know her prior authorization has been approved on her new insurance. Also let her know that she is now able to fill with [facility] (previously locked in to [facility] per insurance). She is interested in us transferring her other prescriptions as well and enrolling in [facility] autorefill program.    Plan & Follow-Up  Will send Skyrizi prescription to [facility] and patient will call Dana, care coordinator, with Skyrizi credit card information.  Will obtain list of prescriptions from patient she would like to have transferred so that she can be enrolled in [facility] program.    Debbi Abbasi, PharmD, BCACP, BCPS, BCCCP  Clinical Specialty Pharmacist, Gastroenterology  04/23/24 13:37 EDT

## 2024-04-23 NOTE — PATIENT INSTRUCTIONS
1.  Please continue your Skyrizi injections  2.  Try FD guard over-the-counter samples will be provided for you today  3.  Okay to try non-THC CBD oil around the time of your Skyrizi injections  4.  Continue pelvic training and recommendations given to you by pelvic floor training  5.  Follow-up with rheumatology  6.  Follow-up with our office in 3 months  7.  Continue PPI  8.  Please send a [employer] message in 2 to 3 weeks of this new regimen.  If ongoing symptoms we will consider testing for bacterial overgrowth

## 2024-04-23 NOTE — PROGRESS NOTES
"No chief complaint on file.          History of Present Illness  Fu Crohns  On skirizi (prev remicade humira prednisone)    She is actually doing fairly well from a Crohn's disease standpoint.  She does still have trouble defecating and is using all the techniques she has gotten from physical therapy.  She reports dyspeptic symptoms with slight early satiety occasional belching her reflux is under good control with her PPI  No nausea and vomiting no hematemesis no blood in her stool    Is accompanied today by her service animal    Colonoscopy (04/03/2024 11:51) nl with surgical changes  Upper GI Endoscopy (04/03/2024 11:51) gastritis  NM HIDA SCAN WITH PHARMACOLOGICAL INTERVENTION (07/14/2023 11:19)   US Gallbladder (07/12/2023 21:25) --> cck  CT Abdomen Pelvis With Contrast (07/12/2023 19:42) tv colon hernia  Review of Systems       Result Review :           Vital Signs:   /80   Pulse 105   Temp 96.2 °F (35.7 °C)   Ht 172.2 cm (67.8\")   Wt (!) 143 kg (315 lb)   SpO2 99%   BMI 48.19 kg/m²     Body mass index is 48.19 kg/m².     Physical Exam  Vitals reviewed.   Constitutional:       General: She is not in acute distress.     Appearance: Normal appearance. She is not ill-appearing.   Eyes:      General: No scleral icterus.  Pulmonary:      Effort: Pulmonary effort is normal. No respiratory distress.   Abdominal:      General: Bowel sounds are normal. There is no distension.      Palpations: Abdomen is soft. Abdomen is not rigid. There is no pulsatile mass.      Tenderness: There is no abdominal tenderness. There is no guarding or rebound.   Skin:     Coloration: Skin is not jaundiced.   Neurological:      Mental Status: She is alert and oriented to person, place, and time.   Psychiatric:         Thought Content: Thought content normal.         Judgment: Judgment normal.           Assessment and Plan    Diagnoses and all orders for this visit:    1. Crohn's disease of small intestine without complication " (Primary)    2. Gastroesophageal reflux disease, unspecified whether esophagitis present    3. Other irritable bowel syndrome    4. Status post small bowel resection    5. Transaminitis       1.  Please continue your Skyrizi injections  2.  Try FD guard over-the-counter samples will be provided for you today  3.  Okay to try non-THC CBD oil around the time of your Skyrizi injections  4.  Continue pelvic training and recommendations given to you by pelvic floor training  5.  Follow-up with rheumatology  6.  Follow-up with our office in 3 months  7.  Continue PPI  8.  Please send a [employer] message in 2 to 3 weeks of this new regimen.  If ongoing symptoms we will consider testing for bacterial overgrowth      I have reviewed and confirmed the accuracy of the HPI and Assessment and Plan as documented by the APRN Dioni Escalona MD        Follow Up   No follow-ups on file.    Patient Instructions   1.  Please continue your Skyrizi injections  2.  Try FD guard over-the-counter samples will be provided for you today  3.  Okay to try non-THC CBD oil around the time of your Skyrizi injections  4.  Continue pelvic training and recommendations given to you by pelvic floor training  5.  Follow-up with rheumatology  6.  Follow-up with our office in 3 months  7.  Continue PPI  8.  Please send a MyChart message in 2 to 3 weeks of this new regimen.  If ongoing symptoms we will consider testing for bacterial overgrowth

## 2024-04-24 ENCOUNTER — SPECIALTY PHARMACY (OUTPATIENT)
Dept: GASTROENTEROLOGY | Facility: CLINIC | Age: 39
End: 2024-04-24
Payer: COMMERCIAL

## 2024-04-24 NOTE — PROGRESS NOTES
Specialty Pharmacy     Received Aditazz copay card info from [employer], not going through at [facility] for product/service not covered. Contacting  for assistance     Dana Huizar  Specialty Pharmacy Technician

## 2024-04-29 ENCOUNTER — SPECIALTY PHARMACY (OUTPATIENT)
Dept: GASTROENTEROLOGY | Facility: CLINIC | Age: 39
End: 2024-04-29
Payer: COMMERCIAL

## 2024-04-29 NOTE — PROGRESS NOTES
Specialty Pharmacy     Spoke to Bri, she will need to call me back with Beverly CC card info for her copay     Danakennedy Huizar  Specialty Pharmacy Technician

## 2024-04-30 ENCOUNTER — SPECIALTY PHARMACY (OUTPATIENT)
Dept: GASTROENTEROLOGY | Facility: CLINIC | Age: 39
End: 2024-04-30
Payer: COMMERCIAL

## 2024-04-30 NOTE — PROGRESS NOTES
Specialty Pharmacy Refill Coordination Note     Bri is a 38 y.o. female contacted today regarding refills of  1 specialty medication(s).    Reviewed and verified with patient: Beverly GRESHAM    Specialty medication(s) and dose(s) confirmed: yes        Delivery Questions      Flowsheet Row Most Recent Value   Delivery method FedEx   Delivery address verified with patient/caregiver? Yes   Delivery address Home   Number of medications in delivery 1   Medication(s) being filled and delivered Risankizumab-Rzaa (Inflammatory Bowel Agents)   Doses left of specialty medications patient is not due for OBI until mid May   Copay verified? Yes   Copay amount $1049.19 on Skyrizi CC and $5 on [employer]   Copay form of payment Credit/debit on file                   Follow-up: 49 day(s)     Dana Huizar  Specialty Pharmacy Technician

## 2024-04-30 NOTE — PROGRESS NOTES
Specialty Pharmacy     Received fax from [facility] specialty about PA for Beverly GRESHAM, spoke to Pita the pharmacist who will discontinue the order. PA has been taken care of and Guicho Garcia is filling prescription for patient      Dana Huizar  Specialty Pharmacy Technician

## 2024-05-21 ENCOUNTER — OFFICE VISIT (OUTPATIENT)
Dept: NEUROLOGY | Facility: CLINIC | Age: 39
End: 2024-05-21
Payer: COMMERCIAL

## 2024-05-21 VITALS
HEART RATE: 101 BPM | SYSTOLIC BLOOD PRESSURE: 120 MMHG | DIASTOLIC BLOOD PRESSURE: 72 MMHG | WEIGHT: 293 LBS | OXYGEN SATURATION: 99 % | BODY MASS INDEX: 48.19 KG/M2

## 2024-05-21 DIAGNOSIS — M79.7 FIBROMYALGIA: ICD-10-CM

## 2024-05-21 DIAGNOSIS — Z90.49 STATUS POST SMALL BOWEL RESECTION: ICD-10-CM

## 2024-05-21 DIAGNOSIS — G43.009 MIGRAINE WITHOUT AURA AND WITHOUT STATUS MIGRAINOSUS, NOT INTRACTABLE: ICD-10-CM

## 2024-05-21 DIAGNOSIS — F41.9 ANXIETY: ICD-10-CM

## 2024-05-21 DIAGNOSIS — R55 VASODEPRESSOR SYNCOPE: Primary | ICD-10-CM

## 2024-05-21 DIAGNOSIS — K50.111 CROHN'S DISEASE OF LARGE INTESTINE WITH RECTAL BLEEDING: ICD-10-CM

## 2024-05-21 RX ORDER — ESCITALOPRAM OXALATE 10 MG/1
10 TABLET ORAL DAILY
Qty: 30 TABLET | Refills: 2 | Status: SHIPPED | OUTPATIENT
Start: 2024-05-21 | End: 2025-05-21

## 2024-05-21 NOTE — PROGRESS NOTES
CC: f/u    HPI:  Bri Pfeiffer is a  37 y.o.  right-handed female  with past medical history of IBD status post small bowel resection in 2020, fibromyalgia, migraine headaches, vasodepressor syndrome, arthritis, depression/anxiety who is here for follow-up regarding migraines. She started seeing us in 2021 for issues of paresthesias.  There is report of an EMG done by Dr. Newell noting bilateral carpal tunnel with both median motor distal latencies were mildly prolonged at 4.5 ms with normal conduction velocities in the forearms. The amplitude was normal in the right but a little low on the left at 4 mV.  Both ulnar motor studies were obtained and there were normal conduction velocities above and below the elbow on the right but on the left the standard study showed some difference at 58.9 m/s below the elbow with 52.9 m/s across the elbow and so the study was repeated recording over the first dorsal interosseous showing a slow velocity across the elbow at 46.6 m/s and a normal velocity below the elbow at 55.9 m/s.    She saw a hand specialist and surgery was recommended but she opted against it since she would have to suspend her Humira.  She also had issues with left leg numbness, imbalance, lightheadedness, postural syncope, diffuse pain of joints and back.       She has had multiple specialty visits including endocrinology, rheumatology.  She has what is thought to be inflammatory arthritis related to her Crohn's.  Work-up I reviewed includes unremarkable C-spine, RPR was nonreactive, cryoglobulins were negative, copper was within normal limits at 122, heavy metals including lead arsenic and mercury were all within normal, vitamin D levels were within normal, sed rate normal at 13 and IEP/SPEP showed no evidence for monoclonal gammopathy.   Normal EMG of lower extremities done by Dr. Thao in 12/21.  MRI of the lumbar spine and pelvis, referenced as without significant nerve root compression, facet  arthropathy at L4/5, normal SI joints, and no sciatic lesion.    She has been on Cymbalta but that was changed to Effexor in June 2022.  She continues on Lyrica.  She reports being on Topamax when she was a teenager and does not recall if there were any adverse affects. She believes she stopped this because her headaches improved.    Back in January she had c/o numbness that was about th same.  She never had a skin bx.   She had been in the ER with GI sxs and had cholecystectomy about 4 wks previous.  She continues at [facility]-was started on midodrine and guafacine .  She c/o frequent headaches still, but not severe.  She takes nurtec occasionally.  She is also on amitriptyline.  Will have a HA about 3 times a week.  Twice she has had headache that was sudden and sharp and atypical but transient.  Memory is a trouble.  She takes many notes on her phone because she forgets  details of her medical story.      Social history:    Family history:      Pain Scale:        ROS:  Review of Systems   Constitutional:  Positive for fatigue.   Eyes:  Positive for photophobia. Negative for pain and visual disturbance.   Respiratory:  Negative for chest tightness, shortness of breath and wheezing.    Cardiovascular:  Negative for chest pain and palpitations.   Musculoskeletal:  Positive for gait problem (balance problem). Negative for neck pain and neck stiffness.   Neurological:  Positive for numbness (hands & lt leg) and headaches (decreased frequency). Negative for dizziness and weakness.   Psychiatric/Behavioral:  Negative for confusion and decreased concentration.        Reviewed ROS conducted by MA and geraldine        Physical Exam:  Vitals:    05/21/24 1006   BP: 120/72   Pulse: 101   SpO2: 99%   Weight: (!) 143 kg (315 lb)      Orthostatic BP:  There is no height or weight on file to calculate BMI.        General: pt well appearing, no distress  HEENT: Normocephalic, conjunctiva normal, external canals  normal, no nasal discharge, moist mucous membranes  Neck: No lymphadenopathy, thyroid not enlarged, no JVD  CV: Regular rate and rhythm, no murmurs negative no bruits auscultated at neck, equal pulses  Pulmonary: Normal respiratory effort, clear to auscultation bilaterally  Extremities: no edema, bruising, or skin lesions  Pysch: good eye contact, cooperative, full affect, euthymic mood, good attention, good insight  Mental: alert, conversant, AOx3, provides history, 3/3 recall.  Language fluent, names, repeats.  CN: CN II-XII intact, PERRL, EOMi, no gaze palsy or nystagmus, intact facial sensation, no facial assymetry, intact hearing, symmetric palate elevation, tongue midline, good SCM strength  Motor: no abnormal movements, no pronator drift, normal  bulk and tone, 5/5 strength b/l UE & LE  Sensory: normal sensation to crude touch, temperature, and vibration throughout  Reflexes: 2+ throughout, neg babinski  Coordination: no ataxia on finger-nose, heel-shin  Gait: normal gait, no ataxia, intact heel and toe walking        Results:      Lab Results   Component Value Date    GLUCOSE 102 (H) 07/15/2023    BUN 7 07/15/2023    CREATININE 0.69 07/15/2023    EGFRIFNONA 80 10/01/2021    EGFRIFAFRI 120 03/02/2021    BCR 10.1 07/15/2023    CO2 23.4 07/15/2023    CALCIUM 9.3 07/15/2023    PROTENTOTREF 6.8 07/25/2023    ALBUMIN 4.5 07/25/2023    LABIL2 1.6 06/08/2022    AST 15 07/25/2023    ALT 20 07/25/2023       Lab Results   Component Value Date    WBC 11.96 (H) 07/25/2023    HGB 14.1 07/25/2023    HCT 42.9 07/25/2023    MCV 91.5 07/25/2023     07/25/2023         .  Lab Results   Component Value Date    RPR Non-Reactive 09/28/2021         Lab Results   Component Value Date    TSH 1.210 06/13/2022    THYROIDAB 8 06/13/2022         Lab Results   Component Value Date    DZGQJBMY94 488 11/21/2022         Lab Results   Component Value Date    FOLATE 15.4 06/08/2022         No results found for: HGBA1C      Lab Results    Component Value Date    GLUCOSE 102 (H) 07/15/2023    BUN 7 07/15/2023    CREATININE 0.69 07/15/2023    EGFRIFNONA 80 10/01/2021    EGFRIFAFRI 120 03/02/2021    BCR 10.1 07/15/2023    K 4.3 07/15/2023    CO2 23.4 07/15/2023    CALCIUM 9.3 07/15/2023    PROTENTOTREF 6.8 07/25/2023    ALBUMIN 4.5 07/25/2023    LABIL2 1.6 06/08/2022    AST 15 07/25/2023    ALT 20 07/25/2023      B12 and TSH reviewed.  Reviewed available MRI brain.  No GRE sequences seen      Diagnosis:  1. Migraine headache  2. Presumed vasodepressor syndrome  3. Paresthesias  4  Memory problem        Impression: 37-year-old female with above-stated medical history with multiple neurologic complaints but whom we follow for migraine headache.  She follows at [facility] for POTs.  Agree we should change her SSRI to one without such orthostatic tendencies.  I think an AI autonomic impairment unlikely without evidence of true polyneuropathy-b/l median neuropathy and L ulnar neuroapathy per Dr Villalobos' EMG in past.  Has never had skin bx.  Her memory is a problem.  She admits is anxious and does yoga often, but no meditation     Plan:  1 taper off venlafaxine.  Begin lexapro instead  2 nurtec abortive  3 continue amitryptiline for ppx  4 MARS take home cognitive test    F/u tele visit, check in after med change in 2-3 weeks    I spent at least 60 minutes interviewing, examining, and counseling patient.  I independently reviewed documentation, laboratory and diagnostic findings, external documentation where applicable, and formulated treatment plan which was discussed with the patient.

## 2024-05-21 NOTE — PATIENT INSTRUCTIONS
Begin tapering your effexor.  Take one tablet every other day for 10 days    On day 10 begin lexapro daily.  On day 7 increase to two tablets daily for 20 mg thereafter

## 2024-05-29 NOTE — PROGRESS NOTES
"Subjective   Bri Pfeiffer is a 38 y.o. female.     CC: Joint Pain    History of Present Illness     Patient comes in today reporting some joint pain and swelling of the left hand, mainly the MCP joint of the index finger, over some time now.  Patient has Crohn's disease and her Humira was changed to Skyrizi, which is working fantastic for her Crohn's, however she has had global increase in pain since that time specifically with this particular finger.  Denies fever/chills/redness/warmth.       The following portions of the patient's history were reviewed and updated as appropriate: allergies, current medications, past family history, past medical history, past social history, past surgical history, and problem list.    Review of Systems   Constitutional:  Negative for activity change, chills and fever.   Respiratory:  Negative for cough.    Cardiovascular:  Negative for chest pain.   Musculoskeletal:  Positive for arthralgias.   Psychiatric/Behavioral:  Negative for dysphoric mood.        /89   Pulse 78   Temp 97.9 °F (36.6 °C) (Oral)   Resp 16   Ht 172.2 cm (67.8\")   Wt (!) 144 kg (317 lb)   SpO2 100%   BMI 48.48 kg/m²     Objective   Physical Exam  Vitals and nursing note reviewed.   Constitutional:       General: She is not in acute distress.     Appearance: She is well-developed.   Pulmonary:      Effort: Pulmonary effort is normal.   Musculoskeletal:         General: Tenderness (of the left MCP index finger joint) present.   Neurological:      Mental Status: She is alert and oriented to person, place, and time.   Psychiatric:         Behavior: Behavior normal.         Thought Content: Thought content normal.         Assessment & Plan   Diagnoses and all orders for this visit:    1. Arthralgia, unspecified joint (Primary)  -     LAUREN  -     Rheumatoid Factor  -     Uric Acid  -     Sedimentation Rate  -     C-reactive Protein  -     Ambulatory Referral to Rheumatology  -     XR Hand 3+ " View Left; Future    2. Crohn's disease of large intestine with rectal bleeding  -     Ambulatory Referral to Rheumatology    Prescription to [facility] alternative compounding pharmacy for topical treatment sans NSAID given.

## 2024-05-30 ENCOUNTER — TELEPHONE (OUTPATIENT)
Dept: FAMILY MEDICINE CLINIC | Facility: CLINIC | Age: 39
End: 2024-05-30

## 2024-05-30 ENCOUNTER — HOSPITAL ENCOUNTER (OUTPATIENT)
Dept: GENERAL RADIOLOGY | Facility: HOSPITAL | Age: 39
Discharge: HOME | End: 2024-05-30
Admitting: FAMILY MEDICINE
Payer: COMMERCIAL

## 2024-05-30 ENCOUNTER — OFFICE VISIT (OUTPATIENT)
Dept: FAMILY MEDICINE CLINIC | Facility: CLINIC | Age: 39
End: 2024-05-30
Payer: COMMERCIAL

## 2024-05-30 VITALS
DIASTOLIC BLOOD PRESSURE: 89 MMHG | HEART RATE: 78 BPM | BODY MASS INDEX: 44.41 KG/M2 | OXYGEN SATURATION: 100 % | HEIGHT: 68 IN | RESPIRATION RATE: 16 BRPM | SYSTOLIC BLOOD PRESSURE: 125 MMHG | WEIGHT: 293 LBS | TEMPERATURE: 97.9 F

## 2024-05-30 DIAGNOSIS — M25.50 ARTHRALGIA, UNSPECIFIED JOINT: Primary | ICD-10-CM

## 2024-05-30 DIAGNOSIS — K50.111 CROHN'S DISEASE OF LARGE INTESTINE WITH RECTAL BLEEDING: ICD-10-CM

## 2024-05-30 DIAGNOSIS — M25.50 ARTHRALGIA, UNSPECIFIED JOINT: ICD-10-CM

## 2024-05-30 PROCEDURE — 73130 X-RAY EXAM OF HAND: CPT

## 2024-05-30 PROCEDURE — 99214 OFFICE O/P EST MOD 30 MIN: CPT | Performed by: FAMILY MEDICINE

## 2024-05-30 NOTE — TELEPHONE ENCOUNTER
RX ALTERNATIVE PRESCRIPTION FORM ALONG WITH DEMOGRAPHIC FORMS FAXED TO [phone]  CONFIRMATION ATTACHED   COPY TO SCAN IN CHART

## 2024-05-31 LAB
ANA SER QL: NEGATIVE
CRP SERPL-MCNC: 1.14 MG/DL (ref 0–0.5)
ERYTHROCYTE [SEDIMENTATION RATE] IN BLOOD BY WESTERGREN METHOD: 7 MM/HR (ref 0–20)
RHEUMATOID FACT SERPL-ACNC: <10 IU/ML
URATE SERPL-MCNC: 6.6 MG/DL (ref 2.4–5.7)

## 2024-06-17 ENCOUNTER — TELEPHONE (OUTPATIENT)
Dept: NEUROLOGY | Facility: CLINIC | Age: 39
End: 2024-06-17

## 2024-06-17 NOTE — TELEPHONE ENCOUNTER
Provider: CASH    Caller: JOSE    Phone Number: [phone]     Reason for Call: PT CALLED AND STATES THAT SHE IS HAVING A ALLERGY TEST COMPLETED AND THEY ARE WANTING PT TO STOP AMITRIPTYLINE FOR 10 DAY'S. PT IS WANTING TO KNOW IF SHE IS NEEDING TO WEAN OFF MEDICATION OR IF THIS IS OKAY JUST TO STOP TAKING.    PLEASE REVIEW AND ADVISE  THANK YOU

## 2024-06-17 NOTE — TELEPHONE ENCOUNTER
I spoke with Leigh and she says that there is no contraindication to this medication listed for allergy testing.  Ok for patient to stop medication for allergy testing and then start after testing.  I spoke with pt and she voices understanding.

## 2024-06-21 ENCOUNTER — TELEPHONE (OUTPATIENT)
Dept: NEUROLOGY | Facility: CLINIC | Age: 39
End: 2024-06-21

## 2024-06-21 ENCOUNTER — SPECIALTY PHARMACY (OUTPATIENT)
Dept: GASTROENTEROLOGY | Facility: CLINIC | Age: 39
End: 2024-06-21
Payer: COMMERCIAL

## 2024-06-21 NOTE — TELEPHONE ENCOUNTER
Pharmacy Name:  [facility] [phone]     Reference Number (if applicable): [id number]    Pharmacy representative name: SHANE    Pharmacy representative phone number: [phone] (Work)     What medication are you calling in regards to: LEXAPRO    What question does the pharmacy have: PHARMACY IS REQUESTING A CALL BACK TO DISCUSS CHANGING THE DOSAGE TO 20 MG.     Who is the provider that prescribed the medication: CASH    PLEASE REVIEW  THANK YOU

## 2024-06-21 NOTE — PROGRESS NOTES
Specialty Pharmacy Refill Coordination Note     Bri is a 38 y.o. female contacted today regarding refills of  1 specialty medication(s).    Reviewed and verified with patient: Sam Paul  -patient was directed by PA to increase lexapro 10 mg to 20 mg after 7 days - requested new script from [facility]'s office - spoke to Owen    Specialty medication(s) and dose(s) confirmed: yes    Refill Questions      Flowsheet Row Most Recent Value   Changes to allergies? No   Changes to medications? Yes  [neuro added lexapro]   New conditions or infections since last clinic visit No   Unplanned office visit, urgent care, ED, or hospital admission in the last 4 weeks  No   How does patient/caregiver feel medication is working? Very good   Financial problems or insurance changes  No   Since the previous refill, were any specialty medication doses or scheduled injections missed or delayed?  No   Does this patient require a clinical escalation to a pharmacist? No            Delivery Questions      Flowsheet Row Most Recent Value   Delivery method FedEx   Delivery address verified with patient/caregiver? Yes   Delivery address Home   Number of medications in delivery 2   Medication(s) being filled and delivered Risankizumab-Rzaa (Inflammatory Bowel Agents), Escitalopram Oxalate   Doses left of specialty medications 1 week   Copay verified? Yes   Copay amount 0   Copay form of payment No copayment ($0)                   Follow-up: 45 day(s)     Dana Huizar  Specialty Pharmacy Technician

## 2024-06-25 ENCOUNTER — SPECIALTY PHARMACY (OUTPATIENT)
Dept: GASTROENTEROLOGY | Facility: CLINIC | Age: 39
End: 2024-06-25
Payer: COMMERCIAL

## 2024-06-25 NOTE — PROGRESS NOTES
Specialty Pharmacy     Refilled lexapro 10 mg, waiting on neruo to send lexapro 20 mg.     Dana Huizar  Specialty Pharmacy Technician

## 2024-06-29 DIAGNOSIS — J30.1 NON-SEASONAL ALLERGIC RHINITIS DUE TO POLLEN: Chronic | ICD-10-CM

## 2024-06-29 DIAGNOSIS — K21.9 GASTROESOPHAGEAL REFLUX DISEASE WITHOUT ESOPHAGITIS: ICD-10-CM

## 2024-06-30 RX ORDER — MONTELUKAST SODIUM 10 MG/1
10 TABLET ORAL DAILY
Qty: 90 TABLET | Refills: 1 | Status: SHIPPED | OUTPATIENT
Start: 2024-06-30

## 2024-07-01 ENCOUNTER — OFFICE VISIT (OUTPATIENT)
Dept: FAMILY MEDICINE CLINIC | Facility: CLINIC | Age: 39
End: 2024-07-01
Payer: COMMERCIAL

## 2024-07-01 VITALS
TEMPERATURE: 96.8 F | BODY MASS INDEX: 44.41 KG/M2 | RESPIRATION RATE: 14 BRPM | HEART RATE: 74 BPM | DIASTOLIC BLOOD PRESSURE: 62 MMHG | OXYGEN SATURATION: 100 % | SYSTOLIC BLOOD PRESSURE: 110 MMHG | WEIGHT: 293 LBS | HEIGHT: 68 IN
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
## 2024-07-01 DIAGNOSIS — R11.2 NAUSEA AND VOMITING, UNSPECIFIED VOMITING TYPE: Primary | ICD-10-CM

## 2024-07-01 PROCEDURE — 99213 OFFICE O/P EST LOW 20 MIN: CPT | Performed by: FAMILY MEDICINE

## 2024-07-01 RX ORDER — AMITRIPTYLINE HYDROCHLORIDE 10 MG/1
10 TABLET, FILM COATED ORAL NIGHTLY
Qty: 90 TABLET | Refills: 1 | Status: SHIPPED | OUTPATIENT
Start: 2024-07-01

## 2024-07-01 RX ORDER — ESOMEPRAZOLE MAGNESIUM 40 MG/1
40 CAPSULE, DELAYED RELEASE ORAL DAILY
Qty: 90 CAPSULE | Refills: 3 | Status: SHIPPED | OUTPATIENT
Start: 2024-07-01

## 2024-07-01 NOTE — PROGRESS NOTES
"Subjective   Bri Pfeiffer is a 38 y.o. female.     CC: N/V    Pt returns today after seeing an allergist last Wednesday, received the in-office allergy test, and started with multiple-episodes of emesis and inability to defecate on the ride home. Pt reports some improvement by Friday AM and used some Zofran with good effect. Defecation still a little bit difficult.   Having some blood/mucous in the stools (has Crohn's). Pt restarted her AR medications Wednesday after the testing. No f/c. Nausea is back to baseline.     The following portions of the patient's history were reviewed and updated as appropriate: allergies, current medications, past family history, past medical history, past social history, past surgical history, and problem list.    Review of Systems   Constitutional:  Negative for activity change, chills and fever.   Respiratory:  Negative for cough.    Cardiovascular:  Negative for chest pain.   Gastrointestinal:  Positive for abdominal pain and nausea.   Psychiatric/Behavioral:  Negative for dysphoric mood.        /62   Pulse 74   Temp 96.8 °F (36 °C) (Temporal)   Resp 14   Ht 172.2 cm (67.8\")   Wt (!) 147 kg (324 lb)   SpO2 100%   BMI 49.56 kg/m²     Objective   Physical Exam  Vitals and nursing note reviewed.   Constitutional:       General: She is not in acute distress.     Appearance: She is well-developed.   Pulmonary:      Effort: Pulmonary effort is normal.   Abdominal:      General: Abdomen is flat.      Palpations: Abdomen is soft.      Tenderness: There is abdominal tenderness (mild, LUQ/LLQ).   Neurological:      Mental Status: She is alert and oriented to person, place, and time.   Psychiatric:         Behavior: Behavior normal.         Thought Content: Thought content normal.         Assessment & Plan   Diagnoses and all orders for this visit:    1. Nausea and vomiting, unspecified vomiting type (Primary)  -     Alpha-Gal IgE Panel    2. Tick bite, unspecified " site, subsequent encounter  Comments:  from April, upper back  Orders:  -     Alpha-Gal IgE Panel    Patient to message her gastroenterologist when she gets home today to make him aware of the situation and to see if he wishes to see/examine her since this recent episode.

## 2024-07-06 LAB
ALPHA-GAL IGE QN: <0.1 KU/L
BEEF IGE QN: <0.1 KU/L
CONV CLASS DESCRIPTION: NORMAL
IGE SERPL-ACNC: 292 IU/ML (ref 6–495)
LAMB IGE QN: <0.1 KU/L
PORK IGE QN: <0.1 KU/L

## 2024-07-08 RX ORDER — ESCITALOPRAM OXALATE 10 MG/1
10 TABLET ORAL DAILY
Qty: 30 TABLET | Refills: 0 | Status: SHIPPED | OUTPATIENT
Start: 2024-07-08 | End: 2025-07-08

## 2024-07-08 NOTE — TELEPHONE ENCOUNTER
Caller: Bri Pfeiffer    Relationship: Self    Best call back number: [phone]]      Requested Prescriptions:   Requested Prescriptions     Pending Prescriptions Disp Refills    escitalopram (Lexapro) 10 MG tablet 30 tablet 2     Sig: Take 1 tablet by mouth Daily.        Pharmacy where request should be sent: [facility]     Last office visit with prescribing clinician: 5/21/2024   Last telemedicine visit with prescribing clinician: Visit date not found   Next office visit with prescribing clinician: Visit date not found     Additional details provided by patient: PHARMACY SAID PATIENT IS TAKING 2 PER DAY. PLEASE CHANGE SCRIPT TO 20 MG DAILY WITH 30 TABLETS. THIS IS THEIR THIRD CALL FOR THIS MEDICINE.    Does the patient have less than a 3 day supply:  [x] Yes  [] No    Would you like a call back once the refill request has been completed: [] Yes [x] No    If the office needs to give you a call back, can they leave a voicemail: [] Yes [x] No    Yuriy Martins   07/08/24 13:03 EDT

## 2024-07-12 RX ORDER — ESCITALOPRAM OXALATE 20 MG/1
20 TABLET ORAL DAILY
Qty: 30 TABLET | Refills: 1 | Status: SHIPPED | OUTPATIENT
Start: 2024-07-12

## 2024-07-15 ENCOUNTER — SPECIALTY PHARMACY (OUTPATIENT)
Dept: GASTROENTEROLOGY | Facility: CLINIC | Age: 39
End: 2024-07-15
Payer: COMMERCIAL

## 2024-07-15 NOTE — PROGRESS NOTES
Specialty Pharmacy     Received lexapro 20 mg from [facility]  Ewelina Huizar  Specialty Pharmacy Technician

## 2024-07-22 RX ORDER — PYRIDOSTIGMINE BROMIDE 60 MG/1
60 TABLET ORAL 3 TIMES DAILY
Qty: 90 TABLET | Refills: 3 | OUTPATIENT
Start: 2024-07-22 | End: 2024-10-21

## 2024-07-30 DIAGNOSIS — M79.7 FIBROMYALGIA: Chronic | ICD-10-CM

## 2024-07-30 DIAGNOSIS — K21.9 GASTROESOPHAGEAL REFLUX DISEASE WITHOUT ESOPHAGITIS: ICD-10-CM

## 2024-07-30 DIAGNOSIS — J30.1 NON-SEASONAL ALLERGIC RHINITIS DUE TO POLLEN: Chronic | ICD-10-CM

## 2024-07-30 RX ORDER — PREGABALIN 100 MG/1
100 CAPSULE ORAL 3 TIMES DAILY
Qty: 270 CAPSULE | Refills: 1 | Status: CANCELLED | OUTPATIENT
Start: 2024-07-30

## 2024-07-30 RX ORDER — ESOMEPRAZOLE MAGNESIUM 40 MG/1
40 CAPSULE, DELAYED RELEASE ORAL DAILY
Qty: 90 CAPSULE | Refills: 3 | Status: SHIPPED | OUTPATIENT
Start: 2024-07-30

## 2024-07-30 RX ORDER — FAMOTIDINE 20 MG/1
20 TABLET, FILM COATED ORAL 2 TIMES DAILY PRN
Qty: 180 TABLET | Refills: 2 | Status: SHIPPED | OUTPATIENT
Start: 2024-07-30

## 2024-07-30 RX ORDER — MONTELUKAST SODIUM 10 MG/1
10 TABLET ORAL DAILY
Qty: 90 TABLET | Refills: 1 | Status: SHIPPED | OUTPATIENT
Start: 2024-07-30

## 2024-08-01 ENCOUNTER — SPECIALTY PHARMACY (OUTPATIENT)
Dept: GASTROENTEROLOGY | Facility: CLINIC | Age: 39
End: 2024-08-01
Payer: COMMERCIAL

## 2024-08-01 ENCOUNTER — OFFICE VISIT (OUTPATIENT)
Dept: FAMILY MEDICINE CLINIC | Facility: CLINIC | Age: 39
End: 2024-08-01
Payer: COMMERCIAL

## 2024-08-01 ENCOUNTER — TELEPHONE (OUTPATIENT)
Dept: FAMILY MEDICINE CLINIC | Facility: CLINIC | Age: 39
End: 2024-08-01

## 2024-08-01 VITALS
DIASTOLIC BLOOD PRESSURE: 74 MMHG | SYSTOLIC BLOOD PRESSURE: 110 MMHG | RESPIRATION RATE: 16 BRPM | HEART RATE: 78 BPM | WEIGHT: 293 LBS | OXYGEN SATURATION: 99 % | BODY MASS INDEX: 44.41 KG/M2 | HEIGHT: 68 IN | TEMPERATURE: 97.5 F

## 2024-08-01 DIAGNOSIS — G43.809 OTHER MIGRAINE WITHOUT STATUS MIGRAINOSUS, NOT INTRACTABLE: Chronic | ICD-10-CM

## 2024-08-01 DIAGNOSIS — M79.7 FIBROMYALGIA: Chronic | ICD-10-CM

## 2024-08-01 DIAGNOSIS — K50.812 CROHN'S DISEASE OF BOTH SMALL AND LARGE INTESTINE WITH INTESTINAL OBSTRUCTION: Primary | Chronic | ICD-10-CM

## 2024-08-01 PROBLEM — G43.909 MIGRAINE: Status: ACTIVE | Noted: 2024-08-01

## 2024-08-01 PROCEDURE — 99212 OFFICE O/P EST SF 10 MIN: CPT | Performed by: FAMILY MEDICINE

## 2024-08-01 NOTE — PROGRESS NOTES
Subjective   Bri Pfeiffer is a 38 y.o. female.     CC: FMLA Completion    History of Present Illness     Patient comes in today after sending this message on 7/23/2024:    Hi Dr. Benavidez and team,  My place of work is requiring FMLA forms to be filled out due to the potential of loss of work from Chrons disease / Fibromyalgia/ chronic fatigue/ Dysautonomia /migraine / medication side effects and over all issues associated with chronic illness.  I am not sure if that is something that you can fill out if I drop the forms off there as my PCP, or if I should bring forms to my various specialists that communicate with you.             The following portions of the patient's history were reviewed and updated as appropriate: allergies, current medications, past family history, past medical history, past social history, past surgical history, and problem list.    Review of Systems   Constitutional:  Negative for activity change, chills and fever.   Respiratory:  Negative for cough.    Cardiovascular:  Negative for chest pain.   Psychiatric/Behavioral:  Negative for dysphoric mood.        Objective   Physical Exam  Vitals and nursing note reviewed.   Constitutional:       General: She is not in acute distress.     Appearance: She is well-developed.   Pulmonary:      Effort: Pulmonary effort is normal.   Neurological:      Mental Status: She is alert and oriented to person, place, and time.   Psychiatric:         Behavior: Behavior normal.         Thought Content: Thought content normal.         Assessment & Plan   Diagnoses and all orders for this visit:    1. Crohn's disease of both small and large intestine with intestinal obstruction (Primary)    2. Fibromyalgia    3. Other migraine without status migrainosus, not intractable    FMLA forms completed for 1-2 occurrences per month, 1 to 2 days per occurrence.

## 2024-08-01 NOTE — PROGRESS NOTES
Specialty Pharmacy Refill Coordination Note     Bri is a 38 y.o. female contacted today regarding refills of  5 specialty medication(s).    Reviewed and verified with patient: metoprolol, pyridostigmine, amitriptyline, esomeprazole, pregabalin    Specialty medication(s) and dose(s) confirmed: yes        Delivery Questions      Flowsheet Row Most Recent Value   Delivery method FedEx   Delivery address verified with patient/caregiver? Yes   Delivery address Home   Number of medications in delivery 5   Medication(s) being filled and delivered Metoprolol Tartrate, Pyridostigmine Bromide (Antimyasthenic/Cholinergic Agents), Amitriptyline Hcl (Tricyclic Agents), Esomeprazole Magnesium, Pregabalin (Anticonvulsants - Misc.)   Doses left of specialty medications 3 weeks of skyrizi   Copay verified? Yes   Copay amount 67.16   Copay form of payment Credit/debit on file   Ship Date 8/1   Delivery Date 8/2   Signature Required No                   Follow-up: 21 day(s)     Dana Huizar  Specialty Pharmacy Technician

## 2024-08-05 ENCOUNTER — SPECIALTY PHARMACY (OUTPATIENT)
Dept: GASTROENTEROLOGY | Facility: CLINIC | Age: 39
End: 2024-08-05
Payer: COMMERCIAL

## 2024-08-05 NOTE — PROGRESS NOTES
Specialty Pharmacy Refill Coordination Note     Bri is a 38 y.o. female contacted today regarding refills of  2 non-specialty medication(s).    Reviewed and verified with patient: lexapro, montelukast     Specialty medication(s) and dose(s) confirmed: yes        Delivery Questions      Flowsheet Row Most Recent Value   Delivery method FedEx   Delivery address verified with patient/caregiver? Yes   Delivery address Home   Number of medications in delivery 2   Medication(s) being filled and delivered Escitalopram Oxalate, Montelukast Sodium (Leukotriene Modulators)   Doses left of specialty medications 1 week   Copay verified? Yes   Copay amount 35   Copay form of payment Credit/debit on file   Ship Date 8/6   Delivery Date 8/7   Signature Required No                   Follow-up: 21 day(s)     Dana Huizar  Specialty Pharmacy Technician

## 2024-08-16 ENCOUNTER — SPECIALTY PHARMACY (OUTPATIENT)
Dept: GASTROENTEROLOGY | Facility: CLINIC | Age: 39
End: 2024-08-16
Payer: COMMERCIAL

## 2024-08-16 NOTE — PROGRESS NOTES
Specialty Pharmacy Patient Management Program  Gastroenterology Refill Outreach      Bri is a 38 y.o. female contacted today regarding refills of her medication(s).    Specialty medication(s) and dose(s) confirmed: felicia  please add extra ice pack, patient reported everything was melted last delivery    Refill Questions      Flowsheet Row Most Recent Value   Changes to allergies? No   Changes to medications? No   New conditions or infections since last clinic visit No   Unplanned office visit, urgent care, ED, or hospital admission in the last 4 weeks  No   How does patient/caregiver feel medication is working? Very good   Financial problems or insurance changes  No   Since the previous refill, were any specialty medication doses or scheduled injections missed or delayed?  No   Does this patient require a clinical escalation to a pharmacist? No          Delivery Questions      Flowsheet Row Most Recent Value   Delivery method FedEx  [please add extra ice pack, patient reported everything was melted last delivery]   Delivery address verified with patient/caregiver? Yes   Delivery address Home   Number of medications in delivery 1   Medication(s) being filled and delivered Risankizumab-Rzaa (Inflammatory Bowel Agents)   Doses left of specialty medications 0   Copay verified? Yes   Copay amount $471 on skyrizi cc and $5 on visa   Copay form of payment Credit/debit on file   Ship Date 8/20   Delivery Date 8/21   Signature Required No  [please add extra ice pack, patient reported everything was melted last delivery]            Follow-Up: 45 days    Dana Huizar  Specialty Pharmacy Technician

## 2024-08-29 ENCOUNTER — SPECIALTY PHARMACY (OUTPATIENT)
Dept: GASTROENTEROLOGY | Facility: CLINIC | Age: 39
End: 2024-08-29
Payer: COMMERCIAL

## 2024-08-29 NOTE — PROGRESS NOTES
Specialty Pharmacy Patient Management Program  Gastroenterology Refill Outreach      Bri is a 38 y.o. female contacted today regarding refills of her medication(s).    Specialty medication(s) and dose(s) confirmed: lyrica, famotidine, metoprolol, pyridostigmine    Refill Questions      Flowsheet Row Most Recent Value   Changes to allergies? No   Changes to medications? No   New conditions or infections since last clinic visit No   Unplanned office visit, urgent care, ED, or hospital admission in the last 4 weeks  No   How does patient/caregiver feel medication is working? Very good   Financial problems or insurance changes  No   Since the previous refill, were any specialty medication doses or scheduled injections missed or delayed?  No   Does this patient require a clinical escalation to a pharmacist? No          Delivery Questions      Flowsheet Row Most Recent Value   Delivery method FedEx   Delivery address verified with patient/caregiver? Yes   Delivery address Home   Number of medications in delivery 4   Medication(s) being filled and delivered Metoprolol Tartrate, Pyridostigmine Bromide (Antimyasthenic/Cholinergic Agents), Famotidine (H-2 Antagonists), Pregabalin (Anticonvulsants - Misc.)   Doses left of specialty medications 2 days   Copay verified? Yes   Copay amount 55   Copay form of payment Credit/debit on file   Ship Date 8/29   Delivery Date 8/30   Signature Required No            Follow-Up: 28 days    Dana Huizar  Specialty Pharmacy Technician

## 2024-09-06 RX ORDER — ESCITALOPRAM OXALATE 20 MG/1
20 TABLET ORAL DAILY
Qty: 30 TABLET | Refills: 1 | Status: SHIPPED | OUTPATIENT
Start: 2024-09-06

## 2024-09-10 ENCOUNTER — SPECIALTY PHARMACY (OUTPATIENT)
Dept: GASTROENTEROLOGY | Facility: CLINIC | Age: 39
End: 2024-09-10
Payer: COMMERCIAL

## 2024-09-10 NOTE — PROGRESS NOTES
Specialty Pharmacy Patient Management Program  Gastroenterology Refill Outreach      Bri is a 38 y.o. female contacted today regarding refills of her medication(s).    Specialty medication(s) and dose(s) confirmed: lexapro    Refill Questions      Flowsheet Row Most Recent Value   Changes to allergies? No   Changes to medications? No   New conditions or infections since last clinic visit No   Unplanned office visit, urgent care, ED, or hospital admission in the last 4 weeks  No   How does patient/caregiver feel medication is working? Very good   Financial problems or insurance changes  No   Since the previous refill, were any specialty medication doses or scheduled injections missed or delayed?  No   Does this patient require a clinical escalation to a pharmacist? No          Delivery Questions      Flowsheet Row Most Recent Value   Delivery method UPS   Delivery address Home   Number of medications in delivery 1   Medication(s) being filled and delivered Escitalopram Oxalate   Doses left of specialty medications 1 week   Copay verified? Yes   Copay amount 0   Copay form of payment No copayment ($0)   Ship Date 9/6   Delivery Date 9/9   Signature Required No            Follow-Up: 21 days    Dana Huizar  Specialty Pharmacy Technician   9/10/2024  10:37 EDT

## 2024-09-12 ENCOUNTER — SPECIALTY PHARMACY (OUTPATIENT)
Dept: GASTROENTEROLOGY | Facility: CLINIC | Age: 39
End: 2024-09-12
Payer: COMMERCIAL

## 2024-09-12 NOTE — PROGRESS NOTES
Specialty Pharmacy     Patient needs an OBI replacement - she called  and needs MD office to call.  She forwarded email for pharmacy to refund copays. Please send to management     Dana Huizar  Specialty Pharmacy Technician

## 2024-09-13 ENCOUNTER — TELEPHONE (OUTPATIENT)
Dept: GASTROENTEROLOGY | Facility: CLINIC | Age: 39
End: 2024-09-13
Payer: COMMERCIAL

## 2024-09-13 RX ORDER — RISANKIZUMAB-RZAA 360 MG/2.4
360 WEARABLE INJECTOR SUBCUTANEOUS ONCE
Qty: 2.4 ML | Refills: 0 | OUTPATIENT
Start: 2024-09-13 | End: 2024-09-13

## 2024-09-13 NOTE — TELEPHONE ENCOUNTER
Spoke to Corby RN, at [facility] re: pt's Skyrizi misfire. He stated they had all the information they needed and had forwarded it on to [facility] to fill replacement dose for pt. He transferred me to Elton Porter, at [facility] who took a one-time verbal RX for Skyrizi OBI for patient. She stated they will work on this to get to the patient ASAP. Sent MyChart update to patient also.    Debbi Abbasi, PharmD, BCACP, BCPS, BCCCP

## 2024-09-27 RX ORDER — METOPROLOL TARTRATE 25 MG/1
12.5 TABLET, FILM COATED ORAL EVERY 12 HOURS SCHEDULED
Qty: 90 TABLET | Refills: 0 | Status: SHIPPED | OUTPATIENT
Start: 2024-09-27 | End: 2024-09-27 | Stop reason: SDUPTHER

## 2024-10-02 ENCOUNTER — SPECIALTY PHARMACY (OUTPATIENT)
Dept: GASTROENTEROLOGY | Facility: CLINIC | Age: 39
End: 2024-10-02
Payer: COMMERCIAL

## 2024-10-02 NOTE — PROGRESS NOTES
Specialty Pharmacy Refill Coordination Note     Bri is a 38 y.o. female contacted today regarding refills of  3 non-specialty medication(s).      Delivery Questions      Flowsheet Row Most Recent Value   Delivery method UPS   Delivery address verified with patient/caregiver? Yes   Delivery address Home   Number of medications in delivery 3   Medication(s) being filled and delivered Pyridostigmine Bromide (Antimyasthenic/Cholinergic Agents), Pregabalin (Anticonvulsants - Misc.), Metoprolol Tartrate   Doses left of specialty medications n/a   Copay verified? Yes   Copay amount 0   Copay form of payment No copayment ($0)   Ship Date 9/26   Delivery Date 9/27   Signature Required No                   Follow-up: 28 day(s)     Dana Huizar  Specialty Pharmacy Technician

## 2024-10-03 NOTE — PROGRESS NOTES
Called [facility] directly to ask for more information re: what claims were overpaid and how pharmacy should refund the pt per the email she received. Eventually was transferred to [facility] directly. Representative stated that they would be the ones to refund the patient but he did not have any notes on her file indicating issues with overpayment. Read him the email the patient received and he requested she call [facility] herself as they can run a 2024 claims summary and determine when/where the patient overpaid. Called pt and relayed this information and told her to let us know if there is an update that we can help with. Pt verbalized understanding.    Debbi Abbasi, PharmD, BCACP, BCPS, BCCCP

## 2024-10-09 ENCOUNTER — SPECIALTY PHARMACY (OUTPATIENT)
Dept: GASTROENTEROLOGY | Facility: CLINIC | Age: 39
End: 2024-10-09
Payer: COMMERCIAL

## 2024-10-09 NOTE — PROGRESS NOTES
Specialty Pharmacy Patient Management Program  Gastroenterology Refill Outreach      Bri is a 38 y.o. female contacted today regarding refills of her medication(s).    Specialty medication(s) and dose(s) confirmed: lexapro      Delivery Questions      Flowsheet Row Most Recent Value   Delivery method UPS   Delivery address verified with patient/caregiver? Yes   Delivery address Home   Number of medications in delivery 1   Medication(s) being filled and delivered Escitalopram Oxalate   Doses left of specialty medications 5   Copay verified? Yes   Copay amount 0   Copay form of payment No copayment ($0)   Ship Date 10/10   Delivery Date 10/11   Signature Required No            Follow-Up: 21 days    Dana Huizar  10/9/2024  09:31 EDT

## 2024-10-10 ENCOUNTER — SPECIALTY PHARMACY (OUTPATIENT)
Dept: GASTROENTEROLOGY | Facility: CLINIC | Age: 39
End: 2024-10-10
Payer: COMMERCIAL

## 2024-10-10 NOTE — PROGRESS NOTES
Specialty Pharmacy Patient Management Program  Refill Outreach      Bri is a 38 y.o. female contacted today regarding refills of her medication(s).    Specialty medication(s) and dose(s) confirmed: Skyrizi  Other medications being refilled: n/a    Refill Questions      Flowsheet Row Most Recent Value   Changes to allergies? No   Changes to medications? No   New conditions or infections since last clinic visit No   Unplanned office visit, urgent care, ED, or hospital admission in the last 4 weeks  No   How does patient/caregiver feel medication is working? Good   Financial problems or insurance changes  No   Since the previous refill, were any specialty medication doses or scheduled injections missed or delayed?  Yes   If yes, please provide the amount 1   Why were doses missed? last dose delayed by ~1 week due to OBI misfire and pt waiting for replacement from [facility]   Does this patient require a clinical escalation to a pharmacist? Yes            Delivery Questions      Flowsheet Row Most Recent Value   Delivery method UPS   Delivery address verified with patient/caregiver? Yes   Delivery address Home   Number of medications in delivery 1   Medication(s) being filled and delivered Risankizumab-Rzaa (Inflammatory Bowel Agents)   Doses left of specialty medications 0   Copay verified? Yes   Copay amount $0   Copay form of payment No copayment ($0)   Ship Date 10/14   Delivery Date 10/15   Signature Required No                 Follow-up: 51 day(s)    Electronically signed by Debbi Abbasi, Kena, 10/10/24, 2:36 PM EDT.       Specialty Pharmacy Patient Management Program  Gastroenterology Reassessment     Bri Pfeiffer is a 38 y.o. female seen by a Gastroenterology provider for Crohn's Disease and enrolled in the Patient Management program offered by [facility]. A follow-up outreach was conducted, including assessment of continued therapy appropriateness, medication adherence, and  side effect incidence and management for Skyrizi (risankizumab).     Changes to Insurance Coverage or Financial Support  none    Relevant Past Medical History and Comorbidities  Relevant medical history and concomitant health conditions were discussed with the patient. The patient's chart has been reviewed for relevant past medical history and comorbid health conditions and updated as necessary.   Past Medical History:   Diagnosis Date    Allergic     Allergies     Anemia     Anxiety     Asthma     Breast mass 2021    Recheck diagnostic 6/22    Cholelithiasis     Coronary artery disease     Congenital pericardial cyst    Crohn's disease 05/2019    CTS (carpal tunnel syndrome)     Depression     Difficulty walking     POLY NEUROPATHY    Diverticulitis of colon     Eczema     Fibrocystic breast     Fibromyalgia, primary     Fissure, anal     GERD (gastroesophageal reflux disease)     GI (gastrointestinal bleed)     Headache, tension-type     Hernia, abdominal     History of medical problems     Crohn's disease    HL (hearing loss)     Hypoglycemia     IBS (irritable bowel syndrome)     Irregular heartbeat     OCCAS    Lactose intolerance     Low back pain     Memory loss     Migraine     Obesity     Pericardial cyst     Peripheral neuropathy     Polycystic ovary syndrome 2022    Hemorrhagic ovarian cysts    Rectal bleeding     Rheumatoid arthritis     Sensitive skin     Sleep apnea     Spondylosis     Syncope     MORE RECENTLY: JAN 27 2023///PREVIOUS ENTRY: STATES LAST EPISODE 4/6/22 AT WORK    Tremor     Visual impairment     Vitamin D deficiency     Wound dehiscence      Social History     Socioeconomic History    Marital status: Single    Number of children: 0    Highest education level: Some college, no degree   Tobacco Use    Smoking status: Never    Smokeless tobacco: Never    Tobacco comments:     caffeine use a few days weekly   Vaping Use    Vaping status: Never Used   Substance and Sexual Activity    Alcohol  use: Not Currently     Comment: Maybe one drink a month. Very rare.    Drug use: Never    Sexual activity: Yes     Partners: Male     Birth control/protection: Condom, I.U.D.     Problem list reviewed by Debbi Abbasi, PharmD on 10/10/2024 at  2:28 PM    Allergies  Known allergies and reactions were discussed with the patient. The patient's chart has been reviewed for allergy information and updated as necessary.   Humira [adalimumab], Lactose intolerance (gi), Levaquin [levofloxacin], Nsaids, Pseudoephedrine, and Percocet [oxycodone-acetaminophen]  Allergies reviewed by Debbi Abbasi, PharmD on 10/10/2024 at  2:28 PM    Relevant Laboratory Values  Lab Results   Component Value Date    GLUCOSE 80 02/12/2024    BUN 11 02/12/2024    CREATININE 0.79 02/12/2024    BCR 13.9 02/12/2024     02/12/2024    K 4.5 02/12/2024     02/12/2024    CO2 28.3 02/12/2024    CALCIUM 9.3 02/12/2024    PROTEINTOT 7.0 07/15/2023    ALBUMIN 4.2 02/12/2024    ALT 12 02/12/2024    AST 14 02/12/2024    ALKPHOS 91 02/12/2024    BILITOT 0.3 02/12/2024    ANIONGAP 10.6 07/15/2023    MG 2.2 07/13/2023    EGFRRESULT 98.9 02/12/2024     Lab Results   Component Value Date    WBC 7.85 02/12/2024    HGB 14.1 02/12/2024    HCT 42.1 02/12/2024     02/12/2024    INR 1.08 07/13/2023     Lab Results   Component Value Date    HEPAIGM Non-Reactive 07/13/2023    HEPBCAB Negative 03/02/2021    HEPBIGMCORE Non-Reactive 07/13/2023    HEPBSAB Reactive 03/02/2021    HEPBSAG Non-Reactive 07/13/2023    HEPCVIRUSABY Non-Reactive 07/13/2023     Lab Results   Component Value Date    QUANTITBGLDP Negative 11/21/2022    QUANTITBGLDP Negative 03/02/2021     Lab Value Review  The above lab values have been reviewed; the following specialty medication(s) dose adjustment(s) are recommended: none.    Current Medication List  This medication list has been reviewed with the patient and evaluated for any interactions or necessary  modifications/recommendations, and updated to include all prescription medications, OTC medications, and supplements the patient is currently taking. This list reflects what is contained in the patient's profile, which has also been marked as reviewed to communicate to other providers it is the most up to date version of the patient's current medication therapy.     Current Outpatient Medications:     albuterol sulfate  (90 Base) MCG/ACT inhaler, Inhale 2 puffs Every 4 (Four) Hours As Needed., Disp: , Rfl:     Aloe Vera 25 MG capsule, , Disp: , Rfl:     amitriptyline (ELAVIL) 10 MG tablet, Take 1 tablet by mouth Every Night for 90 days., Disp: 90 tablet, Rfl: 0    amitriptyline (ELAVIL) 10 MG tablet, TAKE 1 TABLET BY MOUTH EVERY NIGHT, Disp: 90 tablet, Rfl: 1    amitriptyline (ELAVIL) 10 MG tablet, Take 1 tablet by mouth Every Night., Disp: 90 tablet, Rfl: 1    cetirizine (zyrTEC) 10 MG tablet, Take 1 tablet by mouth Daily., Disp: , Rfl:     Cholecalciferol (VITAMIN D-3) 5000 units tablet, Take 1 tablet by mouth Daily., Disp: , Rfl:     Cyanocobalamin (VITAMIN B 12 PO), Take 1 tablet by mouth Daily., Disp: , Rfl:     dilTIAZem HCl (diltiazem 2% and lidocaine 5%), Apply 1 application topically to the appropriate area as directed 2 (Two) Times a Day. Per rectum, Disp: 30 g, Rfl: 3    escitalopram (Lexapro) 20 MG tablet, Take 1 tablet by mouth Daily., Disp: 30 tablet, Rfl: 1    esomeprazole (nexIUM) 40 MG capsule, Take 1 capsule by mouth Daily., Disp: 90 capsule, Rfl: 3    famotidine (PEPCID) 20 MG tablet, Take 1 tablet by mouth 2 (Two) Times a Day As Needed (GERD)., Disp: 180 tablet, Rfl: 2    ferrous sulfate 324 (65 Fe) MG tablet delayed-release EC tablet, Take 1 tablet by mouth Daily With Breakfast., Disp: , Rfl:     fluticasone (FLONASE) 50 MCG/ACT nasal spray, 2 sprays into the nostril(s) as directed by provider Daily., Disp: , Rfl:     Folic Acid 0.8 MG capsule, , Disp: , Rfl:     metoprolol tartrate  (LOPRESSOR) 25 MG tablet, Take 0.5 tablets by mouth Every 12 (Twelve) Hours., Disp: 30 tablet, Rfl: 2    mometasone-formoterol (Dulera) 100-5 MCG/ACT inhaler, Inhale 2 puffs 2 (Two) Times a Day., Disp: 13 g, Rfl: 1    montelukast (SINGULAIR) 10 MG tablet, Take 1 tablet by mouth Daily., Disp: 90 tablet, Rfl: 1    Multiple Vitamin (MULTIVITAMIN PO), Take 1 tablet by mouth Daily., Disp: , Rfl:     ondansetron (ZOFRAN) 8 MG tablet, Take 1 tablet by mouth Every 8 (Eight) Hours As Needed for Nausea or Vomiting., Disp: 60 tablet, Rfl: 5    pregabalin (LYRICA) 100 MG capsule, Take 1 capsule by mouth 3 (Three) Times a Day., Disp: 270 capsule, Rfl: 1    pregabalin (LYRICA) 100 MG capsule, Take 1 capsule by mouth 3 times a day., Disp: 270 capsule, Rfl: 1    pyridostigmine (MESTINON) 60 MG tablet, Take 1 tablet by mouth 3 (Three) Times a Day., Disp: , Rfl:     pyridostigmine (MESTINON) 60 MG tablet, Take 1 tablet by mouth Every 8 (Eight) Hours., Disp: 90 tablet, Rfl: 2    Riboflavin-Magnesium-Feverfew (MigreLief) 200-180-50 MG tablet, , Disp: , Rfl:     Rimegepant Sulfate (Nurtec) 75 MG tablet dispersible tablet, Take 1 tablet by mouth As Needed (Take 1 tablet by mouth daily as needed for headache/migraine.  Do not take more than one in a 24 hour period.)., Disp: 8 tablet, Rfl: 11    Risankizumab-rzaa (Skyrizi) 360 MG/2.4ML solution cartridge, Inject 360 mg under the skin into the appropriate area as directed Every 8 (Eight) Weeks., Disp: 2.4 mL, Rfl: 6    spironolactone (ALDACTONE) 100 MG tablet, Take 1 tablet by mouth Daily., Disp: , Rfl:   Medicines reviewed by Debbi Abbasi, PharmD on 10/10/2024 at  2:28 PM    Drug Interactions  none     Adverse Drug Reactions  Adverse Reactions Experienced:  bone pain/body aches for several days post OBI dose  Plan for ADR Management:  will discuss with providers - may need change in therapy      Hospitalizations and Urgent Care Since Last Assessment  Hospitalizations or Admissions:  none  ED Visits: none  Urgent Office Visits: none     Adherence and Self-Administration  Approximate Number of Doses Missed Since Last Assessment: none  Ongoing or New Barriers to Patient Adherence and/or Self-Administration: none   Methods for Supporting Patient Adherence and/or Self-Administration: N/A     Recently Close Medication Therapy Problems  No medication therapy recommendations to display    Goals of Therapy  Goals related to the patient's specialty therapy were discussed with the patient. The Patient Goals segment of this outreach has been reviewed and updated.   Goals Addressed Today        Specialty Pharmacy General Goal      At least 50% symptom reduction and mucosal healing     10/10/24: ~50% symptom reduction - effectiveness of Skyrizi starts to wear off around week 4-5. Drug level monitoring not yet available. See MTP.              Quality of Life Assessment   Quality of Life related to the patient's specialty therapy was discussed with the patient. The QOL segment of this outreach has been reviewed and updated.   Quality of Life Assessment  Quality of Life Improvement Scale: 7-Somewhat better  Comments on Quality of Life: patient feels better than she did on infliximab, but still not as well as she did on adalimumab. She has ADRs the first several days after her OBI dose and feels that it's effectiveness starts wearing off around week 4-5.    Reassessment Plan & Follow-Up  Medication Therapy Changes: none  Additional Plans, Therapy Recommendations, or Therapy Problems to Be Addressed:  will explore possible alternatives with providers and F/U with pt prior to next dose due 11/6    Pharmacist to perform regular reassessments no more than (6) months from the previous assessment.  Care Coordinator to set up future refill outreaches, coordinate prescription delivery, and escalate clinical questions to pharmacist.     Attestation  Therapeutic appropriateness: Appropriate   I attest the patient was  actively involved in and has agreed to the above plan of care. I attest that the specialty medication(s) addressed above are appropriate for the patient based on my reassessment. If the prescribed therapy is at any point deemed not appropriate based on the current or future assessments, a consultation will be initiated with the patient's specialty care provider to determine the best course of action. The revised plan of therapy will be documented along with any required assessments and/or additional patient education provided.     Debbi Abbasi, PharmD, BCACP, BCPS, BCCCP  Clinical Specialty Pharmacist, Gastroenterology  10/10/24 14:36 EDT

## 2024-10-15 ENCOUNTER — TELEPHONE (OUTPATIENT)
Dept: GASTROENTEROLOGY | Facility: CLINIC | Age: 39
End: 2024-10-15
Payer: COMMERCIAL

## 2024-10-15 NOTE — TELEPHONE ENCOUNTER
Spoke to pt 10/10/24 re: progress on Skyrizi. She stated she is having bone pain and body aches starting the day of her OBI dose and lasting ~4-5 days. She also feels the effectiveness starts to wear off around week 4-5 of the 8 week OBI interval. D/W Dr. Escalona 10/15/24 and he would like for patient to be seen to determine if therapy needs to be adjusted/changed.     Debbi Abbasi, PharmD, BCACP, BCPS, BCCCP

## 2024-10-18 ENCOUNTER — TELEMEDICINE (OUTPATIENT)
Dept: GASTROENTEROLOGY | Facility: CLINIC | Age: 39
End: 2024-10-18
Payer: COMMERCIAL

## 2024-10-18 DIAGNOSIS — M89.8X9 BONE PAIN: ICD-10-CM

## 2024-10-18 DIAGNOSIS — K31.84 GASTROPARESIS: ICD-10-CM

## 2024-10-18 DIAGNOSIS — R12 HEARTBURN: ICD-10-CM

## 2024-10-18 DIAGNOSIS — R19.7 DIARRHEA FOLLOWING GASTROINTESTINAL SURGERY: ICD-10-CM

## 2024-10-18 DIAGNOSIS — Z90.49 STATUS POST SMALL BOWEL RESECTION: ICD-10-CM

## 2024-10-18 DIAGNOSIS — Z98.890 DIARRHEA FOLLOWING GASTROINTESTINAL SURGERY: ICD-10-CM

## 2024-10-18 DIAGNOSIS — K58.8 OTHER IRRITABLE BOWEL SYNDROME: ICD-10-CM

## 2024-10-18 DIAGNOSIS — Z79.620: ICD-10-CM

## 2024-10-18 DIAGNOSIS — K50.00 CROHN'S DISEASE OF SMALL INTESTINE WITHOUT COMPLICATION: Primary | ICD-10-CM

## 2024-10-18 NOTE — PROGRESS NOTES
Chief Complaint   Patient presents with    Follow-up     Discuss Skyrizi treatment       Patient presents today for telehealth service.    This service was conducted via visual and audio utilizing adicate timeads technology integrated into [employer] EMR.    This provider is located at [facility] [place] [facility], [place], [place].    Patient is being seen remotely via telehealth at home address and patient has stated they are in a secure environment for the telemedicine visit.    The patient's condition being diagnosed/treated as appropriate for telemedicine.    Patient consent : Do you consent to use a video/audio connection for your medical care today? Yes        History of Present Illness  38-year-old female presents today for follow-up regarding Skyrizi side effects.  Last office visit April 2024  Laparoscopic cholecystectomy with IOC was performed July 14, 2023 with Dr Flores.   Also status post laparoscopic ileocecectomy November 2020 with Dr. Kerr.    She is followed outpatient by Dr. Escalona and myself for diagnosis of Crohn's disease     CT scan performed July 12, 2023 with supraumbilical hernia containing a segment of transverse colon, no incarceration. Also complex 2.4 cm left ovarian/adnexal cyst.  She has discussed surgical intervention for hernia however Dr. Flores recommends weight loss in order to be a candidate for hernia repair.       She has a a history of chronic constipation with anterior rectocele and abnormal anal rectal manometry consistent with pelvic floor dyssynergy, hidradenitis suppurativa, anal fissure, aphthous ulcers, Uveitis and rectal stricture.  Last colonoscopy April 13, 2022 with 8 mm anal fissure otherwise normal, no evidence of recurrence at ileocolonic anastomosis.      Skyrizi was initiated March 30, 2023.      She is followed [facility] [place] [facility] by Shane Conroy, last visit 01/31/2024.    Previous gastric emptying study  May 2021 with 11% retained at 4 hours, essentially normal     Previous treatments for Crohn's disease:  infliximab discontinued due to worsening joint pain, fevers and constellation of symptoms she did not have prior to infliximab February 2023,   adalimumab initiated 2019- discontinued given concern for allergic reaction with whelps November 2022  prednisone.      History of Present Illness  The patient is a 38-year-old female who presents today via telemedicine video visit for follow-up.    She reports that her last Skyrizi dose was delayed by 8 days due to a knee replacement surgery.   This delay led to an increase in gastrointestinal symptoms, including diarrhea, cramping, and bloating, which have persisted for several weeks.   She has noticed a return of symptoms, including increased diarrhea and urgency, around the 4-week juan antonio after each OBI.  She has 2 to 3 bowel movements most days, but on bad days, it can be every hour, 4 to 5 weeks after her Skyrizi injection.    She also has a large abdominal hernia that becomes more distended when she is bloated.     She has been previously prescribed Lomotil but has not taken this recently.    She was prescribed Lomotil a couple of years ago but did not use it much as she did not have a lot of diarrhea once she started treatment.   She rarely uses MiraLAX now.     She experienced severe bone pain, particularly in the sacroiliac joint and femur, following her most recent Skyrizi OBI September 19, 2024.  This pain typically lasts for 3 to 5 days.   The pain was manageable with all of her previous treatments managed with heating pads and Tylenol except with her most recent OBI when she experienced severe bone pain which led to difficulty ambulating.  She is due for her next Skyrizi dose November 7, 2024.    Despite these side effects, she continues to take Skyrizi due to its overall effectiveness.   Her joint pain is somewhat controlled with Skyrizi, but it is always  present. It is better than with Remicade, but Humira was the best for joint pain.   She had hives with Humira and a bad reaction to Remicade.     Despite daily esomeprazole and twice daily Pepcid she reports worsening acid reflux. She avoids acidic foods and tomatoes, which she tested reactive to.   Her reflux is worse in the evening.    She has a history of hidradenitis suppurativa and uveitis, both of which are under control.     She has an IUD and does not have a menstrual cycle, though she occasionally has spotting.    She is considering trying GLP-1 for weight loss.     Physical Exam  Constitutional:       General: She is not in acute distress.     Appearance: Normal appearance. She is well-developed. She is not ill-appearing.   Eyes:      General: No scleral icterus.  Pulmonary:      Effort: No respiratory distress.   Skin:     Coloration: Skin is not jaundiced or pale.   Neurological:      Mental Status: She is alert and oriented to person, place, and time.   Psychiatric:         Mood and Affect: Mood normal.         Behavior: Behavior normal.         Thought Content: Thought content normal.         Judgment: Judgment normal.          Assessment and Plan    Diagnoses and all orders for this visit:    1. Crohn's disease of small intestine without complication (Primary)    2. Status post small bowel resection    3. Other irritable bowel syndrome    4. Heartburn    5. Gastroparesis    6. Long term current use of risankizumab    7. Bone pain    8. Diarrhea following gastrointestinal surgery       Assessment & Plan  1. Crohn's Disease.  The patient reports increased gastrointestinal symptoms, including diarrhea, cramping, and bloating, particularly in the latter weeks of her 8-week Skyrizi dosing period.   She also experiences severe bone pain post-injection, which has worsened recently. The last on-body injection was on 09/19/2024, and she is due for the next dose on 11/07/2024.   Given the severity of her bone  pain, increasing the frequency of Skyrizi injections is not advisable.   We discussed discontinuing Skyrizi, she wishes to proceed with her next Skyrizi dose on 11/07/2024 and monitor her response.   If the bone pain remains severe, alternative medications such as Stelara or Rinvoq may be considered.   A prescription for Lomotil will be provided, with instructions to take 1 to 2 pills up to three times a day as needed for diarrhea.   If Lomotil proves ineffective, colestipol may be considered for bile acid diarrhea management status post cholecystectomy and right hemicolectomy    2. Gastroesophageal Reflux Disease (GERD).  The patient reports an increase in reflux symptoms, particularly in the evening.   She is currently taking esomeprazole and Pepcid daily.   She is advised to continue avoiding acidic foods and monitor her symptoms.   If symptoms persist, further evaluation may be necessary.    3. Hidradenitis Suppurativa.  The patient's condition is not as well-controlled as it was on Humira but remains manageable with only one lesion currently.   She is using topical clindamycin and can keyon and drain lesions herself. No new treatment is required at this time.  She is up-to-date with dermatology    4. Uveitis.  The patient's uveitis is currently controlled with no recent eye emergencies.   No changes in treatment are necessary.    5. Fibromyalgia.  The patient's joint pain is somewhat controlled with Skyrizi, though it is always present. Humira was previously the most effective for her joint pain. Alternative treatments may be considered if Skyrizi becomes intolerable.  She is encouraged to continue following with healthcare provider for fibromyalgia management    6. Weight Management.  The patient is interested in trying a GLP-1 agonist for weight loss and has discussed this with her primary care physician. She is advised to wait until her gastrointestinal symptoms are better controlled before starting any new  medication for weight loss.  Also discussed previous gastric emptying study 2021 with 11% retained at 4 hours, this is essentially normal but given worsening heartburn, this class of medications can cause gastroparesis and worsening upper GI symptoms and she was encouraged to discuss this with her primary care provider prior to initiation of therapy if this medication is to be prescribed in the future    7. supraumbilical hernia containing a segment of transverse colon, no incarceration on CT scan July 2023  She has discussed surgical intervention for hernia however Dr. Flores recommends weight loss in order to be a candidate for hernia repair.      Follow-up  She is scheduled for a follow-up appointment on 11/19/2024 at 10:30 am.       I spent 45 minutes caring for Bri on this date of service. This time includes time spent by me in the following activities:preparing for the visit, performing a medically appropriate examination and/or evaluation , counseling and educating the patient/family/caregiver, ordering medications, tests, or procedures, and documenting information in the medical record        Patient or patient representative verbalized consent for the use of Ambient Listening during the visit with  BANDAR Hopkins for chart documentation. 10/19/2024  09:34 EDT    EMR Dragon/Transcription Disclaimer:  This document has been dictated utilizing Dragon dictation.

## 2024-10-24 RX ORDER — PREGABALIN 100 MG/1
100 CAPSULE ORAL 3 TIMES DAILY
Qty: 90 CAPSULE | Refills: 0 | Status: SHIPPED | OUTPATIENT
Start: 2024-10-24

## 2024-10-29 ENCOUNTER — SPECIALTY PHARMACY (OUTPATIENT)
Dept: GASTROENTEROLOGY | Facility: CLINIC | Age: 39
End: 2024-10-29
Payer: COMMERCIAL

## 2024-10-31 ENCOUNTER — FLU SHOT (OUTPATIENT)
Dept: FAMILY MEDICINE CLINIC | Facility: CLINIC | Age: 39
End: 2024-10-31
Payer: COMMERCIAL

## 2024-10-31 DIAGNOSIS — Z23 NEED FOR INFLUENZA VACCINATION: Primary | ICD-10-CM

## 2024-10-31 PROCEDURE — 90471 IMMUNIZATION ADMIN: CPT | Performed by: FAMILY MEDICINE

## 2024-10-31 PROCEDURE — 90656 IIV3 VACC NO PRSV 0.5 ML IM: CPT | Performed by: FAMILY MEDICINE

## 2024-11-05 ENCOUNTER — SPECIALTY PHARMACY (OUTPATIENT)
Dept: GASTROENTEROLOGY | Facility: CLINIC | Age: 39
End: 2024-11-05
Payer: COMMERCIAL

## 2024-11-05 RX ORDER — ESCITALOPRAM OXALATE 20 MG/1
20 TABLET ORAL DAILY
Qty: 30 TABLET | Refills: 1 | Status: SHIPPED | OUTPATIENT
Start: 2024-11-05

## 2024-11-19 ENCOUNTER — OFFICE VISIT (OUTPATIENT)
Dept: GASTROENTEROLOGY | Facility: CLINIC | Age: 39
End: 2024-11-19
Payer: COMMERCIAL

## 2024-11-19 VITALS
OXYGEN SATURATION: 99 % | DIASTOLIC BLOOD PRESSURE: 80 MMHG | TEMPERATURE: 96.2 F | SYSTOLIC BLOOD PRESSURE: 130 MMHG | HEIGHT: 68 IN | BODY MASS INDEX: 44.41 KG/M2 | HEART RATE: 67 BPM | WEIGHT: 293 LBS

## 2024-11-19 DIAGNOSIS — Z98.890 DIARRHEA FOLLOWING GASTROINTESTINAL SURGERY: ICD-10-CM

## 2024-11-19 DIAGNOSIS — Z90.49 STATUS POST SMALL BOWEL RESECTION: ICD-10-CM

## 2024-11-19 DIAGNOSIS — K50.00 CROHN'S DISEASE OF SMALL INTESTINE WITHOUT COMPLICATION: Primary | ICD-10-CM

## 2024-11-19 DIAGNOSIS — R94.8 ABNORMAL ANAL MANOMETRY: ICD-10-CM

## 2024-11-19 DIAGNOSIS — R19.7 DIARRHEA FOLLOWING GASTROINTESTINAL SURGERY: ICD-10-CM

## 2024-11-19 DIAGNOSIS — K42.9 UMBILICAL HERNIA WITHOUT OBSTRUCTION AND WITHOUT GANGRENE: ICD-10-CM

## 2024-11-19 DIAGNOSIS — R12 HEARTBURN: ICD-10-CM

## 2024-11-19 PROCEDURE — 99214 OFFICE O/P EST MOD 30 MIN: CPT | Performed by: NURSE PRACTITIONER

## 2024-11-19 RX ORDER — DIPHENOXYLATE HYDROCHLORIDE AND ATROPINE SULFATE 2.5; .025 MG/1; MG/1
1 TABLET ORAL 4 TIMES DAILY PRN
Qty: 120 TABLET | Refills: 3 | Status: SHIPPED | OUTPATIENT
Start: 2024-11-19

## 2024-11-19 RX ORDER — DICYCLOMINE HCL 20 MG
20 TABLET ORAL EVERY 6 HOURS PRN
Qty: 120 TABLET | Refills: 4 | Status: SHIPPED | OUTPATIENT
Start: 2024-11-19

## 2024-11-19 RX ORDER — CEPHALEXIN 500 MG/1
500 CAPSULE ORAL 3 TIMES DAILY
COMMUNITY
Start: 2024-11-16 | End: 2024-11-23

## 2024-11-19 RX ORDER — SERTRALINE HYDROCHLORIDE 25 MG/1
TABLET, FILM COATED ORAL EVERY 24 HOURS
COMMUNITY

## 2024-11-19 RX ORDER — DICYCLOMINE HCL 20 MG
20 TABLET ORAL
COMMUNITY
End: 2024-11-19 | Stop reason: SDUPTHER

## 2024-11-19 NOTE — PATIENT INSTRUCTIONS
For patients with a history of Crohn's disease, we would not recommend bariatric surgery involving the small bowel.   This would leave the options of gastric sleeve and gastric balloon as well as lap band.   The option for reversibility would be the more ideal situation such as balloon or lap band with diet and exercise and reassessment.      Restart dicyclomine as needed to see if helps with lower cramping     Use Lomotil for diarrhea and urgency, adjust dose to avoid constipation    Acid reflux, continue Nexium at bedtime, add Pepcid in the morning and at 2 PM  Dietary and lifestyle modifications for acid reflux encouraged including smaller meals, avoiding bending over after eating and attain upright position after eating for 2 to 3 hours    Intermittent nausea, recommend smaller meals and Zofran as needed    Discussed lower abdominal cramping at upcoming GYN appointment    Continue Beverly for Crohn's disease    Bariatric surgeon referral placed per patient request    Follow-up in 4 months

## 2024-11-19 NOTE — PROGRESS NOTES
Chief Complaint   Patient presents with    Follow-up     Skyrizi treatment             GASTROENTEROLOGY SUMMARY    39-year-old female presents today for follow-up regarding Skyrizi side effects.  Last visit October 18, 2024.   Laparoscopic cholecystectomy with IOC was performed July 14, 2023 with Dr Flores.   Also status post laparoscopic ileocecectomy November 2020 with Dr. Kerr.    She is followed outpatient by Dr. Escalona and myself for diagnosis of Crohn's disease      CT scan performed July 12, 2023 with supraumbilical hernia containing a segment of transverse colon, no incarceration. Also complex 2.4 cm left ovarian/adnexal cyst.  She has discussed surgical intervention for hernia however Dr. Flores recommends weight loss in order to be a candidate for hernia repair.       She has a a history of chronic constipation with anterior rectocele and abnormal anal rectal manometry consistent with pelvic floor dyssynergy, hidradenitis suppurativa, anal fissure, aphthous ulcers, Uveitis and rectal stricture.    Last EGD and colonoscopy April 3, 2024.     Skyrizi was initiated March 30, 2023.       She is followed [facility] [place] [facility] by Dr Rose, Shane Spence, last visit 01/31/2024.     Previous gastric emptying study May 2021 with 11% retained at 4 hours, essentially normal     Previous treatments for Crohn's disease:  infliximab discontinued due to worsening joint pain, fevers and constellation of symptoms she did not have prior to infliximab February 2023,   adalimumab initiated 2019- discontinued given concern for allergic reaction with whelps November 2022  prednisone.   Skyrizi was initiated March 30, 2023  History of Present Illness  The patient is a 39-year-old female who presents today for follow-up.    She had a telemedicine visit on 10/18/2024, during which she was advised to continue Skyrizi. Her last dose of Skyrizi was administered on 11/07/2024, and she tolerated it well.   She  "reports mild lower back pain, but overall, she feels well.    She experiences loose stools more frequently than usual, with an increased urgency to use the restroom, averaging three times a day.   She also reports rectal irritation and occasional difficulty emptying her bowels, even though there have been improvements from pelvic floor physical therapy.   She underwent gallbladder removal in 07/2023 and noticed changes in her bowel movements earlier this year.   She has used Lomotil or Imodium in the past to manage her symptoms when diarrhea was more predominant but is not currently taking them.   She finds it difficult to pass formed stools but can empty her bowels when experiencing liquid diarrhea.       She is also on Nexium and famotidine for acid reflux but still experiences frequent reflux and acid burn, particularly during the day.   She has adjusted her diet to avoid allergens like tomatoes and has switched to smaller meals to manage regurgitation.   She takes Nexium at night and famotidine twice a day.    She is under the care of the [facility] at [place], where she is working on managing her heart rate and fatigue.   She takes Mestinon three times a day and metoprolol at night.   She has a history of mild sleep apnea and idiopathic hypersomnia.   She was previously advised to consider GLP injections or bariatric surgery for weight loss and hernia repair.    She is currently taking dicyclomine for lower abdominal cramping but is unsure if the pain is related to her ovaries or gastrointestinal system.   she has an upcoming appointment with her OB/GYN.          Result Review :           Vital Signs:   /80   Pulse 67   Temp 96.2 °F (35.7 °C)   Ht 172.2 cm (67.8\")   Wt (!) 151 kg (332 lb 3.2 oz)   SpO2 99%   BMI 50.81 kg/m²     Body mass index is 50.81 kg/m².     Physical Exam    Assessment and Plan        Diagnoses and all orders for this visit:    1. Crohn's disease of small intestine " without complication (Primary)  -     diphenoxylate-atropine (LOMOTIL) 2.5-0.025 MG per tablet; Take 1 tablet by mouth 4 (Four) Times a Day As Needed for Diarrhea.  Dispense: 120 tablet; Refill: 3  -     dicyclomine (BENTYL) 20 MG tablet; Take 1 tablet by mouth Every 6 (Six) Hours As Needed for Abdominal Cramping.  Dispense: 120 tablet; Refill: 4  -     Ambulatory Referral to Bariatric Surgery    2. Status post small bowel resection  -     diphenoxylate-atropine (LOMOTIL) 2.5-0.025 MG per tablet; Take 1 tablet by mouth 4 (Four) Times a Day As Needed for Diarrhea.  Dispense: 120 tablet; Refill: 3    3. Abnormal anal manometry  -     diphenoxylate-atropine (LOMOTIL) 2.5-0.025 MG per tablet; Take 1 tablet by mouth 4 (Four) Times a Day As Needed for Diarrhea.  Dispense: 120 tablet; Refill: 3    4. Heartburn    5. Diarrhea following gastrointestinal surgery  -     diphenoxylate-atropine (LOMOTIL) 2.5-0.025 MG per tablet; Take 1 tablet by mouth 4 (Four) Times a Day As Needed for Diarrhea.  Dispense: 120 tablet; Refill: 3  -     dicyclomine (BENTYL) 20 MG tablet; Take 1 tablet by mouth Every 6 (Six) Hours As Needed for Abdominal Cramping.  Dispense: 120 tablet; Refill: 4    6. BMI 50.0-59.9, adult  -     Ambulatory Referral to Bariatric Surgery    7. Umbilical hernia without obstruction and without gangrene  -     Ambulatory Referral to Bariatric Surgery       Assessment & Plan  1. Crohn's Disease Status post laparoscopic ileocecectomy November 2020   She is currently on Skyrizi and tolerated her last dose on November 7, 2024, well.   She reports experiencing some lower back pain but not as severe as before.   There was concern for side effects related to Skyrizi however patient states this has improved and she would like to continue Skyrizi for now.  She will continue with Skyrizi for now.    2. Chronic Diarrhea with history of ileocecectomy November 2020 and previous cholecystectomy.  She reports having more loose stools  and urgency, going about three times a day, which is more frequent than her normal pattern.   A prescription for Lomotil will be sent to [facility].   She is advised to take half a tablet daily or every other day, or a full tablet daily, and monitor the effects.   She should maintain a log of her bowel movements and adjust the Lomotil dosage accordingly.  If patient experiences constipation, the medication is working too well and she is instructed to hold the next dose and when diarrhea and urgency returns restart at lower dose closely monitoring and tracking response      3. Acid Reflux.  She reports more frequent reflux and acid burn despite cutting out food allergens like tomatoes. She should take Pepcid in the morning and at 2:00 PM, and Nexium at bedtime.   She should continue with small meals, avoid bending over, and refrain from lying down after eating.     4.  Intermittent nausea, previous gastric emptying study essentially normal, 11% retained at 4 hours.  Zofran should be taken as needed.    5. Lower Abdominal Cramping.  She reports experiencing severe lower abdominal cramping and is unsure if it is ovarian or GI pain.   Dicyclomine can be restarted as needed, up to three times a day, to see if it alleviates her lower abdominal cramping.   Also encouraged her to see if there is any pattern to lower abdominal cramping and discuss with her GYN at follow-up visit.    6. Weight Management.  Patient is requesting referral to weight loss surgeon, given significant nausea, uncontrolled acid reflux and ongoing GI symptoms, I think she would have worsening/exacerbation of all of the symptoms with GLP-1 receptor agonist treatment however this would benefit patient as weight loss has been recommended prior to surgical consideration for supraumbilical hernia repair by Dr. Flores in the past.  A referral to Dr. Carrizales for bariatric surgery consultation will be made.   Given her history of Crohn's disease if  surgical interventions are appropriate, gastroenterology recommends options such as gastric balloon or lap band, which are reversible and do not involve the small bowel    Follow-up  Return in 4 months for routine follow-up.    Prescription drug monitoring program report reviewed and appropriate: JARED Reviewed in Epic. Kellie               Patient Instructions   For patients with a history of Crohn's disease, we would not recommend bariatric surgery involving the small bowel.   This would leave the options of gastric sleeve and gastric balloon as well as lap band.   The option for reversibility would be the more ideal situation such as balloon or lap band with diet and exercise and reassessment.      Restart dicyclomine as needed to see if helps with lower cramping     Use Lomotil for diarrhea and urgency, adjust dose to avoid constipation    Acid reflux, continue Nexium at bedtime, add Pepcid in the morning and at 2 PM  Dietary and lifestyle modifications for acid reflux encouraged including smaller meals, avoiding bending over after eating and attain upright position after eating for 2 to 3 hours    Intermittent nausea, recommend smaller meals and Zofran as needed    Discussed lower abdominal cramping at upcoming GYN appointment    Continue Skyrizi for Crohn's disease    Bariatric surgeon referral placed per patient request    Follow-up in 4 months      Patient or patient representative verbalized consent for the use of Ambient Listening during the visit with  BANDAR Hopkins for chart documentation. 12/17/2024  06:52 EST    EMR Dragon/Transcription Disclaimer:  This document has been Dictated utilizing Dragon dictation.

## 2024-11-21 RX ORDER — PREGABALIN 100 MG/1
100 CAPSULE ORAL 3 TIMES DAILY
Qty: 90 CAPSULE | Refills: 0 | OUTPATIENT
Start: 2024-11-21

## 2024-11-21 RX ORDER — PREGABALIN 100 MG/1
100 CAPSULE ORAL 3 TIMES DAILY
Qty: 90 CAPSULE | Refills: 0 | Status: SHIPPED | OUTPATIENT
Start: 2024-11-21

## 2024-11-25 ENCOUNTER — SPECIALTY PHARMACY (OUTPATIENT)
Dept: GASTROENTEROLOGY | Facility: CLINIC | Age: 39
End: 2024-11-25
Payer: COMMERCIAL

## 2024-11-26 NOTE — PROGRESS NOTES
CC: f/u    HPI:  Bri Pfeiffer is a  39 y.o.  right-handed female  with past medical history of IBD status post small bowel resection in 2020, fibromyalgia, migraine headaches, vasodepressor syndrome, arthritis, depression/anxiety who is here for follow-up regarding migraines. She started seeing us in 2021 for issues of paresthesias.  There is report of an EMG done by Dr. Newell noting bilateral carpal tunnel with both median motor distal latencies were mildly prolonged at 4.5 ms with normal conduction velocities in the forearms. The amplitude was normal in the right but a little low on the left at 4 mV.  Both ulnar motor studies were obtained and there were normal conduction velocities above and below the elbow on the right but on the left the standard study showed some difference at 58.9 m/s below the elbow with 52.9 m/s across the elbow and so the study was repeated recording over the first dorsal interosseous showing a slow velocity across the elbow at 46.6 m/s and a normal velocity below the elbow at 55.9 m/s.   She saw a hand specialist and surgery was recommended but she opted against it since she would have to suspend her Humira.  She also had issues with left leg numbness, imbalance, lightheadedness, postural syncope, diffuse pain of joints and back.       She has had multiple specialty visits including endocrinology, rheumatology.  She has what is thought to be inflammatory arthritis related to her Crohn's.  Work-up I reviewed includes unremarkable C-spine, RPR was nonreactive, cryoglobulins were negative, copper was within normal limits at 122, heavy metals including lead arsenic and mercury were all within normal, vitamin D levels were within normal, sed rate normal at 13 and IEP/SPEP showed no evidence for monoclonal gammopathy.   Normal EMG of lower extremities done by Dr. Thao in 12/21.  MRI of the lumbar spine and pelvis, referenced as without significant nerve root compression, facet  arthropathy at L4/5, normal SI joints, and no sciatic lesion.    She has been on Cymbalta but that was changed to Effexor in June 2022.  She continues on Lyrica.  She reports being on Topamax when she was a teenager and does not recall if there were any adverse affects. She believes she stopped this because her headaches improved.    Back in January she had c/o numbness that was about th same.  Remember she never had a skin bx.   She had been in the ER with GI sxs and had cholecystectomy about 4 wks previous.  She continues at [place] [facility]-was started on midodrine and guafacine-stopped taking guafacine for intolerability.  She was changed from effexor to lexapro in hopes to help with her orthostasis.    Here lately we have been managing headaches mostly.  She c/o frequent headaches still, but not severe.  She takes nurtec occasionally.  She is also on amitriptyline.  Previously she would have a HA about 3 times a week.  She has only taken nurtec about twice in the month.  She was endorsing memory as a problem.  She takes many notes on her phone because she forgets  details of her medical story.  She took a home MARS test and performed well reports 17 score.  She doesn't c/o memory today.  She did have syncopal spell in the shower this morning.  She is mindful of orthostasic precautions.  She is due back to dysautonomia clinic and looks like skin bx is a thought.  Her GI sxs are better controlled and seems to be doing well on skyrizi.  Is considering surgery for her CTS at this point    Social history:    Family history:      Pain Scale:        ROS:  Review of Systems   Constitutional:  Positive for fatigue.   Eyes:  Positive for photophobia. Negative for pain and visual disturbance.   Respiratory:  Negative for chest tightness, shortness of breath and wheezing.    Cardiovascular:  Negative for chest pain and palpitations.   Musculoskeletal:  Positive for gait problem (balance problem). Negative for  neck pain and neck stiffness.   Neurological:  Positive for numbness (hands & lt leg) and headaches (decreased frequency). Negative for dizziness and weakness.   Psychiatric/Behavioral:  Negative for confusion and decreased concentration.        Reviewed ROS conducted by MA and geraldine        Physical Exam:  Vitals:    05/21/24 1006   BP: 120/72   Pulse: 101   SpO2: 99%   Weight: (!) 143 kg (315 lb)      Orthostatic BP:  There is no height or weight on file to calculate BMI.        General: pt well appearing, no distress  HEENT: Normocephalic, conjunctiva normal, external canals normal, no nasal discharge, moist mucous membranes  Neck: No lymphadenopathy, thyroid not enlarged, no JVD  CV: Regular rate and rhythm, no murmurs negative no bruits auscultated at neck, equal pulses  Pulmonary: Normal respiratory effort, clear to auscultation bilaterally  Extremities: no edema, bruising, or skin lesions  Pysch: good eye contact, cooperative, full affect, euthymic mood, good attention, good insight  Mental: alert, conversant, AOx3, provides history, 3/3 recall.  Language fluent, names, repeats.  CN: CN II-XII intact, PERRL, EOMi, no gaze palsy or nystagmus, intact facial sensation, no facial assymetry, intact hearing, symmetric palate elevation, tongue midline, good SCM strength  Motor: no abnormal movements, no pronator drift, normal  bulk and tone, 5/5 strength b/l UE & LE  Sensory: normal sensation to crude touch, temperature, and vibration throughout  Reflexes: 2+ throughout, neg babinski  Coordination: no ataxia on finger-nose, heel-shin  Gait: normal gait, no ataxia, intact heel and toe walking        Results:      Lab Results   Component Value Date    GLUCOSE 102 (H) 07/15/2023    BUN 7 07/15/2023    CREATININE 0.69 07/15/2023    EGFRIFNONA 80 10/01/2021    EGFRIFAFRI 120 03/02/2021    BCR 10.1 07/15/2023    CO2 23.4 07/15/2023    CALCIUM 9.3 07/15/2023    PROTENTOTREF 6.8 07/25/2023    ALBUMIN 4.5 07/25/2023    LABIL2  1.6 06/08/2022    AST 15 07/25/2023    ALT 20 07/25/2023       Lab Results   Component Value Date    WBC 11.96 (H) 07/25/2023    HGB 14.1 07/25/2023    HCT 42.9 07/25/2023    MCV 91.5 07/25/2023     07/25/2023         .  Lab Results   Component Value Date    RPR Non-Reactive 09/28/2021         Lab Results   Component Value Date    TSH 1.210 06/13/2022    THYROIDAB 8 06/13/2022         Lab Results   Component Value Date    KCTVPBEX44 488 11/21/2022         Lab Results   Component Value Date    FOLATE 15.4 06/08/2022         No results found for: HGBA1C      Lab Results   Component Value Date    GLUCOSE 102 (H) 07/15/2023    BUN 7 07/15/2023    CREATININE 0.69 07/15/2023    EGFRIFNONA 80 10/01/2021    EGFRIFAFRI 120 03/02/2021    BCR 10.1 07/15/2023    K 4.3 07/15/2023    CO2 23.4 07/15/2023    CALCIUM 9.3 07/15/2023    PROTENTOTREF 6.8 07/25/2023    ALBUMIN 4.5 07/25/2023    LABIL2 1.6 06/08/2022    AST 15 07/25/2023    ALT 20 07/25/2023      B12 and TSH reviewed.  Reviewed available MRI brain.  No GRE sequences seen      Diagnosis:  1. Migraine headache  2. Presumed vasodepressor syndrome  3. Paresthesias  4  Memory problem        Impression: 37-year-old female with above-stated medical history with multiple neurologic complaints but whom we follow for migraine headache.  She follows at [facility] for POTs.  I think an AI autonomic impairment unlikely without evidence of true polyneuropathy-b/l median neuropathy and L ulnar neuroapathy per Dr Villalobos' EMG in past.  Has never had skin bx.       Plan:  1 doing well on lexapro, not inclined to increase dose [id number]  2 nurtec abortive is working well  3 continue amitryptiline for ppx  4 pt will f/u with hand surgeon about CTS repair  5 f/u at dysautonmia clinic as scheduled  6 referral to rheum \-wants to reestablish    F/u in 6-9 mos    I spent at least 40 minutes interviewing, examining, and counseling patient.  I independently reviewed  documentation, laboratory and diagnostic findings, external documentation where applicable, and formulated treatment plan which was discussed with the patient

## 2024-12-04 ENCOUNTER — SPECIALTY PHARMACY (OUTPATIENT)
Dept: GASTROENTEROLOGY | Facility: CLINIC | Age: 39
End: 2024-12-04
Payer: COMMERCIAL

## 2024-12-04 NOTE — PROGRESS NOTES
Specialty Pharmacy Patient Management Program  Gastroenterology Refill Outreach      Bri is a 39 y.o. female contacted today regarding refills of her medication(s).    Specialty medication(s) and dose(s) confirmed: skyrizi    Refill Questions      Flowsheet Row Most Recent Value   Changes to allergies? No   Changes to medications? No   New conditions or infections since last clinic visit No   Unplanned office visit, urgent care, ED, or hospital admission in the last 4 weeks  No   How does patient/caregiver feel medication is working? Good   Financial problems or insurance changes  No   Since the previous refill, were any specialty medication doses or scheduled injections missed or delayed?  No   If yes, please provide the amount n/a   Why were doses missed? n/a   Does this patient require a clinical escalation to a pharmacist? No          Delivery Questions      Flowsheet Row Most Recent Value   Delivery method UPS   Delivery address verified with patient/caregiver? Yes   Delivery address Home   Number of medications in delivery 3   Medication(s) being filled and delivered Escitalopram Oxalate (LEXAPRO), Metoprolol Tartrate (LOPRESSOR), Risankizumab-rzaa (Skyrizi)   Doses left of specialty medications 1 week   Copay verified? Yes   Copay amount 0   Copay form of payment No copayment ($0)   Ship Date 12/5   Delivery Date 12/6   Signature Required No            Follow-Up: 45 days    Dana Huizar  12/4/2024  12:54 EST

## 2024-12-05 ENCOUNTER — OFFICE VISIT (OUTPATIENT)
Dept: NEUROLOGY | Facility: CLINIC | Age: 39
End: 2024-12-05
Payer: COMMERCIAL

## 2024-12-05 VITALS
HEART RATE: 78 BPM | HEIGHT: 67 IN | DIASTOLIC BLOOD PRESSURE: 80 MMHG | OXYGEN SATURATION: 100 % | SYSTOLIC BLOOD PRESSURE: 120 MMHG | BODY MASS INDEX: 45.99 KG/M2 | WEIGHT: 293 LBS

## 2024-12-05 DIAGNOSIS — R55 VASODEPRESSOR SYNCOPE: ICD-10-CM

## 2024-12-05 DIAGNOSIS — K50.111 CROHN'S DISEASE OF LARGE INTESTINE WITH RECTAL BLEEDING: ICD-10-CM

## 2024-12-05 DIAGNOSIS — G43.009 MIGRAINE WITHOUT AURA AND WITHOUT STATUS MIGRAINOSUS, NOT INTRACTABLE: ICD-10-CM

## 2024-12-05 DIAGNOSIS — M79.7 FIBROMYALGIA: Primary | ICD-10-CM

## 2024-12-05 PROCEDURE — 99214 OFFICE O/P EST MOD 30 MIN: CPT | Performed by: PHYSICIAN ASSISTANT

## 2024-12-05 NOTE — LETTER
December 9, 2024     Alan Benavidez MD  [address] [facility]  [address]  [place] [id number]    Patient: Bri Pfeiffer   YOB: 1985   Date of Visit: 12/5/2024     Dear Alan Benavidez MD:       Thank you for referring Bri Pfeiffer to me for evaluation. Below are the relevant portions of my assessment and plan of care.    If you have questions, please do not hesitate to call me. I look forward to following Bri along with you.         Sincerely,        MERLE Espinoza        CC: No Recipients    Mackenzie Nova PA  12/09/24 2253  Sign when Signing Visit  CC: f/u    HPI:  Bri Pfeiffer is a  39 y.o.  right-handed female  with past medical history of IBD status post small bowel resection in 2020, fibromyalgia, migraine headaches, vasodepressor syndrome, arthritis, depression/anxiety who is here for follow-up regarding migraines. She started seeing us in 2021 for issues of paresthesias.  There is report of an EMG done by Dr. Newell noting bilateral carpal tunnel with both median motor distal latencies were mildly prolonged at 4.5 ms with normal conduction velocities in the forearms. The amplitude was normal in the right but a little low on the left at 4 mV.  Both ulnar motor studies were obtained and there were normal conduction velocities above and below the elbow on the right but on the left the standard study showed some difference at 58.9 m/s below the elbow with 52.9 m/s across the elbow and so the study was repeated recording over the first dorsal interosseous showing a slow velocity across the elbow at 46.6 m/s and a normal velocity below the elbow at 55.9 m/s.   She saw a hand specialist and surgery was recommended but she opted against it since she would have to suspend her Humira.  She also had issues with left leg numbness, imbalance, lightheadedness, postural syncope, diffuse pain of joints and back.       She has had multiple specialty visits including  endocrinology, rheumatology.  She has what is thought to be inflammatory arthritis related to her Crohn's.  Work-up I reviewed includes unremarkable C-spine, RPR was nonreactive, cryoglobulins were negative, copper was within normal limits at 122, heavy metals including lead arsenic and mercury were all within normal, vitamin D levels were within normal, sed rate normal at 13 and IEP/SPEP showed no evidence for monoclonal gammopathy.   Normal EMG of lower extremities done by Dr. Thao in 12/21.  MRI of the lumbar spine and pelvis, referenced as without significant nerve root compression, facet arthropathy at L4/5, normal SI joints, and no sciatic lesion.    She has been on Cymbalta but that was changed to Effexor in June 2022.  She continues on Lyrica.  She reports being on Topamax when she was a teenager and does not recall if there were any adverse affects. She believes she stopped this because her headaches improved.    Back in January she had c/o numbness that was about th same.  Remember she never had a skin bx.   She had been in the ER with GI sxs and had cholecystectomy about 4 wks previous.  She continues at [place] [facility]-was started on midodrine and guafacine-stopped taking guafacine for intolerability.  She was changed from effexor to lexapro in hopes to help with her orthostasis.    Here lately we have been managing headaches mostly.  She c/o frequent headaches still, but not severe.  She takes nurtec occasionally.  She is also on amitriptyline.  Previously she would have a HA about 3 times a week.  She has only taken nurtec about twice in the month.  She was endorsing memory as a problem.  She takes many notes on her phone because she forgets  details of her medical story.  She took a home MARS test and performed well reports 17 score.  She doesn't c/o memory today.  She did have syncopal spell in the shower this morning.  She is mindful of orthostasic precautions.  She is due back to  dysautonomia clinic and looks like skin bx is a thought.  Her GI sxs are better controlled and seems to be doing well on skyrizi.  Is considering surgery for her CTS at this point    Social history:    Family history:      Pain Scale:        ROS:  Review of Systems   Constitutional:  Positive for fatigue.   Eyes:  Positive for photophobia. Negative for pain and visual disturbance.   Respiratory:  Negative for chest tightness, shortness of breath and wheezing.    Cardiovascular:  Negative for chest pain and palpitations.   Musculoskeletal:  Positive for gait problem (balance problem). Negative for neck pain and neck stiffness.   Neurological:  Positive for numbness (hands & lt leg) and headaches (decreased frequency). Negative for dizziness and weakness.   Psychiatric/Behavioral:  Negative for confusion and decreased concentration.        Reviewed ROS conducted by MA and geraldine        Physical Exam:  Vitals:    05/21/24 1006   BP: 120/72   Pulse: 101   SpO2: 99%   Weight: (!) 143 kg (315 lb)      Orthostatic BP:  There is no height or weight on file to calculate BMI.        General: pt well appearing, no distress  HEENT: Normocephalic, conjunctiva normal, external canals normal, no nasal discharge, moist mucous membranes  Neck: No lymphadenopathy, thyroid not enlarged, no JVD  CV: Regular rate and rhythm, no murmurs negative no bruits auscultated at neck, equal pulses  Pulmonary: Normal respiratory effort, clear to auscultation bilaterally  Extremities: no edema, bruising, or skin lesions  Pysch: good eye contact, cooperative, full affect, euthymic mood, good attention, good insight  Mental: alert, conversant, AOx3, provides history, 3/3 recall.  Language fluent, names, repeats.  CN: CN II-XII intact, PERRL, EOMi, no gaze palsy or nystagmus, intact facial sensation, no facial assymetry, intact hearing, symmetric palate elevation, tongue midline, good SCM strength  Motor: no abnormal movements, no pronator drift,  normal  bulk and tone, 5/5 strength b/l UE & LE  Sensory: normal sensation to crude touch, temperature, and vibration throughout  Reflexes: 2+ throughout, neg babinski  Coordination: no ataxia on finger-nose, heel-shin  Gait: normal gait, no ataxia, intact heel and toe walking        Results:      Lab Results   Component Value Date    GLUCOSE 102 (H) 07/15/2023    BUN 7 07/15/2023    CREATININE 0.69 07/15/2023    EGFRIFNONA 80 10/01/2021    EGFRIFAFRI 120 03/02/2021    BCR 10.1 07/15/2023    CO2 23.4 07/15/2023    CALCIUM 9.3 07/15/2023    PROTENTOTREF 6.8 07/25/2023    ALBUMIN 4.5 07/25/2023    LABIL2 1.6 06/08/2022    AST 15 07/25/2023    ALT 20 07/25/2023       Lab Results   Component Value Date    WBC 11.96 (H) 07/25/2023    HGB 14.1 07/25/2023    HCT 42.9 07/25/2023    MCV 91.5 07/25/2023     07/25/2023         .  Lab Results   Component Value Date    RPR Non-Reactive 09/28/2021         Lab Results   Component Value Date    TSH 1.210 06/13/2022    THYROIDAB 8 06/13/2022         Lab Results   Component Value Date    [id number] 488 11/21/2022         Lab Results   Component Value Date    FOLATE 15.4 06/08/2022         No results found for: HGBA1C      Lab Results   Component Value Date    GLUCOSE 102 (H) 07/15/2023    BUN 7 07/15/2023    CREATININE 0.69 07/15/2023    EGFRIFNONA 80 10/01/2021    EGFRIFAFRI 120 03/02/2021    BCR 10.1 07/15/2023    K 4.3 07/15/2023    CO2 23.4 07/15/2023    CALCIUM 9.3 07/15/2023    PROTENTOTREF 6.8 07/25/2023    ALBUMIN 4.5 07/25/2023    LABIL2 1.6 06/08/2022    AST 15 07/25/2023    ALT 20 07/25/2023      B12 and TSH reviewed.  Reviewed available MRI brain.  No GRE sequences seen      Diagnosis:  1. Migraine headache  2. Presumed vasodepressor syndrome  3. Paresthesias  4  Memory problem        Impression: 37-year-old female with above-stated medical history with multiple neurologic complaints but whom we follow for migraine headache.  She follows at [facility] for POTs.  I think an AI autonomic impairment unlikely without evidence of true polyneuropathy-b/l median neuropathy and L ulnar neuroapathy per Dr Villalobos' EMG in past.  Has never had skin bx.       Plan:  1 doing well on lexapro, not inclined to increase dose [id number]  2 nurtec abortive is working well  3 continue amitryptiline for ppx  4 pt will f/u with hand surgeon about CTS repair  5 f/u at dysautonmia clinic as scheduled  6 referral to rheum \-wants to reestablish    F/u in 6-9 mos    I spent at least 40 minutes interviewing, examining, and counseling patient.  I independently reviewed documentation, laboratory and diagnostic findings, external documentation where applicable, and formulated treatment plan which was discussed with the patient

## 2024-12-10 NOTE — PROGRESS NOTES
Subjective   Bri Pfeiffer is a 39 y.o. female.     CC: Annual Exam    History of Present Illness     Bri Pfeiffer 39 y.o. female who presents for an Annual Wellness Visit.  she has a history of   Patient Active Problem List   Diagnosis    Pericardial cyst    Vasodepressor syncope    Esophageal dysphagia    Rectal bleeding    Colitis    Gastroesophageal reflux disease    Crohn's disease of large intestine with rectal bleeding    Other irritable bowel syndrome    Skin rash    Moderate episode of recurrent major depressive disorder    Anxiety    Low back pain with sciatica    Ileocecal valve stenosis    Crohn's disease    BMI 40.0-44.9, adult    Status post small bowel resection    Fibromyalgia    Non-seasonal allergic rhinitis due to pollen    Incisional hernia, without obstruction or gangrene    Hernia of abdominal cavity    Transaminitis    Acute cholecystitis    Left ovarian cyst    Anal fissure    Migraine   .  she has been feeling well.   I  reviewed health maintenance with her as part of my preventative care plan.    The following portions of the patient's history were reviewed and updated as appropriate: allergies, current medications, past family history, past medical history, past social history, past surgical history, and problem list.    Review of Systems   Constitutional:  Negative for appetite change, fever and unexpected weight change.   HENT:  Negative for ear pain, facial swelling and sore throat.    Eyes:  Negative for pain and visual disturbance.   Respiratory:  Negative for chest tightness, shortness of breath and wheezing.    Cardiovascular:  Negative for chest pain and palpitations.   Gastrointestinal:  Negative for abdominal pain and blood in stool.   Endocrine: Negative.    Genitourinary:  Negative for difficulty urinating and hematuria.   Musculoskeletal:  Negative for joint swelling.   Neurological:  Negative for tremors, seizures and syncope.   Hematological:  Negative  "for adenopathy.   Psychiatric/Behavioral: Negative.         /76   Pulse 60   Temp 98.1 °F (36.7 °C) (Oral)   Resp 14   Ht 170.9 cm (67.3\")   Wt (!) 151 kg (332 lb)   SpO2 99%   BMI 51.54 kg/m²     Objective   Physical Exam  Vitals and nursing note reviewed. Exam conducted with a chaperone present.   Constitutional:       Appearance: She is well-developed.   HENT:      Head: Normocephalic and atraumatic.      Right Ear: External ear normal.      Left Ear: External ear normal.      Mouth/Throat:      Pharynx: No oropharyngeal exudate.   Eyes:      General: No scleral icterus.     Conjunctiva/sclera: Conjunctivae normal.      Pupils: Pupils are equal, round, and reactive to light.   Neck:      Thyroid: No thyromegaly.   Cardiovascular:      Rate and Rhythm: Normal rate and regular rhythm.      Heart sounds: No murmur heard.     No gallop.   Pulmonary:      Effort: Pulmonary effort is normal.      Breath sounds: Normal breath sounds. No wheezing or rales.   Abdominal:      General: Bowel sounds are normal. There is no distension.      Palpations: Abdomen is soft. There is no mass.      Tenderness: There is no abdominal tenderness.      Hernia: No hernia is present.   Musculoskeletal:         General: No tenderness or deformity. Normal range of motion.      Cervical back: Normal range of motion and neck supple.   Lymphadenopathy:      Cervical: No cervical adenopathy.   Skin:     General: Skin is warm and dry.      Findings: No rash.   Neurological:      Mental Status: She is alert and oriented to person, place, and time.      Deep Tendon Reflexes: Reflexes are normal and symmetric.   Psychiatric:         Behavior: Behavior normal.     Agueda present for exam.  Labs reviewed with pt today during visit. All questions answered.    Assessment & Plan   Diagnoses and all orders for this visit:    1. Annual physical exam (Primary)    2. Fibromyalgia  -     pregabalin (LYRICA) 100 MG capsule; Take 1 capsule by " mouth 3 times a day.  Dispense: 270 capsule; Refill: 1    3. Non-seasonal allergic rhinitis due to pollen  -     montelukast (SINGULAIR) 10 MG tablet; Take 1 tablet by mouth Daily.  Dispense: 90 tablet; Refill: 1    4. Other fatigue  -     T4, Free  -     TSH  -     Comprehensive Metabolic Panel  -     CBC & Differential  -     Iron  -     Ferritin    5. Crohn's disease of both small and large intestine with intestinal obstruction  -     Comprehensive Metabolic Panel  -     CBC & Differential  -     Iron  -     Ferritin    Healthy diet/exercise/weight management discussed with pt.

## 2024-12-11 ENCOUNTER — OFFICE VISIT (OUTPATIENT)
Dept: FAMILY MEDICINE CLINIC | Facility: CLINIC | Age: 39
End: 2024-12-11
Payer: COMMERCIAL

## 2024-12-11 VITALS
DIASTOLIC BLOOD PRESSURE: 76 MMHG | RESPIRATION RATE: 14 BRPM | HEART RATE: 60 BPM | TEMPERATURE: 98.1 F | BODY MASS INDEX: 45.99 KG/M2 | SYSTOLIC BLOOD PRESSURE: 116 MMHG | HEIGHT: 67 IN | WEIGHT: 293 LBS | OXYGEN SATURATION: 99 %

## 2024-12-11 DIAGNOSIS — R53.83 OTHER FATIGUE: ICD-10-CM

## 2024-12-11 DIAGNOSIS — J30.1 NON-SEASONAL ALLERGIC RHINITIS DUE TO POLLEN: Chronic | ICD-10-CM

## 2024-12-11 DIAGNOSIS — M79.7 FIBROMYALGIA: ICD-10-CM

## 2024-12-11 DIAGNOSIS — Z00.00 ANNUAL PHYSICAL EXAM: Primary | ICD-10-CM

## 2024-12-11 DIAGNOSIS — K50.812 CROHN'S DISEASE OF BOTH SMALL AND LARGE INTESTINE WITH INTESTINAL OBSTRUCTION: ICD-10-CM

## 2024-12-11 PROCEDURE — 99395 PREV VISIT EST AGE 18-39: CPT | Performed by: FAMILY MEDICINE

## 2024-12-11 RX ORDER — PREGABALIN 100 MG/1
100 CAPSULE ORAL 3 TIMES DAILY
Qty: 270 CAPSULE | Refills: 1 | Status: SHIPPED | OUTPATIENT
Start: 2024-12-11

## 2024-12-11 RX ORDER — MONTELUKAST SODIUM 10 MG/1
10 TABLET ORAL DAILY
Qty: 90 TABLET | Refills: 1 | Status: SHIPPED | OUTPATIENT
Start: 2024-12-11

## 2024-12-12 LAB
ALBUMIN SERPL-MCNC: 4.1 G/DL (ref 3.9–4.9)
ALP SERPL-CCNC: 87 IU/L (ref 44–121)
ALT SERPL-CCNC: 13 IU/L (ref 0–32)
AST SERPL-CCNC: 15 IU/L (ref 0–40)
BASOPHILS # BLD AUTO: 0.1 X10E3/UL (ref 0–0.2)
BASOPHILS NFR BLD AUTO: 1 %
BILIRUB SERPL-MCNC: 0.2 MG/DL (ref 0–1.2)
BUN SERPL-MCNC: 12 MG/DL (ref 6–20)
BUN/CREAT SERPL: 13 (ref 9–23)
CALCIUM SERPL-MCNC: 9.1 MG/DL (ref 8.7–10.2)
CHLORIDE SERPL-SCNC: 102 MMOL/L (ref 96–106)
CO2 SERPL-SCNC: 23 MMOL/L (ref 20–29)
CREAT SERPL-MCNC: 0.92 MG/DL (ref 0.57–1)
EGFRCR SERPLBLD CKD-EPI 2021: 82 ML/MIN/1.73
EOSINOPHIL # BLD AUTO: 0.2 X10E3/UL (ref 0–0.4)
EOSINOPHIL NFR BLD AUTO: 2 %
ERYTHROCYTE [DISTWIDTH] IN BLOOD BY AUTOMATED COUNT: 12.6 % (ref 11.7–15.4)
FERRITIN SERPL-MCNC: 82 NG/ML (ref 15–150)
GLOBULIN SER CALC-MCNC: 3 G/DL (ref 1.5–4.5)
GLUCOSE SERPL-MCNC: 76 MG/DL (ref 70–99)
HCT VFR BLD AUTO: 41.2 % (ref 34–46.6)
HGB BLD-MCNC: 13.3 G/DL (ref 11.1–15.9)
IMM GRANULOCYTES # BLD AUTO: 0 X10E3/UL (ref 0–0.1)
IMM GRANULOCYTES NFR BLD AUTO: 0 %
IRON SERPL-MCNC: 40 UG/DL (ref 27–159)
LYMPHOCYTES # BLD AUTO: 3.1 X10E3/UL (ref 0.7–3.1)
LYMPHOCYTES NFR BLD AUTO: 34 %
MCH RBC QN AUTO: 29.1 PG (ref 26.6–33)
MCHC RBC AUTO-ENTMCNC: 32.3 G/DL (ref 31.5–35.7)
MCV RBC AUTO: 90 FL (ref 79–97)
MONOCYTES # BLD AUTO: 0.8 X10E3/UL (ref 0.1–0.9)
MONOCYTES NFR BLD AUTO: 8 %
NEUTROPHILS # BLD AUTO: 5 X10E3/UL (ref 1.4–7)
NEUTROPHILS NFR BLD AUTO: 55 %
PLATELET # BLD AUTO: 364 X10E3/UL (ref 150–450)
POTASSIUM SERPL-SCNC: 4.4 MMOL/L (ref 3.5–5.2)
PROT SERPL-MCNC: 7.1 G/DL (ref 6–8.5)
RBC # BLD AUTO: 4.57 X10E6/UL (ref 3.77–5.28)
SODIUM SERPL-SCNC: 139 MMOL/L (ref 134–144)
T4 FREE SERPL-MCNC: 0.93 NG/DL (ref 0.82–1.77)
TSH SERPL DL<=0.005 MIU/L-ACNC: 1.38 UIU/ML (ref 0.45–4.5)
WBC # BLD AUTO: 9.2 X10E3/UL (ref 3.4–10.8)

## 2024-12-26 ENCOUNTER — TELEPHONE (OUTPATIENT)
Dept: FAMILY MEDICINE CLINIC | Facility: CLINIC | Age: 39
End: 2024-12-26
Payer: COMMERCIAL

## 2024-12-26 NOTE — TELEPHONE ENCOUNTER
Patient called with symptoms of asthma flare ups. She asked to be seen today since she is having the shortness of breath.  Advised patient to go to the UC. She will go to the UC on [address].

## 2025-01-07 RX ORDER — ESCITALOPRAM OXALATE 20 MG/1
20 TABLET ORAL DAILY
Qty: 30 TABLET | Refills: 1 | Status: SHIPPED | OUTPATIENT
Start: 2025-01-07

## 2025-01-08 ENCOUNTER — SPECIALTY PHARMACY (OUTPATIENT)
Dept: GASTROENTEROLOGY | Facility: CLINIC | Age: 40
End: 2025-01-08
Payer: COMMERCIAL

## 2025-01-14 DIAGNOSIS — M19.90 ARTHRITIS: ICD-10-CM

## 2025-01-14 DIAGNOSIS — M79.7 FIBROMYALGIA: Primary | ICD-10-CM

## 2025-01-15 ENCOUNTER — SPECIALTY PHARMACY (OUTPATIENT)
Dept: GASTROENTEROLOGY | Facility: CLINIC | Age: 40
End: 2025-01-15
Payer: COMMERCIAL

## 2025-01-15 NOTE — PROGRESS NOTES
Specialty Pharmacy Patient Management Program  Gastroenterology Refill Outreach      Bri is a 39 y.o. female contacted today regarding refills of her medication(s).    Specialty medication(s) and dose(s) confirmed: skyrizi    Refill Questions      Flowsheet Row Most Recent Value   Changes to allergies? No   Changes to medications? No   New conditions or infections since last clinic visit No   Unplanned office visit, urgent care, ED, or hospital admission in the last 4 weeks  No   How does patient/caregiver feel medication is working? Very good   Financial problems or insurance changes  No   Since the previous refill, were any specialty medication doses or scheduled injections missed or delayed?  No   If yes, please provide the amount n/a   Why were doses missed? n/a   Does this patient require a clinical escalation to a pharmacist? No          Delivery Questions      Flowsheet Row Most Recent Value   Delivery method UPS   Delivery address verified with patient/caregiver? Yes   Delivery address Home   Number of medications in delivery 1   Medication(s) being filled and delivered Risankizumab-rzaa (Skyrizi)   Doses left of specialty medications 2 weeks   Copay verified? Yes   Copay amount [id number]   Copay form of payment Credit/debit on file   Ship Date 1/16   Delivery Date 1/17   Signature Required No            Follow-Up: 45 days    Dana Huizar  1/15/2025  11:04 EST

## 2025-01-20 ENCOUNTER — PRE-ADMISSION TESTING (OUTPATIENT)
Dept: PREADMISSION TESTING | Facility: HOSPITAL | Age: 40
End: 2025-01-20
Payer: COMMERCIAL

## 2025-01-20 VITALS
HEART RATE: 69 BPM | HEIGHT: 66 IN | OXYGEN SATURATION: 100 % | BODY MASS INDEX: 47.09 KG/M2 | RESPIRATION RATE: 18 BRPM | SYSTOLIC BLOOD PRESSURE: 104 MMHG | TEMPERATURE: 98.5 F | WEIGHT: 293 LBS | DIASTOLIC BLOOD PRESSURE: 66 MMHG

## 2025-01-20 LAB
ABO GROUP BLD: NORMAL
ANION GAP SERPL CALCULATED.3IONS-SCNC: 9.3 MMOL/L (ref 5–15)
B-HCG UR QL: NEGATIVE
BASOPHILS # BLD AUTO: 0.04 10*3/MM3 (ref 0–0.2)
BASOPHILS NFR BLD AUTO: 0.4 % (ref 0–1.5)
BLD GP AB SCN SERPL QL: NEGATIVE
BUN SERPL-MCNC: 10 MG/DL (ref 6–20)
BUN/CREAT SERPL: 13 (ref 7–25)
CALCIUM SPEC-SCNC: 8.9 MG/DL (ref 8.6–10.5)
CHLORIDE SERPL-SCNC: 103 MMOL/L (ref 98–107)
CO2 SERPL-SCNC: 25.7 MMOL/L (ref 22–29)
CREAT SERPL-MCNC: 0.77 MG/DL (ref 0.57–1)
DEPRECATED RDW RBC AUTO: 40.9 FL (ref 37–54)
EGFRCR SERPLBLD CKD-EPI 2021: 101.4 ML/MIN/1.73
EOSINOPHIL # BLD AUTO: 0.16 10*3/MM3 (ref 0–0.4)
EOSINOPHIL NFR BLD AUTO: 1.8 % (ref 0.3–6.2)
ERYTHROCYTE [DISTWIDTH] IN BLOOD BY AUTOMATED COUNT: 12.4 % (ref 12.3–15.4)
GLUCOSE SERPL-MCNC: 85 MG/DL (ref 65–99)
HCT VFR BLD AUTO: 40.1 % (ref 34–46.6)
HGB BLD-MCNC: 13.4 G/DL (ref 12–15.9)
IMM GRANULOCYTES # BLD AUTO: 0.02 10*3/MM3 (ref 0–0.05)
IMM GRANULOCYTES NFR BLD AUTO: 0.2 % (ref 0–0.5)
LYMPHOCYTES # BLD AUTO: 2.47 10*3/MM3 (ref 0.7–3.1)
LYMPHOCYTES NFR BLD AUTO: 27.4 % (ref 19.6–45.3)
MCH RBC QN AUTO: 30.2 PG (ref 26.6–33)
MCHC RBC AUTO-ENTMCNC: 33.4 G/DL (ref 31.5–35.7)
MCV RBC AUTO: 90.5 FL (ref 79–97)
MONOCYTES # BLD AUTO: 0.79 10*3/MM3 (ref 0.1–0.9)
MONOCYTES NFR BLD AUTO: 8.7 % (ref 5–12)
NEUTROPHILS NFR BLD AUTO: 5.55 10*3/MM3 (ref 1.7–7)
NEUTROPHILS NFR BLD AUTO: 61.5 % (ref 42.7–76)
NRBC BLD AUTO-RTO: 0 /100 WBC (ref 0–0.2)
PLATELET # BLD AUTO: 373 10*3/MM3 (ref 140–450)
PMV BLD AUTO: 9.2 FL (ref 6–12)
POTASSIUM SERPL-SCNC: 3.8 MMOL/L (ref 3.5–5.2)
QT INTERVAL: 404 MS
QTC INTERVAL: 424 MS
RBC # BLD AUTO: 4.43 10*6/MM3 (ref 3.77–5.28)
RH BLD: POSITIVE
SODIUM SERPL-SCNC: 138 MMOL/L (ref 136–145)
T&S EXPIRATION DATE: NORMAL
WBC NRBC COR # BLD AUTO: 9.03 10*3/MM3 (ref 3.4–10.8)

## 2025-01-20 PROCEDURE — 80048 BASIC METABOLIC PNL TOTAL CA: CPT

## 2025-01-20 PROCEDURE — 93005 ELECTROCARDIOGRAM TRACING: CPT

## 2025-01-20 PROCEDURE — 86850 RBC ANTIBODY SCREEN: CPT

## 2025-01-20 PROCEDURE — 36415 COLL VENOUS BLD VENIPUNCTURE: CPT

## 2025-01-20 PROCEDURE — 85025 COMPLETE CBC W/AUTO DIFF WBC: CPT

## 2025-01-20 PROCEDURE — 81025 URINE PREGNANCY TEST: CPT

## 2025-01-20 PROCEDURE — 86900 BLOOD TYPING SEROLOGIC ABO: CPT

## 2025-01-20 PROCEDURE — 86901 BLOOD TYPING SEROLOGIC RH(D): CPT

## 2025-01-20 NOTE — DISCHARGE INSTRUCTIONS
Take the following medications the morning of surgery: LYRICA, METOPROLOL, MESTINON, AND INHALERS  If you are on prescription narcotic pain medication to control your pain you may also take that medication the morning of surgery.      General Instructions:     Do not eat solid food after midnight the night before surgery.  Clear liquids day of surgery are allowed but must be stopped at least two hours before your hospital arrival time.       Allowed clear liquids      Water, sodas, and tea or coffee with no cream or milk added.       12 to 20 ounces of a clear liquid that contains carbohydrates is recommended.  If non-diabetic, have Gatorade or Powerade.  If diabetic, have G2 or Powerade Zero.     Do not have liquids red in color.  Do not consume chicken, beef, pork or vegetable broth or bouillon cubes of any variety as they are not considered clear liquids and are not allowed.      Infants may have breast milk up to four hours before surgery.  Infants drinking formula may drink formula up to six hours before surgery.   Patients who avoid smoking, chewing tobacco and alcohol for 4 weeks prior to surgery have a reduced risk of post-operative complications.  Quit smoking as many days before surgery as you can.  Do not smoke, use chewing tobacco or drink alcohol the day of surgery.   If applicable bring your C-PAP/ BI-PAP machine in with you to preop day of surgery.  Bring any papers given to you in the doctor’s office.  Wear clean comfortable clothes.  Do not wear contact lenses, false eyelashes or make-up.  Bring a case for your glasses.   Bring crutches or walker if applicable.  Remove all piercings.  Leave jewelry and any other valuables at home.  Hair extensions with metal clips must be removed prior to surgery.  The Pre-Admission Testing nurse will instruct you to bring medications if unable to obtain an accurate list in Pre-Admission Testing.        If you were given a blood bank ID arm band remember to bring it  with you the day of surgery.    Preventing a Surgical Site Infection:  For 2 to 3 days before surgery, avoid shaving with a razor because the razor can irritate skin and make it easier to develop an infection.    Any areas of open skin can increase the risk of a post-operative wound infection by allowing bacteria to enter and travel throughout the body.  Notify your surgeon if you have any skin wounds / rashes even if it is not near the expected surgical site.  The area will need assessed to determine if surgery should be delayed until it is healed.  The night prior to surgery shower using a fresh bar of anti-bacterial soap (such as Dial) and clean washcloth.  Sleep in a clean bed with clean clothing.  Do not allow pets to sleep with you.  Shower on the morning of surgery using a fresh bar of anti-bacterial soap (such as Dial) and clean washcloth.  Dry with a clean towel and dress in clean clothing.  Ask your surgeon if you will be receiving antibiotics prior to surgery.  Make sure you, your family, and all healthcare providers clean their hands with soap and water or an alcohol based hand  before caring for you or your wound.    Day of surgery:  Your arrival time is approximately two hours before your scheduled surgery time.  Please note if you have an early arrival time the surgery doors do not open before 5:00 AM.  Upon arrival, a Pre-op nurse and Anesthesiologist will review your health history, obtain vital signs, and answer questions you may have.  The only belongings needed at this time will be a list of your home medications and if applicable your C-PAP/BI-PAP machine.  A Pre-op nurse will start an IV and you may receive medication in preparation for surgery, including something to help you relax.     Please be aware that surgery does come with discomfort.  We want to make every effort to control your discomfort so please discuss any uncontrolled symptoms with your nurse.   Your doctor will most  likely have prescribed pain medications.      If you are going home after surgery you will receive individualized written care instructions before being discharged.  A responsible adult must drive you to and from the hospital on the day of your surgery and ideally stay with you through the night.   .  Discharge prescriptions can be filled by the hospital pharmacy during regular pharmacy hours.  If you are having surgery late in the day/evening your prescription may be e-prescribed to your pharmacy.  Please verify your pharmacy hours or chose a 24 hour pharmacy to avoid not having access to your prescription because your pharmacy has closed for the day.    If you are staying overnight following surgery, you will be transported to your hospital room following the recovery period.  [facility] has all private rooms.    If you have any questions please call Pre-Admission Testing at [phone].  Deductibles and co-payments are collected on the day of service. Please be prepared to pay the required co-pay, deductible or deposit on the day of service as defined by your plan.    Call your surgeon immediately if you experience any of the following symptoms:  Sore Throat  Shortness of Breath or difficulty breathing  Cough  Chills  Body soreness or muscle pain  Headache  Fever  New loss of taste or smell  Do not arrive for your surgery ill.  Your procedure will need to be rescheduled to another time.  You will need to call your physician before the day of surgery to avoid any unnecessary exposure to hospital staff as well as other patients.

## 2025-01-21 RX ORDER — SPIRONOLACTONE 100 MG/1
100 TABLET, FILM COATED ORAL DAILY
OUTPATIENT
Start: 2025-01-21

## 2025-01-24 RX ORDER — AMITRIPTYLINE HYDROCHLORIDE 10 MG/1
10 TABLET ORAL NIGHTLY
Qty: 90 TABLET | Refills: 1 | Status: SHIPPED | OUTPATIENT
Start: 2025-01-24

## 2025-02-03 ENCOUNTER — ANESTHESIA (OUTPATIENT)
Dept: PERIOP | Facility: HOSPITAL | Age: 40
End: 2025-02-03
Payer: COMMERCIAL

## 2025-02-03 ENCOUNTER — ANESTHESIA EVENT (OUTPATIENT)
Dept: PERIOP | Facility: HOSPITAL | Age: 40
End: 2025-02-03
Payer: COMMERCIAL

## 2025-02-03 ENCOUNTER — HOSPITAL ENCOUNTER (OUTPATIENT)
Facility: HOSPITAL | Age: 40
Setting detail: HOSPITAL OUTPATIENT SURGERY
Discharge: HOME | End: 2025-02-03
Attending: OBSTETRICS & GYNECOLOGY | Admitting: OBSTETRICS & GYNECOLOGY
Payer: COMMERCIAL

## 2025-02-03 VITALS
HEART RATE: 75 BPM | TEMPERATURE: 97.6 F | DIASTOLIC BLOOD PRESSURE: 66 MMHG | SYSTOLIC BLOOD PRESSURE: 95 MMHG | RESPIRATION RATE: 16 BRPM | OXYGEN SATURATION: 97 %

## 2025-02-03 DIAGNOSIS — Z30.2 REQUEST FOR STERILIZATION: ICD-10-CM

## 2025-02-03 DIAGNOSIS — Z30.2 ENCOUNTER FOR STERILIZATION: Primary | ICD-10-CM

## 2025-02-03 LAB
B-HCG UR QL: NEGATIVE
EXPIRATION DATE: NORMAL
INTERNAL NEGATIVE CONTROL: NEGATIVE
INTERNAL POSITIVE CONTROL: POSITIVE
Lab: NORMAL

## 2025-02-03 PROCEDURE — 25010000002 HYDROMORPHONE PER 4 MG: Performed by: NURSE ANESTHETIST, CERTIFIED REGISTERED

## 2025-02-03 PROCEDURE — 25810000003 SODIUM CHLORIDE PER 500 ML: Performed by: OBSTETRICS & GYNECOLOGY

## 2025-02-03 PROCEDURE — 25010000002 ONDANSETRON PER 1 MG: Performed by: NURSE ANESTHETIST, CERTIFIED REGISTERED

## 2025-02-03 PROCEDURE — 81025 URINE PREGNANCY TEST: CPT | Performed by: ANESTHESIOLOGY

## 2025-02-03 PROCEDURE — 25010000002 LIDOCAINE 2% SOLUTION: Performed by: NURSE ANESTHETIST, CERTIFIED REGISTERED

## 2025-02-03 PROCEDURE — 25010000002 FENTANYL CITRATE (PF) 50 MCG/ML SOLUTION: Performed by: NURSE ANESTHETIST, CERTIFIED REGISTERED

## 2025-02-03 PROCEDURE — 25010000002 SUGAMMADEX 200 MG/2ML SOLUTION: Performed by: NURSE ANESTHETIST, CERTIFIED REGISTERED

## 2025-02-03 PROCEDURE — 88302 TISSUE EXAM BY PATHOLOGIST: CPT | Performed by: OBSTETRICS & GYNECOLOGY

## 2025-02-03 PROCEDURE — 25010000002 MIDAZOLAM PER 1 MG: Performed by: ANESTHESIOLOGY

## 2025-02-03 PROCEDURE — 25010000002 PROPOFOL 10 MG/ML EMULSION: Performed by: NURSE ANESTHETIST, CERTIFIED REGISTERED

## 2025-02-03 PROCEDURE — 25010000002 DEXAMETHASONE PER 1 MG: Performed by: NURSE ANESTHETIST, CERTIFIED REGISTERED

## 2025-02-03 PROCEDURE — 25010000002 ROPIVACAINE PER 1 MG: Performed by: OBSTETRICS & GYNECOLOGY

## 2025-02-03 PROCEDURE — 25810000003 LACTATED RINGERS PER 1000 ML: Performed by: ANESTHESIOLOGY

## 2025-02-03 PROCEDURE — 25010000002 GLYCOPYRROLATE 0.2 MG/ML SOLUTION: Performed by: NURSE ANESTHETIST, CERTIFIED REGISTERED

## 2025-02-03 RX ORDER — DEXAMETHASONE SODIUM PHOSPHATE 4 MG/ML
INJECTION, SOLUTION INTRA-ARTICULAR; INTRALESIONAL; INTRAMUSCULAR; INTRAVENOUS; SOFT TISSUE AS NEEDED
Status: DISCONTINUED | OUTPATIENT
Start: 2025-02-03 | End: 2025-02-03 | Stop reason: SURG

## 2025-02-03 RX ORDER — TRAMADOL HYDROCHLORIDE 50 MG/1
50 TABLET ORAL ONCE
Status: COMPLETED | OUTPATIENT
Start: 2025-02-03 | End: 2025-02-03

## 2025-02-03 RX ORDER — PROPOFOL 10 MG/ML
VIAL (ML) INTRAVENOUS AS NEEDED
Status: DISCONTINUED | OUTPATIENT
Start: 2025-02-03 | End: 2025-02-03 | Stop reason: SURG

## 2025-02-03 RX ORDER — HYDROCODONE BITARTRATE AND ACETAMINOPHEN 5; 325 MG/1; MG/1
1 TABLET ORAL ONCE AS NEEDED
Status: DISCONTINUED | OUTPATIENT
Start: 2025-02-03 | End: 2025-02-03 | Stop reason: HOSPADM

## 2025-02-03 RX ORDER — SODIUM CHLORIDE 0.9 % (FLUSH) 0.9 %
3-10 SYRINGE (ML) INJECTION AS NEEDED
Status: DISCONTINUED | OUTPATIENT
Start: 2025-02-03 | End: 2025-02-03 | Stop reason: HOSPADM

## 2025-02-03 RX ORDER — TRAMADOL HYDROCHLORIDE 50 MG/1
50 TABLET ORAL EVERY 6 HOURS PRN
Qty: 10 TABLET | Refills: 0 | Status: SHIPPED | OUTPATIENT
Start: 2025-02-03 | End: 2025-02-06

## 2025-02-03 RX ORDER — SPIRONOLACTONE 100 MG/1
100 TABLET, FILM COATED ORAL ONCE
Status: COMPLETED | OUTPATIENT
Start: 2025-02-03 | End: 2025-02-03

## 2025-02-03 RX ORDER — SODIUM CHLORIDE 0.9 % (FLUSH) 0.9 %
3 SYRINGE (ML) INJECTION EVERY 12 HOURS SCHEDULED
Status: DISCONTINUED | OUTPATIENT
Start: 2025-02-03 | End: 2025-02-03 | Stop reason: HOSPADM

## 2025-02-03 RX ORDER — ROCURONIUM BROMIDE 10 MG/ML
INJECTION, SOLUTION INTRAVENOUS AS NEEDED
Status: DISCONTINUED | OUTPATIENT
Start: 2025-02-03 | End: 2025-02-03 | Stop reason: SURG

## 2025-02-03 RX ORDER — LIDOCAINE HYDROCHLORIDE 10 MG/ML
0.5 INJECTION, SOLUTION INFILTRATION; PERINEURAL ONCE AS NEEDED
Status: DISCONTINUED | OUTPATIENT
Start: 2025-02-03 | End: 2025-02-03 | Stop reason: HOSPADM

## 2025-02-03 RX ORDER — FENTANYL CITRATE 50 UG/ML
INJECTION, SOLUTION INTRAMUSCULAR; INTRAVENOUS AS NEEDED
Status: DISCONTINUED | OUTPATIENT
Start: 2025-02-03 | End: 2025-02-03 | Stop reason: SURG

## 2025-02-03 RX ORDER — FLUMAZENIL 0.1 MG/ML
0.2 INJECTION INTRAVENOUS AS NEEDED
Status: DISCONTINUED | OUTPATIENT
Start: 2025-02-03 | End: 2025-02-03 | Stop reason: HOSPADM

## 2025-02-03 RX ORDER — DIPHENHYDRAMINE HYDROCHLORIDE 50 MG/ML
12.5 INJECTION INTRAMUSCULAR; INTRAVENOUS
Status: DISCONTINUED | OUTPATIENT
Start: 2025-02-03 | End: 2025-02-03 | Stop reason: HOSPADM

## 2025-02-03 RX ORDER — PROCHLORPERAZINE EDISYLATE 5 MG/ML
10 INJECTION INTRAMUSCULAR; INTRAVENOUS EVERY 6 HOURS PRN
Status: DISCONTINUED | OUTPATIENT
Start: 2025-02-03 | End: 2025-02-03 | Stop reason: HOSPADM

## 2025-02-03 RX ORDER — ALBUTEROL SULFATE 90 UG/1
INHALANT RESPIRATORY (INHALATION) AS NEEDED
Status: DISCONTINUED | OUTPATIENT
Start: 2025-02-03 | End: 2025-02-03 | Stop reason: SURG

## 2025-02-03 RX ORDER — HYDROMORPHONE HYDROCHLORIDE 1 MG/ML
0.5 INJECTION, SOLUTION INTRAMUSCULAR; INTRAVENOUS; SUBCUTANEOUS
Status: DISCONTINUED | OUTPATIENT
Start: 2025-02-03 | End: 2025-02-03 | Stop reason: HOSPADM

## 2025-02-03 RX ORDER — ACETAMINOPHEN 500 MG
1000 TABLET ORAL ONCE
Status: COMPLETED | OUTPATIENT
Start: 2025-02-03 | End: 2025-02-03

## 2025-02-03 RX ORDER — PROMETHAZINE HYDROCHLORIDE 25 MG/1
25 TABLET ORAL ONCE AS NEEDED
Status: DISCONTINUED | OUTPATIENT
Start: 2025-02-03 | End: 2025-02-03 | Stop reason: HOSPADM

## 2025-02-03 RX ORDER — SODIUM CHLORIDE 9 MG/ML
INJECTION, SOLUTION INTRAVENOUS AS NEEDED
Status: DISCONTINUED | OUTPATIENT
Start: 2025-02-03 | End: 2025-02-03 | Stop reason: HOSPADM

## 2025-02-03 RX ORDER — ATROPINE SULFATE 0.4 MG/ML
0.4 INJECTION, SOLUTION INTRAMUSCULAR; INTRAVENOUS; SUBCUTANEOUS ONCE AS NEEDED
Status: DISCONTINUED | OUTPATIENT
Start: 2025-02-03 | End: 2025-02-03 | Stop reason: HOSPADM

## 2025-02-03 RX ORDER — LIDOCAINE HYDROCHLORIDE 20 MG/ML
INJECTION, SOLUTION INFILTRATION; PERINEURAL AS NEEDED
Status: DISCONTINUED | OUTPATIENT
Start: 2025-02-03 | End: 2025-02-03 | Stop reason: SURG

## 2025-02-03 RX ORDER — ONDANSETRON 2 MG/ML
INJECTION INTRAMUSCULAR; INTRAVENOUS AS NEEDED
Status: DISCONTINUED | OUTPATIENT
Start: 2025-02-03 | End: 2025-02-03 | Stop reason: SURG

## 2025-02-03 RX ORDER — ACETAMINOPHEN 500 MG
1000 TABLET ORAL EVERY 6 HOURS
Qty: 40 TABLET | Refills: 0 | Status: SHIPPED | OUTPATIENT
Start: 2025-02-03 | End: 2025-02-08

## 2025-02-03 RX ORDER — GLYCOPYRROLATE 0.2 MG/ML
INJECTION INTRAMUSCULAR; INTRAVENOUS AS NEEDED
Status: DISCONTINUED | OUTPATIENT
Start: 2025-02-03 | End: 2025-02-03 | Stop reason: SURG

## 2025-02-03 RX ORDER — ROPIVACAINE HYDROCHLORIDE 5 MG/ML
INJECTION, SOLUTION EPIDURAL; INFILTRATION; PERINEURAL AS NEEDED
Status: DISCONTINUED | OUTPATIENT
Start: 2025-02-03 | End: 2025-02-03 | Stop reason: HOSPADM

## 2025-02-03 RX ORDER — MIDAZOLAM HYDROCHLORIDE 1 MG/ML
1 INJECTION, SOLUTION INTRAMUSCULAR; INTRAVENOUS
Status: COMPLETED | OUTPATIENT
Start: 2025-02-03 | End: 2025-02-03

## 2025-02-03 RX ORDER — ONDANSETRON 2 MG/ML
4 INJECTION INTRAMUSCULAR; INTRAVENOUS ONCE AS NEEDED
Status: DISCONTINUED | OUTPATIENT
Start: 2025-02-03 | End: 2025-02-03 | Stop reason: HOSPADM

## 2025-02-03 RX ORDER — LABETALOL HYDROCHLORIDE 5 MG/ML
5 INJECTION, SOLUTION INTRAVENOUS
Status: DISCONTINUED | OUTPATIENT
Start: 2025-02-03 | End: 2025-02-03 | Stop reason: HOSPADM

## 2025-02-03 RX ORDER — HYDRALAZINE HYDROCHLORIDE 20 MG/ML
5 INJECTION INTRAMUSCULAR; INTRAVENOUS
Status: DISCONTINUED | OUTPATIENT
Start: 2025-02-03 | End: 2025-02-03 | Stop reason: HOSPADM

## 2025-02-03 RX ORDER — FENTANYL CITRATE 50 UG/ML
50 INJECTION, SOLUTION INTRAMUSCULAR; INTRAVENOUS
Status: DISCONTINUED | OUTPATIENT
Start: 2025-02-03 | End: 2025-02-03 | Stop reason: HOSPADM

## 2025-02-03 RX ORDER — KETAMINE HCL IN NACL, ISO-OSM 100MG/10ML
SYRINGE (ML) INJECTION AS NEEDED
Status: DISCONTINUED | OUTPATIENT
Start: 2025-02-03 | End: 2025-02-03 | Stop reason: SURG

## 2025-02-03 RX ORDER — NALOXONE HCL 0.4 MG/ML
0.2 VIAL (ML) INJECTION AS NEEDED
Status: DISCONTINUED | OUTPATIENT
Start: 2025-02-03 | End: 2025-02-03 | Stop reason: HOSPADM

## 2025-02-03 RX ORDER — PROMETHAZINE HYDROCHLORIDE 25 MG/1
25 SUPPOSITORY RECTAL ONCE AS NEEDED
Status: DISCONTINUED | OUTPATIENT
Start: 2025-02-03 | End: 2025-02-03 | Stop reason: HOSPADM

## 2025-02-03 RX ORDER — HYDROCODONE BITARTRATE AND ACETAMINOPHEN 7.5; 325 MG/1; MG/1
2 TABLET ORAL EVERY 4 HOURS PRN
Status: DISCONTINUED | OUTPATIENT
Start: 2025-02-03 | End: 2025-02-03 | Stop reason: HOSPADM

## 2025-02-03 RX ORDER — SODIUM CHLORIDE, SODIUM LACTATE, POTASSIUM CHLORIDE, CALCIUM CHLORIDE 600; 310; 30; 20 MG/100ML; MG/100ML; MG/100ML; MG/100ML
9 INJECTION, SOLUTION INTRAVENOUS CONTINUOUS
Status: DISCONTINUED | OUTPATIENT
Start: 2025-02-03 | End: 2025-02-03 | Stop reason: HOSPADM

## 2025-02-03 RX ORDER — IPRATROPIUM BROMIDE AND ALBUTEROL SULFATE 2.5; .5 MG/3ML; MG/3ML
3 SOLUTION RESPIRATORY (INHALATION) ONCE AS NEEDED
Status: DISCONTINUED | OUTPATIENT
Start: 2025-02-03 | End: 2025-02-03 | Stop reason: HOSPADM

## 2025-02-03 RX ADMIN — FENTANYL CITRATE 50 MCG: 50 INJECTION, SOLUTION INTRAMUSCULAR; INTRAVENOUS at 11:35

## 2025-02-03 RX ADMIN — PROPOFOL 200 MG: 10 INJECTION, EMULSION INTRAVENOUS at 11:35

## 2025-02-03 RX ADMIN — ACETAMINOPHEN 1000 MG: 500 TABLET, FILM COATED ORAL at 10:07

## 2025-02-03 RX ADMIN — SUGAMMADEX 200 MG: 100 INJECTION, SOLUTION INTRAVENOUS at 12:22

## 2025-02-03 RX ADMIN — HYDROMORPHONE HYDROCHLORIDE 0.5 MG: 1 INJECTION, SOLUTION INTRAMUSCULAR; INTRAVENOUS; SUBCUTANEOUS at 13:18

## 2025-02-03 RX ADMIN — ROCURONIUM BROMIDE 20 MG: 10 INJECTION, SOLUTION INTRAVENOUS at 11:56

## 2025-02-03 RX ADMIN — ALBUTEROL SULFATE 2 PUFF: 90 AEROSOL, METERED RESPIRATORY (INHALATION) at 12:27

## 2025-02-03 RX ADMIN — TRAMADOL HYDROCHLORIDE 50 MG: 50 TABLET ORAL at 13:07

## 2025-02-03 RX ADMIN — PROPOFOL 50 MG: 10 INJECTION, EMULSION INTRAVENOUS at 11:37

## 2025-02-03 RX ADMIN — FENTANYL CITRATE 25 MCG: 50 INJECTION, SOLUTION INTRAMUSCULAR; INTRAVENOUS at 12:21

## 2025-02-03 RX ADMIN — Medication 30 MG: at 12:00

## 2025-02-03 RX ADMIN — ROCURONIUM BROMIDE 50 MG: 10 INJECTION, SOLUTION INTRAVENOUS at 11:36

## 2025-02-03 RX ADMIN — HYDROMORPHONE HYDROCHLORIDE 0.5 MG: 1 INJECTION, SOLUTION INTRAMUSCULAR; INTRAVENOUS; SUBCUTANEOUS at 12:40

## 2025-02-03 RX ADMIN — LIDOCAINE HYDROCHLORIDE 50 MG: 20 INJECTION, SOLUTION INFILTRATION; PERINEURAL at 12:17

## 2025-02-03 RX ADMIN — GLYCOPYRROLATE 0.2 MG: 0.2 INJECTION INTRAMUSCULAR; INTRAVENOUS at 12:04

## 2025-02-03 RX ADMIN — SODIUM CHLORIDE, SODIUM LACTATE, POTASSIUM CHLORIDE, CALCIUM CHLORIDE 9 ML/HR: 20; 30; 600; 310 INJECTION, SOLUTION INTRAVENOUS at 10:00

## 2025-02-03 RX ADMIN — MIDAZOLAM 1 MG: 1 INJECTION INTRAMUSCULAR; INTRAVENOUS at 10:13

## 2025-02-03 RX ADMIN — ONDANSETRON 4 MG: 2 INJECTION INTRAMUSCULAR; INTRAVENOUS at 12:15

## 2025-02-03 RX ADMIN — HYDROMORPHONE HYDROCHLORIDE 0.5 MG: 1 INJECTION, SOLUTION INTRAMUSCULAR; INTRAVENOUS; SUBCUTANEOUS at 12:55

## 2025-02-03 RX ADMIN — SUGAMMADEX 200 MG: 100 INJECTION, SOLUTION INTRAVENOUS at 12:24

## 2025-02-03 RX ADMIN — SPIRONOLACTONE 100 MG: 100 TABLET ORAL at 09:59

## 2025-02-03 RX ADMIN — FENTANYL CITRATE 25 MCG: 50 INJECTION, SOLUTION INTRAMUSCULAR; INTRAVENOUS at 11:52

## 2025-02-03 RX ADMIN — MIDAZOLAM 1 MG: 1 INJECTION INTRAMUSCULAR; INTRAVENOUS at 10:07

## 2025-02-03 RX ADMIN — DEXAMETHASONE SODIUM PHOSPHATE 8 MG: 4 INJECTION, SOLUTION INTRA-ARTICULAR; INTRALESIONAL; INTRAMUSCULAR; INTRAVENOUS; SOFT TISSUE at 11:52

## 2025-02-03 NOTE — ANESTHESIA PROCEDURE NOTES
Airway  Date/Time: 2/3/2025 11:38 AM    General Information and Staff    CRNA/CAA: Ray, Barbara, CRNA    Indications and Patient Condition  Indications for airway management: airway protection    Preoxygenated: yes  MILS maintained throughout  Mask difficulty assessment: 1 - vent by mask    Final Airway Details  Final airway type: endotracheal airway      Successful airway: ETT  Cuffed: yes   Successful intubation technique: direct laryngoscopy  Facilitating devices/methods: intubating stylet  Endotracheal tube insertion site: oral  Blade: Maureen  Blade size: 3  ETT size (mm): 7.5  Cormack-Lehane Classification: grade I - full view of glottis  Placement verified by: chest auscultation and capnometry   Measured from: lips  ETT/EBT  to lips (cm): 21  Number of attempts at approach: 1  Assessment: lips, teeth, and gum same as pre-op and atraumatic intubation

## 2025-02-03 NOTE — ANESTHESIA POSTPROCEDURE EVALUATION
Patient: Bri Pfeiffer    Procedure Summary       Date: 02/03/25 Room / Location: [facility] OR [address] [place] [address] MAIN OR    Anesthesia Start: 1128 Anesthesia Stop: 1239    Procedure: DIAGNOSTIC LAPAROSCOPY WITH BILATERAL SALPINGECTOMY (Abdomen) Diagnosis:     Surgeons: Rito Rodrigez MD Provider: Leyla Thao MD    Anesthesia Type: general ASA Status: 3            Anesthesia Type: general    Vitals  Vitals Value Taken Time   BP 98/63 02/03/25 1300   Temp 36.4 °C (97.6 °F) 02/03/25 1235   Pulse 81 02/03/25 1312   Resp 14 02/03/25 1245   SpO2 95 % 02/03/25 1312   Vitals shown include unfiled device data.        Post Anesthesia Care and Evaluation    Anesthetic complications: No anesthetic complications

## 2025-02-03 NOTE — OP NOTE
Subjective     Patient Name: Bri Pfeiffer  :  1985  MRN:  [id number]      Date of Service:  25      Surgeon: Rito Rodrigez MD       Pre-operative diagnosis(es):       Encounter for sterilization       Post-operative diagnosis(es):       Encounter for sterilization     Procedure(s): Procedure(s):  ROBOTIC ASSISTED LAPAROSCOPIC BILATERAL SALPINGECTOMY           Anesthesia:  Type: General          Objective      Operative findings:  normal uterus, normal fallopian tubes and ovaries bilaterally with a simple cyst on the left ovary      Specimens removed:  bilateral fallopian tubes            EBL : Minimal      Indication for surgery:  38 yo G0 for has no desire for future fertility and requests permanent sterilization.         Procedure:   Patient was taken operating room where general endotracheal anesthesia was obtained without difficulty she is prepped and draped in the normal sterile fashion in the dorsal lithotomy position in [facility].  A catheter was placed within the bladder.  Speculum was placed within the vagina and the anterior lip of the cervix was grasped with a single-tooth tenaculum \[facility]\""ka tenaculum was placed within the cervix and attached to the anterior lip as a means to manipulate the uterus.  The single-tooth tenaculum was then removed.  The speculum was removed from the vagina and surgeon's gloves were changed.  Attention was turned to the abdomen.  The infraumbilical skin was injected with 2 cc of 0.5% Naropin.  A small skin incision was made and the varies needle was inserted but intra-abdominal placement was difficult due to her habitus therefore direct insertion was performed. A 8 mm robotic trocar trocar was placed under direct visualization.  Pneumoperitoneum was then obtained.  Skin sites to the right and left of the midline were then ejected with 0.5% Naropin and skin incisions made with a scalpel .  2 additional 8 mm da Maxine trocars were placed under  direct visualization.  The robot was then brought into the field.  The #3 arm was docked to the midline trocar.  The camera was placed and targeted.  The #1 arm was stowed the #2 arm was docked to the left sided trocar in the #4 to the right sided trocar.  The fenestrated bipolar was placed in the #2 arm and the vessel sealer and the #4.  I broke scrub and went to the surgeon's console.  The right fallopian tube was identified and the fimbriated end was grasped with a fenestrated bipolar and retracted medially.  The vessel sealer was then used to seal and transect down the length of the mesosalpinx and then across the tube near the cornual region amputating it from the uterus.  The tube was placed in the anterior cul-de-sac.  Attention was then turned the patient's left side and the fenestrated bipolar was used to elevate the left fimbriae, the vessel sealer was then used to seal and transect down the length of the mesosalpinx toward the uterine cornua.  The vessel sealer was used to amputate the tube at the cornual region.  The tube was then placed in the anterior cul-de-sac.  The fenestrated bipolar was removed from the abdomen.  The #2 arm was on undocked and a laparoscopic grasper was used to remove each fallopian tube from the abdomen.  The pneumoperitoneum was taken down to single digits and all operative sites were noted to be hemostatic.  The vessel sealer was then removed from the abdomen.  The camera was removed.  The robot was undocked from all remaining trocars.  The pneumoperitoneum was  evacuated and the trocars removed.  Skin incisions were then closed with 4-0 Monocryl in subcuticular  fashion.  Skin glue was applied to the incisions.  The Hulka was removed and the cervix was hemostatic.  Sponge lap and needle counts are correct ×2.  She is awakened from anesthesia and taken to the recovery room in stable condition.       Rito Rodrigez MD  2/3/2025  12:27 EST

## 2025-02-03 NOTE — INTERVAL H&P NOTE
Temp:  [98.3 °F (36.8 °C)] 98.3 °F (36.8 °C)  Heart Rate:  [57] 57  Resp:  [14] 14  BP: (125)/(77) 125/77      H&P reviewed. The patient was examined and there are no changes to the H&P.    Rito Rodrigez MD  2/3/2025  10:51 EST

## 2025-02-03 NOTE — ANESTHESIA PREPROCEDURE EVALUATION
Anesthesia Evaluation     Patient summary reviewed and Nursing notes reviewed   NPO Solid Status: > 6 hours  NPO Liquid Status: > 2 hours           Airway   Mallampati: I  TM distance: >3 FB  Neck ROM: full  Dental - normal exam     Pulmonary    (+) asthma (well controlled, daily + PRN inhaler),sleep apnea  (-) COPD, not a smoker  Cardiovascular   Exercise tolerance: good (4-7 METS)    ECG reviewed    (+) hyperlipidemia  (-) past MI, dysrhythmias, angina    ROS comment: POTS + tachycardia    Nl stress echo 2021. Denies CP, [place]     Neuro/Psych  (+) headaches, syncope (vasovagal), psychiatric history Anxiety and Depression  (-) seizures, CVA    ROS Comment: On mesthinon for tachycardia. No hx MG  GI/Hepatic/Renal/Endo    (+) morbid obesity, GERD, liver disease  (-) no renal disease, diabetes, no thyroid disorder    ROS Comment: Crohn's disease. No recent steroids     Musculoskeletal     Abdominal    Substance History      OB/GYN          Other   arthritis,                 Anesthesia Plan    ASA 3     general       Anesthetic plan, risks, benefits, and alternatives have been provided, discussed and informed consent has been obtained with: patient.    CODE STATUS:

## 2025-02-03 NOTE — DISCHARGE INSTRUCTIONS
YOU WILL BE ON PELVIC REST FOR THE NEXT 1 WEEK OR UNTIL SPECIFIED BY YOUR PHYSICIAN. PELVIC REST INCLUDES:  Avoiding long periods of standing.  Avoiding heavy lifting, pushing or pulling.  DO NOT lift anything heavier than 10 pounds (4.5 kg)  DO NOT douche, use tampons, or have sex (intercourse) for 1 week after the procedure.

## 2025-02-17 DIAGNOSIS — J30.1 NON-SEASONAL ALLERGIC RHINITIS DUE TO POLLEN: Chronic | ICD-10-CM

## 2025-02-17 RX ORDER — MOMETASONE FUROATE AND FORMOTEROL FUMARATE DIHYDRATE 100; 5 UG/1; UG/1
2 AEROSOL RESPIRATORY (INHALATION)
Qty: 13 G | Refills: 1 | Status: SHIPPED | OUTPATIENT
Start: 2025-02-17

## 2025-02-20 RX ORDER — ESCITALOPRAM OXALATE 20 MG/1
20 TABLET ORAL DAILY
Qty: 30 TABLET | Refills: 1 | Status: SHIPPED | OUTPATIENT
Start: 2025-02-20

## 2025-02-24 ENCOUNTER — HOSPITAL ENCOUNTER (OUTPATIENT)
Facility: HOSPITAL | Age: 40
Discharge: HOME | End: 2025-02-24
Payer: COMMERCIAL

## 2025-02-24 ENCOUNTER — OFFICE VISIT (OUTPATIENT)
Age: 40
End: 2025-02-24
Payer: COMMERCIAL

## 2025-02-24 ENCOUNTER — LAB (OUTPATIENT)
Facility: HOSPITAL | Age: 40
End: 2025-02-24
Payer: COMMERCIAL

## 2025-02-24 VITALS
SYSTOLIC BLOOD PRESSURE: 120 MMHG | HEART RATE: 58 BPM | WEIGHT: 293 LBS | HEIGHT: 66 IN | OXYGEN SATURATION: 98 % | BODY MASS INDEX: 47.09 KG/M2 | TEMPERATURE: 98.8 F | DIASTOLIC BLOOD PRESSURE: 80 MMHG

## 2025-02-24 DIAGNOSIS — R53.82 CHRONIC FATIGUE: ICD-10-CM

## 2025-02-24 DIAGNOSIS — M25.50 POLYARTHRALGIA: Primary | ICD-10-CM

## 2025-02-24 DIAGNOSIS — K50.918 CROHN'S DISEASE WITH OTHER COMPLICATION, UNSPECIFIED GASTROINTESTINAL TRACT LOCATION: ICD-10-CM

## 2025-02-24 DIAGNOSIS — M35.7 BENIGN JOINT HYPERMOBILITY SYNDROME: ICD-10-CM

## 2025-02-24 DIAGNOSIS — G89.29 CHRONIC BILATERAL LOW BACK PAIN WITHOUT SCIATICA: ICD-10-CM

## 2025-02-24 DIAGNOSIS — I73.00 RAYNAUD'S PHENOMENON WITHOUT GANGRENE: ICD-10-CM

## 2025-02-24 DIAGNOSIS — M54.50 CHRONIC BILATERAL LOW BACK PAIN WITHOUT SCIATICA: ICD-10-CM

## 2025-02-24 DIAGNOSIS — M35.7 BENIGN JOINT HYPERMOBILITY SYNDROME: Primary | ICD-10-CM

## 2025-02-24 DIAGNOSIS — E66.01 MORBID OBESITY WITH BMI OF 50.0-59.9, ADULT: ICD-10-CM

## 2025-02-24 LAB
ALBUMIN SERPL-MCNC: 3.8 G/DL (ref 3.5–5.2)
ALBUMIN/GLOB SERPL: 1.1 G/DL
ALP SERPL-CCNC: 95 U/L (ref 39–117)
ALT SERPL W P-5'-P-CCNC: 17 U/L (ref 1–33)
ANION GAP SERPL CALCULATED.3IONS-SCNC: 11.4 MMOL/L (ref 5–15)
AST SERPL-CCNC: 17 U/L (ref 1–32)
BACTERIA UR QL AUTO: ABNORMAL /HPF
BASOPHILS # BLD AUTO: 0.04 10*3/MM3 (ref 0–0.2)
BASOPHILS NFR BLD AUTO: 0.5 % (ref 0–1.5)
BILIRUB SERPL-MCNC: 0.5 MG/DL (ref 0–1.2)
BILIRUB UR QL STRIP: NEGATIVE
BUN SERPL-MCNC: 12 MG/DL (ref 6–20)
BUN/CREAT SERPL: 14 (ref 7–25)
CALCIUM SPEC-SCNC: 9.2 MG/DL (ref 8.6–10.5)
CHLORIDE SERPL-SCNC: 104 MMOL/L (ref 98–107)
CLARITY UR: CLEAR
CO2 SERPL-SCNC: 23.6 MMOL/L (ref 22–29)
COLOR UR: YELLOW
CREAT SERPL-MCNC: 0.86 MG/DL (ref 0.57–1)
CREAT UR-MCNC: 44.8 MG/DL
CRP SERPL-MCNC: 0.62 MG/DL (ref 0–0.5)
DEPRECATED RDW RBC AUTO: 39.6 FL (ref 37–54)
EGFRCR SERPLBLD CKD-EPI 2021: 88.8 ML/MIN/1.73
EOSINOPHIL # BLD AUTO: 0.27 10*3/MM3 (ref 0–0.4)
EOSINOPHIL NFR BLD AUTO: 3.1 % (ref 0.3–6.2)
ERYTHROCYTE [DISTWIDTH] IN BLOOD BY AUTOMATED COUNT: 12.1 % (ref 12.3–15.4)
ERYTHROCYTE [SEDIMENTATION RATE] IN BLOOD: 19 MM/HR (ref 0–20)
GLOBULIN UR ELPH-MCNC: 3.5 GM/DL
GLUCOSE SERPL-MCNC: 80 MG/DL (ref 65–99)
GLUCOSE UR STRIP-MCNC: NEGATIVE MG/DL
HCT VFR BLD AUTO: 42 % (ref 34–46.6)
HGB BLD-MCNC: 13.7 G/DL (ref 12–15.9)
HGB UR QL STRIP.AUTO: NEGATIVE
HOLD SPECIMEN: NORMAL
HYALINE CASTS UR QL AUTO: ABNORMAL /LPF
IMM GRANULOCYTES # BLD AUTO: 0.01 10*3/MM3 (ref 0–0.05)
IMM GRANULOCYTES NFR BLD AUTO: 0.1 % (ref 0–0.5)
KETONES UR QL STRIP: NEGATIVE
LEUKOCYTE ESTERASE UR QL STRIP.AUTO: ABNORMAL
LYMPHOCYTES # BLD AUTO: 2.89 10*3/MM3 (ref 0.7–3.1)
LYMPHOCYTES NFR BLD AUTO: 33.2 % (ref 19.6–45.3)
MCH RBC QN AUTO: 29.6 PG (ref 26.6–33)
MCHC RBC AUTO-ENTMCNC: 32.6 G/DL (ref 31.5–35.7)
MCV RBC AUTO: 90.7 FL (ref 79–97)
MONOCYTES # BLD AUTO: 0.57 10*3/MM3 (ref 0.1–0.9)
MONOCYTES NFR BLD AUTO: 6.6 % (ref 5–12)
NEUTROPHILS NFR BLD AUTO: 4.92 10*3/MM3 (ref 1.7–7)
NEUTROPHILS NFR BLD AUTO: 56.5 % (ref 42.7–76)
NITRITE UR QL STRIP: NEGATIVE
NRBC BLD AUTO-RTO: 0 /100 WBC (ref 0–0.2)
PH UR STRIP.AUTO: 6.5 [PH] (ref 5–8)
PLATELET # BLD AUTO: 365 10*3/MM3 (ref 140–450)
PMV BLD AUTO: 9.4 FL (ref 6–12)
POTASSIUM SERPL-SCNC: 4.5 MMOL/L (ref 3.5–5.2)
PROT ?TM UR-MCNC: 4.7 MG/DL
PROT SERPL-MCNC: 7.3 G/DL (ref 6–8.5)
PROT UR QL STRIP: NEGATIVE
PROT/CREAT UR: 104.9 MG/G CREA (ref 0–200)
RBC # BLD AUTO: 4.63 10*6/MM3 (ref 3.77–5.28)
RBC # UR STRIP: ABNORMAL /HPF
REF LAB TEST METHOD: ABNORMAL
SODIUM SERPL-SCNC: 139 MMOL/L (ref 136–145)
SP GR UR STRIP: 1.01 (ref 1–1.03)
SQUAMOUS #/AREA URNS HPF: ABNORMAL /HPF
UROBILINOGEN UR QL STRIP: ABNORMAL
WBC # UR STRIP: ABNORMAL /HPF
WBC NRBC COR # BLD AUTO: 8.7 10*3/MM3 (ref 3.4–10.8)

## 2025-02-24 PROCEDURE — 86431 RHEUMATOID FACTOR QUANT: CPT | Performed by: STUDENT IN AN ORGANIZED HEALTH CARE EDUCATION/TRAINING PROGRAM

## 2025-02-24 PROCEDURE — 85025 COMPLETE CBC W/AUTO DIFF WBC: CPT | Performed by: STUDENT IN AN ORGANIZED HEALTH CARE EDUCATION/TRAINING PROGRAM

## 2025-02-24 PROCEDURE — 73630 X-RAY EXAM OF FOOT: CPT

## 2025-02-24 PROCEDURE — 86200 CCP ANTIBODY: CPT | Performed by: STUDENT IN AN ORGANIZED HEALTH CARE EDUCATION/TRAINING PROGRAM

## 2025-02-24 PROCEDURE — 86140 C-REACTIVE PROTEIN: CPT | Performed by: STUDENT IN AN ORGANIZED HEALTH CARE EDUCATION/TRAINING PROGRAM

## 2025-02-24 PROCEDURE — 72202 X-RAY EXAM SI JOINTS 3/> VWS: CPT

## 2025-02-24 PROCEDURE — 82570 ASSAY OF URINE CREATININE: CPT | Performed by: STUDENT IN AN ORGANIZED HEALTH CARE EDUCATION/TRAINING PROGRAM

## 2025-02-24 PROCEDURE — 84156 ASSAY OF PROTEIN URINE: CPT | Performed by: STUDENT IN AN ORGANIZED HEALTH CARE EDUCATION/TRAINING PROGRAM

## 2025-02-24 PROCEDURE — 85652 RBC SED RATE AUTOMATED: CPT | Performed by: STUDENT IN AN ORGANIZED HEALTH CARE EDUCATION/TRAINING PROGRAM

## 2025-02-24 PROCEDURE — 81001 URINALYSIS AUTO W/SCOPE: CPT | Performed by: STUDENT IN AN ORGANIZED HEALTH CARE EDUCATION/TRAINING PROGRAM

## 2025-02-24 PROCEDURE — 36415 COLL VENOUS BLD VENIPUNCTURE: CPT | Performed by: STUDENT IN AN ORGANIZED HEALTH CARE EDUCATION/TRAINING PROGRAM

## 2025-02-24 PROCEDURE — 80053 COMPREHEN METABOLIC PANEL: CPT | Performed by: STUDENT IN AN ORGANIZED HEALTH CARE EDUCATION/TRAINING PROGRAM

## 2025-02-24 NOTE — PROGRESS NOTES
RHEUMATOLOGY NEW PATIENT VISIT  2025  Patient Name: Bri Pfeiffer : 1985 Medical Record: [id number]  PCP: Alan Benavidez MD  Referring provider: Mackenzie Nova  REASON FOR CONSULTATION    Evaluate multiple joint pain    History of Present Illness  The patient is a 39-year-old female who presents for evaluation for multiple joint pains.  She has a background history of Crohn's disease and currently on Skyrizi, fibromyalgia, sleep apnea and fatigue.    Brief history:     She has a history of Crohn's disease, diagnosed in , which is moderately well-managed under the care of GI at [facility].   Her disease has been complicated by bowel resection necessitated by her prior active disease.  She experiences recurrent flares characterized by severe diarrhea, urgency, blood in the stool, and mucus. Her last flare occurred in 2025, coinciding with a gastrointestinal virus.    Medication history: Include to prior biologics. Initial treatment with Humira was effective but resulted in hives, leading to a switch to Remicade, which caused severe side effects.  She is currently on Skyrizi.    Despite being on Skyrizi since , she experiences 4 to 5 flares per year, a reduction from her previous frequency.  She has had discussion with her GI doctor for possible switching to a new medication approved for Crohn's in 2024 that could also manage her joint symptoms, assuming concomitant MSK involvement.    Associated features: Include, approximately 70 pounds weight gain since her resection in . She has a giant incisional hernia from her Crohn's surgery, which has not been repaired due to her weight. She has a scheduled appointment with her gastroenterologist on 2025.   She also reports persistent chest pain and costochondritis. She has a pericardial cyst and has had an echocardiogram, which did not reveal any valve insufficiency.  She has had pain in bilateral heels close attachment of her  Achilles, unsure whether these were enthesitis, but she also indicates prior plantar fasciitis. She reports midline back pain, worse on the left, but does not have sciatica. 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 She has a history of eczema but not psoriasis.  She has not had any dactylitis, upper respiratory or recurrent urinary infection.  She has a history of Raynaud's phenomenon, with her fingers turning white and blue in cold weather. She wears ankle braces frequently due to recurrent sprains.    She has a history of fibromyalgia, which is still active and managed by her primary care physician. She reports widespread pain associated with fibromyalgia. She takes pregabalin three times a day. She has done physical therapy and a sleep study, which found very mild issues.  Inquiry about joint hypermobility indicates a prior suspicion for this condition with dysautonomia.  She is currently on treatment for POTS    She is on Lexapro for anxiety and depression and takes two antihistamines. She is on metoprolol and Mestinon for hyperadrenergic POTS .    SOCIAL HISTORY  She does not smoke. She used to drink alcohol but does not anymore. She does not use recreational drugs.  She works in veterinary medicine.    FAMILY HISTORY  Both sides of her family have had clots.    MEDICATIONS  Current: pregabalin, Skyrizi, Tamiflu, metoprolol, Mestinon, Lexapro    Other rheumatology ROS   Hair: No alopecia , Psoriatic rashes in the hair line   Central Nervous System: No history of stroke, Weakness, Seizure, Psychosis, Peripheral neuropathy  Eyes: Positive redness (uveitis), from Leptospira infection  Face: No photosensitivity, Red cheeks (butterfly rash), Scaring hyperpigmentation, Parotid gland enlargement, Telangiectasia  Chest : No shortness of breath, Chest pain (intermittent costochondritis), no serositis  Gastrointestinal: No history of dysphagia  Urinary: No  frothy urine, Hematuria  Sexual and Obstetric History: No history of recent STD's, no history of oral/genital ulcers  Lower Limb: No history of non-palpable purpura  Smoking: has never smoked  Alcohol: former     Results  No pertinent laboratory to review     Current Outpatient Medications:     albuterol sulfate  (90 Base) MCG/ACT inhaler, Inhale 2 puffs Every 4 (Four) Hours As Needed., Disp: , Rfl:     ALOE VERA PO, Take 1 tablet by mouth Daily. HELD, Disp: , Rfl:     amitriptyline (ELAVIL) 10 MG tablet, Take 1 tablet by mouth Every Night., Disp: 90 tablet, Rfl: 1    cetirizine (zyrTEC) 10 MG tablet, Take 1 tablet by mouth Daily., Disp: , Rfl:     Cholecalciferol (VITAMIN D-3) 5000 units tablet, Take 1 tablet by mouth Daily. HELD, Disp: , Rfl:     Cyanocobalamin (VITAMIN B 12 PO), Take 1 tablet by mouth Daily. HELD, Disp: , Rfl:     dicyclomine (BENTYL) 20 MG tablet, Take 1 tablet by mouth Every 6 (Six) Hours As Needed for Abdominal Cramping., Disp: 120 tablet, Rfl: 4    diphenoxylate-atropine (LOMOTIL) 2.5-0.025 MG per tablet, Take 1 tablet by mouth 4 (Four) Times a Day As Needed for Diarrhea., Disp: 120 tablet, Rfl: 3    escitalopram (Lexapro) 20 MG tablet, Take 1 tablet by mouth Daily., Disp: 30 tablet, Rfl: 1    esomeprazole (nexIUM) 40 MG capsule, Take 1 capsule by mouth Daily. (Patient taking differently: Take 1 capsule by mouth Every Night.), Disp: 90 capsule, Rfl: 3    famotidine (PEPCID) 20 MG tablet, Take 1 tablet by mouth 2 (Two) Times a Day As Needed (GERD)., Disp: 180 tablet, Rfl: 2    ferrous sulfate 324 (65 Fe) MG tablet delayed-release EC tablet, Take 1 tablet by mouth Daily With Breakfast. HELD, Disp: , Rfl:     fluticasone (FLONASE) 50 MCG/ACT nasal spray, Administer 2 sprays into the nostril(s) as directed by provider Daily., Disp: , Rfl:     Folic Acid 0.8 MG capsule, Take 0.8 mg by mouth Every Morning. HELD, Disp: , Rfl:     methocarbamol (ROBAXIN) 750 MG tablet, Take 1 tablet by mouth 3  (Three) Times a Day As Needed for Muscle Spasms., Disp: 30 tablet, Rfl: 0    mometasone-formoterol (Dulera) 100-5 MCG/ACT inhaler, Inhale 2 puffs 2 (Two) Times a Day., Disp: 13 g, Rfl: 1    montelukast (SINGULAIR) 10 MG tablet, Take 1 tablet by mouth Daily., Disp: 90 tablet, Rfl: 1    Multiple Vitamin (MULTIVITAMIN PO), Take 1 tablet by mouth Daily. HELD, Disp: , Rfl:     ondansetron ODT (ZOFRAN-ODT) 8 MG disintegrating tablet, Take 1 tablet by mouth Every 8 (Eight) Hours As Needed for Nausea or Vomiting., Disp: 12 tablet, Rfl: 0    pregabalin (LYRICA) 100 MG capsule, Take 1 capsule by mouth 3 times a day., Disp: 270 capsule, Rfl: 1    pyridostigmine (MESTINON) 60 MG tablet, Take 1 tablet by mouth Every 8 (Eight) Hours., Disp: 90 tablet, Rfl: 5    Riboflavin-Magnesium-Feverfew (MigreLief) 200-180-50 MG tablet, Take 1-2 tablets by mouth 2 (Two) Times a Day. HELD, Disp: , Rfl:     Rimegepant Sulfate (Nurtec) 75 MG tablet dispersible tablet, Take 1 tablet by mouth As Needed (Take 1 tablet by mouth daily as needed for headache/migraine.  Do not take more than one in a 24 hour period.)., Disp: 8 tablet, Rfl: 11    Risankizumab-rzaa (Skyrizi) 360 MG/2.4ML solution cartridge, Inject 360 mg under the skin into the appropriate area as directed Every 8 (Eight) Weeks., Disp: 2.4 mL, Rfl: 6    spironolactone (ALDACTONE) 100 MG tablet, Take 1 tablet by mouth Daily., Disp: , Rfl:     dilTIAZem HCl (diltiazem 2% and lidocaine 5%), Apply 1 application topically to the appropriate area as directed 2 (Two) Times a Day. Per rectum (Patient taking differently: Apply 1 Application topically to the appropriate area as directed As Needed. Per rectum), Disp: 30 g, Rfl: 3    metoprolol tartrate (LOPRESSOR) 25 MG tablet, Take 0.5 tablets by mouth Every 12 (Twelve) Hours., Disp: 30 tablet, Rfl: 2    modafinil (PROVIGIL) 100 MG tablet, Take 1 tablet by mouth 2 (Two) Times a Day Before Breakfast and Lunch., Disp: 60 tablet, Rfl: 2     triamcinolone (KENALOG) 0.1 % ointment, Apply 1 Application topically to the appropriate area as directed 2 (Two) Times a Day., Disp: 30 g, Rfl: 0     There are no discontinued medications.      Past Surgical History:   Procedure Laterality Date    ADENOIDECTOMY      APPENDECTOMY      CHOLECYSTECTOMY      CHOLECYSTECTOMY WITH INTRAOPERATIVE CHOLANGIOGRAM N/A 07/14/2023    Procedure: CHOLECYSTECTOMY LAPAROSCOPIC INTRAOPERATIVE CHOLANGIOGRAM;  Surgeon: Teto Flores MD;  Location: [facility] MAIN OR;  Service: General;  Laterality: N/A;    COLON RESECTION N/A 11/04/2020    Procedure: laparoscopic ileocecectomy;  Surgeon: Lisa Kerr MD;  Location: [facility] MAIN OR;  Service: General;  Laterality: N/A;    COLONOSCOPY N/A 05/10/2019    Procedure: COLONOSCOPY TO CECUM TO TERMINAL ILEUM WITH BIOPSY;  Surgeon: Tae Turcios MD;  Location: [facility] ENDOSCOPY;  Service: Gastroenterology    COLONOSCOPY N/A 04/13/2022    Procedure: COLONOSCOPY;  Surgeon: Jaylen Scott MD;  Location:  [facility] OR;  Service: Gastroenterology;  Laterality: N/A;  ANAL FISSURE    COLONOSCOPY N/A 04/03/2024    Procedure: COLONOSCOPY;  Surgeon: Ede Benton MD;  Location: [facility] OR;  Service: Gastroenterology;  Laterality: N/A;  internal hemmorhoids    DIAGNOSTIC LAPAROSCOPY N/A 2/3/2025    Procedure: DIAGNOSTIC LAPAROSCOPY, BILATERAL SALPINGECTOMY WITH DAVINCI ROBOT;  Surgeon: Rito Rodrigez MD;  Location: [facility] MAIN OR;  Service: Robotics - DaVinci;  Laterality: N/A;    ENDOSCOPY N/A 05/10/2019    Procedure: ESOPHAGOGASTRODUODENOSCOPY WITH BIOPSY;  Surgeon: Tae Turcios MD;  Location: [facility] ENDOSCOPY;  Service: Gastroenterology    ENDOSCOPY N/A 12/13/2019    Procedure: ESOPHAGOGASTRODUODENOSCOPY WITH COLD BIOPSIES;  Surgeon: Tae Turcios MD;  Location: [facility] ENDOSCOPY;  Service: Gastroenterology    ENDOSCOPY N/A 04/03/2024    Procedure: ESOPHAGOGASTRODUODENOSCOPY WITH BIOPSY;  Surgeon: Ede Benton MD;   "Location:  [facility] OR;  Service: Gastroenterology;  Laterality: N/A;  Gastritis    LIPOMA EXCISION  2007    SIGMOIDOSCOPY N/A 12/13/2019    Procedure: SIGMOIDOSCOPY FLEXIBLE TO T.I.;  Surgeon: Tae Turcios MD;  Location:  [facility]OSCOPY;  Service: Gastroenterology    TONSILLECTOMY AND ADENOIDECTOMY      UPPER GASTROINTESTINAL ENDOSCOPY      WISDOM TOOTH EXTRACTION       Allergies   Allergen Reactions    Humira [Adalimumab] Hives    Lactose Intolerance (Gi) GI Intolerance    Levaquin [Levofloxacin] Hallucinations    Milk-Related Compounds Diarrhea, Nausea Only and Other (See Comments)     Other Reaction(s): Abdominal pain (only)    Nsaids Unknown - High Severity     Crohn's disease    Pseudoephedrine Unknown - High Severity     NOT AN ALLERGY per pt, she has a congenital pericardial cyst and has been advised to avoid sudafed and any other stimulants      Percocet [Oxycodone-Acetaminophen] Rash     Physical Exam      Vitals:    02/24/25 1007   BP: 120/80   BP Location: Left arm   Patient Position: Sitting   Cuff Size: Adult   Pulse: 58   Temp: 98.8 °F (37.1 °C)   TempSrc: Oral   SpO2: 98%   Weight: (!) 153 kg (337 lb)   Height: 167.6 cm (65.98\")       LABS AND IMAGING ll laboratory data was reviewed and relevant values are noted as below.    Autoimmune Labs: No pertinent historical labs to review     C-Reactive Protein (mg/dL)   Date Value   02/24/2025 0.62 (H)     Results from last 7 days   Lab Units 02/24/25  1158   WBC 10*3/mm3 8.70   HEMOGLOBIN g/dL 13.7   HEMATOCRIT % 42.0   MCV fL 90.7   PLATELETS 10*3/mm3 365     Results from last 7 days   Lab Units 02/24/25  1158   ALK PHOS U/L 95   AST (SGOT) U/L 17   ALT (SGPT) U/L 17   BILIRUBIN mg/dL 0.5   ALBUMIN g/dL 3.8     Results from last 7 days   Lab Units 02/24/25  1158   COLOR UA  Yellow   CLARITY UA  Clear   PROTEIN UA  Negative   PH, URINE  6.5   GLUCOSE UA  Negative   BLOOD UA  Negative   LEUKOCYTES UA  Trace*   BILIRUBIN UA  Negative   UROBILINOGEN UA  0.2 " E.U./dL   RBC UA /HPF None Seen   WBC UA /HPF 0-2   BACTERIA UA /HPF 1+*       XR Sacroiliac Joints 3+ View  XR SACROILIAC JOINTS 3+ VW-     CLINICAL: Crohn's disease, sacroiliac joint pain.     FINDINGS: Sacroiliac joint alignment is satisfactory. The sacrum is  typical in appearance. Mild symmetric sacroiliac joint narrowing, no  subchondral sclerosis or bone hypertrophy seen.     Incidentally noted IUD superimposing the mid pelvis. Remainder is  unremarkable.     This report was finalized on 2/25/2025 12:18 PM by Dr. Corby Moon M.D on Workstation: [id number]     XR Foot 3+ View Bilateral  XR FOOT 3+ VW BILATERAL-     CLINICAL: Foot pain.     RIGHT FOOT FINDINGS: There is an inferior calcaneal spur, nominal bone  hypertrophy at the Achilles insertion. Hind, mid and forefoot  articulations preserved. Accessory ossicle adjacent to the navicular.  Soft tissues otherwise unremarkable.     LEFT FOOT FINDINGS: Left foot articulations within normal limits. Small  accessory ossicle adjacent to the base of the cuboid. No acute osseous  abnormality, soft tissues unremarkable.     This report was finalized on 2/25/2025 12:18 PM by Dr. Corby Moon M.D on Workstation: [id number]       Assessment & Plan    The patient is a 39-year-old female who presents for evaluation for multiple joint pains.  She has a background history of Crohn's disease and currently on Skyrizi, fibromyalgia, sleep apnea and fatigue.    Today's evaluation is to answer the question of whether her multiple joint pains are related to inflammation associated with her moderately controlled Crohn's disease.  She has morbid obesity, history of fibromyalgia, possible sleep apnea which could be contributing to her fatigue and joint symptoms.  Overall, from patient's history she suspects her Crohn's disease is not controlled and wonders whether this adds to her arthropathy.    She has never had a dactylitis but does indicate history of plantar fasciitis,  calcaneal heel pain at the attachment of her Achilles tendon (not sure whether this was enthesitis related) and chronic low back pain with concerns for sacroiliac arthritis.  She does not have recent serology with inflammatory markers-ESR and CRP.    Today's evaluation does not indicate evidence of ongoing synovitis, dactylitis, enthesitis, no skin psoriasis and she has an intact muscle strength.    A differential diagnosis for her persistent musculoskeletal pain includes:  - Inflammatory arthritis related to her Crohn's disease: This clinical with active and sometimes an active Crohn's disease.  Her exam does not quite show evidence of synovitis, and her low back pain does not sound inflammatory.  We will have to obtain SI joint films to check for sacroiliitis, check inflammatory markers and baseline blood work.    - Chronic diffuse osteoarthritis, probably secondary to history of hypermobility that may have culminated into early onset of osteoarthritis in multiple joints.  She has gained significant weight overtime, and this could be exacerbating her polyarticular osteoarthritis.    - Hypermobility joint syndrome-she has been evaluated before and suspected to have   hypermobile joint syndrome with dysautonomia. This is a known cause of early osteoarthritis and could well be a major contributor to patient's chronic pain    - Possible obstructive sleep apnea-related MSK pain: It is known that poor sleep from uncontrolled ROSINA can lead to muscle soreness and musculoskeletal pain, and this can become chronic    - Other causes of pain include crystalline arthropathy-gout with elevated uric acid or pseudogout with demonstration of calcium deposit and joint, usually localized but can be diffuse.  This is less likely in a young female with no family history of gout and premenopausal.  Pseudogout is usually seen, only in elderly patients.    I have discussed possible etiologies with the patient and have counseled about  intervening on obvious modifiable risks for MSK issues.    Plan:    - Will obtain blood work for-inflammatory markers (CRP, ESR), CBC, CMP, rheumatoid arthritis profile and urine protein creatinine ratio, in addition we will obtain plain imaging of the foot and SI joints to check for evidence of inflammation.  PS.  At the time of completing this note patient's x-rays of the foot and SI joint have been reviewed and does not show evidence of inflammatory arthritis or sacroiliitis.  Will update patient.    - While there might be low suspicion for Crohn's induced arthropathy, we will see what the labs and imaging show.  She is currently on Skyrizi for her Crohn's, but this is not effective for management of spondylarthritis.   If there is strong suspicion, then recommendation would be to switch current Skyrizi to a medication that would be active on both GI and musculoskeletal system.  She has tried Humira and Remicade in the past but had to discontinue both medications.  Likely options will be tofacitinib (Flip inhibitor) or the 12/23 interleukin inhibitors-Ustekinumab.    - She will continue management of her fibromyalgia with oversight from her primary care.  She can continue pregabalin and we have discussed addition of the important components of cognitive behavioral therapy, physical therapy, optimizing her sleep hygiene, weight loss and dietary modifications.  - The above measures are also applicable for management of her suspected joint hypermobility, while also continuing supportive care for her dysautonomia.  We discussed that she will not need definitive genetic confirmation of her hypermobile EDS, as this is a spectrum disease with varying clinical manifestation.    - Regarding the history of Raynaud's, she does not have digital pains or digital ulcers at time of exam.  This can be managed by conservative means, including avoidance of cold exposures, and use of gloves and hand warmers.  I do not suspect  current active connective tissue as a cause for her Raynaud's    - Continue consistent use of CPAP device to help manage her ROSINA for improvement of overall health and chronic ongoing fatigue    -Will contact patient to discuss results of investigations and give additional recommendation.    Diagnoses and all orders for this visit:    1. Polyarthralgia (Primary)    2. Joint hypermobility syndrome  -     XR Sacroiliac Joints 3+ View; Future  -     XR Foot 3+ View Bilateral  -     CBC & Differential  -     C-reactive Protein  -     Sedimentation Rate  -     Comprehensive Metabolic Panel  -     Protein / Creatinine Ratio, Urine - Urine, Clean Catch  -     Urinalysis With Microscopic If Indicated (No Culture) - Urine, Clean Catch  -     Rheumatoid Arthritis (RA) Profile  -     Urinalysis, Microscopic Only - Urine, Clean Catch    3. Chronic bilateral low back pain without sciatica  -     XR Sacroiliac Joints 3+ View; Future  -     XR Foot 3+ View Bilateral  -     CBC & Differential  -     C-reactive Protein  -     Sedimentation Rate  -     Comprehensive Metabolic Panel  -     Protein / Creatinine Ratio, Urine - Urine, Clean Catch  -     Urinalysis With Microscopic If Indicated (No Culture) - Urine, Clean Catch  -     Rheumatoid Arthritis (RA) Profile  -     Urinalysis, Microscopic Only - Urine, Clean Catch    4. Crohn's disease with other complication, unspecified gastrointestinal tract location  -     XR Sacroiliac Joints 3+ View; Future  -     XR Foot 3+ View Bilateral  -     CBC & Differential  -     C-reactive Protein  -     Sedimentation Rate  -     Comprehensive Metabolic Panel  -     Protein / Creatinine Ratio, Urine - Urine, Clean Catch  -     Urinalysis With Microscopic If Indicated (No Culture) - Urine, Clean Catch  -     Rheumatoid Arthritis (RA) Profile  -     Urinalysis, Microscopic Only - Urine, Clean Catch    5. Chronic fatigue    6. Morbid obesity with BMI of 50.0-59.9, adult    7. Raynaud's phenomenon  without gangrene         Patient or patient representative verbalized consent for the use of Ambient Listening during the visit with  Reinaldo Sawant MD for chart documentation. 2/28/2025  10:37 EST    I spent 60 minutes caring for Bri on this date of service. This time includes time spent by me in the following activities:preparing for the visit, reviewing tests, obtaining and/or reviewing a separately obtained history, performing a medically appropriate examination and/or evaluation , counseling and educating the patient/family/caregiver, ordering medications, tests, or procedures, documenting information in the medical record, and independently interpreting results and communicating that information with the patient/family/caregiver

## 2025-02-25 ENCOUNTER — PATIENT ROUNDING (BHMG ONLY) (OUTPATIENT)
Age: 40
End: 2025-02-25
Payer: COMMERCIAL

## 2025-02-25 LAB
CCP IGA+IGG SERPL IA-ACNC: 3 UNITS (ref 0–19)
RHEUMATOID FACT SERPL-ACNC: <10 IU/ML

## 2025-02-25 NOTE — PROGRESS NOTES
February 25, 2025    Hello, may I speak with Bri Pfeiffer?    My name is Angela Casanova     I am  with [id number]  [facility]  [address]  [facility] [place] [id number] [phone].    Before we get started may I verify your date of birth? 1985    I am calling to officially welcome you to our practice and ask about your recent visit. Is this a good time to talk? yes    Tell me about your visit with us. What things went well?  It went great! From check in & check-out getting my x-rays everything was good. I really like Dr. Sawant and Loere was great!       We're always looking for ways to make our patients' experiences even better. Do you have recommendations on ways we may improve?  no    Overall were you satisfied with your first visit to our practice? yes       I appreciate you taking the time to speak with me today. Is there anything else I can do for you? no      Thank you, and have a great day.

## 2025-02-28 PROBLEM — R53.82 CHRONIC FATIGUE: Status: ACTIVE | Noted: 2025-02-28

## 2025-02-28 PROBLEM — M54.50 CHRONIC BILATERAL LOW BACK PAIN WITHOUT SCIATICA: Status: ACTIVE | Noted: 2025-02-28

## 2025-02-28 PROBLEM — M35.7 BENIGN JOINT HYPERMOBILITY SYNDROME: Status: ACTIVE | Noted: 2025-02-28

## 2025-02-28 PROBLEM — E66.01 MORBID OBESITY WITH BMI OF 50.0-59.9, ADULT: Status: ACTIVE | Noted: 2025-02-28

## 2025-02-28 PROBLEM — M25.50 POLYARTHRALGIA: Status: ACTIVE | Noted: 2025-02-28

## 2025-02-28 PROBLEM — G89.29 CHRONIC BILATERAL LOW BACK PAIN WITHOUT SCIATICA: Status: ACTIVE | Noted: 2025-02-28

## 2025-02-28 NOTE — PATIENT INSTRUCTIONS
Thank you for your new patient visit with rheumatology  You were evaluated for persistent diffuse multiple joint pains  You have a known history of Crohn's disease and we also wanted to check that your joint symptoms are not related to your recurrence  You indicated prior history of hypermobility joint syndrome with dysautonomia  Your exam did not indicate show evidence of synovitis or other obvious features related to spondylarthritis  We will conduct some labs and x-rays to check for serology evidence of inflammation and skeletal changes related to inflammatory arthritis  In the meantime continue current management for your fibromyalgia, improvement of your sleep patterns, physical therapy or exercise and dietary modifications  Will contact you to discuss results of your labs and determine next steps

## 2025-03-04 ENCOUNTER — TELEPHONE (OUTPATIENT)
Age: 40
End: 2025-03-04
Payer: COMMERCIAL

## 2025-03-10 ENCOUNTER — SPECIALTY PHARMACY (OUTPATIENT)
Dept: GASTROENTEROLOGY | Facility: CLINIC | Age: 40
End: 2025-03-10
Payer: COMMERCIAL

## 2025-03-10 NOTE — PROGRESS NOTES
[facility] BLOOM approved   4/23/25 through 12/31/2099  EOC ID: [id number]      Dana Huizar  3/10/2025  11:18 EDT

## 2025-03-15 NOTE — PROGRESS NOTES
"Subjective   Bri Pfeiffer is a 39 y.o. female.     CC: Hypermobility of Joints    History of Present Illness     Pt comes in today to discuss testing for possible Ehler's-Danlo due to chronic hypermobility of her joints. Pt has a long h/o hypermobility of the joints and difficulty healing from skin wounds. FH: GM had aortic aneurysm that ended her life.       The following portions of the patient's history were reviewed and updated as appropriate: allergies, current medications, past family history, past medical history, past social history, past surgical history, and problem list.    Review of Systems   Constitutional:  Negative for activity change, chills and fever.   Respiratory:  Negative for cough.    Cardiovascular:  Negative for chest pain.   Psychiatric/Behavioral:  Negative for dysphoric mood.        /80   Pulse 82   Temp 98.3 °F (36.8 °C) (Oral)   Resp 16   Ht 167.6 cm (65.98\")   Wt (!) 155 kg (341 lb)   SpO2 98%   BMI 55.07 kg/m²     Objective   Physical Exam  Vitals and nursing note reviewed.   Constitutional:       General: She is not in acute distress.     Appearance: She is well-developed.   Pulmonary:      Effort: Pulmonary effort is normal.   Neurological:      Mental Status: She is alert and oriented to person, place, and time.   Psychiatric:         Behavior: Behavior normal.         Thought Content: Thought content normal.         Assessment & Plan   Diagnoses and all orders for this visit:    1. Hypermobility of joint (Primary)  -     Ambulatory Referral to Genetic Counseling/Testing  -     CT Chest Without Contrast; Future    2. Family history of dissecting aortic aneurysm  -     CT Chest Without Contrast; Future                 "

## 2025-03-17 ENCOUNTER — OFFICE VISIT (OUTPATIENT)
Dept: FAMILY MEDICINE CLINIC | Facility: CLINIC | Age: 40
End: 2025-03-17
Payer: COMMERCIAL

## 2025-03-17 VITALS
SYSTOLIC BLOOD PRESSURE: 120 MMHG | WEIGHT: 293 LBS | RESPIRATION RATE: 16 BRPM | HEIGHT: 66 IN | HEART RATE: 82 BPM | BODY MASS INDEX: 47.09 KG/M2 | DIASTOLIC BLOOD PRESSURE: 80 MMHG | OXYGEN SATURATION: 98 % | TEMPERATURE: 98.3 F

## 2025-03-17 DIAGNOSIS — Z82.49 FAMILY HISTORY OF DISSECTING AORTIC ANEURYSM: ICD-10-CM

## 2025-03-17 DIAGNOSIS — M24.9 HYPERMOBILITY OF JOINT: Primary | ICD-10-CM

## 2025-03-17 PROCEDURE — 99213 OFFICE O/P EST LOW 20 MIN: CPT | Performed by: FAMILY MEDICINE

## 2025-03-19 ENCOUNTER — OFFICE VISIT (OUTPATIENT)
Dept: GASTROENTEROLOGY | Facility: CLINIC | Age: 40
End: 2025-03-19
Payer: COMMERCIAL

## 2025-03-19 VITALS
HEART RATE: 65 BPM | DIASTOLIC BLOOD PRESSURE: 82 MMHG | TEMPERATURE: 98.2 F | WEIGHT: 293 LBS | SYSTOLIC BLOOD PRESSURE: 126 MMHG | OXYGEN SATURATION: 97 % | HEIGHT: 66 IN | BODY MASS INDEX: 47.09 KG/M2

## 2025-03-19 DIAGNOSIS — K50.00 CROHN'S DISEASE OF SMALL INTESTINE WITHOUT COMPLICATION: Primary | ICD-10-CM

## 2025-03-19 DIAGNOSIS — K21.9 GASTROESOPHAGEAL REFLUX DISEASE WITHOUT ESOPHAGITIS: ICD-10-CM

## 2025-03-19 DIAGNOSIS — Z79.620: ICD-10-CM

## 2025-03-19 DIAGNOSIS — R94.8 ABNORMAL ANAL MANOMETRY: ICD-10-CM

## 2025-03-19 DIAGNOSIS — Z90.49 STATUS POST SMALL BOWEL RESECTION: ICD-10-CM

## 2025-03-19 DIAGNOSIS — K58.8 OTHER IRRITABLE BOWEL SYNDROME: ICD-10-CM

## 2025-03-19 PROCEDURE — 99214 OFFICE O/P EST MOD 30 MIN: CPT | Performed by: NURSE PRACTITIONER

## 2025-03-19 RX ORDER — ESOMEPRAZOLE MAGNESIUM 40 MG/1
40 CAPSULE, DELAYED RELEASE ORAL DAILY
Qty: 90 CAPSULE | Refills: 3 | Status: SHIPPED | OUTPATIENT
Start: 2025-03-19

## 2025-03-19 RX ORDER — FAMOTIDINE 20 MG/1
20 TABLET, FILM COATED ORAL 2 TIMES DAILY PRN
Qty: 180 TABLET | Refills: 2 | Status: SHIPPED | OUTPATIENT
Start: 2025-03-19

## 2025-03-19 NOTE — PATIENT INSTRUCTIONS
Recommend starting a daily fiber supplement, FiberCon tablets.  This can be purchased over-the-counter, generic brand is fine.  Fiber supplements can take 12 to 72 hours to start working.  Start fiber supplement 1 every other day and gradually increase based on  recommended daily amount if needed based on your response.    Drink 6 to 12 ounces of water or noncarbonated beverage with fiber supplement.     Treatment Plan:  Continue Skyrizi regimen for Crohn's disease.  Referral to a genetic counselor for testing for the vascular version of EDS.  Continue esomeprazole (Nexium) and famotidine (Pepcid) for GERD.  Dietary and lifestyle modifications to manage acid reflux and regurgitation symptoms.  Continue with smaller meals to manage mild gastroparesis.  Initiate a regimen of fiber, starting with one tablet every other day, potentially increasing to one tablet daily based on response.  Continue Lomotil and dicyclomine as needed.     Follow-Up Instructions:  Follow up in 5 months.  Monitor response to fiber regimen and adjust as needed.  Continue prescribed medications and lifestyle modifications.  Attend follow-up appointment with neurologist Dr. Crowder July 2025.     Additional Notes:  The patient had her fallopian tubes removed surgically on 02/03/2025, with incisions still irritated but skin closed.  Refills for esomeprazole (90 days, once a day) and famotidine (for a year) to be sent to [facility] pharmacy.

## 2025-03-19 NOTE — PROGRESS NOTES
Chief Complaint   Patient presents with    Follow-up     Crohns, on Skyrizi             GASTROENTEROLOGY SUMMARY  39-year-old female presents today for follow-up regarding Skyrizi side effects.  Last visit November 19, 2024.   She is followed by this office for established diagnosis of Crohn's disease, initially diagnosed in 2010.  She has a a history of hidradenitis suppurativa, anal fissure, aphthous ulcers, Uveitis and rectal stricture, chronic constipation with anterior rectocele and abnormal anal rectal manometry consistent with pelvic floor dyssynergy, hidradenitis suppurativa, anal fissure, aphthous ulcers, Uveitis and rectal stricture.    Laparoscopic cholecystectomy with IOC was performed July 14, 2023 with Dr Flores.   Also status post laparoscopic ileocecectomy November 2020 with Dr. Kerr.    Last EGD and colonoscopy April 3, 2024.  4/3/2024 colonoscopy Dr Benton  Normal terminal ileum, prior end to end ileocolonic anastomosis at the ileocecal valve, patent with healthy-appearing mucosa  Nonbleeding mild internal hemorrhoids  No specimens collected  Recommend colonoscopy in 2 to 3 years for surveillance  4/3/2024 EGD Dr Benton  Mild gastritis biopsies with mild to moderate reactive chemical gastropathy no significant inflammation, no HP  Otherwise normal  May 2021 gastric emptying study 11% retained at 4 hours, essentially normal    CT scan performed July 12, 2023 with supraumbilical hernia containing a segment of transverse colon, no incarceration. Also complex 2.4 cm left ovarian/adnexal cyst.  She has discussed surgical intervention for hernia however Dr. Flores recommends weight loss in order to be a candidate for hernia repair.       She has had previous evaluation at [facility] by Shane Conroy, initial consult 7/5/2024.     Rheumatology office visit February 24, 2025 for ongoing joint pain, see visit note for detailed discussion of possible causes of joint pain  including inflammatory bowel disease enteropathy.    Previous treatments for Crohn's disease:  infliximab discontinued due to worsening joint pain, fevers and constellation of symptoms she did not have prior to infliximab February 2023,   adalimumab initiated 2019- discontinued given concern for allergic reaction with whelps November 2022  prednisone.   Skyrizi was initiated March 30, 2023  Last dose March 5, 2025.  Next due April 23, 2025  History of Present Illness  The patient is a 39-year-old female who presents today for follow-up.    Joint Pain and Flare-ups  She has been under the care of a rheumatologist, who conducted x-rays of her sacroiliac joints and feet-imaging does not show evidence of inflammatory arthritis or sacroiliitis.  Her rheumatoid factor is negative.  The rheumatologist suspects a link between her symptoms and Crohn's disease as well as other causes for persistent musculoskeletal pain including osteoarthritis, hypermobility joint syndrome and others.  She has had flare-ups and pain post-Skyrizi injections, but the last two injections, including one on 03/05/2025, were uneventful, without a change in symptoms or unwanted side effect.  She does not premedicate prior to Skyrizi injections.  She is currently on Skyrizi, initiated on 03/30/2023, for Crohn's disease and joint pain management.   However, the joint pain remains uncontrolled.  - Onset: Flare-ups and pain post-Skyrizi injections.  - Duration: Ongoing since starting Skyrizi on 03/30/2023.  - Character: Flare-ups and pain.  - Alleviating/Aggravating Factors: No premedication prior to Skyrizi injections; last two injections were uneventful.  - Timing: Post-Skyrizi injections.  - Severity: Pain remains uncontrolled.    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   Her primary care physician plans to refer her to a genetic  counselor for testing of the vascular variant of EDS.   It is believed that her pain is more likely due to hypermobility than a condition treatable with biologics.   The progression of her symptoms is attributed to time rather than a change in biologics.  - Onset: Diagnosed by physical therapist and dysautonomia clinic.  - Character: Pain likely due to hypermobility.  - Timing: Progression attributed to time.    Acid Reflux Symptoms  She reports no significant change in her acid reflux symptoms.   The burning pain is less frequent, but regurgitation remains common.   She is on famotidine twice daily and Nexium at bedtime.  - Character: Burning pain less frequent, regurgitation common.  - Alleviating/Aggravating Factors: Famotidine twice daily and Nexium at bedtime.    Gastrointestinal Issues  She continues to experience diarrhea and loose stools, with bowel movements ranging from none to three times daily.   She has taken Lomotil infrequently, only during severe episodes.   She manages her symptoms through physical therapy, including abdominal massage, pelvic rotation, and squatting potty.   She has previously tried FiberCon, which resulted in constipation.   She is currently on pyridostigmine for tachycardia and potential gastroparesis due to dysautonomia.   She has been on this medication for approximately a year and reports no difference in regurgitation since starting the medication.   She notes that her symptoms worsen in the summer and improve in the winter.   She has a follow-up appointment with her neurologist, Dr. Crowder 07/2025. She occasionally takes dicyclomine.  - Onset: Ongoing gastrointestinal issues.  - Duration: Approximately a year on pyridostigmine.  - Character: Diarrhea, loose stools, constipation from FiberCon, no difference in regurgitation since starting pyridostigmine.  - Alleviating/Aggravating Factors: Lomotil during severe episodes, physical therapy, pyridostigmine, symptoms worsen in  "summer and improve in winter.  - Timing: Bowel movements range from none to three times daily.    Supplemental Information  She had her fallopian tubes removed surgically on 02/03/2025.   Her incisions are still irritated, but the skin is closed.       Result Review :         Colonoscopy (04/03/2024 11:51)  Upper GI Endoscopy (04/03/2024 11:51)  CBC & Differential (02/24/2025 11:58)  Vital Signs:   /82   Pulse 65   Temp 98.2 °F (36.8 °C)   Ht 167.6 cm (66\")   Wt (!) 155 kg (342 lb 4.8 oz)   SpO2 97%   BMI 55.25 kg/m²     Body mass index is 55.25 kg/m².     Physical Exam  Vitals reviewed.   Constitutional:       General: She is not in acute distress.     Appearance: Normal appearance. She is well-developed. She is not diaphoretic.   HENT:      Head: Normocephalic and atraumatic.   Eyes:      General: No scleral icterus.  Cardiovascular:      Rate and Rhythm: Normal rate and regular rhythm.   Pulmonary:      Effort: Pulmonary effort is normal. No respiratory distress.      Breath sounds: Normal breath sounds.   Abdominal:      Palpations: Abdomen is soft.      Tenderness: There is abdominal tenderness.      Comments: Laparoscopic surgical incisions intact, mild tenderness noted at surgical sites   Skin:     General: Skin is warm and dry.      Coloration: Skin is not jaundiced.   Neurological:      Mental Status: She is alert and oriented to person, place, and time.   Psychiatric:         Mood and Affect: Mood normal.         Behavior: Behavior normal.         Thought Content: Thought content normal.         Judgment: Judgment normal.         Assessment and Plan        Diagnoses and all orders for this visit:    1. Crohn's disease of small intestine without complication (Primary)    2. Status post small bowel resection    3. Gastroesophageal reflux disease without esophagitis  -     esomeprazole (nexIUM) 40 MG capsule; Take 1 capsule by mouth Daily.  Dispense: 90 capsule; Refill: 3  -     famotidine " (PEPCID) 20 MG tablet; Take 1 tablet by mouth 2 (Two) Times a Day As Needed (GERD).  Dispense: 180 tablet; Refill: 2    4. Long term current use of risankizumab    5. Other irritable bowel syndrome    6. Abnormal anal manometry       Assessment & Plan  1. Crohn's Disease diagnosed in 2010  - Overall patient feels that symptoms of Crohn's disease are improving with Skyrizi and she wishes to continue with current treatment, she has not had unwanted side effects with her last 2 on body injections.  - Persist with the Skyrizi regimen   Recommended for colonoscopy for continued surveillance at 2 to 3-year interval, April 2026 or April 2027    -Skyrizi was initiated 3/30/2023, bidirectional EGD and colonoscopy 4/3/2024 with no endoscopic evidence of recurrence at surgical site or elsewhere in the colon.  At this time luminal inflammation is well-controlled, GI will continue to follow  It is reassuring that endoscopic evaluation 2024 revealed no active inflammation, and we are aware that it is possible that patients with IBD enteropathy can experience joint pain in both the setting of active luminal inflammation and without luminal inflammation or active Crohn's  -Reviewed rheumatology progress note, will continue with Skyrizi at this time, if other causes of joint pain have been completely exhausted and we need to consider change in therapy with a different mechanism of action, we can consider this in the future, GI will continue to follow    2. Meredith-Danlos Syndrome (EDS)  - Diagnosed by multiple specialists, including physical therapist and dysautonomia clinic  - Primary doctor to refer to a genetic counselor for testing for the vascular version of EDS    3. Gastroesophageal Reflux Disease (GERD)  -EGD 4/3/2024 with mild chronic inactive gastritis, continue with prescribed regimen of esomeprazole and Pepcid  - Refills for esomeprazole (90 days, once a day) and Pepcid (for a year) to be sent to [facility]  Recommend dietary and lifestyle modifications for acid reflux including smaller meals, maintaining upright position 2 to 4 hours after eating and weight loss  - Dietary and lifestyle modifications recommended to manage acid reflux and regurgitation symptoms    4. Mild Gastroparesis 11% retained at 4 hours, essentially normal gastric emptying study however dietary modifications for gastroparesis recommended given symptom complex  - Continue with smaller meals to manage symptoms    5. Constipation  - Initiate a regimen of fiber, starting with one tablet every other day, potentially increasing to one tablet daily based on response  - Continue with Lomotil and dicyclomine as needed    Follow-up  - Patient to follow up in 5 months    PROCEDURE  The patient had her fallopian tubes removed surgically on 02/03/2025.             Patient Instructions   Recommend starting a daily fiber supplement, FiberCon tablets.  This can be purchased over-the-counter, generic brand is fine.  Fiber supplements can take 12 to 72 hours to start working.  Start fiber supplement 1 every other day and gradually increase based on  recommended daily amount if needed based on your response.    Drink 6 to 12 ounces of water or noncarbonated beverage with fiber supplement.     Treatment Plan:  Continue Skyrizi regimen for Crohn's disease.  Referral to a genetic counselor for testing for the vascular version of EDS.  Continue esomeprazole (Nexium) and famotidine (Pepcid) for GERD.  Dietary and lifestyle modifications to manage acid reflux and regurgitation symptoms.  Continue with smaller meals to manage mild gastroparesis.  Initiate a regimen of fiber, starting with one tablet every other day, potentially increasing to one tablet daily based on response.  Continue Lomotil and dicyclomine as needed.     Follow-Up Instructions:  Follow up in 5 months.  Monitor response to fiber regimen and adjust as needed.  Continue prescribed  medications and lifestyle modifications.  Attend follow-up appointment with neurologist Dr. Crowder July 2025.     Additional Notes:  The patient had her fallopian tubes removed surgically on 02/03/2025, with incisions still irritated but skin closed.  Refills for esomeprazole (90 days, once a day) and famotidine (for a year) to be sent to [facility] pharmacy.          Patient or patient representative verbalized consent for the use of Ambient Listening during the visit with  BANDAR Hopkins for chart documentation. 3/31/2025  14:05 EDT    EMR Dragon/Transcription Disclaimer:  This document has been Dictated utilizing Dragon dictation.

## 2025-04-01 ENCOUNTER — SPECIALTY PHARMACY (OUTPATIENT)
Dept: GASTROENTEROLOGY | Facility: CLINIC | Age: 40
End: 2025-04-01
Payer: COMMERCIAL

## 2025-04-01 NOTE — PROGRESS NOTES
Specialty Pharmacy Patient Management Program  Refill Outreach      Bri is a 39 y.o. female contacted today regarding refills of her medication(s).    Specialty medication(s) and dose(s) confirmed: Skyrizi  Other medications being refilled: n/a    Refill Questions      Flowsheet Row Most Recent Value   Changes to allergies? No   Changes to medications? No   New conditions or infections since last clinic visit No   Unplanned office visit, urgent care, ED, or hospital admission in the last 4 weeks  No   How does patient/caregiver feel medication is working? Very good   Financial problems or insurance changes  No   Since the previous refill, were any specialty medication doses or scheduled injections missed or delayed?  No   Does this patient require a clinical escalation to a pharmacist? No            Delivery Questions      Flowsheet Row Most Recent Value   Delivery method UPS   Delivery address verified with patient/caregiver? Yes   Delivery address Home   Number of medications in delivery 1   Medication(s) being filled and delivered Risankizumab-rzaa (Skyrizi)   Doses left of specialty medications 0   Copay verified? Yes   Copay amount $5   Copay form of payment Credit/debit on file   Delivery Date Selection 04/03/25   Signature Required No                 Follow-up: 51 day(s)    Electronically signed by Debbi Abbasi PharmD, 04/01/25, 3:27 PM EDT.         Specialty Pharmacy Patient Management Program  Gastroenterology Reassessment     Bri Pfeiffer is a 39 y.o. female seen by a Gastroenterology provider for Crohn's Disease and enrolled in the Patient Management program offered by [facility]. A follow-up outreach was conducted, including assessment of continued therapy appropriateness, medication adherence, and side effect incidence and management for Skyrizi (risankizumab).     Changes to Insurance Coverage or Financial Support  Per insurance, will have to fill with [facility] with next refill    Relevant Past Medical History and Comorbidities  Relevant medical history and concomitant health conditions were discussed with the patient. The patient's chart has been reviewed for relevant past medical history and comorbid health conditions and updated as necessary.   Past Medical History:   Diagnosis Date    Allergic     Allergies     Anemia     Anxiety     Asthma     Breast mass 2021    Recheck diagnostic 6/22    Cholelithiasis     Cluster headache     Crohn's disease 05/2019    CTS (carpal tunnel syndrome)     Depression     Difficulty walking     POLY NEUROPATHY    Diverticulitis of colon     Dysautonomia     Eczema     Fibrocystic breast     Fibromyalgia, primary     Fissure, anal     GERD (gastroesophageal reflux disease)     GI (gastrointestinal bleed)     Headache, tension-type     Hernia, abdominal     History of medical problems     Crohn's disease    HL (hearing loss)     Hyperlipidemia     Hypoglycemia     IBS (irritable bowel syndrome)     Irregular heartbeat     OCCAS    Lactose intolerance     Low back pain     Memory loss     Migraine     Obesity     Pericardial cyst     Peripheral neuropathy     Pneumonia     Polycystic ovary syndrome 2022    Hemorrhagic ovarian cysts    Possible joint hypermobility syndrome 02/28/2025    Rectal bleeding     Rheumatoid arthritis     Sensitive skin     Sleep apnea     CPAP    Spondylosis     Syncope     MORE RECENTLY: JAN 27 2023///PREVIOUS ENTRY: STATES LAST EPISODE 4/6/22 AT WORK    Tremor     Trigeminal neuralgia     Vision loss     Visual impairment     Vitamin D deficiency     Wound dehiscence      Social History     Socioeconomic History    Marital status: Single    Number of children: 0    Highest education level: Some college, no degree   Tobacco Use    Smoking status: Never     Passive exposure: Never    Smokeless tobacco: Never    Tobacco comments:     caffeine use a few days weekly   Vaping Use    Vaping status:  Never Used   Substance and Sexual Activity    Alcohol use: Yes     Alcohol/week: 1.0 standard drink of alcohol     Types: 1 Shots of liquor per week     Comment: Maybe one drink a month. Very rare.    Drug use: Never    Sexual activity: Yes     Partners: Male     Birth control/protection: Condom, I.U.D., Bilateral salpingectomy      Problem list reviewed by Debbi Abbasi, PharmD on 4/1/2025 at  3:22 PM    Allergies  Known allergies and reactions were discussed with the patient. The patient's chart has been reviewed for allergy information and updated as necessary.   Humira [adalimumab], Lactose intolerance (gi), Levaquin [levofloxacin], Milk-related compounds, Nsaids, Pseudoephedrine, and Percocet [oxycodone-acetaminophen]  Allergies reviewed by Debbi Abbasi, PharmD on 4/1/2025 at  3:21 PM    Relevant Laboratory Values  Lab Results   Component Value Date    GLUCOSE 80 02/24/2025    BUN 12 02/24/2025    CREATININE 0.86 02/24/2025    BCR 14.0 02/24/2025     02/24/2025    K 4.5 02/24/2025     02/24/2025    CO2 23.6 02/24/2025    CALCIUM 9.2 02/24/2025    PROTEINTOT 7.3 02/24/2025    ALBUMIN 3.8 02/24/2025    ALT 17 02/24/2025    AST 17 02/24/2025    ALKPHOS 95 02/24/2025    BILITOT 0.5 02/24/2025    ANIONGAP 11.4 02/24/2025    MG 2.2 07/13/2023     Lab Results   Component Value Date    WBC 8.70 02/24/2025    HGB 13.7 02/24/2025    HCT 42.0 02/24/2025     02/24/2025    INR 1.08 07/13/2023     Lab Results   Component Value Date    HEPAIGM Non-Reactive 07/13/2023    HEPBCAB Negative 03/02/2021    HEPBIGMCORE Non-Reactive 07/13/2023    HEPBSAB Reactive 03/02/2021    HEPBSAG Non-Reactive 07/13/2023    HEPCVIRUSABY Non-Reactive 07/13/2023     Lab Results   Component Value Date    QUANTITBGLDP Negative 11/21/2022    QUANTITBGLDP Negative 03/02/2021     Lab Value Review  The above lab values have been reviewed; the following specialty medication(s) dose adjustment(s) are recommended: none.    Current  Medication List  This medication list has been reviewed with the patient and evaluated for any interactions or necessary modifications/recommendations, and updated to include all prescription medications, OTC medications, and supplements the patient is currently taking. This list reflects what is contained in the patient's profile, which has also been marked as reviewed to communicate to other providers it is the most up to date version of the patient's current medication therapy.     Current Outpatient Medications:     albuterol sulfate  (90 Base) MCG/ACT inhaler, Inhale 2 puffs Every 4 (Four) Hours As Needed., Disp: , Rfl:     ALOE VERA PO, Take 1 tablet by mouth Daily. HELD, Disp: , Rfl:     amitriptyline (ELAVIL) 10 MG tablet, Take 1 tablet by mouth Every Night., Disp: 90 tablet, Rfl: 1    cetirizine (zyrTEC) 10 MG tablet, Take 1 tablet by mouth Daily., Disp: , Rfl:     Cholecalciferol (VITAMIN D-3) 5000 units tablet, Take 1 tablet by mouth Daily. HELD, Disp: , Rfl:     Cyanocobalamin (VITAMIN B 12 PO), Take 1 tablet by mouth Daily. HELD, Disp: , Rfl:     dicyclomine (BENTYL) 20 MG tablet, Take 1 tablet by mouth Every 6 (Six) Hours As Needed for Abdominal Cramping., Disp: 120 tablet, Rfl: 4    diphenoxylate-atropine (LOMOTIL) 2.5-0.025 MG per tablet, Take 1 tablet by mouth 4 (Four) Times a Day As Needed for Diarrhea., Disp: 120 tablet, Rfl: 3    escitalopram (Lexapro) 20 MG tablet, Take 1 tablet by mouth Daily., Disp: 30 tablet, Rfl: 1    esomeprazole (nexIUM) 40 MG capsule, Take 1 capsule by mouth Daily., Disp: 90 capsule, Rfl: 3    famotidine (PEPCID) 20 MG tablet, Take 1 tablet by mouth 2 (Two) Times a Day As Needed (GERD)., Disp: 180 tablet, Rfl: 2    ferrous sulfate 324 (65 Fe) MG tablet delayed-release EC tablet, Take 1 tablet by mouth Daily With Breakfast. HELD, Disp: , Rfl:     fluticasone (FLONASE) 50 MCG/ACT nasal spray, Administer 2 sprays into the nostril(s) as directed by provider Daily.,  Disp: , Rfl:     Folic Acid 0.8 MG capsule, Take 0.8 mg by mouth Every Morning. HELD, Disp: , Rfl:     methocarbamol (ROBAXIN) 750 MG tablet, Take 1 tablet by mouth 3 (Three) Times a Day As Needed for Muscle Spasms., Disp: 30 tablet, Rfl: 0    metoprolol tartrate (LOPRESSOR) 25 MG tablet, Take 0.5 tablets by mouth Every 12 (Twelve) Hours., Disp: 30 tablet, Rfl: 2    modafinil (PROVIGIL) 100 MG tablet, Take 1 tablet by mouth 2 (Two) Times a Day Before Breakfast and Lunch., Disp: 60 tablet, Rfl: 2    mometasone-formoterol (Dulera) 100-5 MCG/ACT inhaler, Inhale 2 puffs 2 (Two) Times a Day., Disp: 13 g, Rfl: 1    montelukast (SINGULAIR) 10 MG tablet, Take 1 tablet by mouth Daily., Disp: 90 tablet, Rfl: 1    Multiple Vitamin (MULTIVITAMIN PO), Take 1 tablet by mouth Daily. HELD, Disp: , Rfl:     ondansetron ODT (ZOFRAN-ODT) 8 MG disintegrating tablet, Take 1 tablet by mouth Every 8 (Eight) Hours As Needed for Nausea or Vomiting., Disp: 12 tablet, Rfl: 0    pregabalin (LYRICA) 100 MG capsule, Take 1 capsule by mouth 3 times a day., Disp: 270 capsule, Rfl: 1    pyridostigmine (MESTINON) 60 MG tablet, Take 1 tablet by mouth Every 8 (Eight) Hours., Disp: 90 tablet, Rfl: 5    Riboflavin-Magnesium-Feverfew (MigreLief) 200-180-50 MG tablet, Take 1-2 tablets by mouth 2 (Two) Times a Day. HELD, Disp: , Rfl:     Rimegepant Sulfate (Nurtec) 75 MG tablet dispersible tablet, Take 1 tablet by mouth As Needed (Take 1 tablet by mouth daily as needed for headache/migraine.  Do not take more than one in a 24 hour period.)., Disp: 8 tablet, Rfl: 11    Risankizumab-rzaa (Skyrizi) 360 MG/2.4ML solution cartridge, Inject 360 mg under the skin into the appropriate area as directed Every 8 (Eight) Weeks., Disp: 2.4 mL, Rfl: 6  Medicines reviewed by Debbi Abbasi, PharmD on 4/1/2025 at  3:22 PM    Drug Interactions  none     Adverse Drug Reactions  Adverse Reactions Experienced: none  Plan for ADR Management:  side effects have not occurred  with last two injections      Hospitalizations and Urgent Care Since Last Assessment  Hospitalizations or Admissions: none  ED Visits: none  Urgent Office Visits: none     Adherence and Self-Administration  Approximate Number of Doses Missed Since Last Assessment: none  Ongoing or New Barriers to Patient Adherence and/or Self-Administration: none   Methods for Supporting Patient Adherence and/or Self-Administration: N/A     Recently Close Medication Therapy Problems  Crohn's disease of large intestine with rectal bleeding   1 Current Medication: Risankizumab-rzaa (Skyrizi) 360 MG/2.4ML solution cartridge   Current Medication Sig: Inject 360 mg under the skin into the appropriate area as directed Every 8 (Eight) Weeks.   Rationale: Undesirable effect - Adverse medication event - Safety   Status: No Longer Relevant   Identified Date: 10/10/2024 Completed Date: 4/1/2025   Note: 10/10/24  Problem: patient is having bone pain and body aches starting the day of OBI dose and lasting ~4-5 days. She also feels the effectiveness starting to wear off around week 4-5 of 8 week interval.  Communication: provider: will discuss alternative options with providers -  changing medication vs attempting to decrease Skyrizi interval. Both Omvoh and Tremfya are pending FDA approval for CD; pt has not yet tried Cimzia, Stelara, or Rinvoq. Of note, pt also has HS and mast cell activation disorder.    Recommendation:  attempt to find ETA for Skyrizi drug level monitoring to see if decreased dosing interval would be helpful for patient. May be more beneficial to change therapy given ADRs for 4-5 days post dose.   Follow-up: prior to patient's next Skyrizi OBI dose (due 11/6/24)  Debbi Abbasi, PharmD  11/19: pt seen by provider and given dicyclomine to try to help with lower abdominal cramping. also referral to bariatric surgery. attempting to continue Skyrizi as long as possible d/t failing multiple biologics previously. will continue to  follow more closely.  Debbi Abbasi PharmD  4/1/25: dicyclomine helping patient a lot, doing much better currently   Debbi Abbasi PharmD, BCACP, BCPS, BCCCP              Goals of Therapy  Goals related to the patient's specialty therapy were discussed with the patient. The Patient Goals segment of this outreach has been reviewed and updated.   Goals Addressed Today        Specialty Pharmacy General Goal      At least 50% symptom reduction and mucosal healing     10/10/24: ~50% symptom reduction - effectiveness of Skyrizi starts to wear off around week 4-5. Drug level monitoring not yet available. See MTP.    4/1/25: patient saw Rheumatology, is doing better on Skyrizi without side effects for last two injections and her IBD symptoms are improving. Joint pain still being worked up, following with PT and Rheum.              Quality of Life Assessment   Quality of Life related to the patient's specialty therapy was discussed with the patient. The QOL segment of this outreach has been reviewed and updated.   Quality of Life Assessment  Quality of Life Improvement Scale: 9-A good deal better    Reassessment Plan & Follow-Up  Medication Therapy Changes: none  Additional Plans, Therapy Recommendations, or Therapy Problems to Be Addressed: none   Pharmacist to perform regular reassessments no more than (6) months from the previous assessment.  Care Coordinator to set up future refill outreaches, coordinate prescription delivery, and escalate clinical questions to pharmacist.     Attestation  Therapeutic appropriateness: Appropriate   I attest the patient was actively involved in and has agreed to the above plan of care. I attest that the specialty medication(s) addressed above are appropriate for the patient based on my reassessment. If the prescribed therapy is at any point deemed not appropriate based on the current or future assessments, a consultation will be initiated with the patient's specialty care provider to  determine the best course of action. The revised plan of therapy will be documented along with any required assessments and/or additional patient education provided.     Debbi Abbasi, PharmD, BCACP, BCPS, BCCCP  Clinical Specialty Pharmacist, Gastroenterology  04/01/25 15:27 EDT

## 2025-04-08 ENCOUNTER — SPECIALTY PHARMACY (OUTPATIENT)
Dept: GASTROENTEROLOGY | Facility: CLINIC | Age: 40
End: 2025-04-08
Payer: COMMERCIAL

## 2025-04-08 DIAGNOSIS — K50.812 CROHN'S DISEASE OF BOTH SMALL AND LARGE INTESTINE WITH INTESTINAL OBSTRUCTION: ICD-10-CM

## 2025-04-08 RX ORDER — RISANKIZUMAB-RZAA 360 MG/2.4
360 WEARABLE INJECTOR SUBCUTANEOUS
Qty: 2.4 ML | Refills: 6 | Status: SHIPPED | OUTPATIENT
Start: 2025-04-08

## 2025-04-09 ENCOUNTER — TELEPHONE (OUTPATIENT)
Dept: FAMILY MEDICINE CLINIC | Facility: CLINIC | Age: 40
End: 2025-04-09
Payer: COMMERCIAL

## 2025-04-09 NOTE — TELEPHONE ENCOUNTER
Denae with [facility] called for clearance about a prior authorization for a ct scan that the patient has scheduled for 4/11/25    Denae also stated that she has a peer to peer if dr burgess would want to try it   [phone]

## 2025-04-10 ENCOUNTER — TELEPHONE (OUTPATIENT)
Dept: FAMILY MEDICINE CLINIC | Facility: CLINIC | Age: 40
End: 2025-04-10

## 2025-04-10 NOTE — TELEPHONE ENCOUNTER
Called PT this morning to inform the cash price for CT Chest without contrast order is $325.00, I spoke to Radha at [facility] off of [facility]. PT is wanting PT to proceed with peer to peer and then if insurance [employer] denies it then she will consider paying cash price for her CT order.

## 2025-04-11 NOTE — TELEPHONE ENCOUNTER
I will have to do it on Monday, April 14th when Dr. Benavidez gets into the office. But I will make note of it first thing Monday morning.

## 2025-04-15 ENCOUNTER — TELEPHONE (OUTPATIENT)
Dept: FAMILY MEDICINE CLINIC | Facility: CLINIC | Age: 40
End: 2025-04-15
Payer: COMMERCIAL

## 2025-04-15 DIAGNOSIS — Z82.49 FAMILY HISTORY OF DISSECTING AORTIC ANEURYSM: Primary | ICD-10-CM

## 2025-04-15 NOTE — TELEPHONE ENCOUNTER
Message sent through my chart   Ok for the hub to relay message if needed  -Patient's insurance is declining the CT scan, and requesting a peer to peer review. In my experience, they almost never approve these and it would be best to get her a consult with a cardiologist who would then be able to evaluate her further. If they feel like the testing is warranted, and order it, it was sail through without any roadblocks as the insurance does not prevent specialist from ordering certain test. Please let patient know she will be called about this appointment.

## 2025-04-15 NOTE — TELEPHONE ENCOUNTER
Patient's insurance is declining the CT scan, and requesting a peer to peer review.  In my experience, they almost never approve these and it would be best to get her a consult with a cardiologist who would then be able to evaluate her further.  If they feel like the testing is warranted, and order it, it was sail through without any roadblocks as the insurance does not prevent specialist from ordering certain test.  Please let patient know she will be called about this appointment.

## 2025-04-16 ENCOUNTER — HOSPITAL ENCOUNTER (EMERGENCY)
Facility: HOSPITAL | Age: 40
Discharge: HOME | End: 2025-04-16
Attending: EMERGENCY MEDICINE
Payer: COMMERCIAL

## 2025-04-16 ENCOUNTER — APPOINTMENT (OUTPATIENT)
Dept: GENERAL RADIOLOGY | Facility: HOSPITAL | Age: 40
End: 2025-04-16
Payer: COMMERCIAL

## 2025-04-16 VITALS
TEMPERATURE: 97.5 F | DIASTOLIC BLOOD PRESSURE: 91 MMHG | OXYGEN SATURATION: 99 % | BODY MASS INDEX: 44.41 KG/M2 | HEART RATE: 87 BPM | HEIGHT: 68 IN | WEIGHT: 293 LBS | RESPIRATION RATE: 20 BRPM | SYSTOLIC BLOOD PRESSURE: 143 MMHG

## 2025-04-16 DIAGNOSIS — J45.41 MODERATE PERSISTENT ASTHMA WITH ACUTE EXACERBATION IN ADULT: Primary | ICD-10-CM

## 2025-04-16 LAB
FLUAV SUBTYP SPEC NAA+PROBE: NOT DETECTED
FLUBV RNA ISLT QL NAA+PROBE: NOT DETECTED
RSV RNA NPH QL NAA+NON-PROBE: NOT DETECTED
SARS-COV-2 RNA RESP QL NAA+PROBE: NOT DETECTED

## 2025-04-16 PROCEDURE — 71046 X-RAY EXAM CHEST 2 VIEWS: CPT

## 2025-04-16 PROCEDURE — 87637 SARSCOV2&INF A&B&RSV AMP PRB: CPT | Performed by: EMERGENCY MEDICINE

## 2025-04-16 PROCEDURE — 99284 EMERGENCY DEPT VISIT MOD MDM: CPT | Performed by: NURSE PRACTITIONER

## 2025-04-16 PROCEDURE — 96374 THER/PROPH/DIAG INJ IV PUSH: CPT

## 2025-04-16 PROCEDURE — 99283 EMERGENCY DEPT VISIT LOW MDM: CPT

## 2025-04-16 PROCEDURE — 25010000002 METHYLPREDNISOLONE PER 125 MG: Performed by: NURSE PRACTITIONER

## 2025-04-16 RX ORDER — SODIUM CHLORIDE 0.9 % (FLUSH) 0.9 %
10 SYRINGE (ML) INJECTION AS NEEDED
Status: DISCONTINUED | OUTPATIENT
Start: 2025-04-16 | End: 2025-04-16 | Stop reason: HOSPADM

## 2025-04-16 RX ORDER — METHYLPREDNISOLONE 4 MG/1
TABLET ORAL
Qty: 21 TABLET | Refills: 0 | Status: SHIPPED | OUTPATIENT
Start: 2025-04-16

## 2025-04-16 RX ORDER — ALBUTEROL SULFATE 0.83 MG/ML
2.5 SOLUTION RESPIRATORY (INHALATION) ONCE
Status: COMPLETED | OUTPATIENT
Start: 2025-04-16 | End: 2025-04-16

## 2025-04-16 RX ORDER — AZITHROMYCIN 250 MG/1
TABLET, FILM COATED ORAL
Qty: 6 TABLET | Refills: 0 | Status: SHIPPED | OUTPATIENT
Start: 2025-04-18

## 2025-04-16 RX ORDER — METHYLPREDNISOLONE SODIUM SUCCINATE 125 MG/2ML
125 INJECTION, POWDER, LYOPHILIZED, FOR SOLUTION INTRAMUSCULAR; INTRAVENOUS ONCE
Status: COMPLETED | OUTPATIENT
Start: 2025-04-16 | End: 2025-04-16

## 2025-04-16 RX ORDER — IPRATROPIUM BROMIDE AND ALBUTEROL SULFATE 2.5; .5 MG/3ML; MG/3ML
3 SOLUTION RESPIRATORY (INHALATION) ONCE
Status: COMPLETED | OUTPATIENT
Start: 2025-04-16 | End: 2025-04-16

## 2025-04-16 RX ORDER — ALBUTEROL SULFATE 1.25 MG/3ML
1 SOLUTION RESPIRATORY (INHALATION) EVERY 6 HOURS PRN
Qty: 60 EACH | Refills: 0 | Status: SHIPPED | OUTPATIENT
Start: 2025-04-16

## 2025-04-16 RX ADMIN — METHYLPREDNISOLONE SODIUM SUCCINATE 125 MG: 125 INJECTION, POWDER, FOR SOLUTION INTRAMUSCULAR; INTRAVENOUS at 18:13

## 2025-04-16 RX ADMIN — IPRATROPIUM BROMIDE AND ALBUTEROL SULFATE 3 ML: .5; 3 SOLUTION RESPIRATORY (INHALATION) at 18:13

## 2025-04-16 RX ADMIN — ALBUTEROL SULFATE 2.5 MG: 2.5 SOLUTION RESPIRATORY (INHALATION) at 18:34

## 2025-04-16 NOTE — FSED PROVIDER NOTE
EMERGENCY DEPARTMENT ENCOUNTER    Room Number:  08/08  Date seen:  4/16/2025  Time seen: 17:48 EDT  PCP: Alan Benavidez MD  Historian: Patient    Discussed/obtained information from independent historians: Not applicable    HPI:  Chief complaint: Shortness of breath and coughing  A complete HPI/ROS/PMH/PSH/SH/FH are unobtainable due to: Not applicable  Context:Bri Pfeiffer is a 39 y.o. female with past medical history significant for Crohn's disease, asthma, seasonal allergies GERD, EDS, low back pain, transaminitis, fatigue who presents to the ED with c/o about 3 days of moderate asthma flare with coughing and mild shortness of breath with wheezing.  She denies any other symptoms.  She is currently taking albuterol, Zyrtec, Dulera and Singulair and Pepcid for her asthma and allergies.  She normally is under good control but the seasons often flare her up.  She denies chest tightness, pressure in her chest, dyspnea on exertion or fever.  She is not been exposed to ill contacts that she is aware of.    External (non-ED) record review: I reviewed patient's recent gastroenterology office visit notes dated 3/19/2025.  Patient has history of Crohn's disease that was diagnosed in 2010.  She uses a medication called Skyrizi.    Chronic or social conditions impacting care:n/a    ALLERGIES  Humira [adalimumab], Lactose intolerance (gi), Levaquin [levofloxacin], Milk-related compounds, Nsaids, Pseudoephedrine, and Percocet [oxycodone-acetaminophen]    PAST MEDICAL HISTORY  Active Ambulatory Problems     Diagnosis Date Noted    Pericardial cyst 03/06/2019    Vasodepressor syncope 03/06/2019    Esophageal dysphagia 04/22/2019    Rectal bleeding 04/22/2019    Colitis 05/21/2019    Gastroesophageal reflux disease 07/25/2019    Crohn's disease of large intestine with rectal bleeding 10/01/2019    Other irritable bowel syndrome 12/17/2019    Skin rash 01/14/2020    Moderate episode of recurrent major depressive  disorder 01/29/2020    Anxiety 01/29/2020    Low back pain with sciatica 07/06/2020    Ileocecal valve stenosis 09/02/2020    Crohn's disease 10/19/2020    BMI 40.0-44.9, adult 11/06/2020    Status post small bowel resection 12/15/2020    Fibromyalgia 07/08/2021    Non-seasonal allergic rhinitis due to pollen 07/08/2021    Incisional hernia, without obstruction or gangrene 03/01/2022    Hernia of abdominal cavity 07/13/2023    Transaminitis 07/13/2023    Acute cholecystitis 07/12/2023    Left ovarian cyst 07/15/2023    Anal fissure 02/12/2024    Migraine 08/01/2024    Encounter for sterilization 02/03/2025    Polyarthralgia 02/28/2025    Chronic fatigue 02/28/2025    Chronic bilateral low back pain without sciatica 02/28/2025    Possible joint hypermobility syndrome 02/28/2025    Morbid obesity with BMI of 50.0-59.9, adult 02/28/2025     Resolved Ambulatory Problems     Diagnosis Date Noted    Abdominal pain 07/12/2023     Past Medical History:   Diagnosis Date    Allergic     Allergies     Anemia     Asthma     Breast mass 2021    Cholelithiasis     Cluster headache     CTS (carpal tunnel syndrome)     Depression     Difficulty walking     Diverticulitis of colon     Dysautonomia     Eczema     Fibrocystic breast     Fibromyalgia, primary     Fissure, anal     GERD (gastroesophageal reflux disease)     GI (gastrointestinal bleed)     Headache, tension-type     Hernia, abdominal     History of medical problems     HL (hearing loss)     Hyperlipidemia     Hypoglycemia     IBS (irritable bowel syndrome)     Irregular heartbeat     Lactose intolerance     Low back pain     Memory loss     Obesity     Peripheral neuropathy     Pneumonia     Polycystic ovary syndrome 2022    Rheumatoid arthritis     Sensitive skin     Sleep apnea     Spondylosis     Syncope     Tremor     Trigeminal neuralgia     Vision loss     Visual impairment     Vitamin D deficiency     Wound dehiscence        PAST SURGICAL HISTORY  Past Surgical  History:   Procedure Laterality Date    ABDOMINAL SURGERY      ADENOIDECTOMY      APPENDECTOMY      CHOLECYSTECTOMY      CHOLECYSTECTOMY WITH INTRAOPERATIVE CHOLANGIOGRAM N/A 07/14/2023    Procedure: CHOLECYSTECTOMY LAPAROSCOPIC INTRAOPERATIVE CHOLANGIOGRAM;  Surgeon: Teto Flores MD;  Location: [facility] MAIN OR;  Service: General;  Laterality: N/A;    COLON RESECTION N/A 11/04/2020    Procedure: laparoscopic ileocecectomy;  Surgeon: Lisa Kerr MD;  Location: [facility] MAIN OR;  Service: General;  Laterality: N/A;    COLONOSCOPY N/A 05/10/2019    Procedure: COLONOSCOPY TO CECUM TO TERMINAL ILEUM WITH BIOPSY;  Surgeon: Tae Turcios MD;  [facility];  Service: Gastroenterology    COLONOSCOPY N/A 04/13/2022    Procedure: COLONOSCOPY;  Surgeon: Jaylen Scott MD;  Location: [facility] OR;  Service: Gastroenterology;  Laterality: N/A;  ANAL FISSURE    COLONOSCOPY N/A 04/03/2024    Procedure: COLONOSCOPY;  Surgeon: Ede Benton MD;  Location: [facility] OR;  Service: Gastroenterology;  Laterality: N/A;  internal hemmorhoids    DIAGNOSTIC LAPAROSCOPY N/A 02/03/2025    Procedure: DIAGNOSTIC LAPAROSCOPY, BILATERAL SALPINGECTOMY WITH DAVINCI ROBOT;  Surgeon: Rito Rodrigez MD;  Location: [facility] MAIN OR;  Service: Robotics - DaVinci;  Laterality: N/A;    ENDOSCOPY N/A 05/10/2019    Procedure: ESOPHAGOGASTRODUODENOSCOPY WITH BIOPSY;  Surgeon: Tae Turcios MD;  Location: [facility] ENDOSCOPY;  Service: Gastroenterology    ENDOSCOPY N/A 12/13/2019    Procedure: ESOPHAGOGASTRODUODENOSCOPY WITH COLD BIOPSIES;  Surgeon: Tae Turcios MD;  Location: [facility] ENDOSCOPY;  Service: Gastroenterology    ENDOSCOPY N/A 04/03/2024    Procedure: ESOPHAGOGASTRODUODENOSCOPY WITH BIOPSY;  Surgeon: Ede Benton MD;  Location: [facility] OR;  Service: Gastroenterology;  Laterality: N/A;  Gastritis    FLEXIBLE SIGMOIDOSCOPY      LIPOMA EXCISION  2007    SIGMOIDOSCOPY N/A 12/13/2019    Procedure:  SIGMOIDOSCOPY FLEXIBLE TO T.I.;  Surgeon: Tae Turcios MD;  Location: [facility] ENDOSCOPY;  Service: Gastroenterology    TONSILLECTOMY AND ADENOIDECTOMY      TUBAL ABDOMINAL LIGATION  2/3/25    Salpingectomy    UPPER GASTROINTESTINAL ENDOSCOPY      WISDOM TOOTH EXTRACTION         FAMILY HISTORY  Family History   Problem Relation Age of Onset    Obesity Mother     Hypertension Mother     Hypertension Father     Skin cancer Father     Cancer Father         Basal cell skin cancer    Hearing loss Father         Mass causing hearing loss (acoustic neuroma)    Obesity Father     Hyperlipidemia Father     Irritable bowel syndrome Sister     Parkinsonism Maternal Uncle     Cancer Maternal Grandmother         Colorectal cancer    Colon cancer Maternal Grandmother         Colorectal cancer    Arthritis Maternal Grandmother     Rectal cancer Maternal Grandmother         *colorectal cancer    Clotting disorder Maternal Grandmother         Blood clots    Prostate cancer Maternal Grandfather     Cancer Maternal Grandfather         Prostate cancer    Hypertension Paternal Grandmother     Obesity Paternal Grandmother     Hearing loss Paternal Grandmother         Age related Hearing loss    Vision loss Paternal Grandmother         Macular degeneration    Prostate cancer Paternal Grandfather     Hypertension Paternal Grandfather     Cancer Paternal Grandfather         Prostate cancer, skin cancer    Hearing loss Paternal Grandfather         Age related hearing loss    Obesity Paternal Grandfather     Clotting disorder Paternal Grandfather         Blood clots    Obesity Sister     Irritable bowel syndrome Sister     Obesity Brother     Obesity Maternal Uncle     Parkinsonism Maternal Uncle     Clotting disorder Maternal Uncle         Blood clots    Irritable bowel syndrome Sister     Fainting Sister         Neurocardio. Syncope    Malig Hyperthermia Neg Hx     Colon polyps Neg Hx     Crohn's disease Neg Hx     Ulcerative colitis Neg  Hx        SOCIAL HISTORY  Social History     Socioeconomic History    Marital status: Single    Number of children: 0    Highest education level: Some college, no degree   Tobacco Use    Smoking status: Never     Passive exposure: Never    Smokeless tobacco: Never    Tobacco comments:     caffeine use a few days weekly   Vaping Use    Vaping status: Never Used   Substance and Sexual Activity    Alcohol use: Yes     Alcohol/week: 1.0 standard drink of alcohol     Types: 1 Shots of liquor per week     Comment: Maybe one drink a month. Very rare.    Drug use: Never    Sexual activity: Yes     Partners: Male     Birth control/protection: Condom, I.U.D., Bilateral salpingectomy        REVIEW OF SYSTEMS  Review of Systems    All systems reviewed and negative except for those discussed in HPI.     PHYSICAL EXAM    I have reviewed the triage vital signs and nursing notes.  Vitals:    04/16/25 1747   BP: 143/91   Pulse: 87   Resp: 20   Temp:    SpO2: 99%     Physical Exam    GENERAL: not distressed  HENT: nares patent, mm moist, no stridor  EYES: no scleral icterus  NECK: no ROM limitations  CV: regular rhythm, regular rate, no murmur  RESPIRATORY: mild increase in work of breathing but she can speak in full sentences.  No upper airway exp wheezes or stridor but mid lungs she does have some fine exp wheezes.   ABDOMEN: soft  : deferred  MUSCULOSKELETAL: no deformity  NEURO: alert, moves all extremities, follows commands  SKIN: warm, dry    LAB RESULTS  Recent Results (from the past 24 hours)   COVID-19 and FLU A/B PCR, 1 HR TAT - Swab, Nasopharynx    Collection Time: 04/16/25  5:50 PM    Specimen: Nasopharynx; Swab   Result Value Ref Range    COVID19 Not Detected Not Detected - Ref. Range    Influenza A PCR Not Detected Not Detected    Influenza B PCR Not Detected Not Detected   RSV PCR - Swab, Nasopharynx    Collection Time: 04/16/25  5:50 PM    Specimen: Nasopharynx; Swab   Result Value Ref Range    RSV, PCR Not Detected  Not Detected       Ordered the above labs and independently interpreted results.  My findings will be discussed in the ED course or medical decision making section below    RADIOLOGY RESULTS  XR Chest 2 View  Result Date: 4/16/2025  XR CHEST 2 VW-  HISTORY: 39 years of age, Female.  COUGH, SOA  COMPARISON: Two-view chest 11/13/2024, 10/01/2021  FINDINGS: Cardiomediastinal silhouette is normal. Lungs are clear and there is no evidence for pulmonary edema or pleural effusion or infiltrate.      No evidence for active disease in the chest.   This report was finalized on 4/16/2025 6:30 PM by Calixto Gonzalez M.D on Workstation: [id number]         Ordered the above noted radiological studies.  Independently interpreted by me.  My findings will be discussed in the medical decision section below.     PROGRESS, DATA ANALYSIS, CONSULTS AND MEDICAL DECISION MAKING    Please note that this section constitutes my independent interpretation of clinical data including lab results, radiology, EKG's.  This constitutes my independent professional opinion regarding differential diagnosis and management of this patient.  It may include any factors such as history from outside sources, review of external records, social determinants of health, management of medications, response to those treatments, and discussions with other providers.    ED Course as of 04/16/25 1848 Wed Apr 16, 2025 1819  I have updated the patient about negative swabs.  She is not hypoxic or tachycardic.   [EW]   1831 I viewed the 2 view CXR in PACS. My independent interpretation is no acute infiltrate.  [EW]   1843 I have given the patient printed prescriptions for home nebulizer as well as the albuterol solution that goes in this. [EW]   1847 2nd breathing treatment seemed to help.  Pt reports feeing better.  [EW]      ED Course User Index  [EW] Beverly Miles, APRHOLLI     Orders placed during this visit:  Orders Placed This Encounter   Procedures    Home  Nebulizer    COVID-19 and FLU A/B PCR, 1 HR TAT - Swab, Nasopharynx    RSV PCR - Swab, Nasopharynx    XR Chest 2 View    Insert peripheral IV    ED Acknowledgement Form Needed;            Medical Decision Making  Problems Addressed:  Moderate persistent asthma with acute exacerbation in adult: complicated acute illness or injury    Amount and/or Complexity of Data Reviewed  Labs: ordered.  Radiology: ordered.    Risk  Prescription drug management.    Pt with h/o asthma and allergies presents with moderate coughing, wheezing for the past 3 days.  She was given DuoNeb, albuterol and 125 mg IV Solu-Medrol with improvement in her symptoms.  I also considered flu, COVID RSV and pneumonia.  Swabs and chest x-ray normal.  She is not hypoxic or tachycardic.  I did send a Medrol Dosepak and a watch and wait prescription for azithromycin to her pharmacy and also gave her a paper prescription for home nebulizer and albuterol.  She was given return to ER precautions.        DIAGNOSIS  Final diagnoses:   Moderate persistent asthma with acute exacerbation in adult          Medication List        New Prescriptions      azithromycin 250 MG tablet  Commonly known as: Zithromax Z-Joshua  Take 2 tablets the first day, then 1 tablet daily for 4 days.  Start taking on: April 18, 2025     methylPREDNISolone 4 MG dose pack  Commonly known as: MEDROL  Take as directed on package instructions.            Changed      * albuterol sulfate  (90 Base) MCG/ACT inhaler  Commonly known as: PROVENTIL HFA;VENTOLIN HFA;PROAIR HFA  What changed: Another medication with the same name was added. Make sure you understand how and when to take each.     * albuterol 1.25 MG/3ML nebulizer solution  Commonly known as: ACCUNEB  Take 3 mL by nebulization Every 6 (Six) Hours As Needed for Shortness of Air or Wheezing.  What changed: You were already taking a medication with the same name, and this prescription was added. Make sure you understand how and when  to take each.           * This list has 2 medication(s) that are the same as other medications prescribed for you. Read the directions carefully, and ask your doctor or other care provider to review them with you.                   Where to Get Your Medications        These medications were sent to [facility] [id number] - [place], [place] - [address] AT [facility] - [phone]  - [phone] FX  [address], [place] [id number]      Phone: [phone]   azithromycin 250 MG tablet  methylPREDNISolone 4 MG dose pack       You can get these medications from any pharmacy    Bring a paper prescription for each of these medications  albuterol 1.25 MG/3ML nebulizer solution         FOLLOW-UP  Alan Benavidez MD  [address]  [place] [id number] [phone]    Schedule an appointment as soon as possible for a visit in 2 days  As needed, If symptoms worsen        Latest Documented Vital Signs:  As of 18:48 EDT  BP- 143/91 HR- 87 Temp- 97.5 °F (36.4 °C) (Infrared) O2 sat- 99%    Appropriate PPE utilized throughout this patient encounter to include mask, if indicated, per current protocol. Hand hygiene was performed before donning PPE and after removal when leaving the room.    Please note that portions of this were completed with a voice recognition program.     Note Disclaimer: At [facility], we believe that sharing information builds trust and better relationships. You are receiving this note because you are receiving care at [facility] or recently visited. It is possible you will see health information before a provider has talked with you about it. This kind of information can be easy to misunderstand. To help you fully understand what it means for your health, we urge you to discuss this note with your provider.

## 2025-04-16 NOTE — DISCHARGE INSTRUCTIONS
Continue all your home allergy and asthma medications.    Start medrol dose pack (steroids) on Thursday  (first dose given IV here in ER)    Return Precautions    Although you are being discharged from the ED today, I encourage you to return for worsening symptoms.  Things can, and do, change such that treatment at home with medication may not be adequate.      Specifically, return for any of the following:    Chest pain, shortness of breath, pain or nausea and vomiting not controlled by medications provided.    Please make a follow up with your Primary Care Provider for a blood pressure recheck.

## 2025-04-21 PROBLEM — J45.40 MODERATE PERSISTENT ASTHMA WITHOUT COMPLICATION: Status: ACTIVE | Noted: 2025-04-21

## 2025-04-21 NOTE — PROGRESS NOTES
Subjective   Bri Pfeiffer is a 39 y.o. female.     CC: ED f/u for Dyspnea    History of Present Illness     Patient returns today after being seen in the [facility] emergency department on 4/16/2025 with this note:    HPI:  Chief complaint: Shortness of breath and coughing  A complete HPI/ROS/PMH/PSH/SH/FH are unobtainable due to: Not applicable  Context:Bri Pfeiffer is a 39 y.o. female with past medical history significant for Crohn's disease, asthma, seasonal allergies GERD, EDS, low back pain, transaminitis, fatigue who presents to the ED with c/o about 3 days of moderate asthma flare with coughing and mild shortness of breath with wheezing.  She denies any other symptoms.  She is currently taking albuterol, Zyrtec, Dulera and Singulair and Pepcid for her asthma and allergies.  She normally is under good control but the seasons often flare her up.  She denies chest tightness, pressure in her chest, dyspnea on exertion or fever.  She is not been exposed to ill contacts that she is aware of.    Chest x-rays showed no acute infiltrate, patient was giving 2 rounds of breathing treatments which seemed to help.    DIAGNOSIS  Final diagnoses:  Moderate persistent asthma with acute exacerbation in adult    azithromycin 250 MG tablet  Commonly known as: Zithromax Z-Joshua  Take 2 tablets the first day, then 1 tablet daily for 4 days.  Start taking on: April 18, 2025     methylPREDNISolone 4 MG dose pack  Commonly known as: MEDROL  Take as directed on package instructions.    Current outpatient and discharge medications have been reconciled for the patient.  Reviewed by: Alan Benavidez MD    Pt reports feeling better today.         The following portions of the patient's history were reviewed and updated as appropriate: allergies, current medications, past family history, past medical history, past social history, past surgical history, and problem list.    Review of Systems   Constitutional:   "Negative for activity change, chills and fever.   Respiratory:  Negative for cough.    Cardiovascular:  Negative for chest pain.   Psychiatric/Behavioral:  Negative for dysphoric mood.        /68   Pulse 71   Temp 97.3 °F (36.3 °C) (Temporal)   Resp 16   Ht 172.7 cm (68\")   Wt (!) 152 kg (336 lb)   SpO2 99%   BMI 51.09 kg/m²     Objective   Physical Exam  Vitals and nursing note reviewed.   Constitutional:       General: She is not in acute distress.     Appearance: She is well-developed.   Cardiovascular:      Rate and Rhythm: Normal rate and regular rhythm.   Pulmonary:      Effort: Pulmonary effort is normal.      Breath sounds: Normal breath sounds.   Neurological:      Mental Status: She is alert and oriented to person, place, and time.   Psychiatric:         Behavior: Behavior normal.         Thought Content: Thought content normal.     Hospital records reviewed with pt confirming HPI.      Assessment & Plan   Diagnoses and all orders for this visit:    1. Moderate persistent asthma with acute exacerbation (Primary)  -     albuterol sulfate  (90 Base) MCG/ACT inhaler; Inhale 2 puffs Every 6 (Six) Hours As Needed for Wheezing or Shortness of Air.  Dispense: 8 g; Refill: 11    2. Hospital discharge follow-up                 "

## 2025-04-22 ENCOUNTER — OFFICE VISIT (OUTPATIENT)
Dept: FAMILY MEDICINE CLINIC | Facility: CLINIC | Age: 40
End: 2025-04-22
Payer: COMMERCIAL

## 2025-04-22 VITALS
WEIGHT: 293 LBS | OXYGEN SATURATION: 99 % | SYSTOLIC BLOOD PRESSURE: 108 MMHG | TEMPERATURE: 97.3 F | HEART RATE: 71 BPM | HEIGHT: 68 IN | DIASTOLIC BLOOD PRESSURE: 68 MMHG | RESPIRATION RATE: 16 BRPM | BODY MASS INDEX: 44.41 KG/M2

## 2025-04-22 DIAGNOSIS — Z09 HOSPITAL DISCHARGE FOLLOW-UP: ICD-10-CM

## 2025-04-22 DIAGNOSIS — J45.41 MODERATE PERSISTENT ASTHMA WITH ACUTE EXACERBATION: Primary | ICD-10-CM

## 2025-04-22 RX ORDER — ALBUTEROL SULFATE 90 UG/1
2 INHALANT RESPIRATORY (INHALATION) EVERY 6 HOURS PRN
Qty: 8 G | Refills: 11 | Status: SHIPPED | OUTPATIENT
Start: 2025-04-22

## 2025-04-30 ENCOUNTER — SPECIALTY PHARMACY (OUTPATIENT)
Dept: GASTROENTEROLOGY | Facility: CLINIC | Age: 40
End: 2025-04-30
Payer: COMMERCIAL

## 2025-04-30 NOTE — PROGRESS NOTES
Specialty Pharmacy Patient Management Program  Gastroenterology Refill Outreach      Bri is a 39 y.o. female contacted today regarding refills of her medication(s).    Specialty medication(s) and dose(s) confirmed: elavil, singulair    Refill Questions      Flowsheet Row Most Recent Value   Changes to allergies? No   Changes to medications? No   New conditions or infections since last clinic visit No   Unplanned office visit, urgent care, ED, or hospital admission in the last 4 weeks  No   How does patient/caregiver feel medication is working? Very good   Financial problems or insurance changes  No   Since the previous refill, were any specialty medication doses or scheduled injections missed or delayed?  No   Does this patient require a clinical escalation to a pharmacist? No          Delivery Questions      Flowsheet Row Most Recent Value   Delivery method [employer]   Delivery address verified with patient/caregiver? Yes   Delivery address Home   Number of medications in delivery 2   Medication(s) being filled and delivered Amitriptyline HCl (ELAVIL), Montelukast Sodium (SINGULAIR)   Doses left of specialty medications 1 week   Copay verified? Yes   Copay amount 29.74   Copay form of payment Credit/debit on file   Delivery Date Selection 05/01/25   Signature Required No   Do you consent to receive electronic handouts?  No            Follow-Up: 85 days    Dana Huizar  4/30/2025  09:53 EDT

## 2025-05-01 RX ORDER — ESCITALOPRAM OXALATE 20 MG/1
20 TABLET ORAL DAILY
Qty: 30 TABLET | Refills: 1 | Status: SHIPPED | OUTPATIENT
Start: 2025-05-01

## 2025-05-02 ENCOUNTER — OFFICE VISIT (OUTPATIENT)
Dept: CARDIOLOGY | Age: 40
End: 2025-05-02
Payer: COMMERCIAL

## 2025-05-02 VITALS
WEIGHT: 293 LBS | DIASTOLIC BLOOD PRESSURE: 60 MMHG | BODY MASS INDEX: 44.41 KG/M2 | HEIGHT: 68 IN | SYSTOLIC BLOOD PRESSURE: 104 MMHG | OXYGEN SATURATION: 97 % | HEART RATE: 70 BPM

## 2025-05-02 DIAGNOSIS — G90.A POSTURAL ORTHOSTATIC TACHYCARDIA SYNDROME (POTS): Primary | ICD-10-CM

## 2025-05-02 DIAGNOSIS — R06.09 DYSPNEA ON EXERTION: ICD-10-CM

## 2025-05-02 DIAGNOSIS — Z82.49 FAMILY HISTORY OF THORACIC AORTIC ANEURYSM: ICD-10-CM

## 2025-05-02 DIAGNOSIS — R00.2 PALPITATIONS: ICD-10-CM

## 2025-05-02 PROCEDURE — 99204 OFFICE O/P NEW MOD 45 MIN: CPT | Performed by: STUDENT IN AN ORGANIZED HEALTH CARE EDUCATION/TRAINING PROGRAM

## 2025-05-02 NOTE — PATIENT INSTRUCTIONS
We will touch base after your echo test is complete, and then we will touch base about the genetic testing results that were obtained at [facility].  If there is any genetic predisposition position to weaken arteries, blood vessels or things that would predispose you to aneurysms, we can certainly consider increasing the interval of her echo test to assess for aneurysms.  Otherwise, if there is no genetic predisposition, and your echo looks good, then I do not really see the need of other testing of your aorta until you turn closer to the age of your grandmother when she was diagnosed.

## 2025-05-02 NOTE — PROGRESS NOTES
[facility]    Subjective:     Encounter Date:25      Patient ID: Bri Pfeiffer is a 39 y.o. female.    Chief Complaint:   Chief Complaint   Patient presents with    New Patient    Chest Pain    Shortness of Breath    Fatigue      History of Present Illness    Bri Pfeiffer is a pleasant 39 y.o. lady who presents for further evaluation of a family history of aortopathy.    She follows with the [facility] where she was diagnosed with POTS with hyperadrenergic features.  She has a history of Crohn's disease.    She has had a longstanding history of orthostatic and exertional intolerance, and ultimately her symptoms had stabilized on the Mestinon and metoprolol.  Guanfacine had caused some excessive fatigue    She presents today for follow-up given the fact that her grandmother  of a thoracic aneurysm when she was in her 60s.  The her grandmother did smoke.  She has no other drugs and then precipitating her symptoms.  She presents today for an opinion on further screening and testing for aneurysms.    She is accompanied today by her 6-month-old byrnes retriever service animal in training.  He was a very good boy.    Previous Cardiac Testing:  Stress echo,  for dyspnea.  Normal.  No evidence of thoracic aortic aneurysm.  The aortic root and ascending aorta are pretty well-visualized on the study    The following portions of the patient's history were reviewed and updated as appropriate: allergies, current medications, past family history, past medical history, past social history, past surgical history and problem list.    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    Fibrocystic breast     Fibromyalgia, primary     Fissure, anal     GERD (gastroesophageal reflux disease)     GI (gastrointestinal bleed)     Headache, tension-type     Hernia, abdominal     History of medical problems     Crohn's disease    HL (hearing loss)     Hyperlipidemia     Hypoglycemia     IBS (irritable bowel syndrome)     Irregular heartbeat     OCCAS    Lactose intolerance     Low back pain     Memory loss     Migraine     Obesity     Pericardial cyst     Peripheral neuropathy     Pneumonia     Polycystic ovary syndrome 2022    Hemorrhagic ovarian cysts    Possible joint hypermobility syndrome 02/28/2025    Rectal bleeding     Rheumatoid arthritis     Sensitive skin     Sleep apnea     CPAP    Spondylosis     Syncope     MORE RECENTLY: JAN 27 2023///PREVIOUS ENTRY: STATES LAST EPISODE 4/6/22 AT WORK    Tremor     Trigeminal neuralgia     Vision loss     Visual impairment     Vitamin D deficiency     Wound dehiscence        Past Surgical History:   Procedure Laterality Date    ABDOMINAL SURGERY      ADENOIDECTOMY      APPENDECTOMY      CHOLECYSTECTOMY      CHOLECYSTECTOMY WITH INTRAOPERATIVE CHOLANGIOGRAM N/A 07/14/2023    Procedure: CHOLECYSTECTOMY LAPAROSCOPIC INTRAOPERATIVE CHOLANGIOGRAM;  Surgeon: Teto Flores MD;  Location: [facility] MAIN OR;  Service: General;  Laterality: N/A;    COLON RESECTION N/A 11/04/2020    Procedure: laparoscopic ileocecectomy;  Surgeon: Lisa Kerr MD;  Location: [facility] MAIN OR;  Service: General;  Laterality: N/A;    COLONOSCOPY N/A 05/10/2019    Procedure: COLONOSCOPY TO CECUM TO TERMINAL ILEUM WITH BIOPSY;  Surgeon: Tae Turcios MD;  [facility];  Service: Gastroenterology    COLONOSCOPY N/A 04/13/2022    Procedure: COLONOSCOPY;  Surgeon: Jaylen Scott MD;  Location: [facility];  Service: Gastroenterology;  Laterality: N/A;  ANAL FISSURE    COLONOSCOPY N/A  "04/03/2024    Procedure: COLONOSCOPY;  Surgeon: Ede Benton MD;  Location: [facility] OR;  Service: Gastroenterology;  Laterality: N/A;  internal hemmorhoids    DIAGNOSTIC LAPAROSCOPY N/A 02/03/2025    Procedure: DIAGNOSTIC LAPAROSCOPY, BILATERAL SALPINGECTOMY WITH DAVINCI ROBOT;  Surgeon: Rito Rodrigez MD;  Location: [facility] MAIN OR;  Service: Robotics - DaVinci;  Laterality: N/A;    ENDOSCOPY N/A 05/10/2019    Procedure: ESOPHAGOGASTRODUODENOSCOPY WITH BIOPSY;  Surgeon: Tae Turcios MD;  Location:  [facility] ENDOSCOPY;  Service: Gastroenterology    ENDOSCOPY N/A 12/13/2019    Procedure: ESOPHAGOGASTRODUODENOSCOPY WITH COLD BIOPSIES;  Surgeon: Tae Turcios MD;  Location:  [facility]OSCOPY;  Service: Gastroenterology    ENDOSCOPY N/A 04/03/2024    Procedure: ESOPHAGOGASTRODUODENOSCOPY WITH BIOPSY;  Surgeon: Ede Benton MD;  Location: [facility] OR;  Service: Gastroenterology;  Laterality: N/A;  Gastritis    FLEXIBLE SIGMOIDOSCOPY      LIPOMA EXCISION  2007    SIGMOIDOSCOPY N/A 12/13/2019    Procedure: SIGMOIDOSCOPY FLEXIBLE TO T.I.;  Surgeon: Tae Turcios MD;  [facility];  Service: Gastroenterology    TONSILLECTOMY AND ADENOIDECTOMY      TUBAL ABDOMINAL LIGATION  2/3/25    Salpingectomy    UPPER GASTROINTESTINAL ENDOSCOPY      WISDOM TOOTH EXTRACTION       EKG obtained from the [facility] preadmission testing lab January 23, 2025 which shows sinus rhythm and is normal.    Procedures       Objective:     Vitals:    05/02/25 1447   BP: 104/60   BP Location: Left arm   Patient Position: Sitting   Cuff Size: Large Adult   Pulse: 70   SpO2: 97%   Weight: (!) 156 kg (345 lb)   Height: 172.7 cm (68\")         Constitutional:       Appearance: Healthy appearance. Not in distress.   Neck:      Vascular: JVD normal.   Pulmonary:      Effort: Pulmonary effort is normal.      Breath sounds: Normal breath sounds.   Cardiovascular:      PMI at left midclavicular line. Normal rate. " Regular rhythm. Normal S2.       Murmurs: There is no murmur.   Pulses:     Intact distal pulses.   Edema:     Peripheral edema absent.   Skin:     General: Skin is warm and dry.   Neurological:      General: No focal deficit present.      Mental Status: Alert, oriented to person, place, and time and oriented to person, place and time.   Psychiatric:         Mood and Affect: Mood and affect normal.         Lab Review:       BUN   Date Value Ref Range Status   02/24/2025 12 6 - 20 mg/dL Final     Creatinine   Date Value Ref Range Status   02/24/2025 0.86 0.57 - 1.00 mg/dL Final   08/11/2021 0.80 0.60 - 1.30 mg/dL Final     Comment:     Serial Number: [id number]:  [id number]     Potassium   Date Value Ref Range Status   02/24/2025 4.5 3.5 - 5.2 mmol/L Final     ALT (SGPT)   Date Value Ref Range Status   02/24/2025 17 1 - 33 U/L Final     AST (SGOT)   Date Value Ref Range Status   02/24/2025 17 1 - 32 U/L Final         Performed        Assessment:          Diagnosis Plan   1. Postural orthostatic tachycardia syndrome (POTS)        2. Family history of thoracic aortic aneurysm  Adult Transthoracic Echo Complete w/ Color, Spectral and Contrast if Necessary Per Protocol      3. Palpitations  Adult Transthoracic Echo Complete w/ Color, Spectral and Contrast if Necessary Per Protocol      4. Dyspnea on exertion  Adult Transthoracic Echo Complete w/ Color, Spectral and Contrast if Necessary Per Protocol             Plan:         History of POTS with hyperadrenergic features: She follows at [facility]  She remains on Mestinon and metoprolol for symptoms and is tolerating them well.  She does have follow-up scheduled in a few months with it.  Family history of aortopathy: I directly reviewed and interpreted her CT angiogram of the chest was performed in August 2021.This was a PE protocol, and is not cardiac gated, however the ascending thoracic aorta appears normal in caliber.  I measure it to be 3 cm at  the level of the pulmonary artery.  Her PCP has ordered a noncontrasted CT chest, but apparently was denied by insurance, and for reasons unbeknownst to myself, as this seems like a reasonable test  Anyway, I plan to To check an echocardiogram to reevaluate her thoracic aortic dimensions and to investigate her concomitant complaints of dyspnea.  If we can see the ascending thoracic aorta well on this echo, that I do not think she needs further CT imaging.   She does have genetics testing that is pending at [place].  If she does not have a genetic positive aortopathy, that I think it is possible her grandmother aortic aneurysm was acquired probably due to blood pressure, smoking, in which case I do not think the patient would need further routine screening, until she gets closer to the age of 50-60 when her grandmother was diagnosed   If her genetic testing is positive for a possible aortopathy, we can probably get an echo for screening intervals every 3 years or so.,  Assuming no aneurysm is detected     Crohn's disease.  On [place]    Thank you for allowing me to participate in the care of Bri Lugoranulfo. Feel free to contact me directly with any further questions or concerns.    RTC as needed after the echo.  Will see what the echo shows, touch base and that will determine if she needs a CT, and by that time we can also decide if her genetics testing has identified any markers that would put her at higher risk for issues moving forward    Alli Atwood MD  [facility]  05/02/25  14:32 EDT       Current Outpatient Medications:     albuterol (ACCUNEB) 1.25 MG/3ML nebulizer solution, Take 3 mL by nebulization Every 6 (Six) Hours As Needed for Shortness of Air or Wheezing., Disp: 60 each, Rfl: 0    albuterol sulfate  (90 Base) MCG/ACT inhaler, Inhale 2 puffs Every 6 (Six) Hours As Needed for Wheezing or Shortness of Air., Disp: 8 g, Rfl: 11    ALOE VERA PO, Take 1 tablet by mouth Daily.  HELD, Disp: , Rfl:     amitriptyline (ELAVIL) 10 MG tablet, Take 1 tablet by mouth Every Night., Disp: 90 tablet, Rfl: 1    cetirizine (zyrTEC) 10 MG tablet, Take 1 tablet by mouth Daily. (Patient taking differently: Take 2 tablets by mouth 2 (Two) Times a Day.), Disp: , Rfl:     Cholecalciferol (VITAMIN D-3) 5000 units tablet, Take 1 tablet by mouth Daily. HELD, Disp: , Rfl:     Cyanocobalamin (VITAMIN B 12 PO), Take 1 tablet by mouth Daily. HELD, Disp: , Rfl:     dicyclomine (BENTYL) 20 MG tablet, Take 1 tablet by mouth Every 6 (Six) Hours As Needed for Abdominal Cramping., Disp: 120 tablet, Rfl: 4    diphenoxylate-atropine (LOMOTIL) 2.5-0.025 MG per tablet, Take 1 tablet by mouth 4 (Four) Times a Day As Needed for Diarrhea., Disp: 120 tablet, Rfl: 3    escitalopram (Lexapro) 20 MG tablet, Take 1 tablet by mouth Daily., Disp: 30 tablet, Rfl: 1    esomeprazole (nexIUM) 40 MG capsule, Take 1 capsule by mouth Daily., Disp: 90 capsule, Rfl: 3    famotidine (PEPCID) 20 MG tablet, Take 1 tablet by mouth 2 (Two) Times a Day As Needed (GERD)., Disp: 180 tablet, Rfl: 2    ferrous sulfate 324 (65 Fe) MG tablet delayed-release EC tablet, Take 1 tablet by mouth Daily With Breakfast. HELD, Disp: , Rfl:     fluticasone (FLONASE) 50 MCG/ACT nasal spray, Administer 2 sprays into the nostril(s) as directed by provider Daily., Disp: , Rfl:     Folic Acid 0.8 MG capsule, Take 0.8 mg by mouth Every Morning. HELD, Disp: , Rfl:     methocarbamol (ROBAXIN) 750 MG tablet, Take 1 tablet by mouth 3 (Three) Times a Day As Needed for Muscle Spasms., Disp: 30 tablet, Rfl: 0    metoprolol tartrate (LOPRESSOR) 25 MG tablet, Take 0.5 tablets by mouth Every 12 (Twelve) Hours., Disp: 30 tablet, Rfl: 2    modafinil (PROVIGIL) 100 MG tablet, Take 1 tablet by mouth 2 (Two) Times a Day Before Breakfast and Lunch., Disp: 60 tablet, Rfl: 2    mometasone-formoterol (Dulera) 100-5 MCG/ACT inhaler, Inhale 2 puffs 2 (Two) Times a Day., Disp: 13 g, Rfl: 1     montelukast (SINGULAIR) 10 MG tablet, Take 1 tablet by mouth Daily., Disp: 90 tablet, Rfl: 1    Multiple Vitamin (MULTIVITAMIN PO), Take 1 tablet by mouth Daily. HELD, Disp: , Rfl:     ondansetron ODT (ZOFRAN-ODT) 8 MG disintegrating tablet, Take 1 tablet by mouth Every 8 (Eight) Hours As Needed for Nausea or Vomiting., Disp: 12 tablet, Rfl: 0    pregabalin (LYRICA) 100 MG capsule, Take 1 capsule by mouth 3 times a day., Disp: 270 capsule, Rfl: 1    pyridostigmine (MESTINON) 60 MG tablet, Take 1 tablet by mouth Every 8 (Eight) Hours., Disp: 90 tablet, Rfl: 5    Riboflavin-Magnesium-Feverfew (MigreLief) 200-180-50 MG tablet, Take 1-2 tablets by mouth 2 (Two) Times a Day. HELD, Disp: , Rfl:     Rimegepant Sulfate (Nurtec) 75 MG tablet dispersible tablet, Take 1 tablet by mouth As Needed (Take 1 tablet by mouth daily as needed for headache/migraine.  Do not take more than one in a 24 hour period.)., Disp: 8 tablet, Rfl: 11    Risankizumab-rzaa (Skyrizi) 360 MG/2.4ML solution cartridge, Inject 360 mg under the skin into the appropriate area as directed Every 8 (Eight) Weeks., Disp: 2.4 mL, Rfl: 6    azithromycin (Zithromax Z-Joshua) 250 MG tablet, Take 2 tablets the first day, then 1 tablet daily for 4 days., Disp: 6 tablet, Rfl: 0    methylPREDNISolone (MEDROL) 4 MG dose pack, Take as directed on package instructions., Disp: 21 tablet, Rfl: 0         Return if symptoms worsen or fail to improve.      Part of this note may be an electronic transcription/translation of spoken language to printed text using the Dragon Dictation System.

## 2025-05-06 ENCOUNTER — HOSPITAL ENCOUNTER (OUTPATIENT)
Dept: CARDIOLOGY | Facility: HOSPITAL | Age: 40
Discharge: HOME | End: 2025-05-06
Admitting: STUDENT IN AN ORGANIZED HEALTH CARE EDUCATION/TRAINING PROGRAM
Payer: COMMERCIAL

## 2025-05-06 VITALS
OXYGEN SATURATION: 99 % | DIASTOLIC BLOOD PRESSURE: 75 MMHG | BODY MASS INDEX: 44.41 KG/M2 | HEART RATE: 71 BPM | WEIGHT: 293 LBS | SYSTOLIC BLOOD PRESSURE: 111 MMHG | HEIGHT: 68 IN

## 2025-05-06 DIAGNOSIS — Z82.49 FAMILY HISTORY OF THORACIC AORTIC ANEURYSM: ICD-10-CM

## 2025-05-06 DIAGNOSIS — R06.09 DYSPNEA ON EXERTION: ICD-10-CM

## 2025-05-06 DIAGNOSIS — R00.2 PALPITATIONS: ICD-10-CM

## 2025-05-06 LAB
AORTIC ARCH: 2.5 CM
AORTIC DIMENSIONLESS INDEX: 0.82 (DI)
ASCENDING AORTA: 2.9 CM
AV MEAN PRESS GRAD SYS DOP V1V2: 4 MMHG
AV VMAX SYS DOP: 148 CM/SEC
BH CV ECHO MEAS - ACS: 2.14 CM
BH CV ECHO MEAS - AO MAX PG: 8.8 MMHG
BH CV ECHO MEAS - AO ROOT AREA (BSA CORRECTED): 1.1 CM2
BH CV ECHO MEAS - AO ROOT DIAM: 2.8 CM
BH CV ECHO MEAS - AO V2 VTI: 29.8 CM
BH CV ECHO MEAS - AVA(I,D): 3 CM2
BH CV ECHO MEAS - EDV(CUBED): 148.9 ML
BH CV ECHO MEAS - EDV(MOD-SP2): 68 ML
BH CV ECHO MEAS - EDV(MOD-SP4): 79 ML
BH CV ECHO MEAS - EF(MOD-SP2): 55.9 %
BH CV ECHO MEAS - EF(MOD-SP4): 57 %
BH CV ECHO MEAS - ESV(CUBED): 31.7 ML
BH CV ECHO MEAS - ESV(MOD-SP2): 30 ML
BH CV ECHO MEAS - ESV(MOD-SP4): 34 ML
BH CV ECHO MEAS - FS: 40.3 %
BH CV ECHO MEAS - IVS/LVPW: 0.82 CM
BH CV ECHO MEAS - IVSD: 0.9 CM
BH CV ECHO MEAS - LAT PEAK E' VEL: 15.1 CM/SEC
BH CV ECHO MEAS - LV DIASTOLIC VOL/BSA (35-75): 30.7 CM2
BH CV ECHO MEAS - LV MASS(C)D: 200.4 GRAMS
BH CV ECHO MEAS - LV MAX PG: 5.7 MMHG
BH CV ECHO MEAS - LV MEAN PG: 3 MMHG
BH CV ECHO MEAS - LV SYSTOLIC VOL/BSA (12-30): 13.2 CM2
BH CV ECHO MEAS - LV V1 MAX: 119 CM/SEC
BH CV ECHO MEAS - LV V1 VTI: 24.4 CM
BH CV ECHO MEAS - LVIDD: 5.3 CM
BH CV ECHO MEAS - LVIDS: 3.2 CM
BH CV ECHO MEAS - LVOT AREA: 3.7 CM2
BH CV ECHO MEAS - LVOT DIAM: 2.16 CM
BH CV ECHO MEAS - LVPWD: 1.1 CM
BH CV ECHO MEAS - MED PEAK E' VEL: 8.6 CM/SEC
BH CV ECHO MEAS - MV A DUR: 0.1 SEC
BH CV ECHO MEAS - MV A MAX VEL: 54.6 CM/SEC
BH CV ECHO MEAS - MV DEC SLOPE: 337.6 CM/SEC2
BH CV ECHO MEAS - MV DEC TIME: 0.19 SEC
BH CV ECHO MEAS - MV E MAX VEL: 71.9 CM/SEC
BH CV ECHO MEAS - MV E/A: 1.32
BH CV ECHO MEAS - MV MAX PG: 2.13 MMHG
BH CV ECHO MEAS - MV MEAN PG: 1.13 MMHG
BH CV ECHO MEAS - MV P1/2T: 59.7 MSEC
BH CV ECHO MEAS - MV V2 VTI: 26.7 CM
BH CV ECHO MEAS - MVA(P1/2T): 3.7 CM2
BH CV ECHO MEAS - MVA(VTI): 3.3 CM2
BH CV ECHO MEAS - PA ACC TIME: 0.09 SEC
BH CV ECHO MEAS - PA V2 MAX: 97 CM/SEC
BH CV ECHO MEAS - PULM A REVS DUR: 0.11 SEC
BH CV ECHO MEAS - PULM A REVS VEL: 29.9 CM/SEC
BH CV ECHO MEAS - PULM DIAS VEL: 31.2 CM/SEC
BH CV ECHO MEAS - PULM S/D: 1.06
BH CV ECHO MEAS - PULM SYS VEL: 33 CM/SEC
BH CV ECHO MEAS - QP/QS: 0.82
BH CV ECHO MEAS - RAP SYSTOLE: 3 MMHG
BH CV ECHO MEAS - RV MAX PG: 4.5 MMHG
BH CV ECHO MEAS - RV V1 MAX: 106.3 CM/SEC
BH CV ECHO MEAS - RV V1 VTI: 23.2 CM
BH CV ECHO MEAS - RVOT DIAM: 2 CM
BH CV ECHO MEAS - SV(LVOT): 89.4 ML
BH CV ECHO MEAS - SV(MOD-SP2): 38 ML
BH CV ECHO MEAS - SV(MOD-SP4): 45 ML
BH CV ECHO MEAS - SV(RVOT): 73.1 ML
BH CV ECHO MEAS - SVI(LVOT): 34.7 ML/M2
BH CV ECHO MEAS - SVI(MOD-SP2): 14.7 ML/M2
BH CV ECHO MEAS - SVI(MOD-SP4): 17.5 ML/M2
BH CV ECHO MEAS - TAPSE (>1.6): 1.94 CM
BH CV ECHO MEASUREMENTS AVERAGE E/E' RATIO: 6.07
BH CV XLRA - RV BASE: 2.8 CM
BH CV XLRA - RV LENGTH: 8.1 CM
BH CV XLRA - RV MID: 2.8 CM
BH CV XLRA - TDI S': 10 CM/SEC
LEFT ATRIUM VOLUME INDEX: 16.1 ML/M2
LV EF BIPLANE MOD: 56 %
SINUS: 2.7 CM
STJ: 2.38 CM

## 2025-05-06 PROCEDURE — 93306 TTE W/DOPPLER COMPLETE: CPT | Performed by: INTERNAL MEDICINE

## 2025-05-06 PROCEDURE — 93306 TTE W/DOPPLER COMPLETE: CPT

## 2025-05-27 ENCOUNTER — SPECIALTY PHARMACY (OUTPATIENT)
Dept: GASTROENTEROLOGY | Facility: CLINIC | Age: 40
End: 2025-05-27
Payer: COMMERCIAL

## 2025-05-27 NOTE — PROGRESS NOTES
Specialty Pharmacy Patient Management Program  Refill Outreach      Bri is a 39 y.o. female contacted today regarding refills of her medication(s).    Specialty medication(s) and dose(s) confirmed: n/a  Other medications being refilled: famotidine, metoprolol tartrate    Refill Questions      Flowsheet Row Most Recent Value   Changes to allergies? No   Changes to medications? No   New conditions or infections since last clinic visit No   Unplanned office visit, urgent care, ED, or hospital admission in the last 4 weeks  No   How does patient/caregiver feel medication is working? Very good   Financial problems or insurance changes  No   Since the previous refill, were any specialty medication doses or scheduled injections missed or delayed?  No   Does this patient require a clinical escalation to a pharmacist? No            Delivery Questions      Flowsheet Row Most Recent Value   Delivery method UPS   Delivery address verified with patient/caregiver? Yes   Delivery address Home   Number of medications in delivery 2   Medication(s) being filled and delivered Famotidine (PEPCID), Metoprolol Tartrate (LOPRESSOR)   Copay verified? Yes   Copay amount $19.20 for famotidine,  $12.22 for metoprolol   Copay form of payment Credit/debit on file   Delivery Date Selection 05/29/25   Signature Required No                 Follow-up: 85 day(s)    Electronically signed by Debbi Abbasi PharmD, 05/27/25, 2:31 PM EDT.

## 2025-05-30 ENCOUNTER — OFFICE VISIT (OUTPATIENT)
Dept: FAMILY MEDICINE CLINIC | Facility: CLINIC | Age: 40
End: 2025-05-30
Payer: COMMERCIAL

## 2025-05-30 ENCOUNTER — TELEPHONE (OUTPATIENT)
Dept: FAMILY MEDICINE CLINIC | Facility: CLINIC | Age: 40
End: 2025-05-30
Payer: COMMERCIAL

## 2025-05-30 VITALS
WEIGHT: 293 LBS | SYSTOLIC BLOOD PRESSURE: 110 MMHG | BODY MASS INDEX: 44.41 KG/M2 | TEMPERATURE: 98.4 F | HEART RATE: 73 BPM | DIASTOLIC BLOOD PRESSURE: 68 MMHG | OXYGEN SATURATION: 97 % | HEIGHT: 68 IN

## 2025-05-30 DIAGNOSIS — J45.41 MODERATE PERSISTENT ASTHMA WITH ACUTE EXACERBATION: Primary | ICD-10-CM

## 2025-05-30 PROCEDURE — 99213 OFFICE O/P EST LOW 20 MIN: CPT | Performed by: NURSE PRACTITIONER

## 2025-05-30 RX ORDER — FLUTICASONE FUROATE, UMECLIDINIUM BROMIDE AND VILANTEROL TRIFENATATE 200; 62.5; 25 UG/1; UG/1; UG/1
1 POWDER RESPIRATORY (INHALATION)
Qty: 60 EACH | Refills: 5 | Status: SHIPPED | OUTPATIENT
Start: 2025-05-30

## 2025-05-30 RX ORDER — ALBUTEROL SULFATE 1.25 MG/3ML
1 SOLUTION RESPIRATORY (INHALATION) EVERY 6 HOURS PRN
Qty: 360 ML | Refills: 11 | Status: SHIPPED | OUTPATIENT
Start: 2025-05-30

## 2025-05-30 RX ORDER — PREDNISONE 20 MG/1
TABLET ORAL
Qty: 20 TABLET | Refills: 0 | Status: SHIPPED | OUTPATIENT
Start: 2025-05-30

## 2025-05-30 NOTE — TELEPHONE ENCOUNTER
Patient called into the office stating that she is having some SOB on exertion no chest pain patient believes that she is having an asthma flare. Patient was schedule with Amrik Abbasi also advise to go to the ER or UC if her symptoms worsen. Patient voiced understanding and had no further question.

## 2025-05-30 NOTE — PROGRESS NOTES
Subjective   Bri Pfeiffer is a 39 y.o. female.     Asthma  She complains of cough, shortness of breath and wheezing. Associated symptoms include headaches. Pertinent negatives include no fever. Her past medical history is significant for asthma.   Cough  Associated symptoms: headaches, shortness of breath and wheezing    Associated symptoms: no chills and no fever    PMH includes: asthma    Headache    Associated symptoms: cough, headaches, shortness of breath and wheezing      Associated symptoms: no fever       Patient presents to office for asthma exacerbation.  Symptoms started over the weekend but worse within the past 24 hours.  She reports wheezing, chest tightness and shortness of breath.  She has a very bronchial sounding cough which is dry.  This is her third exacerbation within the year which is unusual for her.  She is still taking Singulair daily, Dulera 2 puffs twice a day, albuterol HandiHaler as needed and has been using her albuterol nebulizer for the past 24 hours.  The following portions of the patient's history were reviewed and updated as appropriate: allergies, current medications, past family history, past medical history, past social history, past surgical history, and problem list.    Review of Systems   Constitutional:  Negative for chills and fever.   Respiratory:  Positive for cough, chest tightness, shortness of breath and wheezing.    Neurological:  Positive for headaches.       Objective   Physical Exam  Vitals reviewed.   Constitutional:       Appearance: She is well-developed.   HENT:      Right Ear: Tympanic membrane, ear canal and external ear normal.      Left Ear: Tympanic membrane, ear canal and external ear normal.      Nose: No mucosal edema.      Mouth/Throat:      Lips: Pink.      Mouth: Mucous membranes are moist.      Pharynx: Postnasal drip present.   Cardiovascular:      Rate and Rhythm: Normal rate and regular rhythm.   Pulmonary:      Effort: Pulmonary  effort is normal.      Breath sounds: Examination of the right-upper field reveals wheezing. Examination of the left-upper field reveals wheezing. Examination of the right-lower field reveals wheezing. Examination of the left-lower field reveals wheezing. Decreased breath sounds and wheezing present.   Neurological:      Mental Status: She is alert and oriented to person, place, and time.   Psychiatric:         Mood and Affect: Mood normal.         Behavior: Behavior normal.         Thought Content: Thought content normal.         Judgment: Judgment normal.         Assessment & Plan   Diagnoses and all orders for this visit:    1. Moderate persistent asthma with acute exacerbation (Primary)  -     albuterol (ACCUNEB) 1.25 MG/3ML nebulizer solution; Take 3 mL by nebulization Every 6 (Six) Hours As Needed for Shortness of Air or Wheezing.  Dispense: 360 mL; Refill: 11  -     Fluticasone-Umeclidin-Vilant (Trelegy Ellipta) 200-62.5-25 MCG/ACT inhaler; Inhale 1 puff Daily.  Dispense: 60 each; Refill: 5    Other orders  -     predniSONE (DELTASONE) 20 MG tablet; Take 3 tabs QDPC x 3 days then 2 tabs QDPC x 4 days then 1 tab QDPC x 3 days  Dispense: 20 tablet; Refill: 0        Will put her on prednisone taper and hold off on antibiotic as I do not feel that she has an infection at this time.  She will let me know Monday if no significant improvement.  Will also change her Dulera to Trelegy for better asthma management.

## 2025-06-05 RX ORDER — PREDNISONE 20 MG/1
TABLET ORAL
Qty: 20 TABLET | Refills: 0 | Status: SHIPPED | OUTPATIENT
Start: 2025-06-05

## 2025-06-12 DIAGNOSIS — M79.7 FIBROMYALGIA: ICD-10-CM

## 2025-06-12 RX ORDER — PREGABALIN 100 MG/1
100 CAPSULE ORAL 3 TIMES DAILY
Qty: 270 CAPSULE | Refills: 1 | Status: SHIPPED | OUTPATIENT
Start: 2025-06-12

## 2025-06-12 NOTE — TELEPHONE ENCOUNTER
Rx Refill Note  Requested Prescriptions     Pending Prescriptions Disp Refills    pregabalin (LYRICA) 100 MG capsule 270 capsule 1     Sig: Take 1 capsule by mouth 3 times a day.      Last office visit with prescribing clinician: 4/22/2025   Last telemedicine visit with prescribing clinician: Visit date not found   Next office visit with prescribing clinician: Visit date not found       SUSIE Alfred(R)  06/12/25, 10:44 EDT

## 2025-06-26 NOTE — TELEPHONE ENCOUNTER
From: Bri Pfeiffer  To: Xochilt Mcgovern  Sent: 3/14/2023 12:49 PM EDT  Subject: Covid positive    Hello   I wanted to pass on that I tested positive for covid today. I have a call into my primary for paxlovid.  I haven't started Skyrizi yet, but wanted to know if there's a certain time frame I need to wait before I can start it.   Thank you,  Bri   0 = swallows foods/liquids without difficulty

## 2025-07-01 RX ORDER — ESCITALOPRAM OXALATE 20 MG/1
20 TABLET ORAL DAILY
Qty: 30 TABLET | Refills: 1 | Status: SHIPPED | OUTPATIENT
Start: 2025-07-01 | End: 2025-07-07 | Stop reason: SDUPTHER

## 2025-07-07 ENCOUNTER — TELEPHONE (OUTPATIENT)
Dept: NEUROLOGY | Facility: CLINIC | Age: 40
End: 2025-07-07
Payer: COMMERCIAL

## 2025-07-07 RX ORDER — ESCITALOPRAM OXALATE 20 MG/1
20 TABLET ORAL DAILY
Qty: 30 TABLET | Refills: 1 | Status: SHIPPED | OUTPATIENT
Start: 2025-07-07

## 2025-07-07 RX ORDER — AMITRIPTYLINE HYDROCHLORIDE 10 MG/1
10 TABLET ORAL NIGHTLY
Qty: 90 TABLET | Refills: 1 | Status: SHIPPED | OUTPATIENT
Start: 2025-07-07

## 2025-07-07 NOTE — TELEPHONE ENCOUNTER
LVM regarding rescheduling appt on 7/22/2025 with Mackenzie. Went ahead and r/s appt to 10/21/2025 at 3:30 PM. Will put on high priority cancellation list since appt is far out

## 2025-07-08 DIAGNOSIS — J45.41 MODERATE PERSISTENT ASTHMA WITH ACUTE EXACERBATION: ICD-10-CM

## 2025-07-08 DIAGNOSIS — M79.7 FIBROMYALGIA: ICD-10-CM

## 2025-07-08 DIAGNOSIS — J30.1 NON-SEASONAL ALLERGIC RHINITIS DUE TO POLLEN: Chronic | ICD-10-CM

## 2025-07-08 RX ORDER — FLUTICASONE FUROATE, UMECLIDINIUM BROMIDE AND VILANTEROL TRIFENATATE 200; 62.5; 25 UG/1; UG/1; UG/1
1 POWDER RESPIRATORY (INHALATION)
Qty: 180 EACH | Refills: 1 | Status: SHIPPED | OUTPATIENT
Start: 2025-07-08

## 2025-07-08 RX ORDER — MONTELUKAST SODIUM 10 MG/1
10 TABLET ORAL DAILY
Qty: 90 TABLET | Refills: 1 | Status: SHIPPED | OUTPATIENT
Start: 2025-07-08

## 2025-07-08 RX ORDER — ALBUTEROL SULFATE 1.25 MG/3ML
1 SOLUTION RESPIRATORY (INHALATION) EVERY 6 HOURS PRN
Qty: 360 ML | Refills: 11 | Status: SHIPPED | OUTPATIENT
Start: 2025-07-08

## 2025-07-08 RX ORDER — ALBUTEROL SULFATE 90 UG/1
2 INHALANT RESPIRATORY (INHALATION) EVERY 6 HOURS PRN
Qty: 24 G | Refills: 3 | Status: SHIPPED | OUTPATIENT
Start: 2025-07-08

## 2025-07-08 RX ORDER — PREGABALIN 100 MG/1
100 CAPSULE ORAL 3 TIMES DAILY
Qty: 270 CAPSULE | Refills: 1 | Status: SHIPPED | OUTPATIENT
Start: 2025-07-08

## 2025-07-11 ENCOUNTER — TELEPHONE (OUTPATIENT)
Dept: FAMILY MEDICINE CLINIC | Facility: CLINIC | Age: 40
End: 2025-07-11
Payer: COMMERCIAL

## 2025-07-11 NOTE — TELEPHONE ENCOUNTER
Spoke with the pharmacy to inform per that patient she has been on medication for a while now and not had any reaction.pharmacy voiced understanding and had no further questions.

## 2025-07-11 NOTE — TELEPHONE ENCOUNTER
Pharmacy Name: [facility] - [place], [place] - [address] - [phone]  - [phone] FX     Reference Number (if applicable): N/A    Pharmacy representative name: TOM    Pharmacy representative phone number:  [phone]    What medication are you calling in regards to: Fluticasone-Umeclidin-Vilant (Trelegy Ellipta) 200-62.5-25 MCG/ACT inhaler    What question does the pharmacy have: PHARMACY STATED THAT ON PATIENT'S ALLERGY LIST IT SHOWS PATIENT HAS AN ALLERGY TO DAIRY, SO PHARMACY IS WANTING TO KNOW IF THE MEDICATION Fluticasone-Umeclidin-Vilant (Trelegy Ellipta) 200-62.5-25 MCG/ACT inhaler HAS BEEN TOLERATED IN THE PAST.     Who is the provider that prescribed the medication: DR APPIAH    Additional notes:

## 2025-07-15 ENCOUNTER — PATIENT MESSAGE (OUTPATIENT)
Dept: FAMILY MEDICINE CLINIC | Facility: CLINIC | Age: 40
End: 2025-07-15
Payer: COMMERCIAL

## 2025-07-15 RX ORDER — FLUTICASONE PROPIONATE AND SALMETEROL XINAFOATE 45; 21 UG/1; UG/1
2 AEROSOL, METERED RESPIRATORY (INHALATION)
Qty: 8 G | Refills: 1 | Status: SHIPPED | OUTPATIENT
Start: 2025-07-15 | End: 2025-07-17 | Stop reason: SDUPTHER

## 2025-07-17 RX ORDER — FLUTICASONE PROPIONATE AND SALMETEROL XINAFOATE 45; 21 UG/1; UG/1
2 AEROSOL, METERED RESPIRATORY (INHALATION)
Qty: 24 G | Refills: 1 | Status: SHIPPED | OUTPATIENT
Start: 2025-07-17

## 2025-07-24 ENCOUNTER — SPECIALTY PHARMACY (OUTPATIENT)
Dept: GASTROENTEROLOGY | Facility: CLINIC | Age: 40
End: 2025-07-24
Payer: COMMERCIAL

## 2025-07-24 NOTE — PROGRESS NOTES
Specialty Pharmacy Patient Management Program  Gastroenterology Refill Outreach      Bri is a 39 y.o. female contacted today regarding refills of her medication(s).    Specialty medication(s) and dose(s) confirmed: esomeprazole, famotidine    Refill Questions      Flowsheet Row Most Recent Value   Changes to allergies? No   Changes to medications? No   New conditions or infections since last clinic visit No   Unplanned office visit, urgent care, ED, or hospital admission in the last 4 weeks  No   How does patient/caregiver feel medication is working? Very good   Financial problems or insurance changes  Yes   If yes, describe changes in insurance or financial issues. insurance change - will need to fill at [email or web address]   Since the previous refill, were any specialty medication doses or scheduled injections missed or delayed?  No   Does this patient require a clinical escalation to a pharmacist? No          Delivery Questions      Flowsheet Row Most Recent Value   Delivery method UPS   Delivery address verified with patient/caregiver? Yes   Delivery address Home   Number of medications in delivery 2   Medication(s) being filled and delivered Esomeprazole Magnesium (nexIUM), Famotidine (PEPCID)   Doses left of specialty medications 1 week   Copay verified? Yes   Copay amount 0   Copay form of payment No copayment ($0)   Delivery Date Selection 07/25/25   Signature Required No   Do you consent to receive electronic handouts?  No            Follow-Up: 85 days    Dana Huizar  7/24/2025  10:22 EDT

## 2025-08-15 RX ORDER — ESCITALOPRAM OXALATE 20 MG/1
20 TABLET ORAL DAILY
Qty: 30 TABLET | Refills: 3 | Status: SHIPPED | OUTPATIENT
Start: 2025-08-15

## (undated) DEVICE — NDL HYPO PRECISIONGLIDE REG 25G 1 1/2

## (undated) DEVICE — SEAL

## (undated) DEVICE — SUT VIC 5/0 PS2 18IN J495H

## (undated) DEVICE — SPNG GZ WOVN 4X4IN 12PLY 10/BX STRL

## (undated) DEVICE — THE TORRENT IRRIGATION SCOPE CONNECTOR IS USED WITH THE TORRENT IRRIGATION TUBING TO PROVIDE IRRIGATION FLUIDS SUCH AS STERILE WATER DURING GASTROINTESTINAL ENDOSCOPIC PROCEDURES WHEN USED IN CONJUNCTION WITH AN IRRIGATION PUMP (OR ELECTROSURGICAL UNIT).: Brand: TORRENT

## (undated) DEVICE — APPL CHLORAPREP HI/LITE 26ML ORNG

## (undated) DEVICE — CANN NASL CO2 TRULINK W/O2 A/

## (undated) DEVICE — GLV SURG BIOGEL LTX PF 6 1/2

## (undated) DEVICE — TUBING, SUCTION, 1/4" X 10', STRAIGHT: Brand: MEDLINE

## (undated) DEVICE — POOLE SUCTION HANDLE: Brand: CARDINAL HEALTH

## (undated) DEVICE — ENDOPATH PNEUMONEEDLE INSUFFLATION NEEDLES WITH LUER LOCK CONNECTORS 120MM: Brand: ENDOPATH

## (undated) DEVICE — Device: Brand: DEFENDO AIR/WATER/SUCTION AND BIOPSY VALVE

## (undated) DEVICE — SUT VIC 0 TN 27IN DYED JTN0G

## (undated) DEVICE — SAFELINER SUCTION CANISTER 1000CC: Brand: DEROYAL

## (undated) DEVICE — VESSEL SEALER EXTEND: Brand: ENDOWRIST

## (undated) DEVICE — DRSNG SURESITE WNDW 4X4.5

## (undated) DEVICE — ENDOPATH XCEL BLADELESS TROCARS WITH STABILITY SLEEVES: Brand: ENDOPATH XCEL

## (undated) DEVICE — ELECTRD BLD EZ CLN MOD 6.5IN

## (undated) DEVICE — ENDOCUT SCISSOR TIP, DISPOSABLE: Brand: RENEW

## (undated) DEVICE — Device

## (undated) DEVICE — TRAP FLD MINIVAC MEGADYNE 100ML

## (undated) DEVICE — GLV SURG BIOGEL LTX PF 5 1/2

## (undated) DEVICE — PREP SOL POVIDONE/IODINE BT 4OZ

## (undated) DEVICE — GOWN ,SIRUS,NONREINFORCED 3XL: Brand: MEDLINE

## (undated) DEVICE — LINER SURG CANSTR SXN S/RIGD 1500CC

## (undated) DEVICE — KT ORCA ORCAPOD DISP STRL

## (undated) DEVICE — DRSNG SURESITE WNDW 2.38X2.75

## (undated) DEVICE — ADAPT CLN BIOGUARD AIR/H2O DISP

## (undated) DEVICE — ENDOPATH XCEL UNIVERSAL TROCAR STABLILITY SLEEVES: Brand: ENDOPATH XCEL

## (undated) DEVICE — TTL1LYR 16FR10ML 100%SIL TMPST TR: Brand: MEDLINE

## (undated) DEVICE — VIAL FORMALIN CAP 10P 40ML

## (undated) DEVICE — SENSR O2 OXIMAX FNGR A/ 18IN NONSTR

## (undated) DEVICE — COLUMN DRAPE

## (undated) DEVICE — SYR LL 3CC

## (undated) DEVICE — SOL NACL 0.9PCT 1000ML

## (undated) DEVICE — SYR LL TP 10ML STRL

## (undated) DEVICE — BLADELESS OBTURATOR, LONG: Brand: WECK VISTA

## (undated) DEVICE — KT VLV BIOGUARD SXN BIOP AIR/H20 CONN 4PC DISP

## (undated) DEVICE — BW-412T DISP COMBO CLEANING BRUSH: Brand: SINGLE USE COMBINATION CLEANING BRUSH

## (undated) DEVICE — TOWEL,OR,DSP,ST,BLUE,STD,4/PK,20PK/CS: Brand: MEDLINE

## (undated) DEVICE — DISPOSABLE GRASPER CARTRIDGE: Brand: DIRECT DRIVE REPOSABLE GRASPERS

## (undated) DEVICE — LOU LAP SIGMOID COLON: Brand: MEDLINE INDUSTRIES, INC.

## (undated) DEVICE — THE BITE BLOCK MAXI, LATEX FREE STRAP IS USED TO PROTECT THE ENDOSCOPE INSERTION TUBE FROM BEING BITTEN BY THE PATIENT.

## (undated) DEVICE — SOL IRR H2O BTL 1000ML STRL

## (undated) DEVICE — SUT VIC 2/0 TIES 18IN J111T

## (undated) DEVICE — FRCP BX RADJAW4 NDL 2.8 240CM LG OG BX40

## (undated) DEVICE — SPONGE,LAP,18"X18",DLX,XR,ST,5/PK,40/PK: Brand: MEDLINE

## (undated) DEVICE — ELECTRD BLD EZ CLN MOD XLNG 2.75IN

## (undated) DEVICE — ENSEAL TRIO TEMPERATURE CONTOLLED TISSUE SEALING TECHNOLOGY DISPOSABLE TISSUE SEALING DEVICE TAPTRONIC TRIGGER ACTIVATED POWER 3MM CURVED JAW: Brand: ENSEAL

## (undated) DEVICE — LOU GYN LAPAROSCOPY: Brand: MEDLINE INDUSTRIES, INC.

## (undated) DEVICE — GLV SURG SIGNATURE ESSENTIAL PF LTX SZ6

## (undated) DEVICE — SUT PDS 0 CT1 36IN Z346H

## (undated) DEVICE — PENCL E/S ULTRAVAC TELESCP NOSE HOLSTR 10FT

## (undated) DEVICE — WOUND RETRACTOR AND PROTECTOR: Brand: ALEXIS WOUND PROTECTOR-RETRACTOR

## (undated) DEVICE — AIRSEAL 8 MM CANNULA CAP AND OBTURATOR WITH BLADELESS OPTICAL TIP COMPATIBLE WITH INTUITIVE DA VINCI XI AND DA VINCI X 8 MM INSTRUMENT CANNULA, LONG LENGTH: Brand: ARS LONG

## (undated) DEVICE — SUT VIC 0 TIES 18IN J912G

## (undated) DEVICE — SUT MNCRYL PLS ANTIB UD 4/0 PS2 18IN

## (undated) DEVICE — EXOFIN PRECISION PEN HIGH VISCOSITY TOPICAL SKIN ADHESIVE: Brand: EXOFIN PRECISION PEN, 1G

## (undated) DEVICE — ANTIBACTERIAL UNDYED BRAIDED (POLYGLACTIN 910), SYNTHETIC ABSORBABLE SUTURE: Brand: COATED VICRYL

## (undated) DEVICE — 1LYRTR 16FR10ML100%SIL UMS SNP: Brand: MEDLINE INDUSTRIES, INC.

## (undated) DEVICE — SUT VIC 3/0 CT2 27IN J232H

## (undated) DEVICE — BITEBLOCK OMNI BLOC

## (undated) DEVICE — HARMONIC 700 SHEARS, ADVANCED HEMOSTASIS: Brand: HARMONIC

## (undated) DEVICE — SYR LL W/SCALE/MARK 3ML STRL

## (undated) DEVICE — SUT VIC 3/0 TIES 18IN J110T

## (undated) DEVICE — LAPAROSCOPIC SMOKE FILTRATION SYSTEM: Brand: PALL LAPAROSHIELD® PLUS LAPAROSCOPIC SMOKE FILTRATION SYSTEM

## (undated) DEVICE — VISUALIZATION SYSTEM: Brand: CLEARIFY

## (undated) DEVICE — ARM DRAPE